# Patient Record
Sex: FEMALE | Race: WHITE | Employment: OTHER | URBAN - METROPOLITAN AREA
[De-identification: names, ages, dates, MRNs, and addresses within clinical notes are randomized per-mention and may not be internally consistent; named-entity substitution may affect disease eponyms.]

---

## 2017-01-03 ENCOUNTER — OFFICE VISIT (OUTPATIENT)
Dept: CARDIOLOGY | Age: 82
End: 2017-01-03

## 2017-01-03 VITALS
SYSTOLIC BLOOD PRESSURE: 136 MMHG | RESPIRATION RATE: 18 BRPM | HEART RATE: 68 BPM | HEIGHT: 65 IN | BODY MASS INDEX: 27.82 KG/M2 | DIASTOLIC BLOOD PRESSURE: 68 MMHG | OXYGEN SATURATION: 97 % | TEMPERATURE: 97 F | WEIGHT: 167 LBS

## 2017-01-03 DIAGNOSIS — Z79.899 ENCOUNTER FOR MONITORING SOTALOL THERAPY: ICD-10-CM

## 2017-01-03 DIAGNOSIS — Z51.81 ENCOUNTER FOR MONITORING SOTALOL THERAPY: ICD-10-CM

## 2017-01-03 DIAGNOSIS — I49.5 SICK SINUS SYNDROME (HCC): ICD-10-CM

## 2017-01-03 DIAGNOSIS — Z02.89 PAIN MEDICATION AGREEMENT: ICD-10-CM

## 2017-01-03 DIAGNOSIS — R09.81 CHRONIC NASAL CONGESTION: ICD-10-CM

## 2017-01-03 DIAGNOSIS — J32.9 SINUSITIS, UNSPECIFIED CHRONICITY, UNSPECIFIED LOCATION: Primary | ICD-10-CM

## 2017-01-03 DIAGNOSIS — I47.1 SVT (SUPRAVENTRICULAR TACHYCARDIA) (HCC): ICD-10-CM

## 2017-01-03 PROCEDURE — 99214 OFFICE O/P EST MOD 30 MIN: CPT | Performed by: INTERNAL MEDICINE

## 2017-01-03 RX ORDER — TRAMADOL HYDROCHLORIDE 50 MG/1
50 TABLET ORAL DAILY PRN
Qty: 30 TABLET | Refills: 5 | Status: SHIPPED | OUTPATIENT
Start: 2017-01-03 | End: 2017-11-30

## 2017-01-05 DIAGNOSIS — I47.1 SVT (SUPRAVENTRICULAR TACHYCARDIA) (HCC): ICD-10-CM

## 2017-01-05 DIAGNOSIS — Z02.89 PAIN MEDICATION AGREEMENT: ICD-10-CM

## 2017-01-05 DIAGNOSIS — J32.9 SINUSITIS, UNSPECIFIED CHRONICITY, UNSPECIFIED LOCATION: ICD-10-CM

## 2017-01-05 LAB
ALBUMIN SERPL-MCNC: 3.9 G/DL (ref 3.9–4.9)
ALP BLD-CCNC: 76 U/L (ref 40–130)
ALT SERPL-CCNC: 8 U/L (ref 0–33)
ANION GAP SERPL CALCULATED.3IONS-SCNC: 10 MEQ/L (ref 7–13)
AST SERPL-CCNC: 16 U/L (ref 0–35)
BILIRUB SERPL-MCNC: 0.5 MG/DL (ref 0–1.2)
BUN BLDV-MCNC: 23 MG/DL (ref 8–23)
C-REACTIVE PROTEIN: 1.2 MG/L (ref 0–5)
CALCIUM SERPL-MCNC: 8.8 MG/DL (ref 8.6–10.2)
CHLORIDE BLD-SCNC: 103 MEQ/L (ref 98–107)
CO2: 26 MEQ/L (ref 22–29)
CREAT SERPL-MCNC: 0.75 MG/DL (ref 0.5–0.9)
GFR AFRICAN AMERICAN: >60
GFR NON-AFRICAN AMERICAN: >60
GLOBULIN: 2.1 G/DL (ref 2.3–3.5)
GLUCOSE BLD-MCNC: 90 MG/DL (ref 74–109)
HCT VFR BLD CALC: 41 % (ref 37–47)
HEMOGLOBIN: 13.6 G/DL (ref 12–16)
MCH RBC QN AUTO: 31.4 PG (ref 27–31.3)
MCHC RBC AUTO-ENTMCNC: 33.1 % (ref 33–37)
MCV RBC AUTO: 94.9 FL (ref 82–100)
PDW BLD-RTO: 13.8 % (ref 11.5–14.5)
PLATELET # BLD: 150 K/UL (ref 130–400)
POTASSIUM SERPL-SCNC: 4.4 MEQ/L (ref 3.5–5.1)
RBC # BLD: 4.32 M/UL (ref 4.2–5.4)
SODIUM BLD-SCNC: 139 MEQ/L (ref 132–144)
TOTAL PROTEIN: 6 G/DL (ref 6.4–8.1)
WBC # BLD: 6.7 K/UL (ref 4.8–10.8)

## 2017-01-08 LAB
6-ACETYLMORPHINE: NOT DETECTED
7-AMINOCLONAZEPAM: NOT DETECTED
ALPHA-OH-ALPRAZOLAM: NOT DETECTED
ALPRAZOLAM: NOT DETECTED
AMPHETAMINE: NOT DETECTED
BARBITURATES: NOT DETECTED
BENZOYLECGONINE: NOT DETECTED
BUPRENORPHINE: NOT DETECTED
CARISOPRODOL: NOT DETECTED
CLONAZEPAM: NOT DETECTED
CODEINE: NOT DETECTED
CREATININE URINE: 82.8 MG/DL (ref 20–400)
DIAZEPAM: NOT DETECTED
EER PAIN MGT DRUG PANEL, HIGH RES/EMIT U: NORMAL
ETHYL GLUCURONIDE: NOT DETECTED
FENTANYL: NOT DETECTED
HYDROCODONE: NOT DETECTED
HYDROMORPHONE: NOT DETECTED
LORAZEPAM: NOT DETECTED
MARIJUANA METABOLITE: NOT DETECTED
MDA: NOT DETECTED
MDEA: NOT DETECTED
MDMA URINE: NOT DETECTED
MEPERIDINE: NOT DETECTED
METHADONE: NOT DETECTED
METHAMPHETAMINE: NOT DETECTED
METHYLPHENIDATE: NOT DETECTED
MIDAZOLAM: NOT DETECTED
MORPHINE: NOT DETECTED
NORBUPRENORPHINE, FREE: NOT DETECTED
NORDIAZEPAM: NOT DETECTED
NORFENTANYL: NOT DETECTED
NORHYDROCODONE, URINE: NOT DETECTED
NOROXYCODONE: NOT DETECTED
NOROXYMORPHONE, URINE: NOT DETECTED
OXAZEPAM: NOT DETECTED
OXYCODONE: NOT DETECTED
OXYMORPHONE: NOT DETECTED
PAIN MANAGEMENT DRUG PANEL: NORMAL
PCP: NOT DETECTED
PHENTERMINE: NOT DETECTED
PROPOXYPHENE: NOT DETECTED
TAPENTADOL, URINE: NOT DETECTED
TAPENTADOL-O-SULFATE, URINE: NOT DETECTED
TEMAZEPAM: NOT DETECTED
TRAMADOL: NOT DETECTED
ZOLPIDEM: NOT DETECTED

## 2017-01-11 ASSESSMENT — ENCOUNTER SYMPTOMS
CHEST TIGHTNESS: 0
ALLERGIC/IMMUNOLOGIC NEGATIVE: 1
SHORTNESS OF BREATH: 0
SINUS PRESSURE: 1
GASTROINTESTINAL NEGATIVE: 1
EYES NEGATIVE: 1
BACK PAIN: 1

## 2017-02-20 ENCOUNTER — HOSPITAL ENCOUNTER (OUTPATIENT)
Dept: CARDIOLOGY | Age: 82
Discharge: HOME | End: 2017-02-20
Payer: MEDICARE

## 2017-02-20 PROCEDURE — 93293 PM PHONE R-STRIP DEVICE EVAL: CPT

## 2017-04-07 RX ORDER — SOTALOL HYDROCHLORIDE 80 MG/1
80 TABLET ORAL 2 TIMES DAILY
Qty: 180 TABLET | Refills: 3 | Status: SHIPPED | OUTPATIENT
Start: 2017-04-07 | End: 2018-04-02 | Stop reason: SDUPTHER

## 2017-05-09 ENCOUNTER — OFFICE VISIT (OUTPATIENT)
Dept: CARDIOLOGY | Age: 82
End: 2017-05-09

## 2017-05-09 VITALS
OXYGEN SATURATION: 97 % | BODY MASS INDEX: 25.49 KG/M2 | HEIGHT: 65 IN | DIASTOLIC BLOOD PRESSURE: 76 MMHG | RESPIRATION RATE: 16 BRPM | WEIGHT: 153 LBS | SYSTOLIC BLOOD PRESSURE: 147 MMHG | TEMPERATURE: 98 F | HEART RATE: 69 BPM

## 2017-05-09 DIAGNOSIS — Z95.0 PACEMAKER: Primary | ICD-10-CM

## 2017-05-09 DIAGNOSIS — Z51.81 ENCOUNTER FOR MONITORING SOTALOL THERAPY: ICD-10-CM

## 2017-05-09 DIAGNOSIS — I47.1 PAROXYSMAL SVT (SUPRAVENTRICULAR TACHYCARDIA) (HCC): ICD-10-CM

## 2017-05-09 DIAGNOSIS — Z79.899 ENCOUNTER FOR MONITORING SOTALOL THERAPY: ICD-10-CM

## 2017-05-09 PROCEDURE — 1090F PRES/ABSN URINE INCON ASSESS: CPT | Performed by: INTERNAL MEDICINE

## 2017-05-09 PROCEDURE — 4040F PNEUMOC VAC/ADMIN/RCVD: CPT | Performed by: INTERNAL MEDICINE

## 2017-05-09 PROCEDURE — 1123F ACP DISCUSS/DSCN MKR DOCD: CPT | Performed by: INTERNAL MEDICINE

## 2017-05-09 PROCEDURE — G8428 CUR MEDS NOT DOCUMENT: HCPCS | Performed by: INTERNAL MEDICINE

## 2017-05-09 PROCEDURE — G8420 CALC BMI NORM PARAMETERS: HCPCS | Performed by: INTERNAL MEDICINE

## 2017-05-09 PROCEDURE — G8400 PT W/DXA NO RESULTS DOC: HCPCS | Performed by: INTERNAL MEDICINE

## 2017-05-09 PROCEDURE — 1036F TOBACCO NON-USER: CPT | Performed by: INTERNAL MEDICINE

## 2017-05-09 PROCEDURE — 99213 OFFICE O/P EST LOW 20 MIN: CPT | Performed by: INTERNAL MEDICINE

## 2017-05-09 PROCEDURE — 93000 ELECTROCARDIOGRAM COMPLETE: CPT | Performed by: INTERNAL MEDICINE

## 2017-05-22 ENCOUNTER — HOSPITAL ENCOUNTER (OUTPATIENT)
Dept: CARDIOLOGY | Age: 82
Discharge: HOME | End: 2017-05-22
Payer: MEDICARE

## 2017-05-22 PROCEDURE — 93280 PM DEVICE PROGR EVAL DUAL: CPT

## 2017-05-26 ASSESSMENT — ENCOUNTER SYMPTOMS
GASTROINTESTINAL NEGATIVE: 1
ALLERGIC/IMMUNOLOGIC NEGATIVE: 1
EYES NEGATIVE: 1
CHEST TIGHTNESS: 0
SHORTNESS OF BREATH: 0

## 2017-06-18 ENCOUNTER — HOSPITAL ENCOUNTER (EMERGENCY)
Age: 82
Discharge: HOME | End: 2017-06-18
Attending: EMERGENCY MEDICINE
Payer: MEDICARE

## 2017-06-18 ENCOUNTER — APPOINTMENT (OUTPATIENT)
Dept: GENERAL RADIOLOGY | Age: 82
End: 2017-06-18
Payer: MEDICARE

## 2017-06-18 VITALS
HEART RATE: 72 BPM | DIASTOLIC BLOOD PRESSURE: 67 MMHG | OXYGEN SATURATION: 96 % | HEIGHT: 65 IN | TEMPERATURE: 97.9 F | SYSTOLIC BLOOD PRESSURE: 118 MMHG | RESPIRATION RATE: 18 BRPM | BODY MASS INDEX: 26.66 KG/M2 | WEIGHT: 160 LBS

## 2017-06-18 DIAGNOSIS — M10.00 ACUTE IDIOPATHIC GOUT, UNSPECIFIED SITE: Primary | ICD-10-CM

## 2017-06-18 PROCEDURE — 99283 EMERGENCY DEPT VISIT LOW MDM: CPT

## 2017-06-18 PROCEDURE — 73110 X-RAY EXAM OF WRIST: CPT

## 2017-06-18 PROCEDURE — 6370000000 HC RX 637 (ALT 250 FOR IP): Performed by: EMERGENCY MEDICINE

## 2017-06-18 PROCEDURE — 6360000002 HC RX W HCPCS: Performed by: EMERGENCY MEDICINE

## 2017-06-18 PROCEDURE — 96372 THER/PROPH/DIAG INJ SC/IM: CPT

## 2017-06-18 RX ORDER — DEXAMETHASONE SODIUM PHOSPHATE 10 MG/ML
8 INJECTION, SOLUTION INTRAMUSCULAR; INTRAVENOUS ONCE
Status: COMPLETED | OUTPATIENT
Start: 2017-06-18 | End: 2017-06-18

## 2017-06-18 RX ORDER — HYDROCODONE BITARTRATE AND ACETAMINOPHEN 5; 325 MG/1; MG/1
1 TABLET ORAL ONCE
Status: COMPLETED | OUTPATIENT
Start: 2017-06-18 | End: 2017-06-18

## 2017-06-18 RX ORDER — HYDROCODONE BITARTRATE AND ACETAMINOPHEN 5; 325 MG/1; MG/1
1 TABLET ORAL EVERY 6 HOURS PRN
Qty: 20 TABLET | Refills: 0 | Status: SHIPPED | OUTPATIENT
Start: 2017-06-18 | End: 2017-06-25

## 2017-06-18 RX ORDER — COLCHICINE 0.6 MG/1
1.2 TABLET ORAL ONCE
Status: COMPLETED | OUTPATIENT
Start: 2017-06-18 | End: 2017-06-18

## 2017-06-18 RX ORDER — COLCHICINE 0.6 MG/1
0.6 TABLET ORAL ONCE
Status: COMPLETED | OUTPATIENT
Start: 2017-06-18 | End: 2017-06-18

## 2017-06-18 RX ORDER — PREDNISONE 20 MG/1
40 TABLET ORAL DAILY
Qty: 10 TABLET | Refills: 0 | Status: SHIPPED | OUTPATIENT
Start: 2017-06-18 | End: 2017-06-23

## 2017-06-18 RX ADMIN — COLCHICINE 1.2 MG: 0.6 TABLET, FILM COATED ORAL at 11:12

## 2017-06-18 RX ADMIN — HYDROCODONE BITARTRATE AND ACETAMINOPHEN 1 TABLET: 5; 325 TABLET ORAL at 11:12

## 2017-06-18 RX ADMIN — COLCHICINE 0.6 MG: 0.6 TABLET, FILM COATED ORAL at 12:22

## 2017-06-18 RX ADMIN — DEXAMETHASONE SODIUM PHOSPHATE 8 MG: 10 INJECTION INTRAMUSCULAR; INTRAVENOUS at 11:12

## 2017-06-18 ASSESSMENT — PAIN DESCRIPTION - ORIENTATION: ORIENTATION: RIGHT

## 2017-06-18 ASSESSMENT — ENCOUNTER SYMPTOMS
EYE PAIN: 0
SORE THROAT: 0
VOMITING: 0
CHEST TIGHTNESS: 0
NAUSEA: 0
ABDOMINAL PAIN: 0
SHORTNESS OF BREATH: 0

## 2017-06-18 ASSESSMENT — PAIN SCALES - GENERAL
PAINLEVEL_OUTOF10: 8
PAINLEVEL_OUTOF10: 9
PAINLEVEL_OUTOF10: 8

## 2017-06-18 ASSESSMENT — PAIN DESCRIPTION - ONSET: ONSET: SUDDEN

## 2017-06-18 ASSESSMENT — PAIN DESCRIPTION - PAIN TYPE: TYPE: ACUTE PAIN

## 2017-06-18 ASSESSMENT — PAIN DESCRIPTION - LOCATION: LOCATION: WRIST;HAND

## 2017-06-18 ASSESSMENT — PAIN DESCRIPTION - DESCRIPTORS: DESCRIPTORS: THROBBING;SHARP

## 2017-06-18 ASSESSMENT — PAIN DESCRIPTION - DIRECTION: RADIATING_TOWARDS: TO ELBOW

## 2017-06-18 ASSESSMENT — PAIN DESCRIPTION - FREQUENCY: FREQUENCY: CONTINUOUS

## 2017-08-02 DIAGNOSIS — Z95.0 PACEMAKER: Chronic | ICD-10-CM

## 2017-08-02 DIAGNOSIS — I10 ESSENTIAL HYPERTENSION: ICD-10-CM

## 2017-08-02 DIAGNOSIS — I47.1 PAROXYSMAL SVT (SUPRAVENTRICULAR TACHYCARDIA) (HCC): Primary | Chronic | ICD-10-CM

## 2017-08-24 ENCOUNTER — HOSPITAL ENCOUNTER (OUTPATIENT)
Dept: CARDIOLOGY | Age: 82
Discharge: HOME | End: 2017-08-24
Payer: MEDICARE

## 2017-08-24 PROCEDURE — 93293 PM PHONE R-STRIP DEVICE EVAL: CPT

## 2017-09-26 ENCOUNTER — TELEPHONE (OUTPATIENT)
Dept: ADMINISTRATIVE | Age: 82
End: 2017-09-26

## 2017-11-28 ENCOUNTER — HOSPITAL ENCOUNTER (OUTPATIENT)
Dept: CARDIOLOGY | Age: 82
Discharge: HOME | End: 2017-11-28
Payer: MEDICARE

## 2017-11-28 PROCEDURE — 93296 REM INTERROG EVL PM/IDS: CPT

## 2017-11-30 ENCOUNTER — OFFICE VISIT (OUTPATIENT)
Dept: CARDIOLOGY | Age: 82
End: 2017-11-30

## 2017-11-30 VITALS
TEMPERATURE: 98.1 F | RESPIRATION RATE: 16 BRPM | HEIGHT: 65 IN | SYSTOLIC BLOOD PRESSURE: 130 MMHG | HEART RATE: 70 BPM | DIASTOLIC BLOOD PRESSURE: 62 MMHG | BODY MASS INDEX: 26.33 KG/M2 | WEIGHT: 158 LBS | OXYGEN SATURATION: 93 %

## 2017-11-30 DIAGNOSIS — I10 ESSENTIAL HYPERTENSION: ICD-10-CM

## 2017-11-30 DIAGNOSIS — Z95.0 PACEMAKER: Chronic | ICD-10-CM

## 2017-11-30 DIAGNOSIS — I47.1 PAROXYSMAL SVT (SUPRAVENTRICULAR TACHYCARDIA) (HCC): Chronic | ICD-10-CM

## 2017-11-30 DIAGNOSIS — I49.5 SICK SINUS SYNDROME (HCC): ICD-10-CM

## 2017-11-30 DIAGNOSIS — R06.09 DOE (DYSPNEA ON EXERTION): Primary | ICD-10-CM

## 2017-11-30 PROCEDURE — G8417 CALC BMI ABV UP PARAM F/U: HCPCS | Performed by: INTERNAL MEDICINE

## 2017-11-30 PROCEDURE — G8400 PT W/DXA NO RESULTS DOC: HCPCS | Performed by: INTERNAL MEDICINE

## 2017-11-30 PROCEDURE — 4040F PNEUMOC VAC/ADMIN/RCVD: CPT | Performed by: INTERNAL MEDICINE

## 2017-11-30 PROCEDURE — 1090F PRES/ABSN URINE INCON ASSESS: CPT | Performed by: INTERNAL MEDICINE

## 2017-11-30 PROCEDURE — 99214 OFFICE O/P EST MOD 30 MIN: CPT | Performed by: INTERNAL MEDICINE

## 2017-11-30 PROCEDURE — 1036F TOBACCO NON-USER: CPT | Performed by: INTERNAL MEDICINE

## 2017-11-30 PROCEDURE — G8484 FLU IMMUNIZE NO ADMIN: HCPCS | Performed by: INTERNAL MEDICINE

## 2017-11-30 PROCEDURE — G8427 DOCREV CUR MEDS BY ELIG CLIN: HCPCS | Performed by: INTERNAL MEDICINE

## 2017-11-30 PROCEDURE — 93000 ELECTROCARDIOGRAM COMPLETE: CPT | Performed by: INTERNAL MEDICINE

## 2017-11-30 PROCEDURE — 1123F ACP DISCUSS/DSCN MKR DOCD: CPT | Performed by: INTERNAL MEDICINE

## 2017-11-30 ASSESSMENT — ENCOUNTER SYMPTOMS
BLOOD IN STOOL: 0
STRIDOR: 0
CHEST TIGHTNESS: 0
EYES NEGATIVE: 1
SHORTNESS OF BREATH: 1
GASTROINTESTINAL NEGATIVE: 1
NAUSEA: 0
COUGH: 0
WHEEZING: 0

## 2017-11-30 NOTE — PROGRESS NOTES
Subsequent Progress Note  Patient: Eduardo Mosquera  YOB: 1933  MRN: [id number]    Chief Complaint: bynum tachycardia  Chief Complaint   Patient presents with    Tachycardia     6 m f/u [place] pt, pacemaker       Subjective/HPI  Having worse bynum taking meds no cp no falls no bleed     EKG:SR 70   Past Medical History:   Diagnosis Date    Arthritis     BILAT    Lower leg edema     BILAT     Osteoarthritis     Pacemaker 2009    FOR SYNCOPE    Paroxysmal SVT (supraventricular tachycardia) (HCC)     Paroxysmal SVT (supraventricular tachycardia) (HCC)     Sinusitis        Past Surgical History:   Procedure Laterality Date    PACEMAKER INSERTION N/A 2009       No family history on file. Social History     Social History    Marital status:      Spouse name: N/A    Number of children: N/A    Years of education: N/A     Social History Main Topics    Smoking status: Passive Smoke Exposure - Never Smoker    Smokeless tobacco: Never Used    Alcohol use None    Drug use: No    Sexual activity: Not Asked     Other Topics Concern    None     Social History Narrative    None       Allergies   Allergen Reactions    Codeine      Other reaction(s): GI Upset    Penicillins      Other reaction(s): Intolerance       Current Outpatient Prescriptions   Medication Sig Dispense Refill    sotalol (BETAPACE) 80 MG tablet Take 1 tablet by mouth 2 times daily 180 tablet 3    verapamil (COVERA HS) 180 MG CR tablet Take 1 tablet by mouth nightly 90 tablet 3     No current facility-administered medications for this visit. Review of Systems:   Review of Systems   Constitutional: Negative. Negative for diaphoresis and fatigue. HENT: Negative. Eyes: Negative. Respiratory: Positive for shortness of breath. Negative for cough, chest tightness, wheezing and stridor. Cardiovascular: Positive for palpitations. Negative for chest pain and leg swelling. Gastrointestinal: Negative. Negative for blood in stool and nausea. Genitourinary: Negative. Musculoskeletal: Negative. Skin: Negative. Neurological: Negative. Negative for dizziness, syncope, weakness and light-headedness. Hematological: Negative. Psychiatric/Behavioral: Negative. Physical Examination:    /62 (Site: Left Arm, Position: Sitting, Cuff Size: Large Adult)   Pulse 70   Temp 98.1 °F (36.7 °C) (Temporal)   Resp 16   Ht 5' 5\" (1.651 m)   Wt 158 lb (71.7 kg)   SpO2 93%   BMI 26.29 kg/m²    Physical Exam   Constitutional: She appears healthy. No distress. HENT:   Normal cephalic and Atraumatic   Eyes: Pupils are equal, round, and reactive to light. Neck: Normal range of motion and thyroid normal. Neck supple. No JVD present. No neck adenopathy. No thyromegaly present. Cardiovascular: Normal rate, regular rhythm, normal heart sounds, intact distal pulses and normal pulses. Pulmonary/Chest: Effort normal and breath sounds normal. She has no wheezes. She has no rales. She exhibits no tenderness. Abdominal: Soft. Bowel sounds are normal. There is no tenderness. Musculoskeletal: Normal range of motion. She exhibits no edema or tenderness. Neurological: She is alert and oriented to person, place, and time. Skin: Skin is warm. No cyanosis. Nails show no clubbing.        LABS:  CBC:   Lab Results   Component Value Date    WBC 6.7 01/05/2017    RBC 4.32 01/05/2017    RBC 4.22 01/03/2012    HGB 13.6 01/05/2017    HCT 41.0 01/05/2017    MCV 94.9 01/05/2017    MCH 31.4 01/05/2017    MCHC 33.1 01/05/2017    RDW 13.8 01/05/2017     01/05/2017    MPV 8.2 06/27/2015     Lipids:  Lab Results   Component Value Date    CHOL 175 10/22/2014    CHOL 175 11/10/2013    CHOL 164 01/03/2012     Lab Results   Component Value Date    TRIG 65 10/22/2014    TRIG 88 11/10/2013    TRIG 75 01/03/2012     Lab Results   Component Value Date    HDL 61 (H) 10/22/2014    HDL 57 11/10/2013    HDL 52 01/03/2012 Lab Results   Component Value Date    LDLCALC 101 10/22/2014    LDLCALC 100 11/10/2013    LDLCALC 100 01/03/2012     No results found for: LABVLDL, VLDL  Lab Results   Component Value Date    CHOLHDLRATIO 3.2 01/03/2012     CMP:    Lab Results   Component Value Date     01/05/2017    K 4.4 01/05/2017     01/05/2017    CO2 26 01/05/2017    BUN 23 01/05/2017    CREATININE 0.75 01/05/2017    GFRAA >60.0 01/05/2017    LABGLOM >60.0 01/05/2017    GLUCOSE 90 01/05/2017    GLUCOSE 93 01/03/2012    PROT 6.0 01/05/2017    LABALBU 3.9 01/05/2017    LABALBU 4.0 01/03/2012    CALCIUM 8.8 01/05/2017    BILITOT 0.5 01/05/2017    ALKPHOS 76 01/05/2017    AST 16 01/05/2017    ALT 8 01/05/2017     BMP:    Lab Results   Component Value Date     01/05/2017    K 4.4 01/05/2017     01/05/2017    CO2 26 01/05/2017    BUN 23 01/05/2017    LABALBU 3.9 01/05/2017    LABALBU 4.0 01/03/2012    CREATININE 0.75 01/05/2017    CALCIUM 8.8 01/05/2017    GFRAA >60.0 01/05/2017    LABGLOM >60.0 01/05/2017    GLUCOSE 90 01/05/2017    GLUCOSE 93 01/03/2012     Magnesium:    Lab Results   Component Value Date    MG 2.0 10/22/2014    MG 2.0 01/03/2012     TSH:  Lab Results   Component Value Date    TSH 2.360 10/22/2014       Patient Active Problem List   Diagnosis    Disorder of lower leg    Pain in extremity    History of repair of hip joint    Paroxysmal SVT (supraventricular tachycardia) (HCC)    Pacemaker    Sick sinus syndrome (HCC)    Essential hypertension    DUMONT (dyspnea on exertion)       Assessment/Plan:    1. Paroxysmal SVT (supraventricular tachycardia) (HCC)  BB  - EKG 12 Lead    2. Pacemaker  Interrogation report noted. normla DDD fxn and good batterylife    3. DUMONT (dyspnea on exertion)     - ECHO Complete 2D W Doppler W Color; Future  - NM Myocardial Spect Rest Exercise or Rx; Future  - Comprehensive Metabolic Panel; Future  - Lipid Panel; Future  - CBC; Future  - TSH without Reflex;  Future  -

## 2017-12-05 DIAGNOSIS — R06.09 DOE (DYSPNEA ON EXERTION): ICD-10-CM

## 2017-12-05 LAB
ALBUMIN SERPL-MCNC: 3.8 G/DL (ref 3.9–4.9)
ALP BLD-CCNC: 62 U/L (ref 40–130)
ALT SERPL-CCNC: 8 U/L (ref 0–33)
ANION GAP SERPL CALCULATED.3IONS-SCNC: 18 MEQ/L (ref 7–13)
AST SERPL-CCNC: 15 U/L (ref 0–35)
BILIRUB SERPL-MCNC: 0.4 MG/DL (ref 0–1.2)
BUN BLDV-MCNC: 28 MG/DL (ref 8–23)
CALCIUM SERPL-MCNC: 8.9 MG/DL (ref 8.6–10.2)
CHLORIDE BLD-SCNC: 103 MEQ/L (ref 98–107)
CHOLESTEROL, TOTAL: 171 MG/DL (ref 0–199)
CO2: 24 MEQ/L (ref 22–29)
CREAT SERPL-MCNC: 0.65 MG/DL (ref 0.5–0.9)
GFR AFRICAN AMERICAN: >60
GFR NON-AFRICAN AMERICAN: >60
GLOBULIN: 2.4 G/DL (ref 2.3–3.5)
GLUCOSE BLD-MCNC: 86 MG/DL (ref 74–109)
HCT VFR BLD CALC: 39.5 % (ref 37–47)
HDLC SERPL-MCNC: 58 MG/DL (ref 40–59)
HEMOGLOBIN: 12.9 G/DL (ref 12–16)
LDL CHOLESTEROL CALCULATED: 99 MG/DL (ref 0–129)
MAGNESIUM: 2.1 MG/DL (ref 1.7–2.3)
MCH RBC QN AUTO: 31.3 PG (ref 27–31.3)
MCHC RBC AUTO-ENTMCNC: 32.7 % (ref 33–37)
MCV RBC AUTO: 95.6 FL (ref 82–100)
PDW BLD-RTO: 15.6 % (ref 11.5–14.5)
PLATELET # BLD: 227 K/UL (ref 130–400)
POTASSIUM SERPL-SCNC: 4.2 MEQ/L (ref 3.5–5.1)
RBC # BLD: 4.13 M/UL (ref 4.2–5.4)
SODIUM BLD-SCNC: 145 MEQ/L (ref 132–144)
TOTAL PROTEIN: 6.2 G/DL (ref 6.4–8.1)
TRIGL SERPL-MCNC: 71 MG/DL (ref 0–200)
TSH SERPL DL<=0.05 MIU/L-ACNC: 2.1 UIU/ML (ref 0.27–4.2)
WBC # BLD: 7 K/UL (ref 4.8–10.8)

## 2017-12-07 ENCOUNTER — HOSPITAL ENCOUNTER (OUTPATIENT)
Dept: NON INVASIVE DIAGNOSTICS | Age: 82
Discharge: HOME | End: 2017-12-07
Payer: MEDICARE

## 2017-12-07 ENCOUNTER — HOSPITAL ENCOUNTER (OUTPATIENT)
Dept: NUCLEAR MEDICINE | Age: 82
Discharge: HOME | End: 2017-12-07
Payer: MEDICARE

## 2017-12-07 DIAGNOSIS — R06.09 DOE (DYSPNEA ON EXERTION): ICD-10-CM

## 2017-12-07 LAB
LV EF: 60 %
LVEF MODALITY: NORMAL

## 2017-12-07 PROCEDURE — 3430000000 HC RX DIAGNOSTIC RADIOPHARMACEUTICAL: Performed by: INTERNAL MEDICINE

## 2017-12-07 PROCEDURE — 6360000002 HC RX W HCPCS: Performed by: INTERNAL MEDICINE

## 2017-12-07 PROCEDURE — 93018 CV STRESS TEST I&R ONLY: CPT | Performed by: INTERNAL MEDICINE

## 2017-12-07 PROCEDURE — 93306 TTE W/DOPPLER COMPLETE: CPT

## 2017-12-07 PROCEDURE — 2580000003 HC RX 258: Performed by: INTERNAL MEDICINE

## 2017-12-07 PROCEDURE — A9502 TC99M TETROFOSMIN: HCPCS | Performed by: INTERNAL MEDICINE

## 2017-12-07 PROCEDURE — 93017 CV STRESS TEST TRACING ONLY: CPT

## 2017-12-07 PROCEDURE — 78452 HT MUSCLE IMAGE SPECT MULT: CPT

## 2017-12-07 RX ORDER — SODIUM CHLORIDE 0.9 % (FLUSH) 0.9 %
10 SYRINGE (ML) INJECTION PRN
Status: DISCONTINUED | OUTPATIENT
Start: 2017-12-07 | End: 2017-12-10 | Stop reason: HOSPADM

## 2017-12-07 RX ADMIN — Medication 10 ML: at 08:25

## 2017-12-07 RX ADMIN — REGADENOSON 0.4 MG: 0.08 INJECTION, SOLUTION INTRAVENOUS at 10:02

## 2017-12-07 RX ADMIN — TETROFOSMIN 31.2 MILLICURIE: 0.23 INJECTION, POWDER, LYOPHILIZED, FOR SOLUTION INTRAVENOUS at 10:02

## 2017-12-07 RX ADMIN — TETROFOSMIN 11.9 MILLICURIE: 0.23 INJECTION, POWDER, LYOPHILIZED, FOR SOLUTION INTRAVENOUS at 08:25

## 2017-12-07 RX ADMIN — Medication 20 ML: at 10:02

## 2017-12-07 NOTE — PROCEDURES
[address]                       [address], [address] [facility]                                CARDIAC STRESS TEST    PATIENT NAME: Belle Ricci                    :        1933  MED REC NO:   [id number]                            ROOM:  ACCOUNT NO:   [de-identified]                           ADMIT DATE: 2017  PROVIDER:     Yaneli Frankel MD    CARDIOVASCULAR DIAGNOSTIC DEPARTMENT    DATE OF STUDY:  2017    PROCEDURE:  [facility] nuclear stress test.    REFERRING PHYSICIAN:  Ernie Strauss MD    INDICATIONS:  Dyspnea on exertion and tachycardia. PROCEDURE DETAILS:  The patient stressed according to Lexiscan protocol  with no significant event and no chest pain or EKG changes. The patient  has baseline intraventricular conduction block. The patient received 11.9 mCi Myoview at 8:25 a.m. and 31.2 mCi Myoview at  10:02 a.m. during stress. Review of raw images demonstrates some breast  attenuation. FINDINGS:  Uptake of the tracer was generally homogenous. There was a  small mid anterior fixed defect which I suspect is breast attenuation  artifact. The ejection fraction was 58% with end-diastolic volume of 75  mL. CONCLUSIONS:  Negative Lexiscan nuclear stress test.  Small mid anterior  perfusion defect that is fixed, probably breast attenuation.       Deny Gar MD    D: 2017 13:22:59       T: 2017 13:40:02     [email or web address]  Job#: [id number]     Doc#: [id number]    CC:

## 2017-12-07 NOTE — PROGRESS NOTES
Stress part of test complete. Mild SOB, face flushed, denies chest pain and or cardiac related symptoms. VSS, uneventful recovery. To cafeteria in stable condition, pain free.

## 2017-12-29 ENCOUNTER — OFFICE VISIT (OUTPATIENT)
Dept: CARDIOLOGY | Age: 82
End: 2017-12-29

## 2017-12-29 VITALS
WEIGHT: 158 LBS | DIASTOLIC BLOOD PRESSURE: 70 MMHG | HEART RATE: 70 BPM | TEMPERATURE: 98.7 F | RESPIRATION RATE: 16 BRPM | OXYGEN SATURATION: 97 % | BODY MASS INDEX: 26.33 KG/M2 | HEIGHT: 65 IN | SYSTOLIC BLOOD PRESSURE: 132 MMHG

## 2017-12-29 DIAGNOSIS — Z95.0 PACEMAKER: Chronic | ICD-10-CM

## 2017-12-29 DIAGNOSIS — I49.5 SICK SINUS SYNDROME (HCC): ICD-10-CM

## 2017-12-29 DIAGNOSIS — I10 ESSENTIAL HYPERTENSION: ICD-10-CM

## 2017-12-29 DIAGNOSIS — R06.09 DOE (DYSPNEA ON EXERTION): ICD-10-CM

## 2017-12-29 DIAGNOSIS — I47.1 PAROXYSMAL SVT (SUPRAVENTRICULAR TACHYCARDIA) (HCC): Chronic | ICD-10-CM

## 2017-12-29 PROCEDURE — 93000 ELECTROCARDIOGRAM COMPLETE: CPT | Performed by: INTERNAL MEDICINE

## 2017-12-29 PROCEDURE — G8417 CALC BMI ABV UP PARAM F/U: HCPCS | Performed by: INTERNAL MEDICINE

## 2017-12-29 PROCEDURE — G8428 CUR MEDS NOT DOCUMENT: HCPCS | Performed by: INTERNAL MEDICINE

## 2017-12-29 PROCEDURE — 4040F PNEUMOC VAC/ADMIN/RCVD: CPT | Performed by: INTERNAL MEDICINE

## 2017-12-29 PROCEDURE — 99214 OFFICE O/P EST MOD 30 MIN: CPT | Performed by: INTERNAL MEDICINE

## 2017-12-29 PROCEDURE — G8400 PT W/DXA NO RESULTS DOC: HCPCS | Performed by: INTERNAL MEDICINE

## 2017-12-29 PROCEDURE — 1123F ACP DISCUSS/DSCN MKR DOCD: CPT | Performed by: INTERNAL MEDICINE

## 2017-12-29 PROCEDURE — 1090F PRES/ABSN URINE INCON ASSESS: CPT | Performed by: INTERNAL MEDICINE

## 2017-12-29 PROCEDURE — G8484 FLU IMMUNIZE NO ADMIN: HCPCS | Performed by: INTERNAL MEDICINE

## 2017-12-29 PROCEDURE — 1036F TOBACCO NON-USER: CPT | Performed by: INTERNAL MEDICINE

## 2017-12-29 ASSESSMENT — ENCOUNTER SYMPTOMS
STRIDOR: 0
RESPIRATORY NEGATIVE: 1
BLOOD IN STOOL: 0
GASTROINTESTINAL NEGATIVE: 1
WHEEZING: 0
COUGH: 0
CHEST TIGHTNESS: 0
EYES NEGATIVE: 1
SHORTNESS OF BREATH: 0
NAUSEA: 0

## 2017-12-29 NOTE — PROGRESS NOTES
hypertension     - EKG 12 Lead    5. DUMONT (dyspnea on exertion)  stable       Counseling:  Heart Healthy Lifestyle, Low Salt Diet, Take Precautions to Prevent Falls and Walk Daily    Return in about 4 months (around 4/29/2018) for Cardiovascular care. .      Electronically signed by Kati Owens MD on 12/29/2017 at 1:50 PM

## 2018-02-27 ENCOUNTER — HOSPITAL ENCOUNTER (OUTPATIENT)
Dept: CARDIOLOGY | Age: 83
Discharge: HOME | End: 2018-02-27
Payer: MEDICARE

## 2018-02-27 PROCEDURE — 93293 PM PHONE R-STRIP DEVICE EVAL: CPT

## 2018-04-02 RX ORDER — SOTALOL HYDROCHLORIDE 80 MG/1
80 TABLET ORAL 2 TIMES DAILY
Qty: 60 TABLET | Refills: 0 | Status: SHIPPED | OUTPATIENT
Start: 2018-04-02 | End: 2018-04-30 | Stop reason: SDUPTHER

## 2018-04-30 ENCOUNTER — OFFICE VISIT (OUTPATIENT)
Dept: CARDIOLOGY CLINIC | Age: 83
End: 2018-04-30
Payer: MEDICARE

## 2018-04-30 VITALS
DIASTOLIC BLOOD PRESSURE: 68 MMHG | HEIGHT: 65 IN | BODY MASS INDEX: 27.16 KG/M2 | SYSTOLIC BLOOD PRESSURE: 136 MMHG | HEART RATE: 69 BPM | OXYGEN SATURATION: 97 % | WEIGHT: 163 LBS | RESPIRATION RATE: 16 BRPM

## 2018-04-30 DIAGNOSIS — I47.1 PAROXYSMAL SVT (SUPRAVENTRICULAR TACHYCARDIA) (HCC): Chronic | ICD-10-CM

## 2018-04-30 DIAGNOSIS — R06.09 DOE (DYSPNEA ON EXERTION): ICD-10-CM

## 2018-04-30 DIAGNOSIS — Z95.0 PACEMAKER: Chronic | ICD-10-CM

## 2018-04-30 DIAGNOSIS — I10 ESSENTIAL HYPERTENSION: ICD-10-CM

## 2018-04-30 DIAGNOSIS — I49.5 SICK SINUS SYNDROME (HCC): ICD-10-CM

## 2018-04-30 PROCEDURE — 1036F TOBACCO NON-USER: CPT | Performed by: INTERNAL MEDICINE

## 2018-04-30 PROCEDURE — 93000 ELECTROCARDIOGRAM COMPLETE: CPT | Performed by: INTERNAL MEDICINE

## 2018-04-30 PROCEDURE — 1123F ACP DISCUSS/DSCN MKR DOCD: CPT | Performed by: INTERNAL MEDICINE

## 2018-04-30 PROCEDURE — 4040F PNEUMOC VAC/ADMIN/RCVD: CPT | Performed by: INTERNAL MEDICINE

## 2018-04-30 PROCEDURE — 1090F PRES/ABSN URINE INCON ASSESS: CPT | Performed by: INTERNAL MEDICINE

## 2018-04-30 PROCEDURE — G8417 CALC BMI ABV UP PARAM F/U: HCPCS | Performed by: INTERNAL MEDICINE

## 2018-04-30 PROCEDURE — G8427 DOCREV CUR MEDS BY ELIG CLIN: HCPCS | Performed by: INTERNAL MEDICINE

## 2018-04-30 PROCEDURE — 99214 OFFICE O/P EST MOD 30 MIN: CPT | Performed by: INTERNAL MEDICINE

## 2018-04-30 PROCEDURE — G8400 PT W/DXA NO RESULTS DOC: HCPCS | Performed by: INTERNAL MEDICINE

## 2018-04-30 RX ORDER — SOTALOL HYDROCHLORIDE 80 MG/1
80 TABLET ORAL 2 TIMES DAILY
Qty: 180 TABLET | Refills: 2 | Status: SHIPPED | OUTPATIENT
Start: 2018-04-30 | End: 2019-01-17 | Stop reason: SDUPTHER

## 2018-04-30 ASSESSMENT — ENCOUNTER SYMPTOMS
CHEST TIGHTNESS: 0
NAUSEA: 0
RESPIRATORY NEGATIVE: 1
EYES NEGATIVE: 1
GASTROINTESTINAL NEGATIVE: 1
BLOOD IN STOOL: 0
STRIDOR: 0
COUGH: 0
WHEEZING: 0
SHORTNESS OF BREATH: 0

## 2018-05-29 ENCOUNTER — HOSPITAL ENCOUNTER (OUTPATIENT)
Dept: CARDIOLOGY | Age: 83
Discharge: HOME | End: 2018-05-29
Payer: MEDICARE

## 2018-05-29 PROCEDURE — 93280 PM DEVICE PROGR EVAL DUAL: CPT

## 2018-07-25 DIAGNOSIS — I10 ESSENTIAL HYPERTENSION: ICD-10-CM

## 2018-08-28 ENCOUNTER — HOSPITAL ENCOUNTER (OUTPATIENT)
Dept: CARDIOLOGY | Age: 83
Discharge: HOME | End: 2018-08-28
Payer: MEDICARE

## 2018-08-28 PROCEDURE — 93293 PM PHONE R-STRIP DEVICE EVAL: CPT

## 2018-08-31 ENCOUNTER — OFFICE VISIT (OUTPATIENT)
Dept: CARDIOLOGY CLINIC | Age: 83
End: 2018-08-31
Payer: MEDICARE

## 2018-08-31 VITALS
WEIGHT: 158 LBS | SYSTOLIC BLOOD PRESSURE: 136 MMHG | BODY MASS INDEX: 26.33 KG/M2 | DIASTOLIC BLOOD PRESSURE: 64 MMHG | HEIGHT: 65 IN | RESPIRATION RATE: 16 BRPM | HEART RATE: 69 BPM | OXYGEN SATURATION: 99 %

## 2018-08-31 DIAGNOSIS — I10 ESSENTIAL HYPERTENSION: ICD-10-CM

## 2018-08-31 DIAGNOSIS — Z95.0 PACEMAKER: Chronic | ICD-10-CM

## 2018-08-31 DIAGNOSIS — I47.1 PAROXYSMAL SVT (SUPRAVENTRICULAR TACHYCARDIA) (HCC): Chronic | ICD-10-CM

## 2018-08-31 DIAGNOSIS — I49.5 SICK SINUS SYNDROME (HCC): ICD-10-CM

## 2018-08-31 DIAGNOSIS — R06.09 DOE (DYSPNEA ON EXERTION): ICD-10-CM

## 2018-08-31 LAB
ANION GAP SERPL CALCULATED.3IONS-SCNC: 16 MEQ/L (ref 7–13)
BUN BLDV-MCNC: 23 MG/DL (ref 8–23)
CALCIUM SERPL-MCNC: 9.2 MG/DL (ref 8.6–10.2)
CHLORIDE BLD-SCNC: 101 MEQ/L (ref 98–107)
CO2: 25 MEQ/L (ref 22–29)
CREAT SERPL-MCNC: 0.76 MG/DL (ref 0.5–0.9)
GFR AFRICAN AMERICAN: >60
GFR NON-AFRICAN AMERICAN: >60
GLUCOSE BLD-MCNC: 79 MG/DL (ref 74–109)
HCT VFR BLD CALC: 43.4 % (ref 37–47)
HEMOGLOBIN: 14.7 G/DL (ref 12–16)
MCH RBC QN AUTO: 32.4 PG (ref 27–31.3)
MCHC RBC AUTO-ENTMCNC: 33.8 % (ref 33–37)
MCV RBC AUTO: 95.9 FL (ref 82–100)
PDW BLD-RTO: 13.7 % (ref 11.5–14.5)
PLATELET # BLD: 202 K/UL (ref 130–400)
POTASSIUM SERPL-SCNC: 4 MEQ/L (ref 3.5–5.1)
RBC # BLD: 4.52 M/UL (ref 4.2–5.4)
SODIUM BLD-SCNC: 142 MEQ/L (ref 132–144)
WBC # BLD: 7.3 K/UL (ref 4.8–10.8)

## 2018-08-31 PROCEDURE — 93000 ELECTROCARDIOGRAM COMPLETE: CPT | Performed by: INTERNAL MEDICINE

## 2018-08-31 PROCEDURE — G8427 DOCREV CUR MEDS BY ELIG CLIN: HCPCS | Performed by: INTERNAL MEDICINE

## 2018-08-31 PROCEDURE — 1090F PRES/ABSN URINE INCON ASSESS: CPT | Performed by: INTERNAL MEDICINE

## 2018-08-31 PROCEDURE — 1101F PT FALLS ASSESS-DOCD LE1/YR: CPT | Performed by: INTERNAL MEDICINE

## 2018-08-31 PROCEDURE — G8417 CALC BMI ABV UP PARAM F/U: HCPCS | Performed by: INTERNAL MEDICINE

## 2018-08-31 PROCEDURE — 4040F PNEUMOC VAC/ADMIN/RCVD: CPT | Performed by: INTERNAL MEDICINE

## 2018-08-31 PROCEDURE — 1123F ACP DISCUSS/DSCN MKR DOCD: CPT | Performed by: INTERNAL MEDICINE

## 2018-08-31 PROCEDURE — G8400 PT W/DXA NO RESULTS DOC: HCPCS | Performed by: INTERNAL MEDICINE

## 2018-08-31 PROCEDURE — 99215 OFFICE O/P EST HI 40 MIN: CPT | Performed by: INTERNAL MEDICINE

## 2018-08-31 PROCEDURE — 1036F TOBACCO NON-USER: CPT | Performed by: INTERNAL MEDICINE

## 2018-08-31 ASSESSMENT — ENCOUNTER SYMPTOMS
EYES NEGATIVE: 1
CHEST TIGHTNESS: 0
BLOOD IN STOOL: 0
GASTROINTESTINAL NEGATIVE: 1
SHORTNESS OF BREATH: 1
WHEEZING: 0
COUGH: 0
STRIDOR: 0
NAUSEA: 0

## 2018-08-31 NOTE — PROGRESS NOTES
Subsequent Progress Note  Patient: Mike Haro  YOB: 1933  MRN: [id number]    Chief Complaint:DUMONT pacer  Chief Complaint   Patient presents with    Hypertension     4 m    Tachycardia       CV Data:  12/2017 spect negative  12/2017 echo ef 60% Trace AR  Subjective/HPI: more dumont. No cp. No falls  No bleed mild edema     EKG:SR 70 QTc 453    Past Medical History:   Diagnosis Date    Arthritis     BILAT    Lower leg edema     BILAT     Osteoarthritis     Pacemaker 2009    FOR SYNCOPE    Paroxysmal SVT (supraventricular tachycardia) (HCC)     Paroxysmal SVT (supraventricular tachycardia) (HCC)     Sinusitis        Past Surgical History:   Procedure Laterality Date    PACEMAKER INSERTION N/A 2009       Family History   Problem Relation Age of Onset    Cancer Mother     No Known Problems Father        Social History     Social History    Marital status:      Spouse name: N/A    Number of children: N/A    Years of education: N/A     Social History Main Topics    Smoking status: Passive Smoke Exposure - Never Smoker    Smokeless tobacco: Never Used    Alcohol use None    Drug use: No    Sexual activity: Not Asked     Other Topics Concern    None     Social History Narrative    None       Allergies   Allergen Reactions    Codeine      Other reaction(s): GI Upset    Penicillins      Other reaction(s): Intolerance       Current Outpatient Prescriptions   Medication Sig Dispense Refill    verapamil (CALAN SR) 180 MG extended release tablet Take 1 tablet by mouth daily 90 tablet 3    sotalol (BETAPACE) 80 MG tablet Take 1 tablet by mouth 2 times daily 180 tablet 2    aspirin 81 MG tablet Take 1 tablet by mouth daily With Food 30 tablet 3     No current facility-administered medications for this visit. Review of Systems:   Review of Systems   Constitutional: Positive for fatigue. Negative for diaphoresis. HENT: Negative. Eyes: Negative.     Respiratory: Positive for 12/05/2017    TRIG 65 10/22/2014    TRIG 88 11/10/2013     Lab Results   Component Value Date    HDL 58 12/05/2017    HDL 61 (H) 10/22/2014    HDL 57 11/10/2013     Lab Results   Component Value Date    LDLCALC 99 12/05/2017    LDLCALC 101 10/22/2014    LDLCALC 100 11/10/2013     No results found for: LABVLDL, VLDL  Lab Results   Component Value Date    CHOLHDLRATIO 3.2 01/03/2012     CMP:    Lab Results   Component Value Date     12/05/2017    K 4.2 12/05/2017     12/05/2017    CO2 24 12/05/2017    BUN 28 12/05/2017    CREATININE 0.65 12/05/2017    GFRAA >60.0 12/05/2017    LABGLOM >60.0 12/05/2017    GLUCOSE 86 12/05/2017    GLUCOSE 93 01/03/2012    PROT 6.2 12/05/2017    LABALBU 3.8 12/05/2017    LABALBU 4.0 01/03/2012    CALCIUM 8.9 12/05/2017    BILITOT 0.4 12/05/2017    ALKPHOS 62 12/05/2017    AST 15 12/05/2017    ALT 8 12/05/2017     BMP:    Lab Results   Component Value Date     12/05/2017    K 4.2 12/05/2017     12/05/2017    CO2 24 12/05/2017    BUN 28 12/05/2017    LABALBU 3.8 12/05/2017    LABALBU 4.0 01/03/2012    CREATININE 0.65 12/05/2017    CALCIUM 8.9 12/05/2017    GFRAA >60.0 12/05/2017    LABGLOM >60.0 12/05/2017    GLUCOSE 86 12/05/2017    GLUCOSE 93 01/03/2012     Magnesium:    Lab Results   Component Value Date    MG 2.1 12/05/2017    MG 2.0 01/03/2012     TSH:  Lab Results   Component Value Date    TSH 2.100 12/05/2017       Patient Active Problem List   Diagnosis    Disorder of lower leg    Pain in extremity    History of repair of hip joint    Paroxysmal SVT (supraventricular tachycardia) (HCC)    Pacemaker    Sick sinus syndrome (HCC)    Essential hypertension    DUMONT (dyspnea on exertion)       There are no discontinued medications. Modified Medications    No medications on file       No orders of the defined types were placed in this encounter. Assessment/Plan:    1. Sick sinus syndrome (HCC)     - EKG 12 lead    2.  Essential hypertension  Stable

## 2018-09-04 ENCOUNTER — HOSPITAL ENCOUNTER (OUTPATIENT)
Dept: CARDIAC CATH/INVASIVE PROCEDURES | Age: 83
Discharge: HOME | End: 2018-09-04
Attending: INTERNAL MEDICINE | Admitting: INTERNAL MEDICINE
Payer: MEDICARE

## 2018-09-04 VITALS
TEMPERATURE: 98 F | SYSTOLIC BLOOD PRESSURE: 107 MMHG | HEIGHT: 65 IN | DIASTOLIC BLOOD PRESSURE: 67 MMHG | WEIGHT: 158.73 LBS | RESPIRATION RATE: 24 BRPM | BODY MASS INDEX: 26.45 KG/M2 | HEART RATE: 70 BPM

## 2018-09-04 PROCEDURE — 2580000003 HC RX 258: Performed by: INTERNAL MEDICINE

## 2018-09-04 PROCEDURE — 6360000002 HC RX W HCPCS

## 2018-09-04 PROCEDURE — C1887 CATHETER, GUIDING: HCPCS

## 2018-09-04 PROCEDURE — 2720000010 HC SURG SUPPLY STERILE

## 2018-09-04 PROCEDURE — 93458 L HRT ARTERY/VENTRICLE ANGIO: CPT | Performed by: INTERNAL MEDICINE

## 2018-09-04 PROCEDURE — 2709999900 HC NON-CHARGEABLE SUPPLY

## 2018-09-04 PROCEDURE — 2580000003 HC RX 258

## 2018-09-04 PROCEDURE — 6370000000 HC RX 637 (ALT 250 FOR IP): Performed by: INTERNAL MEDICINE

## 2018-09-04 PROCEDURE — 2500000003 HC RX 250 WO HCPCS

## 2018-09-04 PROCEDURE — C1894 INTRO/SHEATH, NON-LASER: HCPCS

## 2018-09-04 PROCEDURE — C1769 GUIDE WIRE: HCPCS

## 2018-09-04 RX ORDER — ASPIRIN 81 MG/1
81 TABLET, CHEWABLE ORAL ONCE
Status: COMPLETED | OUTPATIENT
Start: 2018-09-04 | End: 2018-09-04

## 2018-09-04 RX ORDER — SODIUM CHLORIDE 450 MG/100ML
75 INJECTION, SOLUTION INTRAVENOUS CONTINUOUS
Status: DISCONTINUED | OUTPATIENT
Start: 2018-09-04 | End: 2018-09-04 | Stop reason: HOSPADM

## 2018-09-04 RX ORDER — SODIUM CHLORIDE 450 MG/100ML
INJECTION, SOLUTION INTRAVENOUS CONTINUOUS
Status: CANCELLED | OUTPATIENT
Start: 2018-09-04

## 2018-09-04 RX ORDER — SODIUM CHLORIDE 0.9 % (FLUSH) 0.9 %
10 SYRINGE (ML) INJECTION PRN
Status: DISCONTINUED | OUTPATIENT
Start: 2018-09-04 | End: 2018-09-04 | Stop reason: HOSPADM

## 2018-09-04 RX ORDER — SODIUM CHLORIDE 0.9 % (FLUSH) 0.9 %
10 SYRINGE (ML) INJECTION EVERY 12 HOURS SCHEDULED
Status: DISCONTINUED | OUTPATIENT
Start: 2018-09-04 | End: 2018-09-04 | Stop reason: HOSPADM

## 2018-09-04 RX ORDER — NITROGLYCERIN 0.4 MG/1
0.4 TABLET SUBLINGUAL EVERY 5 MIN PRN
Status: DISCONTINUED | OUTPATIENT
Start: 2018-09-04 | End: 2018-09-04 | Stop reason: HOSPADM

## 2018-09-04 RX ORDER — ONDANSETRON 2 MG/ML
4 INJECTION INTRAMUSCULAR; INTRAVENOUS EVERY 6 HOURS PRN
Status: CANCELLED | OUTPATIENT
Start: 2018-09-04

## 2018-09-04 RX ORDER — SODIUM CHLORIDE 0.9 % (FLUSH) 0.9 %
10 SYRINGE (ML) INJECTION EVERY 12 HOURS SCHEDULED
Status: CANCELLED | OUTPATIENT
Start: 2018-09-04

## 2018-09-04 RX ORDER — SODIUM CHLORIDE 0.9 % (FLUSH) 0.9 %
10 SYRINGE (ML) INJECTION PRN
Status: CANCELLED | OUTPATIENT
Start: 2018-09-04

## 2018-09-04 RX ORDER — DIPHENHYDRAMINE HYDROCHLORIDE 50 MG/ML
50 INJECTION INTRAMUSCULAR; INTRAVENOUS ONCE
Status: DISCONTINUED | OUTPATIENT
Start: 2018-09-04 | End: 2018-09-04 | Stop reason: HOSPADM

## 2018-09-04 RX ORDER — ACETAMINOPHEN 325 MG/1
650 TABLET ORAL EVERY 4 HOURS PRN
Status: CANCELLED | OUTPATIENT
Start: 2018-09-04

## 2018-09-04 RX ORDER — ONDANSETRON 2 MG/ML
4 INJECTION INTRAMUSCULAR; INTRAVENOUS EVERY 6 HOURS PRN
Status: DISCONTINUED | OUTPATIENT
Start: 2018-09-04 | End: 2018-09-04 | Stop reason: HOSPADM

## 2018-09-04 RX ADMIN — SODIUM CHLORIDE 75 ML/HR: 4.5 INJECTION, SOLUTION INTRAVENOUS at 11:30

## 2018-09-04 RX ADMIN — ASPIRIN 81 MG 81 MG: 81 TABLET ORAL at 11:29

## 2018-09-04 NOTE — OP NOTE
Section of Cardiology  Adult Brief Cardiac Cath Procedure Note        Procedure(s):  LHC, b/l coronary angio    Pre-operative Diagnosis:   DUMONT    H&P Status: Completed and reviewed.      Post-operative Diagnosis:  50% Mid LAD near a small Diagonal  calcied CORS  LVEF 55  EDP 15    Findings:  See full report    Complications:  none    Primary Proceduralist:   Dwight Dillard MD

## 2018-09-24 ENCOUNTER — OFFICE VISIT (OUTPATIENT)
Dept: CARDIOLOGY CLINIC | Age: 83
End: 2018-09-24
Payer: MEDICARE

## 2018-09-24 VITALS
RESPIRATION RATE: 16 BRPM | DIASTOLIC BLOOD PRESSURE: 66 MMHG | SYSTOLIC BLOOD PRESSURE: 132 MMHG | BODY MASS INDEX: 26.66 KG/M2 | OXYGEN SATURATION: 98 % | HEIGHT: 65 IN | HEART RATE: 70 BPM | WEIGHT: 160 LBS

## 2018-09-24 DIAGNOSIS — I47.1 PAROXYSMAL SVT (SUPRAVENTRICULAR TACHYCARDIA) (HCC): Chronic | ICD-10-CM

## 2018-09-24 DIAGNOSIS — I49.5 SICK SINUS SYNDROME (HCC): ICD-10-CM

## 2018-09-24 DIAGNOSIS — I10 ESSENTIAL HYPERTENSION: ICD-10-CM

## 2018-09-24 DIAGNOSIS — Z95.0 PACEMAKER: Chronic | ICD-10-CM

## 2018-09-24 DIAGNOSIS — R06.09 DOE (DYSPNEA ON EXERTION): Primary | ICD-10-CM

## 2018-09-24 PROCEDURE — 1123F ACP DISCUSS/DSCN MKR DOCD: CPT | Performed by: INTERNAL MEDICINE

## 2018-09-24 PROCEDURE — G8400 PT W/DXA NO RESULTS DOC: HCPCS | Performed by: INTERNAL MEDICINE

## 2018-09-24 PROCEDURE — G8427 DOCREV CUR MEDS BY ELIG CLIN: HCPCS | Performed by: INTERNAL MEDICINE

## 2018-09-24 PROCEDURE — 1090F PRES/ABSN URINE INCON ASSESS: CPT | Performed by: INTERNAL MEDICINE

## 2018-09-24 PROCEDURE — G8417 CALC BMI ABV UP PARAM F/U: HCPCS | Performed by: INTERNAL MEDICINE

## 2018-09-24 PROCEDURE — 93000 ELECTROCARDIOGRAM COMPLETE: CPT | Performed by: INTERNAL MEDICINE

## 2018-09-24 PROCEDURE — 4040F PNEUMOC VAC/ADMIN/RCVD: CPT | Performed by: INTERNAL MEDICINE

## 2018-09-24 PROCEDURE — 99214 OFFICE O/P EST MOD 30 MIN: CPT | Performed by: INTERNAL MEDICINE

## 2018-09-24 PROCEDURE — 1036F TOBACCO NON-USER: CPT | Performed by: INTERNAL MEDICINE

## 2018-09-24 PROCEDURE — 1101F PT FALLS ASSESS-DOCD LE1/YR: CPT | Performed by: INTERNAL MEDICINE

## 2018-09-24 RX ORDER — ATORVASTATIN CALCIUM 20 MG/1
20 TABLET, FILM COATED ORAL DAILY
Qty: 30 TABLET | Refills: 3 | Status: SHIPPED | OUTPATIENT
Start: 2018-09-24 | End: 2020-01-02 | Stop reason: SDUPTHER

## 2018-09-24 NOTE — PROGRESS NOTES
Subsequent Progress Note  Patient: William Resendiz  YOB: 1933  MRN: [id number]    Chief Complaint: bynum pacer    Chief Complaint   Patient presents with    Hypertension     Hosp f/u Post Cath    Shortness of Breath    Tachycardia       CV Data:  12/2017 spect negative  12/2017 echo ef 60% Trace AR  9/2018 LAD mid 50% near a small diagonal. Cors all calcified EF 55 EDP 15  Subjective/HPI: leg swelling sandra, still sob no cp. Takes all meds     EKG:sr 79 qtc 26    Past Medical History:   Diagnosis Date    Arthritis     BILAT    Lower leg edema     BILAT     Osteoarthritis     Pacemaker 2009    FOR SYNCOPE    Paroxysmal SVT (supraventricular tachycardia) (HCC)     Paroxysmal SVT (supraventricular tachycardia) (HCC)     Sinusitis        Past Surgical History:   Procedure Laterality Date    PACEMAKER INSERTION N/A 2009       Family History   Problem Relation Age of Onset    Cancer Mother     No Known Problems Father        Social History     Social History    Marital status:      Spouse name: N/A    Number of children: N/A    Years of education: N/A     Social History Main Topics    Smoking status: Passive Smoke Exposure - Never Smoker    Smokeless tobacco: Never Used    Alcohol use None    Drug use: No    Sexual activity: Not Asked     Other Topics Concern    None     Social History Narrative    None       Allergies   Allergen Reactions    Codeine      Other reaction(s): GI Upset    Penicillins      Other reaction(s):  Intolerance       Current Outpatient Prescriptions   Medication Sig Dispense Refill    atorvastatin (LIPITOR) 20 MG tablet Take 1 tablet by mouth daily 30 tablet 3    verapamil (CALAN SR) 180 MG extended release tablet Take 1 tablet by mouth daily 90 tablet 3    sotalol (BETAPACE) 80 MG tablet Take 1 tablet by mouth 2 times daily 180 tablet 2    aspirin 81 MG tablet Take 1 tablet by mouth daily With Food 30 tablet 3     No current facility-administered

## 2018-11-01 ENCOUNTER — OFFICE VISIT (OUTPATIENT)
Dept: PULMONOLOGY | Age: 83
End: 2018-11-01
Payer: MEDICARE

## 2018-11-01 VITALS
DIASTOLIC BLOOD PRESSURE: 64 MMHG | BODY MASS INDEX: 26.66 KG/M2 | TEMPERATURE: 97.1 F | RESPIRATION RATE: 18 BRPM | SYSTOLIC BLOOD PRESSURE: 126 MMHG | HEART RATE: 66 BPM | OXYGEN SATURATION: 99 % | WEIGHT: 160 LBS | HEIGHT: 65 IN

## 2018-11-01 DIAGNOSIS — R06.00 DYSPNEA, UNSPECIFIED TYPE: Primary | ICD-10-CM

## 2018-11-01 DIAGNOSIS — I49.5 SICK SINUS SYNDROME (HCC): ICD-10-CM

## 2018-11-01 DIAGNOSIS — J40 BRONCHITIS: ICD-10-CM

## 2018-11-01 PROCEDURE — 4040F PNEUMOC VAC/ADMIN/RCVD: CPT | Performed by: INTERNAL MEDICINE

## 2018-11-01 PROCEDURE — 1090F PRES/ABSN URINE INCON ASSESS: CPT | Performed by: INTERNAL MEDICINE

## 2018-11-01 PROCEDURE — G8417 CALC BMI ABV UP PARAM F/U: HCPCS | Performed by: INTERNAL MEDICINE

## 2018-11-01 PROCEDURE — 1101F PT FALLS ASSESS-DOCD LE1/YR: CPT | Performed by: INTERNAL MEDICINE

## 2018-11-01 PROCEDURE — 1123F ACP DISCUSS/DSCN MKR DOCD: CPT | Performed by: INTERNAL MEDICINE

## 2018-11-01 PROCEDURE — G8427 DOCREV CUR MEDS BY ELIG CLIN: HCPCS | Performed by: INTERNAL MEDICINE

## 2018-11-01 PROCEDURE — 99204 OFFICE O/P NEW MOD 45 MIN: CPT | Performed by: INTERNAL MEDICINE

## 2018-11-01 PROCEDURE — 1036F TOBACCO NON-USER: CPT | Performed by: INTERNAL MEDICINE

## 2018-11-01 PROCEDURE — G8482 FLU IMMUNIZE ORDER/ADMIN: HCPCS | Performed by: INTERNAL MEDICINE

## 2018-11-01 PROCEDURE — G8400 PT W/DXA NO RESULTS DOC: HCPCS | Performed by: INTERNAL MEDICINE

## 2018-11-01 RX ORDER — DOXYCYCLINE HYCLATE 100 MG
100 TABLET ORAL 2 TIMES DAILY
Qty: 20 TABLET | Refills: 0 | Status: SHIPPED | OUTPATIENT
Start: 2018-11-01 | End: 2018-11-11

## 2018-11-01 RX ORDER — GUAIFENESIN 600 MG/1
600 TABLET, EXTENDED RELEASE ORAL 2 TIMES DAILY
Qty: 60 TABLET | Refills: 2 | Status: SHIPPED | OUTPATIENT
Start: 2018-11-01 | End: 2019-05-29

## 2018-11-01 ASSESSMENT — ENCOUNTER SYMPTOMS
RHINORRHEA: 0
NAUSEA: 0
VOICE CHANGE: 0
EYE DISCHARGE: 0
SHORTNESS OF BREATH: 1
TROUBLE SWALLOWING: 0
WHEEZING: 1
DIARRHEA: 0
SORE THROAT: 0
VOMITING: 0
SINUS PRESSURE: 0
COUGH: 1
EYE ITCHING: 0
CHEST TIGHTNESS: 0
ABDOMINAL PAIN: 0

## 2018-11-09 ENCOUNTER — HOSPITAL ENCOUNTER (OUTPATIENT)
Dept: GENERAL RADIOLOGY | Age: 83
Discharge: HOME | End: 2018-11-11
Payer: MEDICARE

## 2018-11-09 ENCOUNTER — HOSPITAL ENCOUNTER (OUTPATIENT)
Dept: PULMONOLOGY | Age: 83
Discharge: HOME | End: 2018-11-09
Payer: MEDICARE

## 2018-11-09 DIAGNOSIS — R06.00 DYSPNEA, UNSPECIFIED TYPE: ICD-10-CM

## 2018-11-09 PROCEDURE — 94060 EVALUATION OF WHEEZING: CPT | Performed by: INTERNAL MEDICINE

## 2018-11-09 PROCEDURE — 94729 DIFFUSING CAPACITY: CPT

## 2018-11-09 PROCEDURE — 6360000002 HC RX W HCPCS: Performed by: INTERNAL MEDICINE

## 2018-11-09 PROCEDURE — 94060 EVALUATION OF WHEEZING: CPT

## 2018-11-09 PROCEDURE — 94726 PLETHYSMOGRAPHY LUNG VOLUMES: CPT

## 2018-11-09 PROCEDURE — 94729 DIFFUSING CAPACITY: CPT | Performed by: INTERNAL MEDICINE

## 2018-11-09 PROCEDURE — 94726 PLETHYSMOGRAPHY LUNG VOLUMES: CPT | Performed by: INTERNAL MEDICINE

## 2018-11-09 PROCEDURE — 71046 X-RAY EXAM CHEST 2 VIEWS: CPT

## 2018-11-09 RX ORDER — ALBUTEROL SULFATE 2.5 MG/3ML
2.5 SOLUTION RESPIRATORY (INHALATION) ONCE
Status: COMPLETED | OUTPATIENT
Start: 2018-11-09 | End: 2018-11-09

## 2018-11-09 RX ADMIN — ALBUTEROL SULFATE 2.5 MG: 2.5 SOLUTION RESPIRATORY (INHALATION) at 12:24

## 2018-11-13 PROBLEM — R06.00 DYSPNEA: Status: ACTIVE | Noted: 2017-11-30

## 2018-11-13 NOTE — PROCEDURES
[address]                      [address], [address] [facility]                               PULMONARY FUNCTION    PATIENT NAME: Ashtyn Nelson                    :        1933  MED REC NO:   [id number]                            ROOM:  ACCOUNT NO:   [de-identified]                           ADMIT DATE: 2018  PROVIDER:     Nicole Clancy MD          DATE OF PROCEDURE:  2018    REASON FOR STUDY:  Shortness of breath, cough, and wheezing. INTERPRETATION:  FVC 1.12, 43% of predicted. FEV1 is 0.74, 38% of  predicted. FEV1/FVC is 66%. FEF 25-75% is 0.38, 30% of predicted. Postbronchodilator study showed study shows FVC is 1.25, 11%  improvement. Lung volume study shows residual volume  3.54, 136% of  predicted. TLC is 4.69, 87% of predicted. Diffusion capacity is 11.33,  57% of predicted. Airway resistance 3.28, 176% of predicted. SUMMARY:  Very severe obstructive pulmonary disease. There is some  response to bronchodilator. Static lung volume suggests air trapping  and hyperinflation. Diffusion capacity is severely impaired. Airway  resistance is increased. Clinical correlation is requested.         Papo Soria MD    D: 2018 17:22:47       T: 2018 23:35:06     [email or web address]  Job#: [id number]     Doc#: [id number]    CC:

## 2018-11-27 ENCOUNTER — HOSPITAL ENCOUNTER (OUTPATIENT)
Dept: CARDIOLOGY | Age: 83
Discharge: HOME | End: 2018-11-27
Payer: MEDICARE

## 2018-11-27 PROCEDURE — 93280 PM DEVICE PROGR EVAL DUAL: CPT

## 2018-12-03 ENCOUNTER — OFFICE VISIT (OUTPATIENT)
Dept: PULMONOLOGY | Age: 83
End: 2018-12-03
Payer: MEDICARE

## 2018-12-03 VITALS
TEMPERATURE: 97.4 F | HEIGHT: 65 IN | HEART RATE: 70 BPM | BODY MASS INDEX: 26.49 KG/M2 | OXYGEN SATURATION: 97 % | DIASTOLIC BLOOD PRESSURE: 62 MMHG | RESPIRATION RATE: 16 BRPM | SYSTOLIC BLOOD PRESSURE: 126 MMHG | WEIGHT: 159 LBS

## 2018-12-03 DIAGNOSIS — R06.00 DYSPNEA, UNSPECIFIED TYPE: ICD-10-CM

## 2018-12-03 DIAGNOSIS — J42 CHRONIC BRONCHITIS, UNSPECIFIED CHRONIC BRONCHITIS TYPE (HCC): ICD-10-CM

## 2018-12-03 DIAGNOSIS — J44.9 CHRONIC OBSTRUCTIVE PULMONARY DISEASE, UNSPECIFIED COPD TYPE (HCC): Primary | ICD-10-CM

## 2018-12-03 PROCEDURE — G8482 FLU IMMUNIZE ORDER/ADMIN: HCPCS | Performed by: INTERNAL MEDICINE

## 2018-12-03 PROCEDURE — 1101F PT FALLS ASSESS-DOCD LE1/YR: CPT | Performed by: INTERNAL MEDICINE

## 2018-12-03 PROCEDURE — G8400 PT W/DXA NO RESULTS DOC: HCPCS | Performed by: INTERNAL MEDICINE

## 2018-12-03 PROCEDURE — G8926 SPIRO NO PERF OR DOC: HCPCS | Performed by: INTERNAL MEDICINE

## 2018-12-03 PROCEDURE — 4040F PNEUMOC VAC/ADMIN/RCVD: CPT | Performed by: INTERNAL MEDICINE

## 2018-12-03 PROCEDURE — G8427 DOCREV CUR MEDS BY ELIG CLIN: HCPCS | Performed by: INTERNAL MEDICINE

## 2018-12-03 PROCEDURE — 99214 OFFICE O/P EST MOD 30 MIN: CPT | Performed by: INTERNAL MEDICINE

## 2018-12-03 PROCEDURE — 3023F SPIROM DOC REV: CPT | Performed by: INTERNAL MEDICINE

## 2018-12-03 PROCEDURE — 1090F PRES/ABSN URINE INCON ASSESS: CPT | Performed by: INTERNAL MEDICINE

## 2018-12-03 PROCEDURE — G8417 CALC BMI ABV UP PARAM F/U: HCPCS | Performed by: INTERNAL MEDICINE

## 2018-12-03 PROCEDURE — 1036F TOBACCO NON-USER: CPT | Performed by: INTERNAL MEDICINE

## 2018-12-03 PROCEDURE — 1123F ACP DISCUSS/DSCN MKR DOCD: CPT | Performed by: INTERNAL MEDICINE

## 2018-12-03 RX ORDER — DOXYCYCLINE HYCLATE 100 MG
100 TABLET ORAL 2 TIMES DAILY
Qty: 20 TABLET | Refills: 0 | Status: SHIPPED | OUTPATIENT
Start: 2018-12-03 | End: 2018-12-13

## 2018-12-03 ASSESSMENT — ENCOUNTER SYMPTOMS
DIARRHEA: 0
WHEEZING: 1
TROUBLE SWALLOWING: 0
COUGH: 1
SORE THROAT: 0
SHORTNESS OF BREATH: 1
SINUS PRESSURE: 0
NAUSEA: 0
ABDOMINAL PAIN: 0
VOICE CHANGE: 0
RHINORRHEA: 0
VOMITING: 0
EYE ITCHING: 0
EYE DISCHARGE: 0
CHEST TIGHTNESS: 0

## 2019-01-17 RX ORDER — SOTALOL HYDROCHLORIDE 80 MG/1
80 TABLET ORAL 2 TIMES DAILY
Qty: 180 TABLET | Refills: 2 | Status: SHIPPED | OUTPATIENT
Start: 2019-01-17 | End: 2019-10-23 | Stop reason: SDUPTHER

## 2019-02-26 ENCOUNTER — HOSPITAL ENCOUNTER (OUTPATIENT)
Dept: CARDIOLOGY | Age: 84
Discharge: HOME | End: 2019-02-26
Payer: MEDICARE

## 2019-02-26 PROCEDURE — 93293 PM PHONE R-STRIP DEVICE EVAL: CPT

## 2019-03-06 ENCOUNTER — OFFICE VISIT (OUTPATIENT)
Dept: PULMONOLOGY | Age: 84
End: 2019-03-06
Payer: MEDICARE

## 2019-03-06 VITALS
OXYGEN SATURATION: 98 % | SYSTOLIC BLOOD PRESSURE: 126 MMHG | DIASTOLIC BLOOD PRESSURE: 74 MMHG | RESPIRATION RATE: 16 BRPM | HEIGHT: 65 IN | TEMPERATURE: 96.7 F | BODY MASS INDEX: 26.33 KG/M2 | WEIGHT: 158 LBS | HEART RATE: 78 BPM

## 2019-03-06 DIAGNOSIS — R60.0 LOWER LEG EDEMA: ICD-10-CM

## 2019-03-06 DIAGNOSIS — R00.2 PALPITATIONS: ICD-10-CM

## 2019-03-06 DIAGNOSIS — J20.9 ACUTE BRONCHITIS, UNSPECIFIED ORGANISM: ICD-10-CM

## 2019-03-06 DIAGNOSIS — J44.1 COPD WITH ACUTE EXACERBATION (HCC): Primary | ICD-10-CM

## 2019-03-06 DIAGNOSIS — J44.9 CHRONIC OBSTRUCTIVE PULMONARY DISEASE, UNSPECIFIED COPD TYPE (HCC): ICD-10-CM

## 2019-03-06 DIAGNOSIS — R06.09 DOE (DYSPNEA ON EXERTION): Primary | ICD-10-CM

## 2019-03-06 DIAGNOSIS — R00.0 TACHYCARDIA: ICD-10-CM

## 2019-03-06 DIAGNOSIS — R06.02 SHORTNESS OF BREATH: ICD-10-CM

## 2019-03-06 PROCEDURE — 4040F PNEUMOC VAC/ADMIN/RCVD: CPT | Performed by: INTERNAL MEDICINE

## 2019-03-06 PROCEDURE — G8926 SPIRO NO PERF OR DOC: HCPCS | Performed by: INTERNAL MEDICINE

## 2019-03-06 PROCEDURE — G8417 CALC BMI ABV UP PARAM F/U: HCPCS | Performed by: INTERNAL MEDICINE

## 2019-03-06 PROCEDURE — 99214 OFFICE O/P EST MOD 30 MIN: CPT | Performed by: INTERNAL MEDICINE

## 2019-03-06 PROCEDURE — 1123F ACP DISCUSS/DSCN MKR DOCD: CPT | Performed by: INTERNAL MEDICINE

## 2019-03-06 PROCEDURE — 3023F SPIROM DOC REV: CPT | Performed by: INTERNAL MEDICINE

## 2019-03-06 PROCEDURE — G8427 DOCREV CUR MEDS BY ELIG CLIN: HCPCS | Performed by: INTERNAL MEDICINE

## 2019-03-06 PROCEDURE — 1036F TOBACCO NON-USER: CPT | Performed by: INTERNAL MEDICINE

## 2019-03-06 PROCEDURE — G8482 FLU IMMUNIZE ORDER/ADMIN: HCPCS | Performed by: INTERNAL MEDICINE

## 2019-03-06 PROCEDURE — 1101F PT FALLS ASSESS-DOCD LE1/YR: CPT | Performed by: INTERNAL MEDICINE

## 2019-03-06 PROCEDURE — 1090F PRES/ABSN URINE INCON ASSESS: CPT | Performed by: INTERNAL MEDICINE

## 2019-03-06 RX ORDER — PREDNISONE 10 MG/1
TABLET ORAL
Qty: 40 TABLET | Refills: 0 | Status: SHIPPED | OUTPATIENT
Start: 2019-03-06 | End: 2019-03-28 | Stop reason: ALTCHOICE

## 2019-03-06 RX ORDER — DOXYCYCLINE HYCLATE 100 MG
100 TABLET ORAL 2 TIMES DAILY
Qty: 20 TABLET | Refills: 0 | Status: SHIPPED | OUTPATIENT
Start: 2019-03-06 | End: 2019-03-16

## 2019-03-06 RX ORDER — ALBUTEROL SULFATE 2.5 MG/3ML
2.5 SOLUTION RESPIRATORY (INHALATION) EVERY 6 HOURS PRN
Qty: 120 EACH | Refills: 5 | Status: SHIPPED | OUTPATIENT
Start: 2019-03-06 | End: 2019-12-23 | Stop reason: SDUPTHER

## 2019-03-06 ASSESSMENT — ENCOUNTER SYMPTOMS
WHEEZING: 1
NAUSEA: 0
EYE ITCHING: 0
RHINORRHEA: 0
EYE DISCHARGE: 0
SHORTNESS OF BREATH: 1
VOICE CHANGE: 0
TROUBLE SWALLOWING: 0
DIARRHEA: 0
COUGH: 1
SORE THROAT: 0
ABDOMINAL PAIN: 0
VOMITING: 0
CHEST TIGHTNESS: 0
SINUS PRESSURE: 0

## 2019-03-28 ENCOUNTER — OFFICE VISIT (OUTPATIENT)
Dept: CARDIOLOGY CLINIC | Age: 84
End: 2019-03-28
Payer: MEDICARE

## 2019-03-28 VITALS
BODY MASS INDEX: 26.82 KG/M2 | DIASTOLIC BLOOD PRESSURE: 71 MMHG | HEART RATE: 74 BPM | HEIGHT: 65 IN | OXYGEN SATURATION: 98 % | SYSTOLIC BLOOD PRESSURE: 124 MMHG | RESPIRATION RATE: 16 BRPM | WEIGHT: 161 LBS

## 2019-03-28 DIAGNOSIS — I47.1 PAROXYSMAL SVT (SUPRAVENTRICULAR TACHYCARDIA) (HCC): Primary | Chronic | ICD-10-CM

## 2019-03-28 DIAGNOSIS — R06.02 SHORTNESS OF BREATH: ICD-10-CM

## 2019-03-28 DIAGNOSIS — I10 ESSENTIAL HYPERTENSION: ICD-10-CM

## 2019-03-28 DIAGNOSIS — Z95.0 PACEMAKER: Chronic | ICD-10-CM

## 2019-03-28 DIAGNOSIS — I49.5 SICK SINUS SYNDROME (HCC): ICD-10-CM

## 2019-03-28 PROCEDURE — G8417 CALC BMI ABV UP PARAM F/U: HCPCS | Performed by: INTERNAL MEDICINE

## 2019-03-28 PROCEDURE — 4040F PNEUMOC VAC/ADMIN/RCVD: CPT | Performed by: INTERNAL MEDICINE

## 2019-03-28 PROCEDURE — 1123F ACP DISCUSS/DSCN MKR DOCD: CPT | Performed by: INTERNAL MEDICINE

## 2019-03-28 PROCEDURE — 1090F PRES/ABSN URINE INCON ASSESS: CPT | Performed by: INTERNAL MEDICINE

## 2019-03-28 PROCEDURE — G8427 DOCREV CUR MEDS BY ELIG CLIN: HCPCS | Performed by: INTERNAL MEDICINE

## 2019-03-28 PROCEDURE — G8482 FLU IMMUNIZE ORDER/ADMIN: HCPCS | Performed by: INTERNAL MEDICINE

## 2019-03-28 PROCEDURE — 1036F TOBACCO NON-USER: CPT | Performed by: INTERNAL MEDICINE

## 2019-03-28 PROCEDURE — 93000 ELECTROCARDIOGRAM COMPLETE: CPT | Performed by: INTERNAL MEDICINE

## 2019-03-28 PROCEDURE — 99214 OFFICE O/P EST MOD 30 MIN: CPT | Performed by: INTERNAL MEDICINE

## 2019-03-28 ASSESSMENT — ENCOUNTER SYMPTOMS
BLOOD IN STOOL: 0
GASTROINTESTINAL NEGATIVE: 1
COUGH: 0
NAUSEA: 0
EYES NEGATIVE: 1
SHORTNESS OF BREATH: 1
WHEEZING: 0
STRIDOR: 0
CHEST TIGHTNESS: 0

## 2019-04-17 ENCOUNTER — OFFICE VISIT (OUTPATIENT)
Dept: PULMONOLOGY | Age: 84
End: 2019-04-17
Payer: MEDICARE

## 2019-04-17 VITALS
OXYGEN SATURATION: 98 % | DIASTOLIC BLOOD PRESSURE: 64 MMHG | RESPIRATION RATE: 16 BRPM | WEIGHT: 161 LBS | TEMPERATURE: 97.1 F | SYSTOLIC BLOOD PRESSURE: 132 MMHG | HEIGHT: 65 IN | HEART RATE: 91 BPM | BODY MASS INDEX: 26.82 KG/M2

## 2019-04-17 DIAGNOSIS — J44.9 CHRONIC OBSTRUCTIVE PULMONARY DISEASE, UNSPECIFIED COPD TYPE (HCC): Primary | ICD-10-CM

## 2019-04-17 DIAGNOSIS — R06.00 DYSPNEA, UNSPECIFIED TYPE: ICD-10-CM

## 2019-04-17 PROCEDURE — 1036F TOBACCO NON-USER: CPT | Performed by: INTERNAL MEDICINE

## 2019-04-17 PROCEDURE — 3023F SPIROM DOC REV: CPT | Performed by: INTERNAL MEDICINE

## 2019-04-17 PROCEDURE — G8427 DOCREV CUR MEDS BY ELIG CLIN: HCPCS | Performed by: INTERNAL MEDICINE

## 2019-04-17 PROCEDURE — 4040F PNEUMOC VAC/ADMIN/RCVD: CPT | Performed by: INTERNAL MEDICINE

## 2019-04-17 PROCEDURE — 1090F PRES/ABSN URINE INCON ASSESS: CPT | Performed by: INTERNAL MEDICINE

## 2019-04-17 PROCEDURE — 1123F ACP DISCUSS/DSCN MKR DOCD: CPT | Performed by: INTERNAL MEDICINE

## 2019-04-17 PROCEDURE — G8417 CALC BMI ABV UP PARAM F/U: HCPCS | Performed by: INTERNAL MEDICINE

## 2019-04-17 PROCEDURE — 99213 OFFICE O/P EST LOW 20 MIN: CPT | Performed by: INTERNAL MEDICINE

## 2019-04-17 PROCEDURE — G8926 SPIRO NO PERF OR DOC: HCPCS | Performed by: INTERNAL MEDICINE

## 2019-04-17 ASSESSMENT — ENCOUNTER SYMPTOMS
NAUSEA: 0
EYE ITCHING: 0
WHEEZING: 0
DIARRHEA: 0
VOICE CHANGE: 0
SHORTNESS OF BREATH: 1
ABDOMINAL PAIN: 0
TROUBLE SWALLOWING: 0
RHINORRHEA: 0
CHEST TIGHTNESS: 0
SINUS PRESSURE: 0
SORE THROAT: 0
EYE DISCHARGE: 0
VOMITING: 0
COUGH: 1

## 2019-04-17 NOTE — PROGRESS NOTES
ICD10    COMPARISONS: October 4, 2017     FINDINGS:    Two views of the chest are submitted. There is a left-sided ICD device. Leads overlying the cardiac silhouette. Unchanged The cardiac silhouette is unchanged configuration. .  The mediastinum is unremarkable. Pulmonary vascular is attenuated, lungs are hyperinflated and there is some widening of the AP diameter the chest and coarsening of the interstitium. Right sided trachea. No focal infiltrates. No effusions. There is blunting of left costophrenic angle again noted. May represent scarring. Unchanged  Pneumothoraces. Impression NO ACUTE ACTIVE CARDIOPULMONARY PROCESS. RADIOGRAPHIC FINDINGS OF COPD. Assessment/Plan:     1. Chronic obstructive pulmonary disease, unspecified COPD type ([address]  She is currently using nebulizer with albuterol twice a day. She said she is doing better since she was given prednisone and doxycycline in last visit. She is only having minimal cough. Sometimes he is using Mucinex to loosen up the mucus. She had a chest x-ray in past showing changes of COPD. She said she is not taking Anoro ellipta anymore. On room air 02 saturation 98%      2. Dyspnea, unspecified type  She is having chronic exertional shortness of breath which has not significantly changed. Her O2 saturation is 98% and recommends to continue bronchodilator as before. Return in about 4 months (around 8/17/2019) for COPD.       Jeimy Cleveland MD

## 2019-04-27 ENCOUNTER — APPOINTMENT (OUTPATIENT)
Dept: GENERAL RADIOLOGY | Age: 84
End: 2019-04-27
Payer: MEDICARE

## 2019-04-27 ENCOUNTER — HOSPITAL ENCOUNTER (EMERGENCY)
Age: 84
Discharge: ANOTHER HOSPITAL | End: 2019-04-27
Payer: MEDICARE

## 2019-04-27 VITALS
SYSTOLIC BLOOD PRESSURE: 149 MMHG | OXYGEN SATURATION: 98 % | HEIGHT: 66 IN | HEART RATE: 93 BPM | DIASTOLIC BLOOD PRESSURE: 61 MMHG | BODY MASS INDEX: 25.71 KG/M2 | TEMPERATURE: 98 F | RESPIRATION RATE: 18 BRPM | WEIGHT: 160 LBS
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
## 2019-04-27 DIAGNOSIS — S72.002A CLOSED FRACTURE OF NECK OF LEFT FEMUR, INITIAL ENCOUNTER (HCC): Primary | ICD-10-CM

## 2019-04-27 LAB
ABO/RH: NORMAL
ALBUMIN SERPL-MCNC: 3.6 G/DL (ref 3.5–4.6)
ALP BLD-CCNC: 76 U/L (ref 40–130)
ALT SERPL-CCNC: 11 U/L (ref 0–33)
ANION GAP SERPL CALCULATED.3IONS-SCNC: 14 MEQ/L (ref 9–15)
ANTIBODY SCREEN: NORMAL
APTT: 26.8 SEC (ref 21.6–35.4)
AST SERPL-CCNC: 14 U/L (ref 0–35)
BASOPHILS ABSOLUTE: 0.1 K/UL (ref 0–0.2)
BASOPHILS RELATIVE PERCENT: 0.9 %
BILIRUB SERPL-MCNC: 0.3 MG/DL (ref 0.2–0.7)
BILIRUBIN URINE: NEGATIVE
BLOOD, URINE: NEGATIVE
BUN BLDV-MCNC: 28 MG/DL (ref 8–23)
CALCIUM SERPL-MCNC: 8.9 MG/DL (ref 8.5–9.9)
CHLORIDE BLD-SCNC: 102 MEQ/L (ref 95–107)
CLARITY: CLEAR
CO2: 24 MEQ/L (ref 20–31)
COLOR: YELLOW
CREAT SERPL-MCNC: 0.68 MG/DL (ref 0.5–0.9)
EKG ATRIAL RATE: 76 BPM
EKG P AXIS: 75 DEGREES
EKG P-R INTERVAL: 204 MS
EKG Q-T INTERVAL: 438 MS
EKG QRS DURATION: 102 MS
EKG QTC CALCULATION (BAZETT): 492 MS
EKG R AXIS: 73 DEGREES
EKG T AXIS: 59 DEGREES
EKG VENTRICULAR RATE: 76 BPM
EOSINOPHILS ABSOLUTE: 0.1 K/UL (ref 0–0.7)
EOSINOPHILS RELATIVE PERCENT: 1.8 %
GFR AFRICAN AMERICAN: >60
GFR NON-AFRICAN AMERICAN: >60
GLOBULIN: 2.8 G/DL (ref 2.3–3.5)
GLUCOSE BLD-MCNC: 106 MG/DL (ref 70–99)
GLUCOSE URINE: NEGATIVE MG/DL
HCT VFR BLD CALC: 38.9 % (ref 37–47)
HEMOGLOBIN: 13.2 G/DL (ref 12–16)
INR BLD: 1.1
KETONES, URINE: ABNORMAL MG/DL
LEUKOCYTE ESTERASE, URINE: NEGATIVE
LYMPHOCYTES ABSOLUTE: 1.8 K/UL (ref 1–4.8)
LYMPHOCYTES RELATIVE PERCENT: 22.2 %
MCH RBC QN AUTO: 32.8 PG (ref 27–31.3)
MCHC RBC AUTO-ENTMCNC: 34 % (ref 33–37)
MCV RBC AUTO: 96.4 FL (ref 82–100)
MONOCYTES ABSOLUTE: 0.6 K/UL (ref 0.2–0.8)
MONOCYTES RELATIVE PERCENT: 8 %
NEUTROPHILS ABSOLUTE: 5.4 K/UL (ref 1.4–6.5)
NEUTROPHILS RELATIVE PERCENT: 67.1 %
NITRITE, URINE: NEGATIVE
PDW BLD-RTO: 14.3 % (ref 11.5–14.5)
PH UA: 5.5 (ref 5–9)
PLATELET # BLD: 183 K/UL (ref 130–400)
POTASSIUM SERPL-SCNC: 3.9 MEQ/L (ref 3.4–4.9)
PROTEIN UA: NEGATIVE MG/DL
PROTHROMBIN TIME: 11.3 SEC (ref 9–11.5)
RBC # BLD: 4.04 M/UL (ref 4.2–5.4)
SODIUM BLD-SCNC: 140 MEQ/L (ref 135–144)
SPECIFIC GRAVITY UA: 1.02 (ref 1–1.03)
TOTAL PROTEIN: 6.4 G/DL (ref 6.3–8)
URINE REFLEX TO CULTURE: ABNORMAL
UROBILINOGEN, URINE: 1 E.U./DL
WBC # BLD: 8.1 K/UL (ref 4.8–10.8)

## 2019-04-27 PROCEDURE — 6360000002 HC RX W HCPCS: Performed by: PERSONAL EMERGENCY RESPONSE ATTENDANT

## 2019-04-27 PROCEDURE — 96376 TX/PRO/DX INJ SAME DRUG ADON: CPT

## 2019-04-27 PROCEDURE — 86900 BLOOD TYPING SEROLOGIC ABO: CPT

## 2019-04-27 PROCEDURE — 73552 X-RAY EXAM OF FEMUR 2/>: CPT

## 2019-04-27 PROCEDURE — 85025 COMPLETE CBC W/AUTO DIFF WBC: CPT

## 2019-04-27 PROCEDURE — 93005 ELECTROCARDIOGRAM TRACING: CPT

## 2019-04-27 PROCEDURE — 86901 BLOOD TYPING SEROLOGIC RH(D): CPT

## 2019-04-27 PROCEDURE — 80053 COMPREHEN METABOLIC PANEL: CPT

## 2019-04-27 PROCEDURE — 86850 RBC ANTIBODY SCREEN: CPT

## 2019-04-27 PROCEDURE — 72170 X-RAY EXAM OF PELVIS: CPT

## 2019-04-27 PROCEDURE — 85610 PROTHROMBIN TIME: CPT

## 2019-04-27 PROCEDURE — 85730 THROMBOPLASTIN TIME PARTIAL: CPT

## 2019-04-27 PROCEDURE — 36415 COLL VENOUS BLD VENIPUNCTURE: CPT

## 2019-04-27 PROCEDURE — 96374 THER/PROPH/DIAG INJ IV PUSH: CPT

## 2019-04-27 PROCEDURE — 81003 URINALYSIS AUTO W/O SCOPE: CPT

## 2019-04-27 PROCEDURE — 71045 X-RAY EXAM CHEST 1 VIEW: CPT

## 2019-04-27 PROCEDURE — 99285 EMERGENCY DEPT VISIT HI MDM: CPT

## 2019-04-27 RX ORDER — FENTANYL CITRATE 50 UG/ML
50 INJECTION, SOLUTION INTRAMUSCULAR; INTRAVENOUS ONCE
Status: COMPLETED | OUTPATIENT
Start: 2019-04-27 | End: 2019-04-27

## 2019-04-27 RX ADMIN — FENTANYL CITRATE 50 MCG: 50 INJECTION, SOLUTION INTRAMUSCULAR; INTRAVENOUS at 02:44

## 2019-04-27 RX ADMIN — FENTANYL CITRATE 50 MCG: 50 INJECTION, SOLUTION INTRAMUSCULAR; INTRAVENOUS at 04:55

## 2019-04-27 ASSESSMENT — PAIN DESCRIPTION - PAIN TYPE
TYPE: ACUTE PAIN
TYPE: ACUTE PAIN

## 2019-04-27 ASSESSMENT — ENCOUNTER SYMPTOMS
BLOOD IN STOOL: 0
RHINORRHEA: 0
DIARRHEA: 0
COUGH: 0
NAUSEA: 0
VOMITING: 0
COLOR CHANGE: 0
SORE THROAT: 0
SHORTNESS OF BREATH: 0
ABDOMINAL PAIN: 0

## 2019-04-27 ASSESSMENT — PAIN DESCRIPTION - ORIENTATION
ORIENTATION: LEFT
ORIENTATION: LEFT

## 2019-04-27 ASSESSMENT — PAIN DESCRIPTION - LOCATION
LOCATION: HIP
LOCATION: LEG

## 2019-04-27 ASSESSMENT — PAIN SCALES - GENERAL
PAINLEVEL_OUTOF10: 9
PAINLEVEL_OUTOF10: 6
PAINLEVEL_OUTOF10: 8
PAINLEVEL_OUTOF10: 4

## 2019-04-27 ASSESSMENT — PAIN DESCRIPTION - FREQUENCY
FREQUENCY: CONTINUOUS
FREQUENCY: CONTINUOUS

## 2019-04-27 ASSESSMENT — PAIN DESCRIPTION - DESCRIPTORS: DESCRIPTORS: SHARP

## 2019-04-27 NOTE — ED TRIAGE NOTES
Pt states that she was watching the [employer] and got up to go to the bathroom. Pt states that she tripped on her \"own feet\" and fell to the floor. Pt denies hitting anything on the way down. Pt states that she landed on the carpet on the floor. Pt denies any loc, no cp, no SOB. Pt at this time is a+ox4. Per EMS pt was found on the floor. EMS placed traction splint on the pt and gave 50mcg fentanyl IV. Per pt she has a significant decrease in pain after traction splint was placed.

## 2019-04-27 NOTE — ED NOTES
Pt was straight cath, due to inability to urinate. Pt states that she has to go and feels her bladder is full, attempted to place pt on bedpan and pure wick, pt was unable to produce urine. Pt was given option for straight cath and agreed. pts sample was obtained and bladder was emptied.       Manjula Hernadez RN  04/27/19 [id number]

## 2019-04-27 NOTE — ED NOTES
Per Juve DANIELS traction splint placed by EMS is to be removed due to obstruction of imaging. Traction splint was placed on multiple positions via nurse in xray and Juve Gregory was called due to inability to get clear view of hip and pelvis. Per Juve DANIELS verbal orders were given to remove traction device.       Hola Santos RN  04/27/19 [id number]

## 2019-04-27 NOTE — ED NOTES
Tony DANIELS is at bedside, talking with pt and daughter about xray results and plan of care. Pt was medicated for pain due to increased pain after xray. Pt has dx of left hip fx. Pt after medication is resting comfortable at in bed at this time. Pt has no signs of distress. Pt was without O2 during transfer from xray table to bed. Noted pts SPO2 decreased to 87%, Lisbeth DANIELS was notified and pt was placed back on O2 at 3 lpm via NC.       Bertram Espinal RN  04/27/19 [id number]

## 2019-04-27 NOTE — ED PROVIDER NOTES
[facility] ED  eMERGENCY dEPARTMENT eNCOUnter      Pt Name: Lisa Pereira  MRN: [id number]  [place] 2/28/1933  Date of evaluation: 4/27/2019  Provider: FRANCES Velez      HISTORY OF PRESENT ILLNESS    Lisa Pereira is a 80 y.o. female with PMHx of right hip arthoplasty, pacemaker, sick sinus rhythm, hypertension, COPD presents to the emergency department with fall. Pt states PTA she got up from the couch and was walking to the bathroom and d/t knee arthritis, fell and landed on her left side. She tried to ambulate but couldn't get herself off the floor. She has been nonambulatory since. She complains of left groin pain and left thigh pain. She denies head injuries, loss of consciousness, chest pain, dizziness, any other injuries, abdominal pain. She denies being on blood thinners including aspirin. Patient was given fentanyl in ambulance with relief and denies needing additional pain medication. She was placed in traction splint in ambulance. HPI    Nursing Notes were reviewed. REVIEW OF SYSTEMS       Review of Systems   Constitutional: Negative for appetite change, chills and fever. HENT: Negative for congestion, rhinorrhea and sore throat. Respiratory: Negative for cough and shortness of breath. Cardiovascular: Negative for chest pain. Gastrointestinal: Negative for abdominal pain, blood in stool, diarrhea, nausea and vomiting. Genitourinary: Negative for difficulty urinating. Musculoskeletal: Positive for arthralgias and gait problem. Negative for neck stiffness. Skin: Negative for color change and rash. Neurological: Negative for dizziness, syncope, weakness, light-headedness, numbness and headaches. All other systems reviewed and are negative.             PAST MEDICAL HISTORY     Past Medical History:   Diagnosis Date    Arthritis     BILAT    Lower leg edema     BILAT     Lung disease     Osteoarthritis     Pacemaker 2009    FOR [employer]    Paroxysmal Attends Islam service: None     Active member of club or organization: None     Attends meetings of clubs or organizations: None     Relationship status: None    Intimate partner violence:     Fear of current or ex partner: None     Emotionally abused: None     Physically abused: None     Forced sexual activity: None   Other Topics Concern    None   Social History Narrative    None         PHYSICAL EXAM         ED Triage Vitals [04/27/19 0051]   BP Temp Temp Source Pulse Resp SpO2 Height Weight   (!) 130/109 98 °F (36.7 °C) Oral 78 18 95 % 5' 6\" (1.676 m) 160 lb (72.6 kg)       Physical Exam   Constitutional: She is oriented to person, place, and time. She appears well-developed and well-nourished. HENT:   Head: Normocephalic and atraumatic. Mouth/Throat: Oropharynx is clear and moist.   Eyes: Pupils are equal, round, and reactive to light. Conjunctivae and EOM are normal.   Neck: Normal range of motion. Neck supple. No tracheal deviation present. Cardiovascular: Normal heart sounds and intact distal pulses. Pulmonary/Chest: Effort normal and breath sounds normal. No stridor. No respiratory distress. Abdominal: Soft. Bowel sounds are normal. She exhibits no distension and no mass. There is no tenderness. There is no rebound and no guarding. Musculoskeletal: Normal range of motion. She exhibits deformity. Left leg shortened and external rotated. MSP intact distally. TTP in left groin area and left anterior thigh. No greater trochanter tenderness to palpation, no knee tenderness, no ankle tenderness, able to wiggle toes   Neurological: She is alert and oriented to person, place, and time. She has normal reflexes. Skin: Skin is warm and dry. Capillary refill takes less than 2 seconds. No rash noted. Psychiatric: She has a normal mood and affect.  Her behavior is normal. Judgment and thought content normal.       DIAGNOSTIC RESULTS     EKG:All EKG's are interpreted by the Emergency Department Physician who either signs or Co-signs this chart in the absence of a cardiologist.    EKG shows normal sinus rhythm with incomplete right bundle branch block, heart rate 76, normal intervals, normal axis, no ST segment changes    RADIOLOGY:   Non-plain film images such as CT, Ultrasound and MRI are read by theradiologist. Plain radiographic images are visualized and preliminarily interpreted by the emergency physician with the below findings:    Interpretation per theRadiologist below, if available at the time of this note:    XR CHEST PORTABLE    (Results Pending)   XR PELVIS (1-2 VIEWS)    (Results Pending)   XR FEMUR LEFT (MIN 2 VIEWS)    (Results Pending)           LABS:  Labs Reviewed   CBC WITH AUTO DIFFERENTIAL - Abnormal; Notable for the following components:       Result Value    RBC 4.04 (*)     MCH 32.8 (*)     All other components within normal limits   COMPREHENSIVE METABOLIC PANEL - Abnormal; Notable for the following components:    Glucose 106 (*)     BUN 28 (*)     All other components within normal limits   APTT   PROTIME-INR   URINE RT REFLEX TO CULTURE   TYPE AND SCREEN       All other labs were within normal range or not returned as of this dictation. EMERGENCY DEPARTMENT COURSE and DIFFERENTIAL DIAGNOSIS/MDM:   Vitals:    Vitals:    04/27/19 0051 04/27/19 0205 04/27/19 0250   BP: (!) 130/109 (!) 154/64 (!) 148/71   Pulse: 78 80 82   Resp: 18 18 18   Temp: 98 °F (36.7 °C)     TempSrc: Oral     SpO2: 95% 99% 97%   Weight: 160 lb (72.6 kg)     Height: 5' 6\" (1.676 m)           MDM    X-ray reveals left femoral neck fracture. Chest x-ray shows no acute process. Blood work is unremarkable. Patient's orthopedic doctor is [facility] Dr. Mari Doyle who did her previous right hip arthroplasty. She'll be transferred to [facility]. CRITICAL CARE TIME   Total Critical Caretime was 35 minutes, excluding separately reportable procedures.   There was a high probability of clinically significant/life threatening deterioration in the patient's condition which required my urgent intervention. Procedures    FINAL IMPRESSION      1. Closed fracture of neck of left femur, initial encounter ([facility] [address]    2. Fall, initial encounter          DISPOSITION/PLAN   DISPOSITION Decision To Transfer 04/27/2019 03:06:54 AM      PATIENT REFERRED TO:  No follow-up provider specified. DISCHARGE MEDICATIONS:  New Prescriptions    No medications on file          (Please notethat portions of this note were completed with a voice recognition program.  Efforts were made to edit the dictations but occasionally words are mis-transcribed. )    FRANCES Howard (electronically signed)  Emergency Physician Assistant         Endy Haynes, [address]  04/27/19 0225

## 2019-04-27 NOTE — ED NOTES
Pt report was called to [facility] to [facility], RN  04/27/19 1026

## 2019-04-29 PROCEDURE — 93010 ELECTROCARDIOGRAM REPORT: CPT | Performed by: INTERNAL MEDICINE

## 2019-05-01 ENCOUNTER — HOSPITAL ENCOUNTER (INPATIENT)
Age: 84
LOS: 8 days | Discharge: HOME HEALTH | DRG: 560 | End: 2019-05-09
Attending: PHYSICAL MEDICINE & REHABILITATION | Admitting: PHYSICAL MEDICINE & REHABILITATION
Payer: MEDICARE

## 2019-05-01 DIAGNOSIS — R26.9 ABNORMALITY OF GAIT AND MOBILITY: Primary | ICD-10-CM

## 2019-05-01 DIAGNOSIS — S72.052A CLOSED FRACTURE OF HEAD OF LEFT FEMUR, INITIAL ENCOUNTER (HCC): ICD-10-CM

## 2019-05-01 PROBLEM — S72.002A LEFT DISPLACED FEMORAL NECK FRACTURE (HCC): Status: ACTIVE | Noted: 2019-05-01

## 2019-05-01 LAB
BACTERIA: NEGATIVE /HPF
BILIRUBIN URINE: NEGATIVE
BLOOD, URINE: ABNORMAL
CLARITY: CLEAR
COLOR: YELLOW
EPITHELIAL CELLS, UA: ABNORMAL /HPF (ref 0–5)
GLUCOSE URINE: NEGATIVE MG/DL
HYALINE CASTS: ABNORMAL /HPF (ref 0–5)
KETONES, URINE: NEGATIVE MG/DL
LEUKOCYTE ESTERASE, URINE: ABNORMAL
NITRITE, URINE: NEGATIVE
PH UA: 6 (ref 5–9)
PROTEIN UA: NEGATIVE MG/DL
RBC UA: ABNORMAL /HPF (ref 0–5)
SPECIFIC GRAVITY UA: 1.01 (ref 1–1.03)
URINE REFLEX TO CULTURE: YES
UROBILINOGEN, URINE: 0.2 E.U./DL
WBC UA: ABNORMAL /HPF (ref 0–5)

## 2019-05-01 PROCEDURE — 81001 URINALYSIS AUTO W/SCOPE: CPT

## 2019-05-01 PROCEDURE — 94150 VITAL CAPACITY TEST: CPT

## 2019-05-01 PROCEDURE — 94664 DEMO&/EVAL PT USE INHALER: CPT

## 2019-05-01 PROCEDURE — 87086 URINE CULTURE/COLONY COUNT: CPT

## 2019-05-01 PROCEDURE — 1180000000 HC REHAB R&B

## 2019-05-01 PROCEDURE — 6370000000 HC RX 637 (ALT 250 FOR IP): Performed by: PHYSICAL MEDICINE & REHABILITATION

## 2019-05-01 RX ORDER — OXYCODONE HYDROCHLORIDE 5 MG/1
5 TABLET ORAL
Status: DISPENSED | OUTPATIENT
Start: 2019-05-01 | End: 2019-05-08

## 2019-05-01 RX ORDER — OXYCODONE HYDROCHLORIDE 5 MG/1
10 TABLET ORAL
Status: DISPENSED | OUTPATIENT
Start: 2019-05-01 | End: 2019-05-08

## 2019-05-01 RX ORDER — DOCUSATE SODIUM 100 MG/1
100 CAPSULE, LIQUID FILLED ORAL 2 TIMES DAILY
Status: DISCONTINUED | OUTPATIENT
Start: 2019-05-01 | End: 2019-05-09 | Stop reason: HOSPADM

## 2019-05-01 RX ORDER — ACETAMINOPHEN 500 MG
500 TABLET ORAL EVERY 8 HOURS SCHEDULED
Status: DISCONTINUED | OUTPATIENT
Start: 2019-05-01 | End: 2019-05-09 | Stop reason: HOSPADM

## 2019-05-01 RX ORDER — PREDNISONE 20 MG/1
20 TABLET ORAL DAILY
Status: COMPLETED | OUTPATIENT
Start: 2019-05-02 | End: 2019-05-03

## 2019-05-01 RX ORDER — SOTALOL HYDROCHLORIDE 80 MG/1
80 TABLET ORAL 2 TIMES DAILY
Status: DISCONTINUED | OUTPATIENT
Start: 2019-05-01 | End: 2019-05-09 | Stop reason: HOSPADM

## 2019-05-01 RX ORDER — ALBUTEROL SULFATE 2.5 MG/3ML
2.5 SOLUTION RESPIRATORY (INHALATION) EVERY 6 HOURS PRN
Status: DISCONTINUED | OUTPATIENT
Start: 2019-05-01 | End: 2019-05-01

## 2019-05-01 RX ORDER — ALBUTEROL SULFATE 2.5 MG/3ML
2.5 SOLUTION RESPIRATORY (INHALATION) 3 TIMES DAILY
Status: DISCONTINUED | OUTPATIENT
Start: 2019-05-01 | End: 2019-05-09 | Stop reason: HOSPADM

## 2019-05-01 RX ADMIN — SOTALOL HYDROCHLORIDE 80 MG: 80 TABLET ORAL at 19:59

## 2019-05-01 RX ADMIN — DOCUSATE SODIUM 100 MG: 100 CAPSULE, LIQUID FILLED ORAL at 19:58

## 2019-05-01 RX ADMIN — ACETAMINOPHEN 500 MG: 500 TABLET ORAL at 19:59

## 2019-05-01 RX ADMIN — OXYCODONE HYDROCHLORIDE 5 MG: 5 TABLET ORAL at 21:21

## 2019-05-01 RX ADMIN — ASPIRIN 325 MG: 325 TABLET, DELAYED RELEASE ORAL at 19:58

## 2019-05-01 RX ADMIN — VERAPAMIL HYDROCHLORIDE 180 MG: 180 TABLET, FILM COATED, EXTENDED RELEASE ORAL at 20:09

## 2019-05-01 ASSESSMENT — PAIN SCALES - GENERAL
PAINLEVEL_OUTOF10: 8
PAINLEVEL_OUTOF10: 6
PAINLEVEL_OUTOF10: 0

## 2019-05-01 NOTE — PROGRESS NOTES
pack years  []   Pulmonary Disorder  (acute or chronic)  [x]   Severe or Chronic w/ Exacerbation  []     Surgical Status No []   Surgeries     General [x]   Surgery Lower []   Abdominal Thoracic or []   Upper Abdominal Thoracic with  PulmonaryDisorder  []     Chest X-ray Clear/Not  Ordered     []  Chronic Changes  Results Pending  []  Infiltrates, atelectasis, pleural effusion, or edema  [x]  Infiltrates in more than one lobe []  Infiltrate + Atelectasis, &/or pleural effusion  []    Respiratory Pattern Regular,  RR = 12-20 [x]  Increased,  RR = 21-25 []  DUMONT, irregular,  or RR = 26-30 []  Decreased FEV1  or RR = 31-35 []  Severe SOB, use  of accessory muscles, or RR ? 35  []    Mental Status Alert, oriented,  Cooperative [x]  Confused but Follows commands []  Lethargic or unable to follow commands []  Obtunded  []  Comatose  []    Breath Sounds Clear to  auscultation  []  Decreased unilaterally or  in bases only []  Decreased  bilaterally  []  Crackles or intermittent wheezes  [x]  Wheezes []    Cough Strong, Spontan., & nonproductive [x]  Strong,  spontaneous, &  productive []  Weak,  Nonproductive []  Weak, productive or  with wheezes []  No spontaneous  cough or may require suctioning []    Level of Activity Ambulatory []  Ambulatory w/ Assist  [x]  Non-ambulatory []  Paraplegic []  Quadriplegic []    Total    Score:__10____     Triage Score:__4______      Tri       Triage:     1. (>20) Freq: Q3    2. (16-20) Freq: Q4   3. (11-15) Freq: QID & Albuterol Q2 PRN    4. (6-10) Freq: TID & Albuterol Q2 PRN    5. (0-5) Freq Q4prn

## 2019-05-02 LAB
ANION GAP SERPL CALCULATED.3IONS-SCNC: 10 MEQ/L (ref 9–15)
BASOPHILS ABSOLUTE: 0 K/UL (ref 0–0.2)
BASOPHILS RELATIVE PERCENT: 0.2 %
BUN BLDV-MCNC: 46 MG/DL (ref 8–23)
CALCIUM SERPL-MCNC: 8.1 MG/DL (ref 8.5–9.9)
CHLORIDE BLD-SCNC: 106 MEQ/L (ref 95–107)
CO2: 24 MEQ/L (ref 20–31)
CREAT SERPL-MCNC: 0.81 MG/DL (ref 0.5–0.9)
EOSINOPHILS ABSOLUTE: 0 K/UL (ref 0–0.7)
EOSINOPHILS RELATIVE PERCENT: 0 %
GFR AFRICAN AMERICAN: >60
GFR NON-AFRICAN AMERICAN: >60
GLUCOSE BLD-MCNC: 108 MG/DL (ref 70–99)
GLUCOSE BLD-MCNC: 163 MG/DL (ref 60–115)
HCT VFR BLD CALC: 28.5 % (ref 37–47)
HEMOGLOBIN: 9.5 G/DL (ref 12–16)
LYMPHOCYTES ABSOLUTE: 1.3 K/UL (ref 1–4.8)
LYMPHOCYTES RELATIVE PERCENT: 12 %
MCH RBC QN AUTO: 31.8 PG (ref 27–31.3)
MCHC RBC AUTO-ENTMCNC: 33.5 % (ref 33–37)
MCV RBC AUTO: 94.9 FL (ref 82–100)
MONOCYTES ABSOLUTE: 1.6 K/UL (ref 0.2–0.8)
MONOCYTES RELATIVE PERCENT: 14.7 %
NEUTROPHILS ABSOLUTE: 7.8 K/UL (ref 1.4–6.5)
NEUTROPHILS RELATIVE PERCENT: 74 %
PDW BLD-RTO: 14.3 % (ref 11.5–14.5)
PERFORMED ON: ABNORMAL
PLATELET # BLD: 151 K/UL (ref 130–400)
PLATELET SLIDE REVIEW: NORMAL
POTASSIUM REFLEX MAGNESIUM: 4.4 MEQ/L (ref 3.4–4.9)
RBC # BLD: 3 M/UL (ref 4.2–5.4)
RBC # BLD: NORMAL 10*6/UL
SODIUM BLD-SCNC: 140 MEQ/L (ref 135–144)
WBC # BLD: 10.6 K/UL (ref 4.8–10.8)

## 2019-05-02 PROCEDURE — 92610 EVALUATE SWALLOWING FUNCTION: CPT

## 2019-05-02 PROCEDURE — 6370000000 HC RX 637 (ALT 250 FOR IP): Performed by: PHYSICAL MEDICINE & REHABILITATION

## 2019-05-02 PROCEDURE — 96125 COGNITIVE TEST BY HC PRO: CPT

## 2019-05-02 PROCEDURE — 92523 SPEECH SOUND LANG COMPREHEN: CPT

## 2019-05-02 PROCEDURE — 94640 AIRWAY INHALATION TREATMENT: CPT

## 2019-05-02 PROCEDURE — 97535 SELF CARE MNGMENT TRAINING: CPT

## 2019-05-02 PROCEDURE — 97116 GAIT TRAINING THERAPY: CPT

## 2019-05-02 PROCEDURE — 80048 BASIC METABOLIC PNL TOTAL CA: CPT

## 2019-05-02 PROCEDURE — 6360000002 HC RX W HCPCS: Performed by: PHYSICAL MEDICINE & REHABILITATION

## 2019-05-02 PROCEDURE — 97162 PT EVAL MOD COMPLEX 30 MIN: CPT

## 2019-05-02 PROCEDURE — 36415 COLL VENOUS BLD VENIPUNCTURE: CPT

## 2019-05-02 PROCEDURE — 97110 THERAPEUTIC EXERCISES: CPT

## 2019-05-02 PROCEDURE — 97166 OT EVAL MOD COMPLEX 45 MIN: CPT

## 2019-05-02 PROCEDURE — 1180000000 HC REHAB R&B

## 2019-05-02 PROCEDURE — 85025 COMPLETE CBC W/AUTO DIFF WBC: CPT

## 2019-05-02 RX ADMIN — ALBUTEROL SULFATE 2.5 MG: 2.5 SOLUTION RESPIRATORY (INHALATION) at 17:54

## 2019-05-02 RX ADMIN — ACETAMINOPHEN 500 MG: 500 TABLET ORAL at 21:52

## 2019-05-02 RX ADMIN — DOCUSATE SODIUM 100 MG: 100 CAPSULE, LIQUID FILLED ORAL at 07:37

## 2019-05-02 RX ADMIN — ACETAMINOPHEN 500 MG: 500 TABLET ORAL at 14:11

## 2019-05-02 RX ADMIN — VERAPAMIL HYDROCHLORIDE 180 MG: 180 TABLET, FILM COATED, EXTENDED RELEASE ORAL at 21:52

## 2019-05-02 RX ADMIN — ASPIRIN 325 MG: 325 TABLET, DELAYED RELEASE ORAL at 07:37

## 2019-05-02 RX ADMIN — DOCUSATE SODIUM 100 MG: 100 CAPSULE, LIQUID FILLED ORAL at 21:52

## 2019-05-02 RX ADMIN — PREDNISONE 20 MG: 20 TABLET ORAL at 07:37

## 2019-05-02 RX ADMIN — SOTALOL HYDROCHLORIDE 80 MG: 80 TABLET ORAL at 21:52

## 2019-05-02 RX ADMIN — ALBUTEROL SULFATE 2.5 MG: 2.5 SOLUTION RESPIRATORY (INHALATION) at 12:33

## 2019-05-02 RX ADMIN — ACETAMINOPHEN 500 MG: 500 TABLET ORAL at 06:17

## 2019-05-02 RX ADMIN — ALBUTEROL SULFATE 2.5 MG: 2.5 SOLUTION RESPIRATORY (INHALATION) at 04:38

## 2019-05-02 RX ADMIN — SOTALOL HYDROCHLORIDE 80 MG: 80 TABLET ORAL at 07:37

## 2019-05-02 ASSESSMENT — PAIN SCALES - GENERAL
PAINLEVEL_OUTOF10: 1
PAINLEVEL_OUTOF10: 0
PAINLEVEL_OUTOF10: 0
PAINLEVEL_OUTOF10: 6
PAINLEVEL_OUTOF10: 0

## 2019-05-02 NOTE — PLAN OF CARE
Care plan updated, pt performing in therapy through out the day.  Electronically signed by Ruth Ann Flower RN on 5/2/2019 at 2:54 PM

## 2019-05-02 NOTE — PROGRESS NOTES
Physical Therapy Rehab Treatment Note  Facility/Department: [facility]  Room: [place] [address]       NAME: Adalgisa Morales  : 1933 (80 y.o.)  MRN: [id number]  CODE STATUS: Full Code    Date of Service: 2019  Chart Reviewed: Yes  Patient assessed for rehabilitation services?: Yes  Family / Caregiver Present: No    Restrictions:  Restrictions/Precautions: Weight Bearing  Lower Extremity Weight Bearing Restrictions  Left Lower Extremity Weight Bearing: Weight Bearing As Tolerated  Position Activity Restriction  Hip Precautions: Posterior hip precautions  Other position/activity restrictions: Posterior hip precautions       SUBJECTIVE: Response To Previous Treatment: Patient with no complaints from previous session. Pain Screening  Patient Currently in Pain: No  Pre Treatment Pain Screening  Pain at present: 0  Scale Used: Numeric Score  Intervention List: Patient able to continue with treatment    Post Treatment Pain Screening:  Pain Assessment  Pain Assessment: 0-10  Pain Level: 0    OBJECTIVE:   Overall Orientation Status: Within Functional Limits  Follows Commands: Within Functional Limits    Transfers  Sit to Stand: Contact guard assistance  Stand to sit: Contact guard assistance  Comment: Cues for approach to chair to maintain hip precautions    Ambulation  Ambulation?: Yes  More Ambulation?: No  Ambulation 1  Surface: carpet  Device: Rolling Walker  Assistance: Contact guard assistance  Quality of Gait: decreased LLE stance time, heavy UE use  Distance: 75'    Stairs/Curb  Stairs?: Yes   Stairs  # Steps : 4  Stairs Height: 6\"  Rails: Bilateral  Device: No Device  Assistance: Contact guard assistance  Comment: Cues for technique good carryover    Exercises  Hip Abduction: Adduction ball squeezes x 20  Knee Long Arc Quad: x 20  Ankle Pumps: x 20     ASSESSMENT/COMMENTS:  Body structures, Functions, Activity limitations: Decreased ROM; Decreased balance;Decreased endurance;Decreased safe awareness;Decreased functional mobility   Assessment: Patient shows good carryover with proper technique and maintaining hip precautions with gait and transfers    PLAN OF CARE/Safety:   Safety Devices  Type of devices: All fall risk precautions in place; Chair alarm in place; Left in chair      Therapy Time:   Individual   Time In 1030   Time Out 1100   Minutes 30     Minutes: 30      Transfer/Bed mobility training: 10      Gait training: 15     Therapeutic ex: [facility], 05/02/19 at 11:07 AM

## 2019-05-02 NOTE — PROGRESS NOTES
Pain Screening:  Pain Assessment  Pain Level: 0    Prior Level of Function:  Social/Functional History  Lives With: Daughter  Type of Home: House  Home Layout: One level  Home Access: Stairs to enter without rails  Entrance Stairs - Number of Steps: 3  Entrance Stairs - Rails: None  Home Equipment: Cane, Rolling walker, [employer], Long-handled shoehorn, Sock aid, Reacher  ADL Assistance: Independent  Homemaking Assistance: Independent  Ambulation Assistance: Independent(no device)  Transfer Assistance: Independent  Active : Yes    OBJECTIVE:   Vision/Hearing:  Vision: Within Functional Limits  Hearing: Within functional limits    Cognition:  Overall Orientation Status: Within Functional Limits  Follows Commands: Within Functional Limits       ROM:  RLE PROM: WFL  LLE PROM: WFL    Strength:  Strength RLE  Comment: 3-/5 hip,3+/5 knee ,4/5 ankle  Strength LLE  Comment: 4/5    Neuro:        Balance  Sitting - Static: Good  Sitting - Dynamic: (reaching NT)  Standing - Static: Fair;-(pt able to stand 60 sec with bilat UE support and min assist)  Standing - Dynamic: Fair;-(with bilat UE support and min assist)       Bed mobility  Rolling to Left: Minimal assistance  Rolling to Right: Minimal assistance  Supine to Sit: Minimal assistance  Sit to Supine: Minimal assistance    Transfers  Sit to Stand: Minimal Assistance  Stand to sit: Minimal Assistance  Bed to Chair: Minimal assistance(with ww)    Ambulation  Ambulation?: Yes  Ambulation 1  Surface: carpet  Device: Rolling Walker  Assistance: Minimal assistance  Quality of Gait: decreased LLE stance time, heavy UE use  Distance: 50 feet  Stairs/Curb  Stairs?: No(time limited)  Wheelchair Activities  Propulsion: No(pt to be ambulatory)    Activity Tolerance  Activity Tolerance: Patient Tolerated treatment well          Car transfers: Not tested  Walk 10 ft: Minimal Assist  Walk 50 ft with 2 turns: Minimal Assist  Walk 150 ft in corridor: Not tested  Walking 10 ft on unlevel surface: Not tested  Picking up objects: Not tested       ASSESSMENT:  Body structures, Functions, Activity limitations: Decreased ROM; Decreased balance;Decreased endurance;Decreased safe awareness;Decreased functional mobility ; Decreased strength  Decision Making: Medium Complexity  History: med  Exam: med  Clinical Presentation: med    Prognosis: Good  Patient Education: hip precautions  Barriers to Learning: none    CLINICAL IMPRESSION: Pt presents with deficits in strength, endurance, balance and motor control. She requires assistance with mobility due to new hip fx with hemiarthroplasty repair.  She is need of PT prior to safe return to home    PLAN OF CARE:  Frequency: 1-2 treatment sessions per day, 5-7 days per week     Current Treatment Recommendations: Strengthening, Functional Mobility Training, Neuromuscular Re-education, Home Exercise Program, Equipment Evaluation, Education, & procurement, Transfer Training, Gait Training, Safety Education & Training, Endurance Training, Balance Training, Stair training, Patient/Caregiver Education & Training    Patient's Goal:  to go home without needing help    GOALS:  Long term goals  Long term goal 1: indep bed mobility  Long term goal 2: indep bed transfers  Long term goal 3:  indep gait 150 feet with ww  Long term goal 4: indep 4 stairs with rails - SBA without rails  Long term goal 5: pt indep with HEP and hip precaution follow thru    ELOS:   Plan weeks: 1    Therapy Time:    Individual   Time In 1000   Time Out 1030   Minutes [email or web address] [place], [place], 05/02/19 at 12:20 PM

## 2019-05-02 NOTE — PROGRESS NOTES
Subjective: The patient complains of moderate  acute pain relieved by rest and exacerbated by activity. I am concerned about patients fatigue. ROS x10: The patient also complains of severely impaired mobility and activities of daily living. Otherwise no new problems with vision, hearing, nose, mouth, throat, dermal, cardiovascular, GI, , pulmonary, musculoskeletal, psychiatric or neurological. See Rehab H&P on Rehab chart dated . Vital signs:  /65   Pulse 76   Temp 97 °F (36.1 °C) (Oral)   Resp 18   Ht 5' 6\" (1.676 m)   Wt 172 lb 3.2 oz (78.1 kg)   SpO2 95%   BMI 27.79 kg/m²   I/O:   PO/Intake:  fair PO intake, no problems observed or reported. Bowel/Bladder:  continent, no problems noted. General:  Patient is well developed, adequately nourished, non-obese and     well kempt. HEENT:    PERRLA, hearing intact to loud voice, external inspection of ear     and nose benign. Inspection of lips, tongue and gums benign  Musculoskeletal: No significant change in strength or tone. All joints stable. Inspection and palpation of digits and nails show no clubbing,       cyanosis or inflammatory conditions. Neuro/Psychiatric: Affect: flat but pleasant. Alert and oriented to person, place and     situation. No significant change in deep tendon reflexes or     sensation  Lungs:  CTA-B. Respiration effort is normal at rest.     Heart:   S1 = S2, RRR. No loud murmurs. Abdomen:  Soft, non-tender, no enlargement of liver or spleen. Extremities:  No significant lower extremity edema or tenderness. Skin:   Intact to general survey, no visualized or palpated problems.     Rehabilitation:  Physical therapy: FIMS:  Bed Mobility:      Transfers: Sit to Stand: Contact guard assistance  Stand to sit: Contact guard assistance  Bed to Chair: Minimal assistance(with ww), Ambulation 1  Surface: carpet  Device: Rolling Walker  Assistance: Stand by assistance, Contact guard assistance(Cues ideas/needs only (hungry/hot/pain)  Social Interaction: 7 - Patient has appropriate behavior/relations 100% of the time  Problem Solvin - Patient independent with complex tasks  Memory: 5 - Patient requires prompting with stress/unfamiliar situations      Lab/X-ray studies reviewed, analyzed and discussed with patient and staff:   Recent Results (from the past 24 hour(s))   CBC Auto Differential    Collection Time: 19  6:22 AM   Result Value Ref Range    WBC 10.6 4.8 - 10.8 K/uL    RBC 3.00 (L) 4.20 - 5.40 M/uL    Hemoglobin 9.5 (L) 12.0 - 16.0 g/dL    Hematocrit 28.5 (L) 37.0 - 47.0 %    MCV 94.9 82.0 - 100.0 fL    MCH 31.8 (H) 27.0 - 31.3 pg    MCHC 33.5 33.0 - 37.0 %    RDW 14.3 11.5 - 14.5 %    Platelets 436 129 - 683 K/uL    PLATELET SLIDE REVIEW Normal     Neutrophils % 74.0 %    Lymphocytes % 12.0 %    Monocytes % 14.7 %    Eosinophils % 0.0 %    Basophils % 0.2 %    Neutrophils # 7.8 (H) 1.4 - 6.5 K/uL    Lymphocytes # 1.3 1.0 - 4.8 K/uL    Monocytes # 1.6 (H) 0.2 - 0.8 K/uL    Eosinophils # 0.0 0.0 - 0.7 K/uL    Basophils # 0.0 0.0 - 0.2 K/uL    RBC Morphology Normal    Basic Metabolic Panel w/ Reflex to MG    Collection Time: 19  6:22 AM   Result Value Ref Range    Sodium 140 135 - 144 mEq/L    Potassium reflex Magnesium 4.4 3.4 - 4.9 mEq/L    Chloride 106 95 - 107 mEq/L    CO2 24 20 - 31 mEq/L    Anion Gap 10 9 - 15 mEq/L    Glucose 108 (H) 70 - 99 mg/dL    BUN 46 (H) 8 - 23 mg/dL    CREATININE 0.81 0.50 - 0.90 mg/dL    GFR Non-African American >60.0 >60    GFR  >60.0 >60    Calcium 8.1 (L) 8.5 - 9.9 mg/dL       Xr Pelvis (1-2 Views)    Result Date: 2019  EXAMINATION: XR PELVIS (1 VIEWS) CLINICAL HISTORY: FRACTURE COMPARISONS: MAY 7, 2015 FINDINGS: Left subcapital fracture identified. Bipolar noncemented right hip replacement. Diffuse osteopenia. No abnormal lucency bone prosthetic interface. Bilateral diffuse calcification iliofemoral vasculature.      LEFT SUBCAPITAL residuals. Check for C.difficile x1 if >2 loose stools in 24 hours, continue bowel & bladder program.  Monitor bowel and bladder function. Lactinex 2 PO every AC. MOM prn, Brown Bomb prn, Glycerin suppository prn, enema prn. 3. Moderate generalized OA pain: reassess pain every shift and prior to and after each therapy session, give prn Tylenol and  See mar, modalities prn in therapy, Lidoderm, K-pad prn.   4. Skin healing and breakdown risk:  continue pressure relief program.  Daily skin exams and reports from nursing. 5. Fatigue due to nutritional and hydration deficiency:  continue to monitor I&Os, calorie counts prn, dietary consult prn.  6. Acute episodic insomnia with situational adjustment disorder:  prn Ambien, monitor for day time sedation. 7. Falls risk elevated:  patient to use call light to get nursing assistance to get up, bed and chair alarm. 8. Elevated DVT risk: progressive activities in PT, continue prophylaxis PATSY hose, elevation and see mar . 9. Complex discharge planning:  Weekly team meeting every Monday to assess progress towards goals, discuss and address social, psychological and medical comorbidities and to address difficulties they may be having progressing in therapy. Patient and family education is in progress. The patient is to follow-up with their family physician after discharge. 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             Tamiko Ruiz Cathy Ross D.O., PM&R     Attending    [address]

## 2019-05-02 NOTE — PROGRESS NOTES
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  DATE ONSET: 19    Date of Eval: 2019   Evaluating Therapist: DONN Lainez    Assessment:  Cognitive Diagnosis: Pt p/w mild cognitive deficits characterized by decreased immediate recall and STM; decreased ability to follow multi-step and abstract verbal directives; and decreased insight for safety and problem solving in novel/complex situations. Pt and son (present at eval) reported the pt was at her cognitive baseline and they did not seek further ST intervention at this time.          Recommendations:  Requires SLP Intervention: No(pt declined f/u therapy )  Duration/Frequency of Treatment: no f/u  cog   D/C Recommendations: Home with intermittent assistance          Goals:  Short-term Goals  Goal 1: NA no f/u cog  Long-term Goals  Goal 1: NA no f/u cog    Patient/family involved in developing goals and treatment plan: pt and her son   Subjective:   Previous level of function and limitations: her daughter lives with her and they spend most time together, she is rarely home alone; independent with most ADLs with assistance from daughter as needed   General  Chart Reviewed: Yes  Family / Caregiver Present: Yes  Subjective  Subjective: \"I think my medication is affecting my memory\"     Vision  Vision: Within Functional Limits  Hearing  Hearing: Within functional limits           Objective:     Oral/Motor  Oral Motor: Within functional limits    Auditory Comprehension  Comprehension: Exceptions  Multistep Basic Commands: Mild  Complex/Abstract Commands: Mild  Interfering Components: (pt verbalized feelings of anxiety as assessment progressed, which coincided with decline in ability to follow complex directives)  Effective Techniques: Repetition    Reading Comprehension  Reading Status: Within functional limits    Expression  Primary Mode of Expression: Verbal    Verbal Expression  Verbal Expression: Within functional limits    Written Expression  Written Expression: Within Functional Limits    Motor Speech  Motor Speech: Within Functional Limits    Pragmatics/Social Functioning  Pragmatics: Within functional limits    Cognition:      Orientation  Overall Orientation Status: Within Normal Limits  Attention  Attention: Within Functional Limits  Memory  Memory: Exceptions to [employer]  Short-term Memory: Mild  Working Memory: Mild  Problem Solving  Problem Solving: Exceptions to [employer]  Complex Functional Tasks: Mild  Numeric Reasoning  Numeric Reasoning: Exceptions to [employer]   Calculations: Moderate  Abstract Reasoning  Abstract Reasoning: Within Functional Limits  Safety/Judgement  Safety/Judgement: Exceptions to [employer]  Complex Functional Tasks: Mild  Novel Situations: Mild    Additional Assessments:    The [place] Cognitive Assessment [facility]) was complete this date. The [address] [place] [address], [place] is a useful screening tool for Mild Cognitive Impairment. Normal results are a score equal to or greater than 26/30. Please add 1 point for individuals who have 12 years or fewer of formal education.     Results:  Visuospatial/Executive Functioning 4/5   Naming 3/3   Attention 4/6   Language 3/3   Abstraction 2/2   Delayed Recall 2/5   Orientation 6/6   Total 24/30       Prognosis:  Speech Therapy Prognosis  Prognosis: Good(pt has strong famliy support - daughter lives with her)  Individuals consulted  Consulted and agree with results and recommendations: Patient; Family member  Family member consulted: son     Education:  Patient Education: rationale for SLE   Patient Education Response: Verbalizes understanding  Safety Devices in place: Yes  Type of devices: Call light within reach; Chair alarm in place    Pain:  Pain Assessment  Patient Currently in Pain: Denies  Pain Assessment: 0-10  Pain Level: 0    Comprehension          Expression   []7 - Independent   []7 - Indpendent   []6 - Modified Independent  [x]6 - Modified Independent   [x]5 - Supervision   []5 - Supervision   []4 - Min Assist   []4 - Min Assist   []3 - Mod Assist   []3 - Mod Assist   []2 - Max Assist   []2 - Max Assist   []1 - Dependent   []1 - Dependent    Problem Solving        Memory   []7 - Independent   []7 - Independent   []6 - Modified Independent  []6 - Modified Independent   [x]5 - Supervision   [x]5 - Supervision   []4 - Min Assist   []4 - Min Assist   []3 - Mod Assist   []3 - Mod Assist   []2 - Max Assist   []2 - Max Assist   []1 - Dependent   [] 1 -Dependent            Therapy Time:   Individual Concurrent Group Co-treatment   Time In 1110         Time Out 1150         Minutes Paola Davison, Date: 5/2/2019, Time: 12:51 PM

## 2019-05-02 NOTE — H&P
HISTORY & PHYSICAL       DATE OF ADMISSION:  5/1/2019    DATE OF SERVICE:  5/1/19    Subjective:    Miladys Bloom, 80 y.o. female presents today with:     CHIEF COMPLAINT:  pain     HPI    80 year with history of fall and sustained fall with left femeral neck fracture s/p left THR    The patient has stabilized medically andis able to participate at acute level rehab but is too medically complex for SNF due to need for therapy at the acute level with at least 15 hours a week of PT OT and cognitive and recreational therapy at an acute level with daily medical monitoring. Imaging:    Imaging and other studies reviewed and discussed with patient and staff    Xr Pelvis (1-2 Views)    Result Date: 4/27/2019  EXAMINATION: XR PELVIS (1 VIEWS) CLINICAL HISTORY: FRACTURE COMPARISONS: MAY 7, 2015 FINDINGS: Left subcapital fracture identified. Bipolar noncemented right hip replacement. Diffuse osteopenia. No abnormal lucency bone prosthetic interface. Bilateral diffuse calcification iliofemoral vasculature. LEFT SUBCAPITAL FRACTURE. RIGHT HIP REPLACEMENT. Xr Femur Left (min 2 Views)    Result Date: 4/27/2019  EXAMINATION: XR FEMUR LEFT (MIN 2 VIEWS) CLINICAL HISTORY: FRACTURE COMPARISONS: NONE FINDINGS: Nondisplaced left subcapital fracture. Osteopenia. Diffuse calcification profunda femoral, superficial femoral, and popliteal arteries. LEFT SUBCAPITAL FRACTURE. Xr Chest Portable    Result Date: 4/27/2019  EXAMINATION: XR CHEST PORTABLE CLINICAL HISTORY: STUMBLED AND FELL TO FLOOR. NO LOSS OF CONSCIOUSNESS COMPARISONS: None available. FINDINGS: Pacemaker generator overlying left axilla with pacemaker lead tips in region of right atrium and right ventricle. Cardiopericardial silhouette is normal in size. Aorta is calcified. Left diaphragm elevated. Mild blunting costophrenic angles. Calcified granuloma right upper lung. SMALL BILATERAL PLEURAL EFFUSIONS VERSUS THICKENING. 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 Labs:     labs reviewed and discussed with patient and staff    Lab Results   Component Value Date    POCGLU 105 11/10/2013     Lab Results   Component Value Date     04/27/2019    K 3.9 04/27/2019     04/27/2019    CO2 24 04/27/2019    BUN 28 04/27/2019    CREATININE 0.68 04/27/2019    CALCIUM 8.9 04/27/2019    LABALBU 3.6 04/27/2019    LABALBU 4.0 01/03/2012    BILITOT 0.3 04/27/2019    ALKPHOS 76 04/27/2019    AST 14 04/27/2019    ALT 11 04/27/2019     Lab Results   Component Value Date    WBC 8.1 04/27/2019    RBC 4.04 04/27/2019    RBC 4.22 01/03/2012    HGB 13.2 04/27/2019    HCT 38.9 04/27/2019    MCV 96.4 04/27/2019    MCH 32.8 04/27/2019    MCHC 34.0 04/27/2019    RDW 14.3 04/27/2019     04/27/2019    MPV 8.2 06/27/2015     No results found for: VITD25  Lab Results   Component Value Date    APPEARANCE CLOUDY 01/25/2013    COLORU Yellow 05/01/2019    NITRU Negative 05/01/2019    GLUCOSEU Negative 05/01/2019    GLUCOSEU NEG 01/03/2012    KETUA Negative 05/01/2019    UROBILINOGEN 0.2 05/01/2019    BILIRUBINUR Negative 05/01/2019    BILIRUBINUR NEG 01/03/2012     Lab Results   Component Value Date    PROTIME 11.3 04/27/2019    PROTIME 10.8 01/03/2012     Lab Results   Component Value Date    INR 1.1 04/27/2019         I discussed results with patient. The patient remains highly medically complex and continues to have severe problems with activities of daily living and mobility. The patient was assessed to be able to tolerate intensive rehabilitation and therefore was admitted to Rehabilitation to address these needs. Prior Function; everyday activities:     Social History     Socioeconomic History    Marital status:       Spouse name: Not on file    Number of children: Not on file    Years of education: Not on file    Highest education level: Not on file   Occupational History    Not on file   Social Needs    Financial resource strain: Not on file   [employer] insecurity:     Worry: Not on file     Inability: Not on file    Transportation needs:     Medical: Not on file     Non-medical: Not on file   Tobacco Use    Smoking status: Never Smoker    Smokeless tobacco: Never Used   Substance and Sexual Activity    Alcohol use: Not on file    Drug use: No    Sexual activity: Not on file   Lifestyle    Physical activity:     Days per week: Not on file     Minutes per session: Not on file    Stress: Not on file   Relationships    Social connections:     Talks on phone: Not on file     Gets together: Not on file     Attends Mandaeism service: Not on file     Active member of club or organization: Not on file     Attends meetings of clubs or organizations: Not on file     Relationship status: Not on file    Intimate partner violence:     Fear of current or ex partner: Not on file     Emotionally abused: Not on file     Physically abused: Not on file     Forced sexual activity: Not on file   Other Topics Concern    Not on file   Social History Narrative    Not on file     Social supports listed above. Prior Device(s) used:  As above    History of falls:  Rarely falls    In depth analysis of complex functional data; the patient has been:    Current Rehabilitation Assessments:    Physical therapy: FIMS:  Bed Mobility:      Transfers: ,  ,      FIMS:  , ,      Occupational therapy: FIMS:   ,  ,      Speech therapy: FIMS:       Prior to admission patient was independent with all ADLs and mobilityand did not require any outside services.        Past Medical History:   Diagnosis Date    Arthritis     BILAT    COPD [facility]chronic obstructive pulmonary disease) ([facility])     Lower leg edema     BILAT     Lung disease     Osteoarthritis     Pacemaker 2009    FOR [employer]    Paroxysmal SVT (supraventricular tachycardia) (HCC)     Paroxysmal SVT (supraventricular tachycardia) (HCC)     Pneumonia     Sinusitis        Past Surgical History:   Procedure Laterality Date    Smokeless tobacco: Never Used   Substance and Sexual Activity    Alcohol use: Not on file    Drug use: No    Sexual activity: Not on file   Lifestyle    Physical activity:     Days per week: Not on file     Minutes per session: Not on file    Stress: Not on file   Relationships    Social connections:     Talks on phone: Not on file     Gets together: Not on file     Attends Nondenominational service: Not on file     Active member of club or organization: Not on file     Attends meetings of clubs or organizations: Not on file     Relationship status: Not on file    Intimate partner violence:     Fear of current or ex partner: Not on file     Emotionally abused: Not on file     Physically abused: Not on file     Forced sexual activity: Not on file   Other Topics Concern    Not on file   Social History Narrative    Not on file                 FAMILY HISTORY:  Does not pertain tochief complaint. Review of Systems       Objective  /65   Pulse 79   Temp 97 °F (36.1 °C)   Resp 15   Ht 5' 6\" (1.676 m)   Wt 172 lb 3.2 oz (78.1 kg)   SpO2 93%   BMI 27.79 kg/m² *              ROS x10: The patient also complains of severely impaired mobility and activities of daily living. Otherwise no new problems with vision, hearing, nose, mouth, throat, dermal, cardiovascular, GI, , pulmonary, musculoskeletal, psychiatric or neurological. See Rehab H&P on Rehab chart dated . Vital signs:  /65   Pulse 79   Temp 97 °F (36.1 °C)   Resp 15   Ht 5' 6\" (1.676 m)   Wt 172 lb 3.2 oz (78.1 kg)   SpO2 93%   BMI 27.79 kg/m²   I/O:   PO/Intake:  fair PO intake, no problems observed or reported. Bowel/Bladder:  continent, no problems noted. General:  Patient is well developed, adequately nourished, non-obese and     well kempt. HEENT:    PERRLA, hearing intact to loud voice, external inspection of ear     and nose benign.   Inspection of lips, tongue and gums benign  Musculoskeletal: No significant change in strength or tone. All joints stable. Inspection and palpation of digits and nails show no clubbing,       cyanosis or inflammatory conditions. Neuro/Psychiatric: Affect: flat but pleasant. Alert and oriented to person, place and     situation. No significant change in deep tendon reflexes or     sensation  Lungs:  CTA-B. Respiration effort is normal at rest.     Heart:   S1 = S2, RRR. No loud murmurs. Abdomen:  Soft, non-tender, no enlargement of liver or spleen. Extremities:  No significant lower extremity edema or tenderness. Skin:   Intact to general survey, no visualized or palpated problems. After extensive review of the records and above physical exam, I have formulated the followingdiagnoses and plan:      DIAGNOSES:    1. The patient was admitted to the acute rehabilitation unit with the primary rehab diagnoses being severe abnormality of gait and mobility andimpaired self care and ADL's due to left femerel neck fx s/p THR    Compared to Pre-Admission Assessment, patients medical and functional status remain challengingly complex and patient continues to requireintensive therapeutic intervention from multiple therapies, therefore, initiate acute intensive comprehensive inpatient rehabilitation program including PT/OT to improve balance, ambulation, ADLs, and to improve the P/AROM. Functional and medical status reassessed regarding patients ability to participate in therapies and patient found to be able to participate in acute intensivecomprehensive inpatient rehabilitation program.  Therapeutic modifications regarding activities in therapies, place, amount of time per day and intensity of therapy made daily. Enroll in acute course of therapy program to include 1 1/2 hours per day of PT 5 to 7days per week and 1 1/2 hours per day of OT 5 to 7 days per week, and  SLP and Rec T 1/2 hour per day 3-5 days per week.   The patient is stable medically and physically on previous exam.       This patient present with significant new onset decreased mobility andinability to perform activities of daily living skills independently and is at significant risk for prolonged disability  For this reason they have been admitted to [facility]. Thepatient's current functional and medical status are highly complex but the patient is able to participate in intensive rehabilitation. A comprehensive inpatient rehabilitation program is appropriate. The patient Raymundo Congregation initial evaluation by the rehabilitation team and be discussed at regular treatment team meetings to assess progress, mobility, self care, mood and discharge issues. Physical therapy will be consulted for mobilityand endurance issues and should be performed 1 to 2 times per day, 7 days per week for the length of stay. Occupational therapy will be consulted for activities of daily living and should be performed 1 to 2 times per day,7 days per week for the length of stay. Their capacity to participate at an acute level, decision to be treated in the gym, room or on the unit, their activity goals for the day and the number of minutes of activeparticipation will be reassessed and re-prescribed daily. Because this patient is medically complex, I will check a CBC, BMP, UA and orthostatic blood pressures. They will be reassessed daily regarding their ability toparticipate in an acute level rehabilitation program.  Recreational Therapy will be consulted for community re-entry and adjustment to disability. Communication, cognitive and emotional issues will also be addressed duringthis rehabilitation stay by rehabilitation psychologist or speech therapist as appropriate. I reviewed the patient's old and current charts and discussed other rehabilitation options with the rehabilitation teamincluding the rehab RN and the admission team as well as the patient.   I feel that the patient's functional recovery would be best served at an acute inpatient rehabilitation program because the patient needs intense therapythree hours per day, direct RN supervision and daily monitoring by a physician for medical status. This cannot be sufficiently provided by home health care, a skilled nursing facility or in an outpatient setting. I furtherfeel that the patient has the potential to improve functional abilities in an acute intensive rehabilitation program.    Old records were reviewed and summarized. 2.  Other diagnoses which complicate rehabilitation stay include: Active Problems:    Sick sinus syndrome (HCC)    COPD with acute exacerbation (HCC)    Left displaced femoral neck fracture (HCC)  Resolved Problems:    * No resolved hospital problems. 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 The patient is high risk for urinary tract infection, an admission urinalysis has been ordered. I will have the nurses check post void residual bladder volumes and place acatheter if excessive urine is retained in the bladder after voiding. The patient is risk for deep venous thromosis,complex deep venous thrombosis protocol prophylaxis has been ordered see mar. The patient is high risk for orthostasis and a hydration program and orthostatic blood pressure screening have been ordered. I will attempt to get old records from the patient's previous hospital stay.   Care everywhere on EPIC wasutilized. 3.  Current and previous medications were reviewed and summarized and compared to old medication lists from home and from the acute floor. 4.  Complex labs and x-rays were reviewed. I will reviewpatient's old EKG and place it on the chart. 5.  Will provide emotional support for this patient regarding adjustment to their disability. Cognition and mood will be screened daily and addressed by rehabilitationpsychology and/or speech therapy as appropriate. I have encouraged the patient to attend the Rehab Adjustment to Disability Support Group and recreational therapy. 6.  Estimated length of stay is 2-   weeks. Discharge to home with help from family and home health PT, OT, RN, and aide. Patient should be independent at discharge. 7.  The patient's medical and rehab prognosis are good. [address] regarding the patient's back up to general medical needs. A welcome letter was presented with an explanation of my services, my specialty and what to expect during the rehabilitation process. Aswell as introducing myself, I also wrote my name on their bedside marker board with their name as well as the names of the other physicians with an explanation of our individual roles in their care, as well as the rehab process.       Padmini Galindo, RONNIE.O., F.A.A.P.M.&R.

## 2019-05-02 NOTE — PROGRESS NOTES
Physical Therapy Rehab Treatment Note  Fac[facility]/Department[facility]  [facility]       NAME: Vania Fitzpatrick  : 1933 (80 y.o.)  MRN: [id number]  CODE STATUS: Full Code    Date of Service: 2019  Chart Reviewed: Yes  Patient assessed for rehabilitation services?: Yes  Family / Caregiver Present: No    Restrictions:  Restrictions/Precautions: Weight Bearing  Lower Extremity Weight Bearing Restrictions  Left Lower Extremity Weight Bearing: Weight Bearing As Tolerated  Position Activity Restriction  Hip Precautions: Posterior hip precautions  Other position/activity restrictions: Posterior hip precautions       SUBJECTIVE: Response To Previous Treatment: Patient with no complaints from previous session.   Pain Screening  Patient Currently in Pain: No  Pre Treatment Pain Screening  Pain at present: 0  Scale Used: Numeric Score  Intervention List: Patient able to continue with treatment    Post Treatment Pain Screening:  Pain Assessment  Pain Assessment: 0-10  Pain Level: 0    OBJECTIVE:   Overall Orientation Status: Within Functional Limits  Follows Commands: Within Functional Limits      Bed mobility  Supine to Sit: Contact guard assistance  Sit to Supine: Minimal assistance  Comment: Verbal visual and tactile cues for proper technique while maintaining hip precautions    Transfers  Sit to Stand: Contact guard assistance  Stand to sit: Contact guard assistance  Comment: Cues for approach to chair to maintain hip precautions    Ambulation  Ambulation?: Yes  More Ambulation?: No  Ambulation 1  Surface: carpet  Device: Rolling Walker  Assistance: Stand by assistance;Contact guard assistance(Cues to keep hands on walker and how to navigate turns to maintain hip precautions)  Quality of Gait: Patient ambulates with mild antalgic gait with decreased LLE stance phase  Distance: 100' x 1, Multiple short distances ambulations to practice negotiation of turns and approach to sit  Stairs/Curb  Stairs?: No(Tested in a.m.)     Exercises  Heelslides: x20  Gluteal Sets: x 20 3\" hold  Hip Abduction: x 20 with slideboard  Knee Long Arc Quad: x 20  Ankle Pumps: x 20     ASSESSMENT/COMMENTS:  Body structures, Functions, Activity limitations: Decreased ROM; Decreased balance;Decreased endurance;Decreased safe awareness;Decreased functional mobility ; Decreased strength  Assessment: Patient able to state hip precautions but requires cues to maintain with gait and bed mobility. Patient ambulates and lifts hands from walker requiring cues for safety    PLAN OF CARE/Safety:   Safety Devices  Type of devices: Chair alarm in place; Left in chair      Therapy Time:   Individual   Time In 1300   Time Out 1400   Minutes 60     Minutes: 60      Transfer/Bed mobility trainin      Gait training: 15     Therapeutic ex: [address] PTA, 19 at 2:07 PM

## 2019-05-02 NOTE — PROGRESS NOTES
Occupational Therapy   Occupational Therapy Initial Assessment  Date: 2019   Patient Name: Lucy Snow  MRN: [id number]     : 1933    Date of Service: 2019    Discharge Recommendations:  Continue to assess pending progress       Assessment   Performance deficits / Impairments: Decreased functional mobility ; Decreased strength;Decreased endurance;Decreased high-level IADLs;Decreased ADL status; Decreased safe awareness;Decreased balance  Prognosis: Good  Decision Making: Medium Complexity  History: 5 complexities  Exam: 7 deficits  Assistance / Modification: Mod A  REQUIRES OT FOLLOW UP: Yes  Activity Tolerance  Activity Tolerance: Patient Tolerated treatment well  Safety Devices  Safety Devices in place: Yes  Type of devices: All fall risk precautions in place  Restraints  Initially in place: No           Patient Diagnosis(es): There were no encounter diagnoses. has a past medical history of Arthritis, COPD (chronic obstructive pulmonary disease) (HCC), Lower leg edema, Lung disease, Osteoarthritis, Pacemaker, Paroxysmal SVT (supraventricular tachycardia) ([address] Paroxysmal SVT (supraventricular tachycardia) ([address] Pneumonia, and Sinusitis. has a past surgical history that includes Pacemaker insertion (N/A, ).            Restrictions  Restrictions/Precautions  Restrictions/Precautions: Weight Bearing  Lower Extremity Weight Bearing Restrictions  Left Lower Extremity Weight Bearing: Weight Bearing As Tolerated  Position Activity Restriction  Hip Precautions: Posterior hip precautions  Other position/activity restrictions: Posterior hip precautions    Subjective   General  Chart Reviewed: Yes  Patient assessed for rehabilitation services?: Yes  Family / Caregiver Present: No  Referring Practitioner: Dr Tapan Pringle  Diagnosis: Left Femoral neck fracture S/P Left hip hemiarthroplasty  Oxygen Therapy  SpO2: 99 %  Pulse Oximeter Device Mode: Intermittent  Pulse Oximeter Device Location: reported. Visual Field Cut: No  Oculo Motor Control: WNL  Cognition  Cognition Comment: Comp 5, expression 4, social 7, problem 4, memory 5  Perception  Overall Perceptual Status: WFL     Sensation  Overall Sensation Status: WFL        LUE AROM (degrees)  LUE AROM : WFL  Left Hand AROM (degrees)  Left Hand AROM: WFL  RUE AROM (degrees)  RUE AROM : WFL  Right Hand AROM (degrees)  Right Hand AROM: WFL  LUE Strength  Gross LUE Strength: WFL  L Hand Grasp: 4/5  L Hand Release: 4/5  RUE Strength  Gross RUE Strength: WFL  R Hand Grasp: 4/5  R Hand Release: 4/5     Hand Dominance  Hand Dominance: Right             Plan   Plan  Times per week: 5-7x/wk  Plan weeks: 1 1/2 weeks  Current Treatment Recommendations: Strengthening, Functional Mobility Training, Cognitive/Perceptual Training, Endurance Training, Balance Training, Neuromuscular Re-education, Self-Care / ADL, Home Management Training    G-Code     OutComes Score                                                  AM-PAC Score             Goals  Patient Goals   Patient goals : To return to home with daughter. [x]  Patient will complete self care as followed using the recommended adaptive equipment and/or adaptive techniques as instructed:  Feeding:independent  Grooming: independent  Bathing: Mod I  UE Dressing: independent  LE Dressing: Mod I  Toileting: Mod I  Toilet Transfers: Mod I  Tub Transfers: mod I   [x]  Patient will sequence self-care routine without verbal/tactile cues. []  Patient will improve   UE sensation and/or utilize compensatory techniques for safe completion of self-care as projected. [x]  Patient will improve static and dynamic standing balance to complete pants management at standing level     []  Patient will improve   UE Function (AROM, strength, motor control, tone normalization) to complete ADLs as projected. [x]  Patient will improve functional endurance to tolerate/complete 35-50 minutes of ADLs.      [x]  Patient will improve

## 2019-05-02 NOTE — PROGRESS NOTES
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    ONSET DATE: 19    Date of Eval: 2019  Evaluating Therapist: Dina Lopez    Recommended Diet and Intervention  Diet Solids Recommendation: Regular  Liquid Consistency Recommendation: Thin  Recommended Form of Meds: PO     Therapeutic Interventions: (no f/u swallow)    Compensatory Swallowing Strategies  Compensatory Swallowing Strategies: Upright as possible for all oral intake    Reason for Referral  Raul Patel was referred for a bedside swallow evaluation to assess the efficiency of her swallow function, identify signs and symptoms of aspiration and make recommendations regarding safe dietary consistencies, effective compensatory strategies, and safe eating environment. General  Chart Reviewed: Yes  Behavior/Cognition: Alert; Cooperative;Pleasant mood  Respiratory Status: Room air  O2 Device: None (Room air)  Follows Directions: Complex  Dentition: Some missing teeth  Patient Positioning: Upright in chair  Baseline Vocal Quality: Normal  Volitional Cough: Strong  Prior Dysphagia History: none per pt and per chart review  Consistencies Administered: Thin;Thin - cup; Thin - straw;Reg solid;Puree    Current Diet level:  Current Diet : Regular  Current Liquid Diet : Thin    Oral Motor Deficits  Oral/Motor  Oral Motor:  Within functional limits    Oral Phase Dysfunction  Oral Phase  Oral Phase: WFL  Oral Phase  Oral Phase - Comment: WFL oral stage swallow with good bolus manipulation and minimal oral residue of regular solid clearing with thin liquid wash     Indicators of Pharyngeal Phase Dysfunction   Pharyngeal Phase  Pharyngeal Phase: WFL  Pharyngeal Phase   Pharyngeal: WFL pharyngeal swallow characterized by good swallow onset time and good laryngeal elevation judged via palpation     Impression  Dysphagia Diagnosis: Swallow function appears grossly intact  Dysphagia Outcome Severity Scale: Level 7: Normal in all situations     Treatment Plan  Requires SLP Intervention: No  Duration/Frequency of Treatment: no f/u swallow  D/C Recommendations: Home with intermittent assistance       Treatment/Goals  Short-term Goals  Goal 1: no f/u swallow  Long-term Goals  Goal 1: no f/u swallow    Prognosis  Prognosis  Prognosis for safe diet advancement: good  Barriers/Prognosis Comment: no f/u swallow   Individuals consulted  Consulted and agree with results and recommendations: Patient;RN;Family member(NICKOLAS Clancy )  Family member consulted: son     Education  Patient Education: rationale for BSE  Patient Education Response: Verbalizes understanding  Safety Devices in place: Yes  Type of devices: Call light within reach; Chair alarm in place    [x]  Roge Clancy RN notified   []  Dr. Yamel Bolaños notified via voicemail - NA no change   []  OT/PT Departments notified via [address] Patel [address] - NA no change     Pain:  Pain Assessment  Patient Currently in Pain: Denies  Pain Assessment: 0-10  Pain Level: 0       Therapy Time  SLP Individual Minutes  Time In: 1100  Time Out: [address]  Minutes: [address] [facility], [facility]-SLP, Date: 5/2/2019, Time: 12:13 PM

## 2019-05-02 NOTE — CONSULTS
Consult Note    Reason for Consult:  Medical mgmt    Requesting Physician:  Elizabeth Wu, *    HISTORY OF PRESENT ILLNESS:    The patient is a 80 y.o. female who presents s/p L LAKSHMI 2/2 mechanical fall. Pt reports pain is well controlled and denies CP, palp, SOB, abd pain, NVDFC. Hospitalist service consulted form mgmt of pt's underlying SSS s/p pacer, COPD, BLE edema. Pt will require intensive inpt PT/OT d/t gait ataxia 2/2 recent surgery. Past Medical History:   Diagnosis Date    Arthritis     BILAT    Lower leg edema     BILAT     Lung disease     Osteoarthritis     Pacemaker 2009    FOR SYNCOPE    Paroxysmal SVT (supraventricular tachycardia) (HCC)     Paroxysmal SVT (supraventricular tachycardia) (HCC)     Pneumonia     Sinusitis        Past Surgical History:   Procedure Laterality Date    PACEMAKER INSERTION N/A 2009       Prior to Admission medications    Medication Sig Start Date End Date Taking? Authorizing Provider   albuterol (PROVENTIL) (2.5 MG/3ML) 0.083% nebulizer solution Take 3 mLs by nebulization every 6 hours as needed for Wheezing 3/6/19 4/5/19  Samina Solis MD   Handicap Placard MISC by Does not apply route Patient requires a disability parking placard due to difficulties ambulating long distances.          Good 3/6/19-3/6/2024 3/6/19   Jaymie King MD   sotalol (BETAPACE) 80 MG tablet Take 1 tablet by mouth 2 times daily 1/17/19   Jaymie King MD   guaiFENesin [facility]) 600 MG extended release tablet Take 1 tablet by mouth 2 times daily 11/1/18   Samina Solis MD   atorvastatin (LIPITOR) 20 MG tablet Take 1 tablet by mouth daily 9/24/18   Jaymie King MD   verapamil (CALAN SR) 180 MG extended release tablet Take 1 tablet by mouth daily 7/25/18   Jaymie King MD   aspirin 81 MG tablet Take 1 tablet by mouth daily With Food 12/29/17   Jaymie King MD       Scheduled Meds:   aspirin  325 mg Oral Daily    docusate sodium  100 mg Oral BID    [START ON for  wheezing  CARDIOVASCULAR:  negative  GASTROINTESTINAL:  negative  MUSCULOSKELETAL:  positive for  pain and decreased range of motion to LLE  NEUROLOGICAL:  negative  BEHAVIOR/PSYCH:  negative  ROS as noted in HPI, 12 point ROS reviewed and otherwise negative. Physical Exam:  Vitals:    05/01/19 1659 05/01/19 2006   BP: (!) 143/69 136/65   Pulse: 94 79   Resp: 16 15   Temp: 97.3 °F (36.3 °C) 97 °F (36.1 °C)   TempSrc: Oral    SpO2: 94% 93%   Weight: 172 lb 3.2 oz (78.1 kg)    Height: 5' 6\" (1.676 m)        CONSTITUTIONAL:  alert and mild distress  EYES:  pupils equal, round and reactive to light, sclera clear and conjunctiva normal  ENT:  normocepalic, without obvious abnormality, atraumatic, upper and lower dentures, oral pharynx with moist mucus membranes  NECK:  supple, symmetrical, trachea midline, skin normal and no carotid bruits  LUNGS:  no increased work of breathing and clear to auscultation  CARDIOVASCULAR:  normal apical pulses and normal S1 and S2  ABDOMEN:  normal bowel sounds and non-tender  MUSCULOSKELETAL:  Bandage to L hip clean and dry, 1+ BLE edema + distal pulses  NEUROLOGIC:  Mental Status Exam:  Level of Alertness:   awake  Orientation:   person, place, time  Cranial Nerves:  cranial nerves II-XII are grossly intact  SKIN:  normal skin color, texture, turgor    Labs:  No results found for this or any previous visit (from the past 72 hour(s)). Data:    No results found. Assessment/Plan:  1. Gait ataxia - s/p L LAKSHMI 2/2 mechanical fall  2. SSS s/p pacer  3. COPD w/o exac  4.   Chronic sinusitis      PT/OT per rehab attending  Prn duoneb aerosols  Cbc, bmp, Mg in am  Fall precautions    Electronically signed by Daxa Sahu PA-C on 5/1/19 at 8:10 PM    Dr. Ronal Rawls DO - supervising physician

## 2019-05-03 LAB — URINE CULTURE, ROUTINE: NORMAL

## 2019-05-03 PROCEDURE — 1180000000 HC REHAB R&B

## 2019-05-03 PROCEDURE — 6360000002 HC RX W HCPCS: Performed by: PHYSICAL MEDICINE & REHABILITATION

## 2019-05-03 PROCEDURE — 6370000000 HC RX 637 (ALT 250 FOR IP): Performed by: PHYSICAL MEDICINE & REHABILITATION

## 2019-05-03 PROCEDURE — 97535 SELF CARE MNGMENT TRAINING: CPT

## 2019-05-03 PROCEDURE — 97530 THERAPEUTIC ACTIVITIES: CPT

## 2019-05-03 PROCEDURE — 97110 THERAPEUTIC EXERCISES: CPT

## 2019-05-03 PROCEDURE — 94640 AIRWAY INHALATION TREATMENT: CPT

## 2019-05-03 PROCEDURE — 97116 GAIT TRAINING THERAPY: CPT

## 2019-05-03 RX ADMIN — ACETAMINOPHEN 500 MG: 500 TABLET ORAL at 14:23

## 2019-05-03 RX ADMIN — ALBUTEROL SULFATE 2.5 MG: 2.5 SOLUTION RESPIRATORY (INHALATION) at 04:30

## 2019-05-03 RX ADMIN — ACETAMINOPHEN 500 MG: 500 TABLET ORAL at 06:04

## 2019-05-03 RX ADMIN — DOCUSATE SODIUM 100 MG: 100 CAPSULE, LIQUID FILLED ORAL at 21:11

## 2019-05-03 RX ADMIN — SOTALOL HYDROCHLORIDE 80 MG: 80 TABLET ORAL at 21:11

## 2019-05-03 RX ADMIN — OXYCODONE HYDROCHLORIDE 5 MG: 5 TABLET ORAL at 08:27

## 2019-05-03 RX ADMIN — SOTALOL HYDROCHLORIDE 80 MG: 80 TABLET ORAL at 08:27

## 2019-05-03 RX ADMIN — DOCUSATE SODIUM 100 MG: 100 CAPSULE, LIQUID FILLED ORAL at 08:27

## 2019-05-03 RX ADMIN — ALBUTEROL SULFATE 2.5 MG: 2.5 SOLUTION RESPIRATORY (INHALATION) at 11:04

## 2019-05-03 RX ADMIN — Medication 5 MG: at 21:13

## 2019-05-03 RX ADMIN — ALBUTEROL SULFATE 2.5 MG: 2.5 SOLUTION RESPIRATORY (INHALATION) at 17:37

## 2019-05-03 RX ADMIN — VERAPAMIL HYDROCHLORIDE 180 MG: 180 TABLET, FILM COATED, EXTENDED RELEASE ORAL at 21:11

## 2019-05-03 RX ADMIN — PREDNISONE 20 MG: 20 TABLET ORAL at 08:27

## 2019-05-03 RX ADMIN — OXYCODONE HYDROCHLORIDE 5 MG: 5 TABLET ORAL at 12:35

## 2019-05-03 RX ADMIN — OXYCODONE HYDROCHLORIDE 5 MG: 5 TABLET ORAL at 21:14

## 2019-05-03 RX ADMIN — ASPIRIN 325 MG: 325 TABLET, DELAYED RELEASE ORAL at 08:27

## 2019-05-03 RX ADMIN — MAGNESIUM HYDROXIDE 30 ML: 400 SUSPENSION ORAL at 14:23

## 2019-05-03 SDOH — SOCIAL STABILITY: SOCIAL NETWORK
DO YOU BELONG TO ANY CLUBS OR ORGANIZATIONS SUCH AS CHURCH GROUPS UNIONS, FRATERNAL OR ATHLETIC GROUPS, OR SCHOOL GROUPS?: NO

## 2019-05-03 SDOH — HEALTH STABILITY: PHYSICAL HEALTH: ON AVERAGE, HOW MANY MINUTES DO YOU ENGAGE IN EXERCISE AT THIS LEVEL?: 0 MIN

## 2019-05-03 SDOH — HEALTH STABILITY: PHYSICAL HEALTH: ON AVERAGE, HOW MANY DAYS PER WEEK DO YOU ENGAGE IN MODERATE TO STRENUOUS EXERCISE (LIKE A BRISK WALK)?: 0 DAYS

## 2019-05-03 SDOH — SOCIAL STABILITY: SOCIAL NETWORK: IN A TYPICAL WEEK, HOW MANY TIMES DO YOU TALK ON THE PHONE WITH FAMILY, FRIENDS, OR NEIGHBORS?: ONCE A WEEK

## 2019-05-03 SDOH — SOCIAL STABILITY: SOCIAL NETWORK: HOW OFTEN DO YOU GET TOGETHER WITH FRIENDS OR RELATIVES?: MORE THAN THREE TIMES A WEEK

## 2019-05-03 SDOH — HEALTH STABILITY: MENTAL HEALTH
STRESS IS WHEN SOMEONE FEELS TENSE, NERVOUS, ANXIOUS, OR CAN'T SLEEP AT NIGHT BECAUSE THEIR MIND IS TROUBLED. HOW STRESSED ARE YOU?: NOT AT ALL

## 2019-05-03 SDOH — SOCIAL STABILITY: SOCIAL NETWORK: HOW OFTEN DO YOU ATTENT MEETINGS OF THE CLUB OR ORGANIZATION YOU BELONG TO?: NEVER

## 2019-05-03 ASSESSMENT — PAIN SCALES - GENERAL
PAINLEVEL_OUTOF10: 0
PAINLEVEL_OUTOF10: 3
PAINLEVEL_OUTOF10: 6
PAINLEVEL_OUTOF10: 3
PAINLEVEL_OUTOF10: 0
PAINLEVEL_OUTOF10: 8

## 2019-05-03 NOTE — PLAN OF CARE
Plan of Care:  Coping  Educated and recommended to patient Rehab Support Group to help with coping. Patient reports she is not interested in attending the rehab support group while an inpatient on rehab.  Electronically signed by Vivian Bryant on 5/3/2019 at 9:59 AM

## 2019-05-03 NOTE — PROGRESS NOTES
Flexion: x 20 to hip precautions  Hip Abduction: x 20 slideboard  Knee Long Arc Quad: x 20  Ankle Pumps: x 20     ASSESSMENT/COMMENTS:  Body structures, Functions, Activity limitations: Decreased ROM; Decreased balance;Decreased endurance;Decreased safe awareness;Decreased functional mobility ; Decreased strength  Assessment: Patient shows improve technique with supine exercises and continues to require verbal cues for maintain hip precautions with bed mobility    PLAN OF CARE/Safety:   Safety Devices  Type of devices: Chair alarm in place; Left in chair      Therapy Time:   Individual   Time In 1300   Time Out 1400   Minutes 60     Minutes: 60      Transfer/Bed mobility training: 10      Gait trainin     Therapeutic ex: [address] [place] [address] [facility], 19 at 1:47 PM

## 2019-05-03 NOTE — PROGRESS NOTES
Long Arc Quad: x 20  Ankle Pumps: x 20     ASSESSMENT/COMMENTS:  Body structures, Functions, Activity limitations: Decreased ROM; Decreased balance;Decreased endurance;Decreased safe awareness;Decreased functional mobility ; Decreased strength  Assessment: Patient shows improved ability to maintain hip precautions with bed mobility and transfers this day. Patient progressing towards goals    PLAN OF CARE/Safety:   Safety Devices  Type of devices: Chair alarm in place; Left in chair      Therapy Time:   Individual   Time In 1000   Time Out 1100   Minutes 60     Minutes: 60      Transfer/Bed mobility training: 15      Gait trainin     Therapeutic ex: [address], 19 at 12:11 PM

## 2019-05-03 NOTE — PROGRESS NOTES
Occupational Therapy  Facility/Department: [facility]  Daily Treatment Note  NAME: Vania Fitzpatrick  : 1933  MRN: [id number]    Date of Service: 5/3/2019    Discharge Recommendations:  Continue to assess pending progress       Assessment      Activity Tolerance  Activity Tolerance: Patient Tolerated treatment well  Safety Devices  Safety Devices in place: Yes  Type of devices: All fall risk precautions in place  Restraints  Initially in place: No         Patient Diagnosis(es): There were no encounter diagnoses. has a past medical history of Arthritis, COPD (chronic obstructive pulmonary disease) (HCC), Lower leg edema, Lung disease, Osteoarthritis, Pacemaker, Paroxysmal SV[address] ([address]raventricular tachycardia) ([facility] Paroxysmal SVT (supraventricular tachycardia) ([facility] Pneumonia, and Sinusitis. has a past surgical history that includes Pacemaker insertion (N/A, ). Restrictions  Restrictions/Precautions  Restrictions/Precautions: Weight Bearing  Lower Extremity Weight Bearing Restrictions  Left Lower Extremity Weight Bearing: Weight Bearing As Tolerated  Position Activity Restriction  Hip Precautions: Posterior hip precautions  Other position/activity restrictions: Posterior hip precautions  Subjective \"I want to be home next week. \"  General  Chart Reviewed: Yes  Patient assessed for rehabilitation services?: Yes  Family / Caregiver Present: No  Referring Practitioner: Dr Atul Devine  Diagnosis: Left Femoral neck fracture S/P Left hip hemiarthroplasty      Orientation     Objective    ADL  Feeding: Setup  Grooming: Modified independent   UE Bathing: Setup  LE Bathing: Moderate assistance  UE Dressing: Setup  LE Dressing: Minimal assistance(using reacher and sock aid)  Toileting: Unable to assess(comment)        Toilet Transfers  Toilet Transfer: Unable to assess  Tub Transfers  Tub Transfers: Not tested  Shower Transfers  Shower - Transfer From: Silvia Eli - Transfer Type:  To and From  Shower - Transfer To: Shower seat with back  Shower - Technique: Ambulating  Shower Transfers: Contact Guard         Pt completed am adl routine with functional levels as stated above. Pt completed [place] puzzle in standing at table top without seated rest break. Pt reports minimal pain in hip, not quantified during treatment. \"               Plan   Plan  Times per week: 5-7x/wk  Plan weeks: 1 1/2 weeks  Current Treatment Recommendations: Strengthening, Functional Mobility Training, Cognitive/Perceptual Training, Endurance Training, Balance Training, Neuromuscular Re-education, Self-Care / ADL, Home Management Training  G-Code     OutComes Score                                                  AM-PAC Score             Goals  Patient Goals   Patient goals : To return to home with daughter.        Therapy Time   Individual Concurrent Group Co-treatment   Time In 0830         Time Out 0930         Minutes 61                 TANA Macias/L Electronically signed by TANA Swenson/L on 4/8/20 at 1:22 PM

## 2019-05-03 NOTE — PROGRESS NOTES
[facility]) -     Acute  Rehabilitation  RECREATIONAL THERAPY  Initial Evaluation    Date:  5/3/2019        Patient Name: Briana Balderas       MRN: [id number]        YOB: 1933 (80 y.o.)       Gender: female  Diagnosis: Left Femoral neck fracture S/P Left hip hemiarthroplasty  Referring Practitioner: Dr Danelle Gamez    RESTRICTIONS/PRECAUTIONS:  Restrictions/Precautions: Weight Bearing  Vision: Impaired  Hearing: Within functional limits    Patient seen at: [id number]    Recreation Therapist received referral, chart reviewed and visitor present    SUBJECTIVE:   Patient reports she is always on the go and how \"different\" she is. Prior to admission, patient engaged in leisure/recreation on a regular basis: always   Patient reports he/she is comfortable in: comfortable with both scenarios    Patient reports pain is a other  She reports soreness    OBJECTIVE:  Pt is up in wheelchair. Oxygen No    Ability to provide information regarding leisure and recreation interests:  No issues     Leisure Interest Survey: Prefers doing chores, cleaning, yardwork  [employer]   Gardening/yard work, Reading, Word searches/Crosswords, Likes music, Likes animals, Indoor/outdoor plants and Spending time outdoors                 Sports/Physical Activity     Walking to do yardwark and chores. Seasonal fishing    [employer], Shopping, Dining out, Gambling/Casino and Local zamarripa    Social Activities   Family/friend visits and Traveling    Details regarding  Leisure Interests: Pt prefers [employer] she also likes Polka. Pt likes estate sales and thrift stores. Likes to mow grass and prune in the yard.    Past leisure interests:   Future leisure interests:     Comments:     Emotional   Observed/noted:   Cooperative and Pleasant  Affect:   Appropriate  Comments:     Celina Cortez Session included:    Increased Socialization, Provided active listening and Benefits of the patient's leisure and recreation pursuits being utilized as stress relievers    Recommendations to utilize Recreation Therapy services, its supported services and groups include:            Art Therapy group, [facility] [facility], Pet Therapy, Individual Music Therapy session and Coping skills    Comment(s):     ASSESSMENT  Patient tolerated todays session:  good       Prior Activity Level: Very Active   Leisure Awareness:   good      Lack of interest in the following:    Patient declines ART THERAPY SERVICES services. and Independent leisure activities for in room use. Barriers to Leisure Participation:  General Weakness  Comment(s):    Plan  Prior to discharge from rehab program:   COMPLETE  Patient will express interest in participating in the Pet Therapy program during their hospitalization. COMPLETE Patient will indicate current and prior leisure/recreational interests and state those they will follow through with post discharge. COMPLETE Patient will be provided with a large print cognitive/orientation/puzzle handout 5x/week. COMPLETE Patient will express interest in participating in Music Therapy during his hospitalization.     Patient provided with the following accessible and independently used recreation/leisure resources when in hospital room:   5 Days week large print orientation/puzzle activity handout    EDUCATION   Was new education provided: Yes  Learner:   Patient and Family  Education provided:    [place]  Method of Education: Discussion  Evaluation of Patients Response to Education:  Verbalized Understanding                         FIMS  Comprehension: 5 - Patient understands basic needs (hungry/hot/pain)  Expression: 5 - Expresses basic ideas/needs only (hungry/hot/pain)  Social Interaction: 7 - Patient has appropriate behavior/relations 100% of the time  Problem Solvin - Patient independent with complex tasks  Memory: 5 - Patient requires prompting with stress/unfamiliar situations    Electronically signed by: Norbert Sanabria  Date: 5/3/2019   Electronically signed by Norbert Sanabria on 5/3/2019 at 10:05 AM

## 2019-05-03 NOTE — PROGRESS NOTES
mild antalgic gait with decreased LLE stance phase  Distance: 250' x 1 150' x 1  Comments: Increased tightness in hip with ambulation , Stairs  # Steps : 4  Stairs Height: 6\"  Rails: Bilateral  Device: No Device  Assistance: Stand by assistance  Comment: Good technique and safety awareness    FIMS: Bed, Chair, Wheel Chair: 4 - Requires steadying assistance only <25% assist  and/or requires assist with one leg only  Walk: 5 - Supervision Requires standby supervision or cuing to walk at least 150 feet  Distance Walked: 200  Wheel Chair: 0 - Activity Not Assessed/Does Not Occur  Stairs: 2- Maximal Assistance Performs 25-49% of the effort to go up and down 4 to 6 stairs and requires the assistance of one person only,  , Assessment: Patient shows improved ability to maintain hip precautions with bed mobility and transfers this day.  Patient progressing towards goals    Occupational therapy: FIMS:  Eatin - Feeds self with setup/supervision/cues and/or requires only setup/supervision/cues to perform tube feedings  Groomin - Requires setup/cues to do all tasks  Bathin - Did not occur  Dressing-Upper: 4 - Requires assist with buttons/zippers only and/or requires assist with one arm only  Dressing-Lower: 2 - Requires assist with 4-5 parts of dressing  Toiletin - Requires steadying assistance only  Toilet Transfer: 4 - Requires steadying assistance only < 25% assist  Tub Transfer: 0 - Activity does not occur  Shower Transfer: 4 - Minimal contact assistance, pt. expends 75% or more effort,  ,      Speech therapy: FIMS: Comprehension: 5 - Patient understands basic needs (hungry/hot/pain)  Expression: 5 - Expresses basic ideas/needs only (hungry/hot/pain)  Social Interaction: 5 - Patient is appropriate with supervision/cues  Problem Solvin - Patient independent with complex tasks  Memory: 5 - Patient requires prompting with stress/unfamiliar situations      Lab/X-ray studies reviewed, analyzed and discussed with patient and staff:   Recent Results (from the past 24 hour(s))   POCT Glucose    Collection Time: 05/02/19  8:54 PM   Result Value Ref Range    POC Glucose 163 (H) 60 - 115 mg/dl    Performed on ACCU-CHEK        Xr Pelvis (1-2 Views)    Result Date: 4/27/2019  EXAMINATION: XR PELVIS (1 VIEWS) CLINICAL HISTORY: FRACTURE COMPARISONS: MAY 7, 2015 FINDINGS: Left subcapital fracture identified. Bipolar noncemented right hip replacement. Diffuse osteopenia. No abnormal lucency bone prosthetic interface. Bilateral diffuse calcification iliofemoral vasculature. LEFT SUBCAPITAL FRACTURE. RIGHT HIP REPLACEMENT. Xr Femur Left (min 2 Views)    Result Date: 4/27/2019  EXAMINATION: XR FEMUR LEFT (MIN 2 VIEWS) CLINICAL HISTORY: FRACTURE COMPARISONS: NONE FINDINGS: Nondisplaced left subcapital fracture. Osteopenia. Diffuse calcification profunda femoral, superficial femoral, and popliteal arteries. LEFT SUBCAPITAL FRACTURE. Xr Chest Portable    Result Date: 4/27/2019  EXAMINATION: XR CHEST PORTABLE CLINICAL HISTORY: STUMBLED AND FELL TO FLOOR. NO LOSS OF CONSCIOUSNESS COMPARISONS: None available. FINDINGS: Pacemaker generator overlying left axilla with pacemaker lead tips in region of right atrium and right ventricle. Cardiopericardial silhouette is normal in size. Aorta is calcified. Left diaphragm elevated. Mild blunting costophrenic angles. Calcified granuloma right upper lung. SMALL BILATERAL PLEURAL EFFUSIONS VERSUS THICKENING. 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 Previous extensive, complex labs, notes and diagnostics reviewed and analyzed. ALLERGIES:    Allergies as of 05/01/2019 - Review Complete 05/01/2019   Allergen Reaction Noted    Codeine  08/18/2015    Penicillins  08/18/2015      (please also verify by checking [facility])     Complex Physical Medicine & Rehab Issues Assess & Plan:   1.  Severe abnormality of gait and mobility and impaired self-care and ADL's secondary to progressive left Hip fracture s/p THR . Functional and medical status reassessed regarding patients ability to participate in therapies and patient found to be able to participate in acute intensive comprehensive inpatient rehabilitation program including PT/OT to improve balance, ambulation, ADLs, and to improve the P/AROM. Therapeutic modifications regarding activities in therapies, place, amount of time per day and intensity of therapy made daily. In bed therapies or bedside therapies prn.   2. Bowel and Bladder dysfunction:  frequent toileting, ambulate to bathroom with assistance, check post void residuals. Check for C.difficile x1 if >2 loose stools in 24 hours, continue bowel & bladder program.  Monitor bowel and bladder function. Lactinex 2 PO every AC. MOM prn, Brown Bomb prn, Glycerin suppository prn, enema prn. 3. Moderate generalized OA pain: reassess pain every shift and prior to and after each therapy session, give prn Tylenol and  See mar, modalities prn in therapy, Lidoderm, K-pad prn.   4. Skin healing and breakdown risk:  continue pressure relief program.  Daily skin exams and reports from nursing. 5. Fatigue due to nutritional and hydration deficiency:  continue to monitor I&Os, calorie counts prn, dietary consult prn.  6. Acute episodic insomnia with situational adjustment disorder:  prn Ambien, monitor for day time sedation. 7. Falls risk elevated:  patient to use call light to get nursing assistance to get up, bed and chair alarm. 8. Elevated DVT risk: progressive activities in PT, continue prophylaxis PATSY hose, elevation and see mar . 9. Complex discharge planning:  Weekly team meeting every Monday to assess progress towards goals, discuss and address social, psychological and medical comorbidities and to address difficulties they may be having progressing in therapy. Patient and family education is in progress. The patient is to follow-up with their family physician after discharge.         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

## 2019-05-03 NOTE — CARE COORDINATION
Spoke with patient and explained role in the team. Patient questions answered appropriately. Explained discharge process. Patient stated understanding. Patient lives with daughter who is retired. Patient has a cane, SW, shower chair, and stated she has a w/c. Patient was independent prior to admission. Electronically signed by Barney Klinefelter, RN on 5/3/2019 at 8:24 AM  Will verify with daughter when she comes in. Electronically signed by Barney Klinefelter, RN on 5/3/2019 at 8:25 AM    Spoke with daughter. Patient does have SW, [employer], rollator and w/c at home. Updated PT.  Electronically signed by Barney Klinefelter, RN on 5/3/2019 at 10:40 AM

## 2019-05-04 PROCEDURE — 6370000000 HC RX 637 (ALT 250 FOR IP): Performed by: PHYSICAL MEDICINE & REHABILITATION

## 2019-05-04 PROCEDURE — 6360000002 HC RX W HCPCS: Performed by: PHYSICAL MEDICINE & REHABILITATION

## 2019-05-04 PROCEDURE — 94640 AIRWAY INHALATION TREATMENT: CPT

## 2019-05-04 PROCEDURE — 1180000000 HC REHAB R&B

## 2019-05-04 PROCEDURE — 97116 GAIT TRAINING THERAPY: CPT

## 2019-05-04 PROCEDURE — 97110 THERAPEUTIC EXERCISES: CPT

## 2019-05-04 PROCEDURE — 97112 NEUROMUSCULAR REEDUCATION: CPT

## 2019-05-04 PROCEDURE — 97535 SELF CARE MNGMENT TRAINING: CPT

## 2019-05-04 PROCEDURE — 97530 THERAPEUTIC ACTIVITIES: CPT

## 2019-05-04 PROCEDURE — 94760 N-INVAS EAR/PLS OXIMETRY 1: CPT

## 2019-05-04 RX ORDER — SODIUM PHOSPHATE, DIBASIC AND SODIUM PHOSPHATE, MONOBASIC 7; 19 G/133ML; G/133ML
1 ENEMA RECTAL ONCE
Status: COMPLETED | OUTPATIENT
Start: 2019-05-04 | End: 2019-05-05

## 2019-05-04 RX ORDER — LACTULOSE 10 G/15ML
20 SOLUTION ORAL ONCE
Status: COMPLETED | OUTPATIENT
Start: 2019-05-04 | End: 2019-05-04

## 2019-05-04 RX ORDER — POTASSIUM CHLORIDE 600 MG/1
8 TABLET, FILM COATED, EXTENDED RELEASE ORAL DAILY
Status: DISCONTINUED | OUTPATIENT
Start: 2019-05-05 | End: 2019-05-09 | Stop reason: HOSPADM

## 2019-05-04 RX ORDER — LACTULOSE 10 G/15ML
20 SOLUTION ORAL DAILY PRN
Status: DISCONTINUED | OUTPATIENT
Start: 2019-05-04 | End: 2019-05-09 | Stop reason: HOSPADM

## 2019-05-04 RX ORDER — FUROSEMIDE 20 MG/1
20 TABLET ORAL DAILY
Status: DISCONTINUED | OUTPATIENT
Start: 2019-05-05 | End: 2019-05-09 | Stop reason: HOSPADM

## 2019-05-04 RX ADMIN — SOTALOL HYDROCHLORIDE 80 MG: 80 TABLET ORAL at 22:04

## 2019-05-04 RX ADMIN — DOCUSATE SODIUM 100 MG: 100 CAPSULE, LIQUID FILLED ORAL at 11:17

## 2019-05-04 RX ADMIN — ACETAMINOPHEN 500 MG: 500 TABLET ORAL at 12:40

## 2019-05-04 RX ADMIN — ALBUTEROL SULFATE 2.5 MG: 2.5 SOLUTION RESPIRATORY (INHALATION) at 17:47

## 2019-05-04 RX ADMIN — OXYCODONE HYDROCHLORIDE 5 MG: 5 TABLET ORAL at 11:17

## 2019-05-04 RX ADMIN — ALBUTEROL SULFATE 2.5 MG: 2.5 SOLUTION RESPIRATORY (INHALATION) at 12:27

## 2019-05-04 RX ADMIN — ACETAMINOPHEN 500 MG: 500 TABLET ORAL at 22:04

## 2019-05-04 RX ADMIN — ALBUTEROL SULFATE 2.5 MG: 2.5 SOLUTION RESPIRATORY (INHALATION) at 04:57

## 2019-05-04 RX ADMIN — DOCUSATE SODIUM 100 MG: 100 CAPSULE, LIQUID FILLED ORAL at 22:04

## 2019-05-04 RX ADMIN — OXYCODONE HYDROCHLORIDE 5 MG: 5 TABLET ORAL at 22:04

## 2019-05-04 RX ADMIN — VERAPAMIL HYDROCHLORIDE 180 MG: 180 TABLET, FILM COATED, EXTENDED RELEASE ORAL at 22:04

## 2019-05-04 RX ADMIN — ASPIRIN 325 MG: 325 TABLET, DELAYED RELEASE ORAL at 11:17

## 2019-05-04 RX ADMIN — Medication 5 MG: at 22:03

## 2019-05-04 RX ADMIN — SOTALOL HYDROCHLORIDE 80 MG: 80 TABLET ORAL at 11:16

## 2019-05-04 RX ADMIN — LACTULOSE 20 G: 20 SOLUTION ORAL at 19:11

## 2019-05-04 ASSESSMENT — PAIN DESCRIPTION - ONSET: ONSET: ON-GOING

## 2019-05-04 ASSESSMENT — PAIN DESCRIPTION - PAIN TYPE
TYPE: SURGICAL PAIN

## 2019-05-04 ASSESSMENT — PAIN SCALES - GENERAL
PAINLEVEL_OUTOF10: 6
PAINLEVEL_OUTOF10: 4
PAINLEVEL_OUTOF10: 0
PAINLEVEL_OUTOF10: 5
PAINLEVEL_OUTOF10: 0
PAINLEVEL_OUTOF10: 5
PAINLEVEL_OUTOF10: 6

## 2019-05-04 ASSESSMENT — PAIN DESCRIPTION - DESCRIPTORS
DESCRIPTORS: ACHING
DESCRIPTORS: ACHING

## 2019-05-04 ASSESSMENT — PAIN DESCRIPTION - LOCATION
LOCATION: HIP

## 2019-05-04 ASSESSMENT — PAIN DESCRIPTION - ORIENTATION
ORIENTATION: LEFT

## 2019-05-04 ASSESSMENT — PAIN DESCRIPTION - FREQUENCY
FREQUENCY: CONTINUOUS
FREQUENCY: CONTINUOUS

## 2019-05-04 ASSESSMENT — PAIN DESCRIPTION - PROGRESSION: CLINICAL_PROGRESSION: NOT CHANGED

## 2019-05-04 NOTE — PROGRESS NOTES
INPATIENT PROGRESS NOTE    SERVICE DATE:  5/4/2019   SERVICE TIME:  3:26 PM      SUBJECTIVE    INTERVAL HPI: Pt c/o constipation and no stool passed for 9 days. She denies any NV, abd pain, CP, SOB. MEDICATIONS:    Current Facility-Administered Medications   Medication Dose Route Frequency Provider Last Rate Last Dose    aspirin EC tablet 325 mg  325 mg Oral Daily David Morales MD   325 mg at 05/04/19 1117    docusate sodium (COLACE) capsule 100 mg  100 mg Oral BID David Morales MD   100 mg at 05/04/19 1117    oxyCODONE (ROXICODONE) immediate release tablet 5 mg  5 mg Oral Q3H PRN David Morales MD   5 mg at 05/04/19 1117    Or    oxyCODONE (ROXICODONE) immediate release tablet 10 mg  10 mg Oral Q3H PRN David Morales MD        sotalol (BETAPACE) tablet 80 mg  80 mg Oral BID Davdi Morales MD   80 mg at 05/04/19 1116    verapamil (CALAN SR) extended release tablet 180 mg  180 mg Oral Nightly David Morales MD   180 mg at 05/03/19 2111    acetaminophen (TYLENOL) tablet 500 mg  500 mg Oral 3 times per day David Morales MD   500 mg at 05/04/19 1240    albuterol (PROVENTIL) nebulizer solution 2.5 mg  2.5 mg Nebulization TID David Morales MD   2.5 mg at 05/04/19 1227    magnesium hydroxide (MILK OF MAGNESIA) 400 MG/5ML suspension 30 mL  30 mL Oral Daily PRN David Morales MD   30 mL at 05/03/19 1423    melatonin tablet 5 mg  5 mg Oral Nightly PRN David Morales MD   5 mg at 05/03/19 2113       OBJECTIVE  PHYSICAL EXAM:   BP (!) 128/46   Pulse 68   Temp 97 °F (36.1 °C) (Oral)   Resp 18   Ht 5' 6\" (1.676 m)   Wt 172 lb 3.2 oz (78.1 kg)   SpO2 95%   BMI 27.79 kg/m²   Body mass index is 27.79 kg/m².   CONSTITUTIONAL:  alert and mild distress  EYES:  pupils equal, round and reactive to light, sclera clear and conjunctiva normal  ENT:  normocepalic, without obvious abnormality, atraumatic, fair dentition, oral pharynx with moist mucus membranes  NECK:  supple, symmetrical, trachea midline, skin normal and no carotid bruits  LUNGS:  no increased work of breathing and clear to auscultation  CARDIOVASCULAR:  normal apical pulses and normal S1 and S2  ABDOMEN:  normal bowel sounds and non-tender  MUSCULOSKELETAL:  2+ BLE edema, + distal pulses, limited ROM to LLE 2/2 pain  NEUROLOGIC:  Mental Status Exam:  Level of Alertness:   awake  Orientation:   person, place, time  Cranial Nerves:  cranial nerves II-XII are grossly intact  SKIN:  normal skin color, texture, turgor    DATA:     No results found for this or any previous visit (from the past 24 hour(s)). ASSESSMENT AND PLAN   1. S/p L LAKSHMI  2. Constipation  3. COPD w/o exac  4. SSS s/p pacer  5.   Peripheral edema      Fleets enema  20mg lasix qd x 3, PATSY hose qd  Cbc, bmp, Mg in am        SIGNATURE: Per Gifford PA-C PATIENT NAME: Adalgisa Morales   DATE: May 4, 2019 MRN: [id number]   TIME: 3:26 PM PAGER: [phone]     Dr. Lucinda Renteria DO, Supervising Physician

## 2019-05-04 NOTE — PROGRESS NOTES
Occupational Therapy  Facility/Department: [facility]  Daily Treatment Note  NAME: Lucy Snow  : 1933  MRN: [id number]    Date of Service: 2019    Discharge Recommendations:  Continue to assess pending progress    Assessment  Activity Tolerance  Activity Tolerance: Patient Tolerated treatment well  Safety Devices  Safety Devices in place: Yes  Type of devices: All fall risk precautions in place       Patient Diagnosis(es): There were no encounter diagnoses. has a past medical history of Arthritis, COPD (chronic obstructive pulmonary disease) (HCC), Lower leg edema, Lung disease, Osteoarthritis, Pacemaker, Paroxysmal SVT (supraventricular tachycardia) ([address] Paroxysmal SVT (supraventricular tachycardia) ([facility] [address] Pneumonia, and Sinusitis. has a past surgical history that includes Pacemaker insertion (N/A, ).     Restrictions  Restrictions/Precautions  Restrictions/Precautions: Weight Bearing  Lower Extremity Weight Bearing Restrictions  Left Lower Extremity Weight Bearing: Weight Bearing As Tolerated  Position Activity Restriction  Hip Precautions: Posterior hip precautions  Other position/activity restrictions: Posterior hip precautions     Subjective  General  Chart Reviewed: Yes  Patient assessed for rehabilitation services?: Yes  Response to previous treatment: Patient with no complaints from previous session  Family / Caregiver Present: No  Referring Practitioner: Dr Tapan Pringle  Diagnosis: Left Femoral neck fracture S/P Left hip hemiarthroplasty  Pain Assessment  Pain Assessment: 0-10  Pain Type: Surgical pain  Pain Location: Hip  Pain Orientation: Left  Pain Descriptors: Aching  Pain Frequency: Continuous  Pain Onset: On-going  Clinical Progression: Not changed  Non-Pharmaceutical Pain Intervention(s): Declines  Response to Pain Intervention: Patient Satisfied  Vital Signs  Patient Currently in Pain: Yes     Objective  ADL  Equipment Provided: Sock aid;Reacher;Dressing stick  LE Dressing: Stand by assistance(to don pants past feet and don/doff socks. )Assistance to pull socks all the way up. Pt had Min difficulty and required instruction with verbal cues for problem solving. Pt is able to state her hip precautions, however, required verbal cues throughout to maintain. Reacher Activity: Pt used her right hand  to manipulate a reacher in order to  various sized rings that were spread out across the floor and don/doff them onto horizontal rods at graded heights and distances. Pt had Min difficulty with activity and worked at a slow and steady pace without rest breaks. To improve reacher skills for use during LB dressing. Pt able to maintain hip precautions throughout with no cues. Cognition  Overall Cognitive Status: WFL  Problem Solving: Assistance required to identify errors made  Cognition Comment: Card Sorting: Pt sorted a standard deck of cards by suit (diamonds, hearts, spades, and clubs). Pt had no difficulty with activity. Pt then sequenced each deck of cards from lowest to highest (Lethia Sinks). Pt had Min difficulty with activity and made 5 errors. Pt made simple errors (switching 2 and 3, etc.) possibly d/t pt rushing through activity. Cued pt to slow down and focus on her work. Pt was able to correct errors once therapist identified. To improve cognition for safe ADL/IADL completion. Upper Extremity Function  UE Strengthing: Pt wore 1# wrist weights  to don/doff rubber rings onto their corresponding vertical graded malena. Pt had Min difficulty with activity and required rest breaks prn. To increase BUE strength and endurance for improved transfers. Problem solving incorporated into activity.      Plan  Plan  Times per week: 5-7x/wk  Plan weeks: 1 1/2 weeks  Current Treatment Recommendations: Strengthening, Functional Mobility Training, Cognitive/Perceptual Training, Endurance Training, Balance Training, Neuromuscular Re-education, Self-Care / ADL, Home Management Training Goals  Patient Goals   Patient goals : To return to home with daughter.     Therapy Time   Individual Concurrent Group Co-treatment   Time In 1100         Time Out 1200         Minutes 60           Electronically signed by ORVILLE Eduardo on 5/4/2019 at 12:47 PM    ORVILLE Eduardo

## 2019-05-04 NOTE — PROGRESS NOTES
[facility] EMERGENCY [facility] AT [place] Respiratory Therapy Evaluation   Current Order: albuterol tid and albuterol q2prn  Home Regimen: prn    Ordering Physician: shawn  Re-evaluation Date:  5/7   Diagnosis: hip fx   Patient Status: Stable / Unstable + Physician notified    The following MDI Criteria must be met in order to convert aerosol to MDI with spacer. If unable to meet, MDI will be converted to aerosol:  []  Patient able to demonstrate the ability to use MDI effectively  []  Patient alert and cooperative  []  Patient able to take deep breath with 5-10 second hold  []  Medication(s) available in this delivery method   []  Peak flow greater than or equal to 200 ml/min            Current Order Substituted To  (same drug, same frequency)   Aerosol to MDI [] Albuterol Sulfate 0.083% unit dose by aerosol Albuterol Sulfate MDI 2 puffs by inhalation with spacer    [] Levalbuterol 1.25 mg unit dose by aerosol Levalbuterol MDI 2 puffs by inhalation with spacer    [] Levalbuterol 0.63 mg unit dose by aerosol Levalbuterol MDI 2 puffs by inhalation with spacer    [] Ipratropium Bromide 0.02% unit dose by aerosol Ipratropium Bromide MDI 2 puffs by inhalation with spacer    [] Duoneb (Ipratropium + Albuterol) unit dose by aerosol Ipratropium MDI + Albuterol MDI 2 puffs by inhalation w/spacer   MDI to Aerosol [] Albuterol Sulfate MDI Albuterol Sulfate 0.083% unit dose by aerosol    [] Levalbuterol MDI 2 puffs by inhalation Levalbuterol 1.25 mg unit dose by aerosol    [] Ipratropium Bromide MDI by inhalation Ipratropium Bromide 0.02% unit dose by aerosol    [] Combivent (Ipratropium + Albuterol) MDI by inhalation Duoneb (Ipratropium + Albuterol) unit dose by aerosol   Treatment Assessment [Frequency/Schedule]:  Change frequency to: ____no changes______________________________________________per Protocol, P&T, MEC      Points 0 1 2 3 4   Pulmonary Status  Non-Smoker  []   Smoking history   < 20 pack years  []   Smoking history  ?  20 pack years  [] Pulmonary Disorder  (acute or chronic)  [x]   Severe or Chronic w/ Exacerbation  []     Surgical Status No []   Surgeries     General [x]   Surgery Lower []   Abdominal Thoracic or []   Upper Abdominal Thoracic with  PulmonaryDisorder  []     Chest X-ray Clear/Not  Ordered     [x]  Chronic Changes  Results Pending  []  Infiltrates, atelectasis, pleural effusion, or edema  []  Infiltrates in more than one lobe []  Infiltrate + Atelectasis, &/or pleural effusion  []    Respiratory Pattern Regular,  RR = 12-20 [x]  Increased,  RR = 21-25 []  DUMONT, irregular,  or RR = 26-30 []  Decreased FEV1  or RR = 31-35 []  Severe SOB, use  of accessory muscles, or RR ? 35  []    Mental Status Alert, oriented,  Cooperative [x]  Confused but Follows commands []  Lethargic or unable to follow commands []  Obtunded  []  Comatose  []    Breath Sounds Clear to  auscultation  []  Decreased unilaterally or  in bases only [x]  Decreased  bilaterally  []  Crackles or intermittent wheezes []  Wheezes []    Cough Strong, Spontan., & nonproductive [x]  Strong,  spontaneous, &  productive []  Weak,  Nonproductive []  Weak, productive or  with wheezes []  No spontaneous  cough or may require suctioning []    Level of Activity Ambulatory []  Ambulatory w/ Assist  [x]  Non-ambulatory []  Paraplegic []  Quadriplegic []    Total    Score:___6___     Triage Score:___4_____      Tri       Triage:     1. (>20) Freq: Q3    2. (16-20) Freq: Q4   3. (11-15) Freq: QID & Albuterol Q2 PRN    4. (6-10) Freq: TID & Albuterol Q2 PRN    5. (0-5) Freq Q4prn

## 2019-05-04 NOTE — PROGRESS NOTES
Physical Therapy Rehab Treatment Note  [facility]/Department: [facility]  Room: R240/R240-01       NAME: Becca Castellanos  : 1933 (80 y.o.)  MRN: [id number]  CODE STATUS: Full Code    Date of Service: 2019  Chart Reviewed: Yes  Family / Caregiver Present: No  General Comment  Comments: agreeable to session. Restrictions:  Restrictions/Precautions: Weight Bearing  Lower Extremity Weight Bearing Restrictions  Left Lower Extremity Weight Bearing: Weight Bearing As Tolerated  Position Activity Restriction  Hip Precautions: Posterior hip precautions  Other position/activity restrictions: Posterior hip precautions       SUBJECTIVE: Subjective: \"i haven't had a bm in 9 days\"  Pain Screening  Patient Currently in Pain: Yes  Pre Treatment Pain Screening  Pain at present: 5  Scale Used: Numeric Score  Comments / Details: \"It's not really pain, it's soreness\"    Post Treatment Pain Screenin  Pain Assessment  Pain Level: 5  Pain Type: Surgical pain  Pain Location: Hip  Pain Orientation: Left  Pain Descriptors: Aching  Pain Frequency: Continuous    OBJECTIVE:   Overall Orientation Status: Within Functional Limits    Transfers  Sit to Stand: Stand by assistance  Stand to sit: Stand by assistance    Ambulation  Ambulation?: Yes  Ambulation 1  Surface: carpet;ramp;level tile  Device: Rolling Walker  Assistance: Stand by assistance  Quality of Gait: increased wb through upper extremities, decreased wb through left hip  Distance: 175 feet  Comments: tolerated up and down low grade ramp well and managed hip precautions without prompting      Other exercises  Other exercises 3: gastroc stretches to pts tolerance. longseated     ASSESSMENT/COMMENTS:tolerated gt well and without complaint. Pt just concerned about not having bm in 9 days. PLAN OF CARE/Safety:   Safety Devices  Type of devices: Chair alarm in place; Left in chair; All fall risk precautions in place      Therapy Time:   Individual   Time In 1300   Time Out 1330   Minutes 30     Minutes:30      Transfer/Bed mobility training:10      Gait training:10      Neuro re education:     Therapeutic ex:10      Lina Dc PTA, 05/04/19 at 1:29 PM

## 2019-05-04 NOTE — PROGRESS NOTES
Physical Therapy Rehab Treatment Note  [facility]       NAME: Gena Magallon  : 1933 (80 y.o.)  MRN: [id number]  CODE STATUS: Full Code    Date of Service: 2019  Chart Reviewed: Yes  Family / Caregiver Present: No  General Comment  Comments: agreeable to session. Restrictions:  Restrictions/Precautions: Weight Bearing  Lower Extremity Weight Bearing Restrictions  Left Lower Extremity Weight Bearing: Weight Bearing As Tolerated  Position Activity Restriction  Hip Precautions: Posterior hip precautions       SUBJECTIVE: Subjective: No new c/o     Pre Treatment Pain Screening  Pain at present: 5  Intervention List: Patient able to continue with treatment    Post Treatment Pain Screening:  Pain Assessment  Pain Assessment: 0-10  Pain Level: 4  Pain Type: Surgical pain  Pain Location: Hip  Pain Orientation: Left    OBJECTIVE:   Overall Orientation Status: Within Functional Limits      Bed mobility  Supine to Sit: Contact guard assistance  Sit to Supine: Contact guard assistance    Transfers  Sit to Stand: Contact guard assistance  Stand to sit: Stand by assistance  Car Transfer: Contact guard assistance  Comment: verbal cues for hand placement    Ambulation 1  Surface: carpet  Device: Rolling Walker  Assistance: Stand by assistance  Quality of Gait: Decrease WB LLE  Distance: 150   Stairs  # Steps : 4  Stairs Height: 6\"  Rails: Bilateral  Assistance: Stand by assistance  Comment: Good technique and safety awareness         Exercises  Quad Sets: x20  Heelslides: x20  Gluteal Sets: x20  Hip Abduction: x 20 slideboard  Knee Short Arc Quad: x20  Ankle Pumps: x20     ASSESSMENT/COMMENTS:  Assessment: Pt demonstrates good safety awareness with gait and transfers. PLAN OF CARE/Safety:   Safety Devices  Type of devices: Chair alarm in place; Left in chair; All fall risk precautions in place      Therapy Time:   Individual   Time In 1330   Time Out 1400   Minutes 30 Minutes      Transfer/Bed mobility trainin      Gait training:10      Neuro re education:     Therapeutic ex:15      Handy Morfin PTA, 19 at 3:35 PM

## 2019-05-04 NOTE — PROGRESS NOTES
adduction LLE  Other exercises  Other exercises?: Yes  Other exercises 1: HS curls with green tband 2x20     ASSESSMENT/COMMENTS:  Body structures, Functions, Activity limitations: Decreased ROM; Decreased balance;Decreased endurance;Decreased safe awareness;Decreased functional mobility ; Decreased strength  Assessment: Reports mm soreness with therex but no c/o pain. continues to demo antalgic gait pattern LLE with decreased hip extension. PLAN OF CARE/Safety:   Safety Devices  Type of devices: Chair alarm in place; Left in chair; All fall risk precautions in place      Therapy Time:   Individual   Time In 930   Time Out 1030   Minutes 60     Minutes:      Transfer/Bed mobility trainin min      Gait trainin min     Therapeutic ex: 28 min      Michelle Roque PT, 19 at 12:39 PM

## 2019-05-05 LAB
ANION GAP SERPL CALCULATED.3IONS-SCNC: 11 MEQ/L (ref 9–15)
BASOPHILS ABSOLUTE: 0 K/UL (ref 0–0.2)
BASOPHILS RELATIVE PERCENT: 0.1 %
BUN BLDV-MCNC: 29 MG/DL (ref 8–23)
CALCIUM SERPL-MCNC: 7.9 MG/DL (ref 8.5–9.9)
CHLORIDE BLD-SCNC: 105 MEQ/L (ref 95–107)
CO2: 26 MEQ/L (ref 20–31)
CREAT SERPL-MCNC: 0.64 MG/DL (ref 0.5–0.9)
EOSINOPHILS ABSOLUTE: 0.6 K/UL (ref 0–0.7)
EOSINOPHILS RELATIVE PERCENT: 5.7 %
GFR AFRICAN AMERICAN: >60
GFR NON-AFRICAN AMERICAN: >60
GLUCOSE BLD-MCNC: 85 MG/DL (ref 70–99)
HBA1C MFR BLD: 5.2 % (ref 4.8–5.9)
HCT VFR BLD CALC: 29.7 % (ref 37–47)
HEMOGLOBIN: 9.7 G/DL (ref 12–16)
LYMPHOCYTES ABSOLUTE: 2.9 K/UL (ref 1–4.8)
LYMPHOCYTES RELATIVE PERCENT: 25.8 %
MAGNESIUM: 2.2 MG/DL (ref 1.7–2.4)
MCH RBC QN AUTO: 31.5 PG (ref 27–31.3)
MCHC RBC AUTO-ENTMCNC: 32.6 % (ref 33–37)
MCV RBC AUTO: 96.8 FL (ref 82–100)
MONOCYTES ABSOLUTE: 1.1 K/UL (ref 0.2–0.8)
MONOCYTES RELATIVE PERCENT: 9.5 %
NEUTROPHILS ABSOLUTE: 6.7 K/UL (ref 1.4–6.5)
NEUTROPHILS RELATIVE PERCENT: 58.9 %
PDW BLD-RTO: 14.7 % (ref 11.5–14.5)
PLATELET # BLD: 200 K/UL (ref 130–400)
POTASSIUM SERPL-SCNC: 4.6 MEQ/L (ref 3.4–4.9)
RBC # BLD: 3.06 M/UL (ref 4.2–5.4)
SODIUM BLD-SCNC: 142 MEQ/L (ref 135–144)
WBC # BLD: 11.4 K/UL (ref 4.8–10.8)

## 2019-05-05 PROCEDURE — 85025 COMPLETE CBC W/AUTO DIFF WBC: CPT

## 2019-05-05 PROCEDURE — 6360000002 HC RX W HCPCS: Performed by: PHYSICAL MEDICINE & REHABILITATION

## 2019-05-05 PROCEDURE — 6370000000 HC RX 637 (ALT 250 FOR IP): Performed by: PHYSICIAN ASSISTANT

## 2019-05-05 PROCEDURE — 6370000000 HC RX 637 (ALT 250 FOR IP): Performed by: PHYSICAL MEDICINE & REHABILITATION

## 2019-05-05 PROCEDURE — 83735 ASSAY OF MAGNESIUM: CPT

## 2019-05-05 PROCEDURE — 94761 N-INVAS EAR/PLS OXIMETRY MLT: CPT

## 2019-05-05 PROCEDURE — 94640 AIRWAY INHALATION TREATMENT: CPT

## 2019-05-05 PROCEDURE — 6370000000 HC RX 637 (ALT 250 FOR IP): Performed by: INTERNAL MEDICINE

## 2019-05-05 PROCEDURE — 97116 GAIT TRAINING THERAPY: CPT

## 2019-05-05 PROCEDURE — 97535 SELF CARE MNGMENT TRAINING: CPT

## 2019-05-05 PROCEDURE — 83036 HEMOGLOBIN GLYCOSYLATED A1C: CPT

## 2019-05-05 PROCEDURE — 36415 COLL VENOUS BLD VENIPUNCTURE: CPT

## 2019-05-05 PROCEDURE — 80048 BASIC METABOLIC PNL TOTAL CA: CPT

## 2019-05-05 PROCEDURE — 1180000000 HC REHAB R&B

## 2019-05-05 PROCEDURE — 94760 N-INVAS EAR/PLS OXIMETRY 1: CPT

## 2019-05-05 RX ORDER — BISACODYL 10 MG
10 SUPPOSITORY, RECTAL RECTAL DAILY PRN
Status: DISCONTINUED | OUTPATIENT
Start: 2019-05-05 | End: 2019-05-09 | Stop reason: HOSPADM

## 2019-05-05 RX ADMIN — Medication 1 ENEMA: at 03:10

## 2019-05-05 RX ADMIN — DOCUSATE SODIUM 100 MG: 100 CAPSULE, LIQUID FILLED ORAL at 09:37

## 2019-05-05 RX ADMIN — FUROSEMIDE 20 MG: 20 TABLET ORAL at 12:32

## 2019-05-05 RX ADMIN — SOTALOL HYDROCHLORIDE 80 MG: 80 TABLET ORAL at 09:38

## 2019-05-05 RX ADMIN — ACETAMINOPHEN 500 MG: 500 TABLET ORAL at 15:55

## 2019-05-05 RX ADMIN — DOCUSATE SODIUM 100 MG: 100 CAPSULE, LIQUID FILLED ORAL at 20:18

## 2019-05-05 RX ADMIN — POTASSIUM CHLORIDE 8 MEQ: 600 TABLET, EXTENDED RELEASE ORAL at 09:37

## 2019-05-05 RX ADMIN — ALBUTEROL SULFATE 2.5 MG: 2.5 SOLUTION RESPIRATORY (INHALATION) at 05:25

## 2019-05-05 RX ADMIN — ALBUTEROL SULFATE 2.5 MG: 2.5 SOLUTION RESPIRATORY (INHALATION) at 16:16

## 2019-05-05 RX ADMIN — SOTALOL HYDROCHLORIDE 80 MG: 80 TABLET ORAL at 20:18

## 2019-05-05 RX ADMIN — ACETAMINOPHEN 500 MG: 500 TABLET ORAL at 05:38

## 2019-05-05 RX ADMIN — ALBUTEROL SULFATE 2.5 MG: 2.5 SOLUTION RESPIRATORY (INHALATION) at 11:05

## 2019-05-05 RX ADMIN — BISACODYL 10 MG: 10 SUPPOSITORY RECTAL at 23:18

## 2019-05-05 RX ADMIN — OXYCODONE HYDROCHLORIDE 5 MG: 5 TABLET ORAL at 09:40

## 2019-05-05 RX ADMIN — ASPIRIN 325 MG: 325 TABLET, DELAYED RELEASE ORAL at 09:37

## 2019-05-05 RX ADMIN — LACTULOSE 20 G: 20 SOLUTION ORAL at 09:41

## 2019-05-05 RX ADMIN — ACETAMINOPHEN 500 MG: 500 TABLET ORAL at 20:17

## 2019-05-05 RX ADMIN — VERAPAMIL HYDROCHLORIDE 180 MG: 180 TABLET, FILM COATED, EXTENDED RELEASE ORAL at 20:19

## 2019-05-05 ASSESSMENT — PAIN DESCRIPTION - LOCATION: LOCATION: HIP

## 2019-05-05 ASSESSMENT — PAIN SCALES - GENERAL
PAINLEVEL_OUTOF10: 3
PAINLEVEL_OUTOF10: 6
PAINLEVEL_OUTOF10: 0
PAINLEVEL_OUTOF10: 5
PAINLEVEL_OUTOF10: 2
PAINLEVEL_OUTOF10: 5

## 2019-05-05 ASSESSMENT — PAIN DESCRIPTION - ORIENTATION: ORIENTATION: LEFT

## 2019-05-05 ASSESSMENT — PAIN DESCRIPTION - DESCRIPTORS: DESCRIPTORS: ACHING

## 2019-05-05 ASSESSMENT — PAIN DESCRIPTION - PAIN TYPE: TYPE: SURGICAL PAIN

## 2019-05-05 ASSESSMENT — PAIN DESCRIPTION - FREQUENCY: FREQUENCY: CONTINUOUS

## 2019-05-05 NOTE — PROGRESS NOTES
Physical Therapy Rehab Treatment Note  Facility/Department: [facility]  [facility]       NAME: Kimi Navas  : 1933 (80 y.o.)  MRN: [id number]  CODE STATUS: Full Code    Date of Service: 2019  Chart Reviewed: Yes  Family / Caregiver Present: No  General Comment  Comments: \"take it easy on me today please\"    Restrictions:  Restrictions/Precautions: Weight Bearing  Lower Extremity Weight Bearing Restrictions  Left Lower Extremity Weight Bearing: Weight Bearing As Tolerated  Position Activity Restriction  Hip Precautions: Posterior hip precautions  Other position/activity restrictions: Posterior hip precautions       SUBJECTIVE: Subjective: \"i still haven't had a bm\"  Pain Screening  Patient Currently in Pain: Yes  Pre Treatment Pain Screening  Pain at present: 5  Scale Used: Numeric Score    Post Treatment Pain Screenin  Pain Assessment  Pain Assessment: 0-10  Pain Level: 5  Pain Type: Surgical pain  Pain Location: Hip  Pain Orientation: Left  Pain Descriptors: Aching  Pain Frequency: Continuous  Ice applied to left hip after tx. OBJECTIVE:   Overall Orientation Status: Within Functional Limits         Bed mobility  Sit to Supine: Minimal assistance  Comment: pt unable to get left leg into bed at this time. Transfers  Sit to Stand: Stand by assistance  Stand to sit: Stand by assistance    Ambulation  Ambulation?: Yes  Ambulation 1  Surface: level tile  Device: Rolling Walker  Assistance: Stand by assistance  Quality of Gait: increased wb through upper extremities, mild antalgic gt  Distance: 250 feet x 2 with one seated rest break  Stairs/Curb  Stairs?: No      Activity Tolerance  Activity Tolerance: Patient Tolerated treatment well    ASSESSMENT/COMMENTS:  Assessment: tolerated session well and without complaint. pt requested to return to bed after tx.     PLAN OF CARE/Safety:      Therapy Time:   Individual   Time In 2799   Time Out 1020   Minutes 30     Minutes:30      Transfer/Bed mobility training:15      Gait training:15      Neuro re education:0     Therapeutic ex:0      Vivian Baca PTA, 05/05/19 at 10:29 AM

## 2019-05-05 NOTE — PROGRESS NOTES
Pt had some pain this morning and was given roxycodone. This afternoon, she took her scheduled tylenol and has had no further complaints of pain. She was given lactulose PRN since she needs to have a BM. She only passed one small hard stool today. Electronically signed by Kelley Perry LPN on 9/4/7616 at 6:43 PM

## 2019-05-05 NOTE — PROGRESS NOTES
Aquacel intact to left hip. Medicated with prn Roxicodone 5mg prn thus far. Focus assessment complete.  Electronically signed by Yogesh Rocha RN on 5/5/19 at 2:06 PM

## 2019-05-05 NOTE — PROGRESS NOTES
Subjective: The patient complains of moderate  acute pain relieved by rest and exacerbated by activity. I am concerned about patients fatigue. ROS x10: The patient also complains of severely impaired mobility and activities of daily living. Otherwise no new problems with vision, hearing, nose, mouth, throat, dermal, cardiovascular, GI, , pulmonary, musculoskeletal, psychiatric or neurological. See Rehab H&P on Rehab chart dated . Vital signs:  BP (!) 105/56   Pulse 70   Temp 97 °F (36.1 °C) (Oral)   Resp 13   Ht 5' 6\" (1.676 m)   Wt 172 lb 3.2 oz (78.1 kg)   SpO2 95%   BMI 27.79 kg/m²   I/O:   PO/Intake:  fair PO intake, no problems observed or reported. Bowel/Bladder:  continent, no problems noted. General:  Patient is well developed, adequately nourished, non-obese and     well kempt. HEENT:    PERRLA, hearing intact to loud voice, external inspection of ear     and nose benign. Inspection of lips, tongue and gums benign  Musculoskeletal: No significant change in strength or tone. All joints stable. Inspection and palpation of digits and nails show no clubbing,       cyanosis or inflammatory conditions. Neuro/Psychiatric: Affect: flat but pleasant. Alert and oriented to person, place and     situation. No significant change in deep tendon reflexes or     sensation  Lungs:  CTA-B. Respiration effort is normal at rest.     Heart:   S1 = S2, RRR. No loud murmurs. Abdomen:  Soft, non-tender, no enlargement of liver or spleen. Extremities:  No significant lower extremity edema or tenderness. Skin:   Intact to general survey, no visualized or palpated problems.     Rehabilitation:  Physical therapy: FIMS:  Bed Mobility:      Transfers: Sit to Stand: Stand by assistance  Stand to sit: Stand by assistance  Bed to Chair: Stand by assistance(<25% VCs for STS transfers for maintaining hip precautions), Ambulation 1  Surface: level tile  Device: Rolling Walker  Assistance: Stand by assistance  Quality of Gait: increased wb through upper extremities, mild antalgic gt  Distance: 250 feet x 2 with one seated rest break  Comments: tolerated up and down low grade ramp well and managed hip precautions without prompting, Stairs  # Steps : 4  Stairs Height: 6\"  Rails: Bilateral  Device: No Device  Assistance: Stand by assistance  Comment: Good technique and safety awareness    FIMS: Bed, Chair, Wheel Chair: 4 - Requires steadying assistance only <25% assist  and/or requires assist with one leg only  Walk: 5 - Supervision Requires standby supervision or cuing to walk at least 150 feet  Distance Walked: 200  Wheel Chair: 0 - Activity Not Assessed/Does Not Occur  Stairs: 2- Maximal Assistance Performs 25-49% of the effort to go up and down 4 to 6 stairs and requires the assistance of one person only,  , Assessment: tolerated session well and without complaint. pt requested to return to bed after tx.     Occupational therapy: FIMS:  Eatin - Feeds self with setup/supervision/cues and/or requires only setup/supervision/cues to perform tube feedings  Groomin - Requires setup/cues to do all tasks  Bathin - Did not occur  Dressing-Upper: 4 - Requires assist with buttons/zippers only and/or requires assist with one arm only  Dressing-Lower: 2 - Requires assist with 4-5 parts of dressing  Toiletin - Requires setup/supervision/cues  Toilet Transfer: 5 - Requires setup/supervision/cues  Tub Transfer: 0 - Activity does not occur  Shower Transfer: 4 - Minimal contact assistance, pt. expends 75% or more effort,  ,      Speech therapy: FIMS: Comprehension: 5 - Patient understands basic needs (hungry/hot/pain)  Expression: 5 - Expresses basic ideas/needs only (hungry/hot/pain)  Social Interaction: 6 - Patient requires medication for mood and/or effect  Problem Solvin - Patient able to solve simple/routine tasks  Memory: 5 - Patient requires prompting with stress/unfamiliar situations Lab/X-ray studies reviewed, analyzed and discussed with patient and staff:   Recent Results (from the past 24 hour(s))   Basic Metabolic Panel    Collection Time: 05/05/19  6:20 AM   Result Value Ref Range    Sodium 142 135 - 144 mEq/L    Potassium 4.6 3.4 - 4.9 mEq/L    Chloride 105 95 - 107 mEq/L    CO2 26 20 - 31 mEq/L    Anion Gap 11 9 - 15 mEq/L    Glucose 85 70 - 99 mg/dL    BUN 29 (H) 8 - 23 mg/dL    CREATININE 0.64 0.50 - 0.90 mg/dL    GFR Non-African American >60.0 >60    GFR  >60.0 >60    Calcium 7.9 (L) 8.5 - 9.9 mg/dL   CBC Auto Differential    Collection Time: 05/05/19  6:20 AM   Result Value Ref Range    WBC 11.4 (H) 4.8 - 10.8 K/uL    RBC 3.06 (L) 4.20 - 5.40 M/uL    Hemoglobin 9.7 (L) 12.0 - 16.0 g/dL    Hematocrit 29.7 (L) 37.0 - 47.0 %    MCV 96.8 82.0 - 100.0 fL    MCH 31.5 (H) 27.0 - 31.3 pg    MCHC 32.6 (L) 33.0 - 37.0 %    RDW 14.7 (H) 11.5 - 14.5 %    Platelets 121 875 - 280 K/uL    Neutrophils % 58.9 %    Lymphocytes % 25.8 %    Monocytes % 9.5 %    Eosinophils % 5.7 %    Basophils % 0.1 %    Neutrophils # 6.7 (H) 1.4 - 6.5 K/uL    Lymphocytes # 2.9 1.0 - 4.8 K/uL    Monocytes # 1.1 (H) 0.2 - 0.8 K/uL    Eosinophils # 0.6 0.0 - 0.7 K/uL    Basophils # 0.0 0.0 - 0.2 K/uL   Magnesium    Collection Time: 05/05/19  6:20 AM   Result Value Ref Range    Magnesium 2.2 1.7 - 2.4 mg/dL       Xr Pelvis (1-2 Views)    Result Date: 4/27/2019  EXAMINATION: XR PELVIS (1 VIEWS) CLINICAL HISTORY: FRACTURE COMPARISONS: MAY 7, 2015 FINDINGS: Left subcapital fracture identified. Bipolar noncemented right hip replacement. Diffuse osteopenia. No abnormal lucency bone prosthetic interface. Bilateral diffuse calcification iliofemoral vasculature. LEFT SUBCAPITAL FRACTURE. RIGHT HIP REPLACEMENT. Xr Femur Left (min 2 Views)    Result Date: 4/27/2019  EXAMINATION: XR FEMUR LEFT (MIN 2 VIEWS) CLINICAL HISTORY: FRACTURE COMPARISONS: NONE FINDINGS: Nondisplaced left subcapital fracture. Osteopenia. Diffuse calcification profunda femoral, superficial femoral, and popliteal arteries. LEFT SUBCAPITAL FRACTURE. Xr Chest Portable    Result Date: 4/27/2019  EXAMINATION: XR CHEST PORTABLE CLINICAL HISTORY: STUMBLED AND FELL TO FLOOR. NO LOSS OF CONSCIOUSNESS COMPARISONS: None available. FINDINGS: Pacemaker generator overlying left axilla with pacemaker lead tips in region of right atrium and right ventricle. Cardiopericardial silhouette is normal in size. Aorta is calcified. Left diaphragm elevated. Mild blunting costophrenic angles. Calcified granuloma right upper lung. SMALL BILATERAL PLEURAL EFFUSIONS VERSUS THICKENING. 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 Previous extensive, complex labs, notes and diagnostics reviewed and analyzed. ALLERGIES:    Allergies as of 05/01/2019 - Review Complete 05/01/2019   Allergen Reaction Noted    Codeine  08/18/2015    Penicillins  08/18/2015      (please also verify by checking [facility])     Complex Physical Medicine & Rehab Issues Assess & Plan:   1. Severe abnormality of gait and mobility and impaired self-care and ADL's secondary to progressive left Hip fracture s/p THR . Functional and medical status reassessed regarding patients ability to participate in therapies and patient found to be able to participate in acute intensive comprehensive inpatient rehabilitation program including PT/OT to improve balance, ambulation, ADLs, and to improve the P/AROM. Therapeutic modifications regarding activities in therapies, place, amount of time per day and intensity of therapy made daily. In bed therapies or bedside therapies prn.   2. Bowel and Bladder dysfunction:  frequent toileting, ambulate to bathroom with assistance, check post void residuals. Check for C.difficile x1 if >2 loose stools in 24 hours, continue bowel & bladder program.  Monitor bowel and bladder function. Lactinex 2 PO every AC.   MOM prn, Brown Bomb prn, Glycerin suppository prn, enema prn.  3. Moderate generalized OA pain: reassess pain every shift and prior to and after each therapy session, give prn Tylenol and  See mar, modalities prn in therapy, Lidoderm, K-pad prn.   4. Skin healing and breakdown risk:  continue pressure relief program.  Daily skin exams and reports from nursing. 5. Fatigue due to nutritional and hydration deficiency:  continue to monitor I&Os, calorie counts prn, dietary consult prn.  6. Acute episodic insomnia with situational adjustment disorder:  prn Ambien, monitor for day time sedation. 7. Falls risk elevated:  patient to use call light to get nursing assistance to get up, bed and chair alarm. 8. Elevated DVT risk: progressive activities in PT, continue prophylaxis PATSY hose, elevation and see mar . 9. Complex discharge planning:  Weekly team meeting every Monday to assess progress towards goals, discuss and address social, psychological and medical comorbidities and to address difficulties they may be having progressing in therapy. Patient and family education is in progress. The patient is to follow-up with their family physician after discharge.         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 [address]              Shea Rebolledo D.O., PM&R     Attending    [facility]

## 2019-05-05 NOTE — PROGRESS NOTES
Stand by assistance  Quality of Gait: increased wb through upper extremities, mild antalgic gt  Distance: 250 feet x 2 with one seated rest break  Comments: tolerated up and down low grade ramp well and managed hip precautions without prompting, Stairs  # Steps : 4  Stairs Height: 6\"  Rails: Bilateral  Device: No Device  Assistance: Stand by assistance  Comment: Good technique and safety awareness    FIMS: Bed, Chair, Wheel Chair: 4 - Requires steadying assistance only <25% assist  and/or requires assist with one leg only  Walk: 5 - Supervision Requires standby supervision or cuing to walk at least 150 feet  Distance Walked: 200  Wheel Chair: 0 - Activity Not Assessed/Does Not Occur  Stairs: 2- Maximal Assistance Performs 25-49% of the effort to go up and down 4 to 6 stairs and requires the assistance of one person only,  , Assessment: tolerated session well and without complaint. pt requested to return to bed after tx.     Occupational therapy: FIMS:  Eatin - Feeds self with setup/supervision/cues and/or requires only setup/supervision/cues to perform tube feedings  Groomin - Requires setup/cues to do all tasks  Bathin - Did not occur  Dressing-Upper: 4 - Requires assist with buttons/zippers only and/or requires assist with one arm only  Dressing-Lower: 2 - Requires assist with 4-5 parts of dressing  Toiletin - Requires setup/supervision/cues  Toilet Transfer: 5 - Requires setup/supervision/cues  Tub Transfer: 0 - Activity does not occur  Shower Transfer: 4 - Minimal contact assistance, pt. expends 75% or more effort,  ,      Speech therapy: FIMS: Comprehension: 5 - Patient understands basic needs (hungry/hot/pain)  Expression: 5 - Expresses basic ideas/needs only (hungry/hot/pain)  Social Interaction: 6 - Patient requires medication for mood and/or effect  Problem Solvin - Patient able to solve simple/routine tasks  Memory: 5 - Patient requires prompting with stress/unfamiliar situations Lab/X-ray studies reviewed, analyzed and discussed with patient and staff:   Recent Results (from the past 24 hour(s))   Basic Metabolic Panel    Collection Time: 05/05/19  6:20 AM   Result Value Ref Range    Sodium 142 135 - 144 mEq/L    Potassium 4.6 3.4 - 4.9 mEq/L    Chloride 105 95 - 107 mEq/L    CO2 26 20 - 31 mEq/L    Anion Gap 11 9 - 15 mEq/L    Glucose 85 70 - 99 mg/dL    BUN 29 (H) 8 - 23 mg/dL    CREATININE 0.64 0.50 - 0.90 mg/dL    GFR Non-African American >60.0 >60    GFR  >60.0 >60    Calcium 7.9 (L) 8.5 - 9.9 mg/dL   CBC Auto Differential    Collection Time: 05/05/19  6:20 AM   Result Value Ref Range    WBC 11.4 (H) 4.8 - 10.8 K/uL    RBC 3.06 (L) 4.20 - 5.40 M/uL    Hemoglobin 9.7 (L) 12.0 - 16.0 g/dL    Hematocrit 29.7 (L) 37.0 - 47.0 %    MCV 96.8 82.0 - 100.0 fL    MCH 31.5 (H) 27.0 - 31.3 pg    MCHC 32.6 (L) 33.0 - 37.0 %    RDW 14.7 (H) 11.5 - 14.5 %    Platelets 181 762 - 075 K/uL    Neutrophils % 58.9 %    Lymphocytes % 25.8 %    Monocytes % 9.5 %    Eosinophils % 5.7 %    Basophils % 0.1 %    Neutrophils # 6.7 (H) 1.4 - 6.5 K/uL    Lymphocytes # 2.9 1.0 - 4.8 K/uL    Monocytes # 1.1 (H) 0.2 - 0.8 K/uL    Eosinophils # 0.6 0.0 - 0.7 K/uL    Basophils # 0.0 0.0 - 0.2 K/uL   Magnesium    Collection Time: 05/05/19  6:20 AM   Result Value Ref Range    Magnesium 2.2 1.7 - 2.4 mg/dL       Xr Pelvis (1-2 Views)    Result Date: 4/27/2019  EXAMINATION: XR PELVIS (1 VIEWS) CLINICAL HISTORY: FRACTURE COMPARISONS: MAY 7, 2015 FINDINGS: Left subcapital fracture identified. Bipolar noncemented right hip replacement. Diffuse osteopenia. No abnormal lucency bone prosthetic interface. Bilateral diffuse calcification iliofemoral vasculature. LEFT SUBCAPITAL FRACTURE. RIGHT HIP REPLACEMENT. Xr Femur Left (min 2 Views)    Result Date: 4/27/2019  EXAMINATION: XR FEMUR LEFT (MIN 2 VIEWS) CLINICAL HISTORY: FRACTURE COMPARISONS: NONE FINDINGS: Nondisplaced left subcapital fracture. Osteopenia. Diffuse calcification profunda femoral, superficial femoral, and popliteal arteries. LEFT SUBCAPITAL FRACTURE. Xr Chest Portable    Result Date: 4/27/2019  EXAMINATION: XR CHEST PORTABLE CLINICAL HISTORY: STUMBLED AND FELL TO FLOOR. NO LOSS OF CONSCIOUSNESS COMPARISONS: None available. FINDINGS: Pacemaker generator overlying left axilla with pacemaker lead tips in region of right atrium and right ventricle. Cardiopericardial silhouette is normal in size. Aorta is calcified. Left diaphragm elevated. Mild blunting costophrenic angles. Calcified granuloma right upper lung. SMALL BILATERAL PLEURAL EFFUSIONS VERSUS THICKENING. 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 Previous extensive, complex labs, notes and diagnostics reviewed and analyzed. ALLERGIES:    Allergies as of 05/01/2019 - Review Complete 05/01/2019   Allergen Reaction Noted    Codeine  08/18/2015    Penicillins  08/18/2015      (please also verify by checking [facility])     Complex Physical Medicine & Rehab Issues Assess & Plan:   1. Severe abnormality of gait and mobility and impaired self-care and ADL's secondary to progressive left Hip fracture s/p THR . Functional and medical status reassessed regarding patients ability to participate in therapies and patient found to be able to participate in acute intensive comprehensive inpatient rehabilitation program including PT/OT to improve balance, ambulation, ADLs, and to improve the P/AROM. Therapeutic modifications regarding activities in therapies, place, amount of time per day and intensity of therapy made daily. In bed therapies or bedside therapies prn.   2. Bowel and Bladder dysfunction:  frequent toileting, ambulate to bathroom with assistance, check post void residuals. Check for C.difficile x1 if >2 loose stools in 24 hours, continue bowel & bladder program.  Monitor bowel and bladder function. Lactinex 2 PO every AC.   MOM prn, Brown Bomb prn, Glycerin suppository prn, enema

## 2019-05-06 PROBLEM — Z87.81 HX OF FRACTURE OF FEMUR: Status: ACTIVE | Noted: 2019-05-06

## 2019-05-06 PROBLEM — R26.9 ABNORMALITY OF GAIT AND MOBILITY: Status: ACTIVE | Noted: 2019-05-06

## 2019-05-06 PROCEDURE — 1180000000 HC REHAB R&B

## 2019-05-06 PROCEDURE — 94760 N-INVAS EAR/PLS OXIMETRY 1: CPT

## 2019-05-06 PROCEDURE — 97535 SELF CARE MNGMENT TRAINING: CPT

## 2019-05-06 PROCEDURE — 97110 THERAPEUTIC EXERCISES: CPT

## 2019-05-06 PROCEDURE — 6370000000 HC RX 637 (ALT 250 FOR IP): Performed by: PHYSICAL MEDICINE & REHABILITATION

## 2019-05-06 PROCEDURE — 94640 AIRWAY INHALATION TREATMENT: CPT

## 2019-05-06 PROCEDURE — 6360000002 HC RX W HCPCS: Performed by: PHYSICAL MEDICINE & REHABILITATION

## 2019-05-06 PROCEDURE — 97116 GAIT TRAINING THERAPY: CPT

## 2019-05-06 RX ADMIN — ALBUTEROL SULFATE 2.5 MG: 2.5 SOLUTION RESPIRATORY (INHALATION) at 14:10

## 2019-05-06 RX ADMIN — OXYCODONE HYDROCHLORIDE 10 MG: 5 TABLET ORAL at 03:53

## 2019-05-06 RX ADMIN — FUROSEMIDE 20 MG: 20 TABLET ORAL at 08:24

## 2019-05-06 RX ADMIN — DOCUSATE SODIUM 100 MG: 100 CAPSULE, LIQUID FILLED ORAL at 20:30

## 2019-05-06 RX ADMIN — SOTALOL HYDROCHLORIDE 80 MG: 80 TABLET ORAL at 08:24

## 2019-05-06 RX ADMIN — VERAPAMIL HYDROCHLORIDE 180 MG: 180 TABLET, FILM COATED, EXTENDED RELEASE ORAL at 20:29

## 2019-05-06 RX ADMIN — ACETAMINOPHEN 500 MG: 500 TABLET ORAL at 21:00

## 2019-05-06 RX ADMIN — ACETAMINOPHEN 500 MG: 500 TABLET ORAL at 05:30

## 2019-05-06 RX ADMIN — ASPIRIN 325 MG: 325 TABLET, DELAYED RELEASE ORAL at 08:24

## 2019-05-06 RX ADMIN — DOCUSATE SODIUM 100 MG: 100 CAPSULE, LIQUID FILLED ORAL at 08:24

## 2019-05-06 RX ADMIN — ALBUTEROL SULFATE 2.5 MG: 2.5 SOLUTION RESPIRATORY (INHALATION) at 04:51

## 2019-05-06 RX ADMIN — ACETAMINOPHEN 500 MG: 500 TABLET ORAL at 13:30

## 2019-05-06 RX ADMIN — POTASSIUM CHLORIDE 8 MEQ: 600 TABLET, EXTENDED RELEASE ORAL at 08:24

## 2019-05-06 RX ADMIN — SOTALOL HYDROCHLORIDE 80 MG: 80 TABLET ORAL at 20:29

## 2019-05-06 ASSESSMENT — PAIN SCALES - GENERAL
PAINLEVEL_OUTOF10: 8
PAINLEVEL_OUTOF10: 6
PAINLEVEL_OUTOF10: 2
PAINLEVEL_OUTOF10: 3
PAINLEVEL_OUTOF10: 5

## 2019-05-06 ASSESSMENT — PAIN DESCRIPTION - FREQUENCY: FREQUENCY: CONTINUOUS

## 2019-05-06 ASSESSMENT — PAIN DESCRIPTION - PAIN TYPE: TYPE: SURGICAL PAIN

## 2019-05-06 ASSESSMENT — PAIN DESCRIPTION - ORIENTATION: ORIENTATION: LEFT

## 2019-05-06 ASSESSMENT — PAIN DESCRIPTION - PROGRESSION: CLINICAL_PROGRESSION: GRADUALLY WORSENING

## 2019-05-06 ASSESSMENT — PAIN DESCRIPTION - DESCRIPTORS: DESCRIPTORS: ACHING

## 2019-05-06 ASSESSMENT — PAIN DESCRIPTION - LOCATION: LOCATION: HIP

## 2019-05-06 NOTE — PROGRESS NOTES
Physical Therapy Rehab Treatment Note  [facility]       NAME: Lisa Pereira  : 1933 (80 y.o.)  MRN: [id number]  CODE STATUS: Full Code    Date of Service: 2019  Chart Reviewed: Yes  Patient assessed for rehabilitation services?: Yes  Family / Caregiver Present: No    Restrictions:  Restrictions/Precautions: Weight Bearing  Lower Extremity Weight Bearing Restrictions  Left Lower Extremity Weight Bearing: Weight Bearing As Tolerated  Position Activity Restriction  Hip Precautions: Posterior hip precautions  Other position/activity restrictions: Posterior hip precautions       SUBJECTIVE: Subjective: I am doing good  Response To Previous Treatment: Patient with no complaints from previous session.   Pain Screening  Patient Currently in Pain: Yes  Pre Treatment Pain Screening  Pain at present: 0  Scale Used: Numeric Score  Intervention List: Patient able to continue with treatment    Post Treatment Pain Screening:  Pain Assessment  Pain Assessment: 0-10  Pain Level: 6  Pain Type: Surgical pain  Pain Location: Hip  Pain Orientation: Left  Pain Descriptors: Aching  Pain Frequency: Continuous  Clinical Progression: Gradually worsening  Non-Pharmaceutical Pain Intervention(s): Cold applied  Response to Pain Intervention: Patient Satisfied    OBJECTIVE:   Follows Commands: Within Functional Limits    Bed mobility  Rolling to Left: Stand by assistance  Rolling to Right: Stand by assistance  Supine to Sit: Stand by assistance  Sit to Supine: Stand by assistance  Scooting: Stand by assistance    Transfers  Sit to Stand: Supervision  Stand to sit: Supervision  Bed to Chair: Supervision  Car Transfer: Supervision  Comment: Improved hand placement    Ambulation  Ambulation?: Yes  Ambulation 1  Surface: level tile;carpet;ramp  Device: Rolling Walker  Quality of Gait: Improved weight acceptance on RLE   Distance: 500' x 1, 275' x 1  Stairs/Curb  Stairs?: Yes   Stairs  # Steps : 4  Stairs Height: 6\"  Rails: Bilateral  Assistance: Supervision  Comment: Non-reciprocal pattern     Exercises  Hamstring Sets: x 20 YTB  Gluteal Sets: x20  Hip Flexion: x 20 to hip precautions  Hip Abduction: x 20 YTB/ Ball squeezes  Knee Long Arc Quad: x 20  Ankle Pumps: x20     ASSESSMENT/COMMENTS:  Body structures, Functions, Activity limitations: Decreased ROM; Decreased balance;Decreased endurance;Decreased safe awareness;Decreased functional mobility ; Decreased strength  Assessment: Patient continues to progress towards goal patient satified with use of rhiannon for pain relief    PLAN OF CARE/Safety:   Safety Devices  Type of devices: Chair alarm in place; Left in chair; All fall risk precautions in place      Therapy Time:   Individual   Time In 1000   Time Out 1100   Minutes 60     Minutes: 60      Transfer/Bed mobility training: 15      Gait trainin     Therapeutic ex: [address], PTA, 19 at 11:50 AM

## 2019-05-06 NOTE — CARE COORDINATION
Spoke with patient and daughter and stated that upon discharge, they would like to go to [facility] OP therapy as needed.  Electronically signed by Patricia Hatfield RN on 5/6/2019 at 9:21 AM

## 2019-05-06 NOTE — PROGRESS NOTES
Subjective: The patient complains of moderate  acute pain relieved by rest and exacerbated by activity. I am concerned about patients fatigue. ROS x10: The patient also complains of severely impaired mobility and activities of daily living. Otherwise no new problems with vision, hearing, nose, mouth, throat, dermal, cardiovascular, GI, , pulmonary, musculoskeletal, psychiatric or neurological. See Rehab H&P on Rehab chart dated . Vital signs:  BP (!) 112/54   Pulse 76   Temp 98 °F (36.7 °C) (Oral)   Resp 16   Ht 5' 6\" (1.676 m)   Wt 172 lb 3.2 oz (78.1 kg)   SpO2 96%   BMI 27.79 kg/m²   I/O:   PO/Intake:  fair PO intake, no problems observed or reported. Bowel/Bladder:  continent, no problems noted. General:  Patient is well developed, adequately nourished, non-obese and     well kempt. HEENT:    PERRLA, hearing intact to loud voice, external inspection of ear     and nose benign. Inspection of lips, tongue and gums benign  Musculoskeletal: No significant change in strength or tone. All joints stable. Inspection and palpation of digits and nails show no clubbing,       cyanosis or inflammatory conditions. Neuro/Psychiatric: Affect: flat but pleasant. Alert and oriented to person, place and     situation. No significant change in deep tendon reflexes or     sensation  Lungs:  CTA-B. Respiration effort is normal at rest.     Heart:   S1 = S2, RRR. No loud murmurs. Abdomen:  Soft, non-tender, no enlargement of liver or spleen. Extremities:  No significant lower extremity edema or tenderness. Skin:   Intact to general survey, no visualized or palpated problems.     Rehabilitation:  Physical therapy: FIMS:  Bed Mobility:      Transfers: Sit to Stand: Stand by assistance  Stand to sit: Stand by assistance  Bed to Chair: Stand by assistance(<25% VCs for STS transfers for maintaining hip precautions), Ambulation 1  Surface: level tile  Device: Rolling Walker  Assistance: Stand by assistance  Quality of Gait: increased wb through upper extremities, mild antalgic gt  Distance: 250 feet x 2 with one seated rest break  Comments: tolerated up and down low grade ramp well and managed hip precautions without prompting, Stairs  # Steps : 4  Stairs Height: 6\"  Rails: Bilateral  Device: No Device  Assistance: Stand by assistance  Comment: Good technique and safety awareness    FIMS: Bed, Chair, Wheel Chair: 5 - Requires setup/supervision/cues  Walk: 5 - Supervision Requires standby supervision or cuing to walk at least 150 feet  Distance Walked: 200  Wheel Chair: 0 - Activity Not Assessed/Does Not Occur  Stairs: 2- Maximal Assistance Performs 25-49% of the effort to go up and down 4 to 6 stairs and requires the assistance of one person only,  , Assessment: tolerated session well and without complaint. pt requested to return to bed after tx.     Occupational therapy: FIMS:  Eatin - Patient feeds self  Groomin - Requires setup/cues to do all tasks  Bathin - Able to bathe 8-9 areas  Dressing-Upper: 4 - Requires assist with buttons/zippers only and/or requires assist with one arm only  Dressing-Lower: 3 - Requires assist with 2-3 parts of dressing  Toiletin - Requires setup/supervision/cues  Toilet Transfer: 5 - Requires setup/supervision/cues  Tub Transfer: 0 - Activity does not occur  Shower Transfer: 4 - Minimal contact assistance, pt. expends 75% or more effort,  ,      Speech therapy: FIMS: Comprehension: 5 - Patient understands basic needs (hungry/hot/pain)  Expression: 7 - Patient expresses complex ideas/needs  Social Interaction: 7 - Patient has appropriate behavior/relations 100% of the time  Problem Solvin - Patient able to solve simple/routine tasks  Memory: 6 - Patient requires device to recall (e.g. memory book)      Lab/X-ray studies reviewed, analyzed and discussed with patient and staff:   No results found for this or any previous visit (from the past 24 rehabilitation program including PT/OT to improve balance, ambulation, ADLs, and to improve the P/AROM. Therapeutic modifications regarding activities in therapies, place, amount of time per day and intensity of therapy made daily. In bed therapies or bedside therapies prn.   2. Bowel and Bladder dysfunction:  frequent toileting, ambulate to bathroom with assistance, check post void residuals. Check for C.difficile x1 if >2 loose stools in 24 hours, continue bowel & bladder program.  Monitor bowel and bladder function. Lactinex 2 PO every AC. MOM prn, Brown Bomb prn, Glycerin suppository prn, enema prn. 3. Moderate generalized OA pain: reassess pain every shift and prior to and after each therapy session, give prn Tylenol and  See mar, modalities prn in therapy, Lidoderm, K-pad prn.   4. Skin healing and breakdown risk:  continue pressure relief program.  Daily skin exams and reports from nursing. 5. Fatigue due to nutritional and hydration deficiency:  continue to monitor I&Os, calorie counts prn, dietary consult prn.  6. Acute episodic insomnia with situational adjustment disorder:  prn Ambien, monitor for day time sedation. 7. Falls risk elevated:  patient to use call light to get nursing assistance to get up, bed and chair alarm. 8. Elevated DVT risk: progressive activities in PT, continue prophylaxis PATSY hose, elevation and see mar . 9. Complex discharge planning:  Weekly team meeting every Monday to assess progress towards goals, discuss and address social, psychological and medical comorbidities and to address difficulties they may be having progressing in therapy. Patient and family education is in progress. The patient is to follow-up with their family physician after discharge.         Complex Active General Medical Issues that complicate care Assess & Plan:    Patient Active Problem List   Diagnosis    Disorder of lower leg    Pain in limb    Hip joint replacement by other means   

## 2019-05-06 NOTE — PROGRESS NOTES
Physical Therapy Rehab Treatment Note  Facility/Department: [facility]  Room: [place] [address]       NAME: Gena Magallon  : 1933 (80 y.o.)  MRN: [id number]  CODE STATUS: Full Code    Date of Service: 2019  Chart Reviewed: Yes  Patient assessed for rehabilitation services?: Yes  Family / Caregiver Present: No    Restrictions:  Restrictions/Precautions: Weight Bearing  Lower Extremity Weight Bearing Restrictions  Left Lower Extremity Weight Bearing: Weight Bearing As Tolerated  Position Activity Restriction  Hip Precautions: Posterior hip precautions  Other position/activity restrictions: Posterior hip precautions       SUBJECTIVE: Subjective: I am doing good  Response To Previous Treatment: Patient with no complaints from previous session.   Pain Screening  Patient Currently in Pain: Yes  Pre Treatment Pain Screening  Pain at present: 0  Scale Used: Numeric Score  Intervention List: Patient able to continue with treatment    Post Treatment Pain Screening:  Pain Assessment  Pain Assessment: 0-10  Pain Level: 6  Pain Type: Surgical pain  Pain Location: Hip  Pain Orientation: Left  Pain Descriptors: Aching  Pain Frequency: Continuous  Clinical Progression: Gradually worsening  Non-Pharmaceutical Pain Intervention(s): Cold applied  Response to Pain Intervention: Patient Satisfied    OBJECTIVE:   Follows Commands: Within Functional Limits    Bed mobility  Supine to Sit: Stand by assistance  Sit to Supine: Stand by assistance    Transfers  Sit to Stand: Supervision  Stand to sit: Supervision  Bed to Chair: Supervision  Comment: Improved hand placement    Ambulation  Ambulation?: Yes  More Ambulation?: No  Ambulation 1  Surface: level tile;carpet;ramp  Device: Rolling Walker  Assistance: Supervision  Quality of Gait: Reciprocal pattern cues to lift gaze from floor  Distance: 500' x 2  Stairs/Curb  Stairs?: No    Curbs: 6\"  Device: Rolling walker  Assistance: Supervision  Comment: Non-reciprocal pattern cues for

## 2019-05-06 NOTE — PROGRESS NOTES
Occupational Therapy  Facility/Department: [facility]  Daily Treatment Note  NAME: Becca Castellanos  : 1933  MRN: [id number]    Date of Service: 2019    Discharge Recommendations:  Continue to assess pending progress       Assessment   Performance deficits / Impairments: Decreased functional mobility ; Decreased strength;Decreased endurance;Decreased high-level IADLs;Decreased ADL status; Decreased safe awareness;Decreased balance  Prognosis: Good  History: 5 complexities  Exam: 7 deficits  Assistance / Modification: SBA  REQUIRES OT FOLLOW UP: Yes  Activity Tolerance  Activity Tolerance: Patient Tolerated treatment well  Safety Devices  Safety Devices in place: Yes  Type of devices: All fall risk precautions in place(HTP)         Patient Diagnosis(es): There were no encounter diagnoses. has a past medical history of Arthritis, COPD (chronic obstructive pulmonary disease) (HCC), Lower leg edema, Lung disease, Osteoarthritis, Pacemaker, Paroxysmal SVT (supraventricular tachycardia) ([address] Paroxysmal SVT (supraventricular tachycardia) ([address] Pneumonia, and Sinusitis. has a past surgical history that includes Pacemaker insertion (N/A, ).     Restrictions  Restrictions/Precautions  Restrictions/Precautions: Weight Bearing  Lower Extremity Weight Bearing Restrictions  Left Lower Extremity Weight Bearing: Weight Bearing As Tolerated  Position Activity Restriction  Hip Precautions: Posterior hip precautions  Other position/activity restrictions: Posterior hip precautions  Subjective   General  Chart Reviewed: Yes  Patient assessed for rehabilitation services?: Yes  Response to previous treatment: Patient with no complaints from previous session  Family / Caregiver Present: No  Referring Practitioner: Dr Dyer  Diagnosis: Left Femoral neck fracture S/P Left hip hemiarthroplasty      Orientation     Objective    ADL  Equipment Provided: Reacher;Sock aid;Long-handled sponge  Feeding: Independent  Grooming: Independent  UE Bathing: Supervision  LE Bathing: Verbal cueing  UE Dressing: Supervision  LE Dressing: Verbal cueing  Toileting: Supervision        Toilet Transfers  Toilet - Technique: Ambulating  Equipment Used: Grab bars  Toilet Transfer: Modified independent  Shower Transfers  Shower - Transfer From: Serene Cull - Transfer Type: To and From  Shower - Transfer To: Shower seat with back  Shower - Technique: Ambulating  Shower Transfers: Stand by assistance                  Plan   Plan  Times per week: 5-7x/wk  Plan weeks: 1 1/2 weeks  Current Treatment Recommendations: Strengthening, Functional Mobility Training, Cognitive/Perceptual Training, Endurance Training, Balance Training, Neuromuscular Re-education, Self-Care / ADL, Home Management Training               Goals  Patient Goals   Patient goals : To return to home with daughter.        Therapy Time   Individual Concurrent Group Co-treatment   Time In 0825         Time Out 9548         Minutes 60              Electronically signed by TANA Liang/L on 5/6/19 at 9:24 AM    TANA Liang/RIGOBERTO

## 2019-05-07 PROBLEM — S72.052A CLOSED FRACTURE OF HEAD OF LEFT FEMUR (HCC): Status: ACTIVE | Noted: 2019-04-27

## 2019-05-07 PROCEDURE — 97116 GAIT TRAINING THERAPY: CPT

## 2019-05-07 PROCEDURE — 97110 THERAPEUTIC EXERCISES: CPT

## 2019-05-07 PROCEDURE — 97530 THERAPEUTIC ACTIVITIES: CPT

## 2019-05-07 PROCEDURE — 94640 AIRWAY INHALATION TREATMENT: CPT

## 2019-05-07 PROCEDURE — 97535 SELF CARE MNGMENT TRAINING: CPT

## 2019-05-07 PROCEDURE — 94664 DEMO&/EVAL PT USE INHALER: CPT

## 2019-05-07 PROCEDURE — 6360000002 HC RX W HCPCS: Performed by: PHYSICAL MEDICINE & REHABILITATION

## 2019-05-07 PROCEDURE — 6370000000 HC RX 637 (ALT 250 FOR IP): Performed by: PHYSICAL MEDICINE & REHABILITATION

## 2019-05-07 PROCEDURE — 1180000000 HC REHAB R&B

## 2019-05-07 PROCEDURE — 99232 SBSQ HOSP IP/OBS MODERATE 35: CPT | Performed by: PHYSICAL MEDICINE & REHABILITATION

## 2019-05-07 RX ADMIN — ACETAMINOPHEN 500 MG: 500 TABLET ORAL at 06:14

## 2019-05-07 RX ADMIN — FUROSEMIDE 20 MG: 20 TABLET ORAL at 09:27

## 2019-05-07 RX ADMIN — LACTULOSE 20 G: 20 SOLUTION ORAL at 15:18

## 2019-05-07 RX ADMIN — DOCUSATE SODIUM 100 MG: 100 CAPSULE, LIQUID FILLED ORAL at 21:17

## 2019-05-07 RX ADMIN — ALBUTEROL SULFATE 2.5 MG: 2.5 SOLUTION RESPIRATORY (INHALATION) at 18:02

## 2019-05-07 RX ADMIN — VERAPAMIL HYDROCHLORIDE 180 MG: 180 TABLET, FILM COATED, EXTENDED RELEASE ORAL at 21:17

## 2019-05-07 RX ADMIN — SOTALOL HYDROCHLORIDE 80 MG: 80 TABLET ORAL at 21:17

## 2019-05-07 RX ADMIN — SOTALOL HYDROCHLORIDE 80 MG: 80 TABLET ORAL at 09:26

## 2019-05-07 RX ADMIN — ACETAMINOPHEN 500 MG: 500 TABLET ORAL at 21:17

## 2019-05-07 RX ADMIN — DOCUSATE SODIUM 100 MG: 100 CAPSULE, LIQUID FILLED ORAL at 09:27

## 2019-05-07 RX ADMIN — ASPIRIN 325 MG: 325 TABLET, DELAYED RELEASE ORAL at 09:27

## 2019-05-07 RX ADMIN — POTASSIUM CHLORIDE 8 MEQ: 600 TABLET, EXTENDED RELEASE ORAL at 09:27

## 2019-05-07 RX ADMIN — ACETAMINOPHEN 500 MG: 500 TABLET ORAL at 13:56

## 2019-05-07 RX ADMIN — ALBUTEROL SULFATE 2.5 MG: 2.5 SOLUTION RESPIRATORY (INHALATION) at 04:55

## 2019-05-07 ASSESSMENT — PAIN DESCRIPTION - ORIENTATION: ORIENTATION: LEFT

## 2019-05-07 ASSESSMENT — PAIN DESCRIPTION - FREQUENCY: FREQUENCY: CONTINUOUS

## 2019-05-07 ASSESSMENT — PAIN SCALES - GENERAL
PAINLEVEL_OUTOF10: 6
PAINLEVEL_OUTOF10: 6
PAINLEVEL_OUTOF10: 3
PAINLEVEL_OUTOF10: 1

## 2019-05-07 ASSESSMENT — PAIN DESCRIPTION - LOCATION: LOCATION: HIP

## 2019-05-07 ASSESSMENT — PAIN DESCRIPTION - PAIN TYPE: TYPE: SURGICAL PAIN;ACUTE PAIN

## 2019-05-07 ASSESSMENT — PAIN DESCRIPTION - DESCRIPTORS: DESCRIPTORS: ACHING

## 2019-05-07 ASSESSMENT — PAIN DESCRIPTION - PROGRESSION: CLINICAL_PROGRESSION: GRADUALLY WORSENING

## 2019-05-07 NOTE — PROGRESS NOTES
Physical Therapy Rehab Treatment Note  [facility]  [facility]       NAME: Briana Balderas  : 1933 (80 y.o.)  MRN: [id number]  CODE STATUS: Full Code    Date of Service: 2019  Chart Reviewed: Yes  Patient assessed for rehabilitation services?: Yes  Family / Caregiver Present: No  General Comment  Comments: Able to state hip precautions x 3    Restrictions:  Restrictions/Precautions: Weight Bearing  Lower Extremity Weight Bearing Restrictions  Left Lower Extremity Weight Bearing: Weight Bearing As Tolerated  Position Activity Restriction  Hip Precautions: Posterior hip precautions  Other position/activity restrictions: Posterior hip precautions       SUBJECTIVE: Subjective: I am going to take a nap after therapy  Response To Previous Treatment: Patient with no complaints from previous session.   Pain Screening  Patient Currently in Pain: Yes  Pre Treatment Pain Screening  Pain at present: 5  Scale Used: Numeric Score  Intervention List: Patient able to continue with treatment    Post Treatment Pain Screening:  Pain Assessment  Pain Assessment: 0-10  Pain Level: 6  Pain Type: Surgical pain;Acute pain  Pain Location: Hip  Pain Orientation: Left  Pain Descriptors: Aching  Pain Frequency: Continuous  Clinical Progression: Gradually worsening  Non-Pharmaceutical Pain Intervention(s): Cold applied  Response to Pain Intervention: Patient Satisfied    OBJECTIVE:   Follows Commands: Within Functional Limits    Bed mobility  Rolling to Left: Supervision  Rolling to Right: Supervision  Supine to Sit: Supervision  Sit to Supine: Supervision  Scooting: Supervision    Transfers  Sit to Stand: Modified independent  Stand to sit: Modified independent  Car Transfer: Modified independent    Ambulation  Ambulation?: Yes  More Ambulation?: No  Ambulation 1  Surface: level tile;carpet  Device: Rolling Walker  Assistance: Modified Independent  Quality of Gait: Reciprocal pattern patient lifts gaze from floor on own  Distance: 500' x 3  Stairs/Curb  Stairs?: No(Tested on a.m.)     Exercises  Straight Leg Raise: x 20   Hamstring Sets: x 20 YTB  Quad Sets: x20  Heelslides: x20  Gluteal Sets: x20  Hip Abduction: x 20  Ankle Pumps: x20    ASSESSMENT/COMMENTS:  Body structures, Functions, Activity limitations: Decreased ROM; Decreased balance;Decreased endurance;Decreased safe awareness;Decreased functional mobility ; Decreased strength  Assessment: Patient is meeting goals continue to progress hip strength    PLAN OF CARE/Safety:   Safety Devices  Type of devices: Chair alarm in place; Left in chair; All fall risk precautions in place      Therapy Time:   Individual   Time In 1300   Time Out 1400   Minutes 60     Minutes: 60      Transfer/Bed mobility training: 15      Gait trainin     Therapeutic ex: Marixa Morrison PTA, 19 at 1:28 PM

## 2019-05-07 NOTE — PROGRESS NOTES
Physical Therapy Rehab Treatment Note  Facility/Department: [facility]  Room: [place] [address]       NAME: Julian Kang  : 1933 (80 y.o.)  MRN: [id number]  CODE STATUS: Full Code    Date of Service: 2019  Chart Reviewed: Yes  Patient assessed for rehabilitation services?: Yes  Family / Caregiver Present: No  General Comment  Comments: Able to state hip precautions x 3    Restrictions:  Restrictions/Precautions: Weight Bearing  Lower Extremity Weight Bearing Restrictions  Left Lower Extremity Weight Bearing: Weight Bearing As Tolerated  Position Activity Restriction  Hip Precautions: Posterior hip precautions  Other position/activity restrictions: Posterior hip precautions       SUBJECTIVE: Subjective: I hope I will do good at home  Response To Previous Treatment: Patient with no complaints from previous session.   Pain Screening  Patient Currently in Pain: Yes  Pre Treatment Pain Screening  Pain at present: 0  Scale Used: Numeric Score  Intervention List: Patient able to continue with treatment    Post Treatment Pain Screening:  Pain Assessment  Pain Assessment: 0-10  Pain Level: 6  Pain Type: Surgical pain;Acute pain  Pain Location: Hip  Pain Orientation: Left  Pain Descriptors: Aching  Pain Frequency: Continuous  Clinical Progression: Gradually worsening  Non-Pharmaceutical Pain Intervention(s): Cold applied  Response to Pain Intervention: Patient Satisfied    OBJECTIVE:   Follows Commands: Within Functional Limits    Transfers  Sit to Stand: Modified independent  Stand to sit: Modified independent  Car Transfer: Modified independent    Ambulation  Ambulation?: Yes  More Ambulation?: No  Ambulation 1  Surface: level tile;carpet;ramp;uneven  Device: Rolling Walker  Assistance: Modified Independent  Quality of Gait: Reciprocal pattern cues to lift gaze from floor  Distance: 1000' x 1, 500' x 1  Stairs/Curb  Stairs?: Yes   Stairs  # Steps : 12  Stairs Height: 6\"  Rails: Bilateral  Curbs: 6\"  Device: Rolling walker  Assistance: Modified independent   Comment: Non-reciprocal pattern cues for walker negotiation  for curb step    Exercises  Hamstring Sets: x 20 YTB  Hip Flexion: x 20 to hip precautions  Hip Abduction:  x 20 YTB/ Ball squeezes  Knee Long Arc Quad: x 20  Ankle Pumps: x20  Comments: Ice pack applied to L hip while exercises performed     ASSESSMENT/COMMENTS:  Body structures, Functions, Activity limitations: Decreased ROM; Decreased balance;Decreased endurance;Decreased safe awareness;Decreased functional mobility ; Decreased strength  Assessment: Patient is meeting goals continue to progress hip strength    PLAN OF CARE/Safety:   Safety Devices  Type of devices: Chair alarm in place; Left in chair; All fall risk precautions in place      Therapy Time:   Individual   Time In 1000   Time Out 1100   Minutes 60     Minutes: 60      Transfer/Bed mobility training: 15      Gait trainin     Therapeutic ex: [id number] Watt PTA, 19 at 11:49 AM

## 2019-05-07 NOTE — PROGRESS NOTES
Subjective: The patient complains of severe  acute  On chronic left hip pain partially relieved by rest, ice, OxyIR and relieved with Tylenol and exacerbated by the patient range of motion and weightbearing. I am concerned about patients  postop hypoxia now improving. ROS x10: The patient also complains of severely impaired mobility and activities of daily living. Otherwise no new problems with vision, hearing, nose, mouth, throat, dermal, cardiovascular, GI, , pulmonary, musculoskeletal, psychiatric or neurological. See Rehab H&P on Rehab chart dated . Vital signs:  BP (!) 124/53   Pulse 70   Temp 97 °F (36.1 °C) (Oral)   Resp 18   Ht 5' 6\" (1.676 m)   Wt 172 lb 3.2 oz (78.1 kg)   SpO2 98%   BMI 27.79 kg/m²   I/O:   PO/Intake:  fair PO intake, no problems observed or reported. Bowel/Bladder:  continent, no problems noted. General:  Patient is well developed, adequately nourished, non-obese and     well kempt. HEENT:    PERRLA, hearing intact to loud voice, external inspection of ear     and nose benign. Inspection of lips, tongue and gums benign  Musculoskeletal: No significant change in strength or tone. All joints stable. Inspection and palpation of digits and nails show no clubbing,       cyanosis or inflammatory conditions. Neuro/Psychiatric: Affect: flat but pleasant. Alert and oriented to person, place and     situation. No significant change in deep tendon reflexes or     sensation  Lungs:  Diminished, CTA-B. Respiration effort is normal at rest.     Heart:   S1 = S2, RRR. No loud murmurs. Abdomen:  Soft, non-tender, no enlargement of liver or spleen. Extremities:  significant lower extremity edema and left hip tenderness. Skin:   Intact left hip incision to general survey, no visualized or palpated problems.     Rehabilitation:  Physical therapy: FIMS:  Bed Mobility: Scooting: Supervision    Transfers: Sit to Stand: Modified independent  Stand to sit: Modified independent  Bed to Chair: Supervision, Ambulation 1  Surface: level tile, carpet  Device: Rolling Walker  Assistance: Modified Independent  Quality of Gait: Reciprocal pattern patient lifts gaze from floor on own  Distance: 500' x 1  Comments: tolerated up and down low grade ramp well and managed hip precautions without prompting, Stairs  # Steps : 12  Stairs Height: 6\"  Rails: Bilateral  Curbs: 6\"  Device: Rolling walker  Assistance: Modified independent   Comment: Non-reciprocal pattern cues for walker negotiation  for curb step    FIMS: Bed, Chair, Wheel Chair: 5 - Requires setup/supervision/cues  Walk: 5 - Supervision Requires standby supervision or cuing to walk at least 150 feet  Distance Walked: 250  Wheel Chair: 0 - Activity Not Assessed/Does Not Occur  Stairs: 2- Maximal Assistance Performs 25-49% of the effort to go up and down 4 to 6 stairs and requires the assistance of one person only,  , Assessment: Patient is meeting goals continue to progress hip strength    Occupational therapy: FIMS:  Eatin - Patient feeds self  Groomin - Patient independent with all grooming tasks  Bathin - Able to bathe all 10 areas with device  Dressing-Upper: 5 - Requires setup/supervision/cues and/or requires assist with presthesis/brace only  Dressing-Lower: 5 - Requires setup/supervision/cues and/or staff applies TEDS/prosthesis/brace only  Toiletin - Requires setup/supervision/cues  Toilet Transfer: 5 - Requires setup/supervision/cues  Tub Transfer: 0 - Activity does not occur  Shower Transfer: 5 - Supervision, set-up, cues,  ,      Speech therapy: FIMS: Comprehension: 6 - Complex ideas 90% or device (hearing aid/glasses)  Expression: 6 - Device used to express complex ideas/needs  Social Interaction: 6 - Patient requires medication for mood and/or effect  Problem Solvin - Patient able to solve simple/routine tasks  Memory: 5 - Patient requires prompting with stress/unfamiliar situations Lab/X-ray studies reviewed, analyzed and discussed with patient and staff:   No results found for this or any previous visit (from the past 24 hour(s)). Previous extensive, complex labs, notes and diagnostics reviewed and analyzed. ALLERGIES:    Allergies as of 05/01/2019 - Review Complete 05/01/2019   Allergen Reaction Noted    Codeine  08/18/2015    Penicillins  08/18/2015      (please also verify by checking MAR)     Yesterday I evaluated this patient for periodic reassessment of medical and functional status. The patient was discussed in detail at the treatment team meeting focusing on current medical issues, progress in therapies, social issues, psychological issues, barriers to progress and strategies to address these barriers, and discharge planning. See the hand written addendum to rehab progress note. The patient continues to be high risk for future disability and their medical and rehabilitation prognosis continue to be good and therefore, we will continue the patient's rehabilitation course as planned. The patient's tentative discharge date was set. Patient and family education was discussed. The patient was made aware of the team discussion regarding their progress. Discharge plans were discussed along with barriers to progress and strategies to address these barriers, patient encouraged to continue to discuss discharge plans with . Complex Physical Medicine & Rehab Issues Assess & Plan:   1. Severe abnormality of gait and mobility and impaired self-care and ADL's secondary to left hip fracture . Functional and medical status reassessed regarding patients ability to participate in therapies and patient found to be able to participate in acute intensive comprehensive inpatient rehabilitation program including PT/OT to improve balance, ambulation, ADLs, and to improve the P/AROM.   Therapeutic modifications regarding activities in therapies, place, amount of time per Pacemaker, Sick sinus syndrome, Essential hypertension-signs every shift, dose and titrate cardiac medications, consult hospitalist for backup medical.  Consult Dr. Elham Delaney cardiology as needed  2. COPD with acute exacerbation, Chronic bronchitis -pox checks every shift, titrate oxygen and aerosol treatments  3.    Left displaced femoral neck fracture -bearing as tolerated-posterior total hip replacement precautions        Shana Zee D.O., PM&R     Attending    [address]

## 2019-05-07 NOTE — PROGRESS NOTES
Occupational Therapy  Facility/Department: [facility]  Daily Treatment Note  NAME: Tito Souza  : 1933  MRN: [id number]    Date of Service: 2019    Discharge Recommendations:  Continue to assess pending progress       Assessment   Performance deficits / Impairments: Decreased functional mobility ; Decreased strength;Decreased endurance;Decreased high-level IADLs;Decreased ADL status; Decreased safe awareness;Decreased balance  Prognosis: Good  Assistance / Modification: Mod I  REQUIRES OT FOLLOW UP: Yes  Activity Tolerance  Activity Tolerance: Patient Tolerated treatment well  Safety Devices  Safety Devices in place: Yes  Type of devices: All fall risk precautions in place  Restraints  Initially in place: No         Patient Diagnosis(es): The encounter diagnosis was Abnormality of gait and mobility due to Left femoral neck fracture S/P Left Hip Hem[facility]arthroplasty. [facility] .      has a past medical history of Arthritis, COPD (chronic obstructive pulmonary disease) (HCC), Lower leg edema, Lung disease, Osteoarthritis, Pacemaker, Paroxysmal SVT (supraventricular tachycardia) ([address] Paroxysmal SVT (supraventricular tachycardia) ([address] Pneumonia, and Sinusitis. has a past surgical history that includes Pacemaker insertion (N/A, ).     Restrictions  Restrictions/Precautions  Restrictions/Precautions: Weight Bearing  Lower Extremity Weight Bearing Restrictions  Left Lower Extremity Weight Bearing: Weight Bearing As Tolerated  Position Activity Restriction  Hip Precautions: Posterior hip precautions  Other position/activity restrictions: Posterior hip precautions  Subjective   General  Chart Reviewed: Yes  Patient assessed for rehabilitation services?: Yes  Response to previous treatment: Patient with no complaints from previous session  Family / Caregiver Present: No  Referring Practitioner: Dr Sherlyn Donald  Diagnosis: Left Femoral neck fracture S/P Left hip hemiarthroplasty

## 2019-05-07 NOTE — PROGRESS NOTES
[facility] EMERGENCY [facility] AT [place] Respiratory Therapy Evaluation   Current Order:  Albuterol TID      Home Regimen: PRN per patient      Ordering Physician: Ashish Greenberg  Re-evaluation Date:  5/10     Diagnosis: Hip fx      Patient Status: Stable / Unstable + Physician notified    The following MDI Criteria must be met in order to convert aerosol to MDI with spacer. If unable to meet, MDI will be converted to aerosol:  []  Patient able to demonstrate the ability to use MDI effectively  []  Patient alert and cooperative  []  Patient able to take deep breath with 5-10 second hold  []  Medication(s) available in this delivery method   []  Peak flow greater than or equal to 200 ml/min            Current Order Substituted To  (same drug, same frequency)   Aerosol to MDI [] Albuterol Sulfate 0.083% unit dose by aerosol Albuterol Sulfate MDI 2 puffs by inhalation with spacer    [] Levalbuterol 1.25 mg unit dose by aerosol Levalbuterol MDI 2 puffs by inhalation with spacer    [] Levalbuterol 0.63 mg unit dose by aerosol Levalbuterol MDI 2 puffs by inhalation with spacer    [] Ipratropium Bromide 0.02% unit dose by aerosol Ipratropium Bromide MDI 2 puffs by inhalation with spacer    [] Duoneb (Ipratropium + Albuterol) unit dose by aerosol Ipratropium MDI + Albuterol MDI 2 puffs by inhalation w/spacer   MDI to Aerosol [] Albuterol Sulfate MDI Albuterol Sulfate 0.083% unit dose by aerosol    [] Levalbuterol MDI 2 puffs by inhalation Levalbuterol 1.25 mg unit dose by aerosol    [] Ipratropium Bromide MDI by inhalation Ipratropium Bromide 0.02% unit dose by aerosol    [] Combivent (Ipratropium + Albuterol) MDI by inhalation Duoneb (Ipratropium + Albuterol) unit dose by aerosol   Treatment Assessment [Frequency/Schedule]:  Change frequency to: ______No change____________________________________________per Protocol, P&T, MEC      Points 0 1 2 3 4   Pulmonary Status  Non-Smoker  []   Smoking history   < 20 pack years  []   Smoking history  ?  20 pack years  []   Pulmonary Disorder  (acute or chronic)  [x]   Severe or Chronic w/ Exacerbation  []     Surgical Status No [x]   Surgeries     General []   Surgery Lower []   Abdominal Thoracic or []   Upper Abdominal Thoracic with  PulmonaryDisorder  []     Chest X-ray Clear/Not  Ordered     [x]  Chronic Changes  Results Pending  []  Infiltrates, atelectasis, pleural effusion, or edema  []  Infiltrates in more than one lobe []  Infiltrate + Atelectasis, &/or pleural effusion  []    Respiratory Pattern Regular,  RR = 12-20 [x]  Increased,  RR = 21-25 []  DUMONT, irregular,  or RR = 26-30 []  Decreased FEV1  or RR = 31-35 []  Severe SOB, use  of accessory muscles, or RR ? 35  []    Mental Status Alert, oriented,  Cooperative [x]  Confused but Follows commands []  Lethargic or unable to follow commands []  Obtunded  []  Comatose  []    Breath Sounds Clear to  auscultation  []  Decreased unilaterally or  in bases only []  Decreased  bilaterally  []  Crackles or intermittent wheezes [x]  Wheezes []    Cough Strong, Spontan., & nonproductive [x]  Strong,  spontaneous, &  productive []  Weak,  Nonproductive []  Weak, productive or  with wheezes []  No spontaneous  cough or may require suctioning []    Level of Activity Ambulatory []  Ambulatory w/ Assist  [x]  Non-ambulatory []  Paraplegic []  Quadriplegic []    Total    Score:____7___     Triage Score:____4____      Tri       Triage:     1. (>20) Freq: Q3    2. (16-20) Freq: Q4   3. (11-15) Freq: QID & Albuterol Q2 PRN    4. (6-10) Freq: TID & Albuterol Q2 PRN    5. (0-5) Freq Q4prn

## 2019-05-08 PROCEDURE — 97127 HC OT THER IVNTJ W/FOCUS COG FUNCJ: CPT

## 2019-05-08 PROCEDURE — 1180000000 HC REHAB R&B

## 2019-05-08 PROCEDURE — 97535 SELF CARE MNGMENT TRAINING: CPT

## 2019-05-08 PROCEDURE — 6360000002 HC RX W HCPCS: Performed by: PHYSICAL MEDICINE & REHABILITATION

## 2019-05-08 PROCEDURE — 97116 GAIT TRAINING THERAPY: CPT

## 2019-05-08 PROCEDURE — 6370000000 HC RX 637 (ALT 250 FOR IP): Performed by: PHYSICAL MEDICINE & REHABILITATION

## 2019-05-08 PROCEDURE — 97530 THERAPEUTIC ACTIVITIES: CPT

## 2019-05-08 PROCEDURE — 97110 THERAPEUTIC EXERCISES: CPT

## 2019-05-08 PROCEDURE — 94640 AIRWAY INHALATION TREATMENT: CPT

## 2019-05-08 PROCEDURE — 99232 SBSQ HOSP IP/OBS MODERATE 35: CPT | Performed by: PHYSICAL MEDICINE & REHABILITATION

## 2019-05-08 RX ORDER — FUROSEMIDE 20 MG/1
20 TABLET ORAL DAILY
Qty: 60 TABLET | Refills: 3 | Status: SHIPPED | OUTPATIENT
Start: 2019-05-08 | End: 2019-05-29

## 2019-05-08 RX ORDER — UREA 10 %
5 LOTION (ML) TOPICAL NIGHTLY PRN
COMMUNITY
Start: 2019-05-08 | End: 2019-05-29

## 2019-05-08 RX ORDER — POTASSIUM CHLORIDE 600 MG/1
8 TABLET, FILM COATED, EXTENDED RELEASE ORAL DAILY
Qty: 60 TABLET | Refills: 3 | Status: SHIPPED | OUTPATIENT
Start: 2019-05-08 | End: 2019-05-29 | Stop reason: ALTCHOICE

## 2019-05-08 RX ORDER — PSEUDOEPHEDRINE HCL 30 MG
100 TABLET ORAL 2 TIMES DAILY
COMMUNITY
Start: 2019-05-08 | End: 2019-05-29 | Stop reason: ALTCHOICE

## 2019-05-08 RX ORDER — LACTULOSE 10 G/15ML
20 SOLUTION ORAL DAILY
Qty: 1 BOTTLE | Refills: 2 | Status: SHIPPED | OUTPATIENT
Start: 2019-05-08 | End: 2019-05-29 | Stop reason: ALTCHOICE

## 2019-05-08 RX ORDER — LACTULOSE 10 G/15ML
20 SOLUTION ORAL DAILY
Status: DISCONTINUED | OUTPATIENT
Start: 2019-05-08 | End: 2019-05-09 | Stop reason: HOSPADM

## 2019-05-08 RX ORDER — BISACODYL 10 MG
10 SUPPOSITORY, RECTAL RECTAL DAILY PRN
COMMUNITY
Start: 2019-05-08 | End: 2019-05-29

## 2019-05-08 RX ADMIN — SOTALOL HYDROCHLORIDE 80 MG: 80 TABLET ORAL at 08:59

## 2019-05-08 RX ADMIN — ACETAMINOPHEN 500 MG: 500 TABLET ORAL at 22:48

## 2019-05-08 RX ADMIN — OXYCODONE HYDROCHLORIDE 5 MG: 5 TABLET ORAL at 09:02

## 2019-05-08 RX ADMIN — POTASSIUM CHLORIDE 8 MEQ: 600 TABLET, EXTENDED RELEASE ORAL at 08:59

## 2019-05-08 RX ADMIN — SOTALOL HYDROCHLORIDE 80 MG: 80 TABLET ORAL at 22:47

## 2019-05-08 RX ADMIN — DOCUSATE SODIUM 100 MG: 100 CAPSULE, LIQUID FILLED ORAL at 22:48

## 2019-05-08 RX ADMIN — ALBUTEROL SULFATE 2.5 MG: 2.5 SOLUTION RESPIRATORY (INHALATION) at 16:30

## 2019-05-08 RX ADMIN — DOCUSATE SODIUM 100 MG: 100 CAPSULE, LIQUID FILLED ORAL at 08:59

## 2019-05-08 RX ADMIN — VERAPAMIL HYDROCHLORIDE 180 MG: 180 TABLET, FILM COATED, EXTENDED RELEASE ORAL at 22:47

## 2019-05-08 RX ADMIN — ACETAMINOPHEN 500 MG: 500 TABLET ORAL at 06:12

## 2019-05-08 RX ADMIN — ALBUTEROL SULFATE 2.5 MG: 2.5 SOLUTION RESPIRATORY (INHALATION) at 05:05

## 2019-05-08 RX ADMIN — ASPIRIN 325 MG: 325 TABLET, DELAYED RELEASE ORAL at 08:59

## 2019-05-08 RX ADMIN — HYDROCORTISONE 2.5%: 25 CREAM TOPICAL at 22:50

## 2019-05-08 RX ADMIN — FUROSEMIDE 20 MG: 20 TABLET ORAL at 08:59

## 2019-05-08 ASSESSMENT — PAIN DESCRIPTION - FREQUENCY
FREQUENCY: CONTINUOUS
FREQUENCY: CONTINUOUS

## 2019-05-08 ASSESSMENT — PAIN SCALES - GENERAL
PAINLEVEL_OUTOF10: 6
PAINLEVEL_OUTOF10: 3
PAINLEVEL_OUTOF10: 5
PAINLEVEL_OUTOF10: 4
PAINLEVEL_OUTOF10: 5

## 2019-05-08 ASSESSMENT — PAIN DESCRIPTION - LOCATION
LOCATION: HIP
LOCATION: HIP

## 2019-05-08 ASSESSMENT — PAIN DESCRIPTION - ORIENTATION
ORIENTATION: LEFT
ORIENTATION: LEFT

## 2019-05-08 ASSESSMENT — PAIN DESCRIPTION - PAIN TYPE
TYPE: SURGICAL PAIN
TYPE: SURGICAL PAIN

## 2019-05-08 ASSESSMENT — PAIN DESCRIPTION - DESCRIPTORS
DESCRIPTORS: ACHING
DESCRIPTORS: ACHING

## 2019-05-08 NOTE — PROGRESS NOTES
Occupational Therapy  Facility/Department: [facility]  Daily Treatment Note  NAME: Nelia Garcia  : 1933  MRN: [id number]    Date of Service: 2019    Discharge Recommendations:  Continue to assess pending progress       Assessment      Activity Tolerance  Activity Tolerance: Patient Tolerated treatment well         Patient Diagnosis(es): The encounter diagnosis was Abnormality of gait and mobility due to Left femoral neck fracture S/P Left Hip Hemiarthroplasty. [facility] admit 19. .      has a past medical history of Arthritis, COPD (chronic obstructive pulmonary disease) (HCC), Lower leg edema, Lung disease, Osteoarthritis, Pacemaker, Paroxysmal SVT (supraventricular tachycardia) ([address] Paroxysmal SVT (supraventricular tachycardia) ([address] Pneumonia, and Sinusitis. has a past surgical history that includes Pacemaker insertion (N/A, ). Restrictions  Restrictions/Precautions  Restrictions/Precautions: Weight Bearing  Lower Extremity Weight Bearing Restrictions  Left Lower Extremity Weight Bearing: Weight Bearing As Tolerated  Position Activity Restriction  Hip Precautions: Posterior hip precautions  Other position/activity restrictions: Posterior hip precautions  Subjective   General  Chart Reviewed: Yes  Patient assessed for rehabilitation services?: Yes  Response to previous treatment: Patient with no complaints from previous session  Family / Caregiver Present: No  Referring Practitioner: Dr Partha Morris  Diagnosis: Left Femoral neck fracture S/P Left hip hemiarthroplasty  Pain Assessment  Pain Assessment: 0-10  Pain Level: 4  Pain Type: Surgical pain  Pain Location: Hip  Pain Orientation: Left  Pain Descriptors: Aching  Pain Frequency: Continuous  Non-Pharmaceutical Pain Intervention(s): Heat applied  Vital Signs  Patient Currently in Pain: Yes   Orientation     Objective     Pt. was scheduled for a discharge ADL, however completed previously with PCA.   Hot pack was applied to the L hip to reduce pain while she completed seated tasks. Pt. was issued a strengthening HEP with a 2 lb free weight to promote strengthening for her ADls and IADLs at home. Pt. completed 1 set x 10 reps with occasional verbal cues for technique. Pt. engaged in problem solving task with creating specific amounts of money. Pt. used bills and coins to come up with the exact amount. Pt. had 1 error out of the 6 amounts and required min assist to correct the error. Pt. then placed the coins and bills back into the correct spots with min errors with the coins. Pt. practiced a tub transfer with a tub chair. Pt. reports she does not have grab bars at home currently but does have a tub chair. Pt. attempted to step into the tub while standing with min assist and safety concerns. Pt. then attempted the transfer with sitting on the tub chair first and then swivel and lift her legs into the tub while seated. Pt. was able to complete the transfer at supervision level with better safety. Plan   Plan  Times per week: 5-7x/wk  Plan weeks: 1 1/2 weeks  Current Treatment Recommendations: Strengthening, Functional Mobility Training, Cognitive/Perceptual Training, Endurance Training, Balance Training, Neuromuscular Re-education, Self-Care / ADL, Home Management Training    Goals  Patient Goals   Patient goals : To return to home with daughter.        Therapy Time   Individual Concurrent Group Co-treatment   Time In 0900         Time Out 1000         Minutes 92 ORVILLE Dominguez Electronically signed by ORVILLE Wilks on 5/8/2019 at 11:16 AM

## 2019-05-08 NOTE — FLOWSHEET NOTE
Pt awake in room this am c/o some pain this am and wants to do great in pt so requested pain medication and give. .Electronically signed by Darius Kebede LPN on 4/5/4962 at 08:82 AM    Pt was due tylenol at 1400 but refused since she had other pain medication she stated she didn't think she needed tylenol. Pt also had lg BM so lactulose held and states she did not need the Anusol cream.Electronically signed by Darius Kebede LPN on 3/3/5415 at 5:10 PM  Pt was in bed when supper arrived daughter at bedside. Pt states that the pain medication worked really good and still did not want tylenol. No distress noted. Electronically signed by Darius Kebede LPN on 0/0/7595 at 2:56 PM

## 2019-05-08 NOTE — PROGRESS NOTES
Physical Therapy Rehab Treatment Note  Facility/Department: [facility]  Room: [place] [address]       NAME: Danette Quintanilla  : 1933 (80 y.o.)  MRN: [id number]  CODE STATUS: Full Code    Date of Service: 2019  Chart Reviewed: Yes  Patient assessed for rehabilitation services?: Yes  Family / Caregiver Present: No  General Comment  Comments: Able to state hip precautions x 3    Restrictions:  Restrictions/Precautions: Weight Bearing  Lower Extremity Weight Bearing Restrictions  Left Lower Extremity Weight Bearing: Weight Bearing As Tolerated  Position Activity Restriction  Hip Precautions: Posterior hip precautions  Other position/activity restrictions: Posterior hip precautions       SUBJECTIVE: Subjective: I like being here but I want to go home  Response To Previous Treatment: Patient with no complaints from previous session. Pain Screening  Patient Currently in Pain: Yes  Pre Treatment Pain Screening  Pain at present: 5  Scale Used: Numeric Score  Intervention List: Patient able to continue with treatment    Post Treatment Pain Screening:  Pain Assessment  Pain Assessment: 0-10  Pain Level: 5  Pain Type: Surgical pain  Pain Location: Hip  Pain Orientation: Left  Pain Descriptors: Aching  Pain Frequency: Continuous    OBJECTIVE:   Follows Commands: Within Functional Limits    Bed mobility  Rolling to Left: Independent  Rolling to Right: Independent  Supine to Sit: Independent  Sit to Supine: Independent  Scooting: Independent  Comment: Maintains hip precaution with all bed mobility    Transfers  Sit to Stand: Independent  Stand to sit:  Independent  Bed to Chair: Independent  Car Transfer: Independent    Ambulation  Ambulation?: Yes  More Ambulation?: No  Ambulation 1  Surface: level tile;carpet  Device: Rolling Walker  Assistance: Independent  Quality of Gait: Reciprocal pattern patient lifts gaze from floor on own  Distance: 1000' x 2  Stairs/Curb  Stairs?: Yes   Stairs  # Steps : 12  Stairs Height: 6\"  Rails: Bilateral  Curbs: 6\"  Device: Rolling walker  Assistance: Independent  Comment: Non-reciprocal pattern     Exercises  Straight Leg Raise: x 20   Quad Sets: x20  Heelslides: x20  Gluteal Sets: x20  Hip Abduction: x 20  Ankle Pumps: x20  Comments: Patient performed HEP with handout. Completed with Casey     ASSESSMENT/COMMENTS:  Body structures, Functions, Activity limitations: Decreased ROM; Decreased balance;Decreased endurance;Decreased safe awareness;Decreased functional mobility ; Decreased strength  Assessment: Patient has met all goals and is able to maintain hip precautions with all functional activities    PLAN OF CARE/Safety:   Safety Devices  Type of devices: Chair alarm in place; Left in chair; All fall risk precautions in place      Therapy Time:   Individual   Time In 1000   Time Out 1100   Minutes 60     Minutes: 60      Transfer/Bed mobility training: 15      Gait trainin     Therapeutic ex: [id number] Watt PTA, 19 at 10:50 AM

## 2019-05-08 NOTE — PROGRESS NOTES
[facility] OCCUPATIONAL THERAPY DISCHARGE SUMMARY- REHAB     Date: 2019  Patient Name: Abe Arnold        MRN: [id number]  Account: [de-identified]   : 1933  (80 y.o.)  Room: [place] [address]    Diagnosis:  Left Femoral neck fracture S/P Left hip hemiarthroplasty    Past Medical History:   Diagnosis Date    Arthritis     BILAT    [facility]chronic obstructive [facility] disease) [address]     Lower leg edema     BILAT     Lung disease     Osteoarthritis     Pacemaker     FOR SYNCOPE    Paroxysmal SVT (supraventricular tachycardia) (HCC)     Paroxysmal SVT (supraventricular tachycardia) (HCC)     Pneumonia     Sinusitis      Past Surgical History:   Procedure Laterality Date    PACEMAKER INSERTION N/A        Precautions:   Restrictions/Precautions: Weight Bearing  Hip Precautions: Posterior hip precautions  Other position/activity restrictions: Posterior hip precautions  Left Lower Extremity Weight Bearing: Weight Bearing As Tolerated     Social/Functional History:  Social/Functional History  Lives With: Daughter  Type of Home: House  Home Layout: One level  Home Access: Stairs to enter with rails  Entrance Stairs - Number of Steps: 3  Entrance Stairs - Rails: None  Bathroom Shower/Tub: Tub/Shower unit, Shower chair with back  Bathroom Equipment: Shower chair  Home Equipment: Cane, Standard walker, Reacher, Sock aid, Long-handled shoehorn  ADL Assistance: Independent  Homemaking Assistance: Independent  Ambulation Assistance: Independent  Transfer Assistance: Independent  Active : Yes  Mode of Transportation: Car  Occupation: Retired  Type of occupation: Domestic   Leisure & Hobbies: Reading, sports, outdoor acts and being active    Current Functional Status: Status per PCA in bold, pt. last ADL scores with OT as below  ADL  Equipment Provided: Reacher, Sock aid, Long-handled sponge  Feeding: Independent  Grooming: Independent  UE Bathing: Supervision  LE Bathing: Verbal cueing  UE Dressing: Supervision  LE Dressing: Verbal cueing  Toileting: Independent  Toilet Transfers  Toilet - Technique: Ambulating  Equipment Used: Grab bars  Toilet Transfer: Modified independent  Tub Transfers  Tub Transfers: Not tested  Shower Transfers  Shower - Transfer From: Hubert Santiago - Transfer Type: To and From  Shower - Transfer To: Shower seat with back  Shower - Technique: Ambulating  Shower Transfers: Stand by assistance    Equipment Provided: Reacher, Sock aid, Long-handled sponge  Feeding: Independent  Grooming: Independent  UE Bathing: Mod I  LE Bathing: Mod I  UE Dressing: Independent  LE Dressing: Supervision  Toileting: Mod I  Toilet Transfers  Toilet - Technique: Ambulating  Equipment Used: Grab bars  Toilet Transfer: Modified independent  Shower Transfers  Shower - Transfer From: Hubert Santiago - Transfer Type: To and From  Shower - Transfer To: Shower seat with back  Shower - Technique: Ambulating  Shower Transfers: Mod I    Orientation Status:  Orientation  Overall Orientation Status: Within Functional Limits    Cognition Status:  Cognition  Overall Cognitive Status: [facility]  Problem Solving: Assistance required to identify errors made  Cognition Comment: Comprehension: Mod I, Expression: Mod I, Social Interaction: Mod I, Problem Solving: Mod I, Memory:  Mod I    Perception Status:  Perception  Overall Perceptual Status: [facility]    Sensation Status:  Sensation  Overall Sensation Status: [facility]    Vision and Hearing Status:  Vision  Vision: Impaired  Vision Exceptions: Wears glasses for reading  Hearing  Hearing: Within functional limits     UE Function Status:    ROM:   LUE AROM (degrees)  LUE AROM : WFL  Left Hand AROM (degrees)  Left Hand AROM: WFL  RUE AROM (degrees)  RUE AROM : WFL  Right Hand AROM (degrees)  Right Hand AROM: WFL    Strength:  LUE Strength  Gross LUE Strength: WFL  L Hand Grasp: 4/5  L Hand Release: 4/5  RUE Strength  Gross RUE Strength: WFL  R Hand Grasp: 4/5  R Hand Release: 4/5    Coordination, Tone, Quality of Movement: Tone RUE  RUE Tone: Normotonic  Tone LUE  LUE Tone: Normotonic  Coordination  Movements Are Fluid And Coordinated: No  Coordination and Movement description: Decreased speed, Left UE, Right UE    D/C Recommendations:    Equipment Recommendations:  OT D/C Equipment  Equipment Needed: No    OT Follow Up:  OT D/C RECOMMENDATIONS  REQUIRES OT FOLLOW UP: No    Home Exercise Program Provided: [] Yes [x] No  If yes, type of HEP:      Electronically signed by:     AUSTIN Patrick OTR/L  5/8/2019, 3:48 PM

## 2019-05-08 NOTE — PROGRESS NOTES
Subjective: The patient complains of severe  acute  On chronic left hip pain partially relieved by rest, ice, OxyIR and relieved with Tylenol and exacerbated by the patient range of motion and weightbearing. I am concerned about patients  postop hypoxia now improving. Patient complains of severe constipation and hard stools most recent bowel movement was on 55 and it flared up some hemorrhoids. ROS x10: The patient also complains of severely impaired mobility and activities of daily living. Otherwise no new problems with vision, hearing, nose, mouth, throat, dermal, cardiovascular, GI, , pulmonary, musculoskeletal, psychiatric or neurological. See Rehab H&P on Rehab chart dated . Vital signs:  BP (!) 99/49   Pulse 70   Temp 97 °F (36.1 °C) (Oral)   Resp 17   Ht 5' 6\" (1.676 m)   Wt 172 lb 3.2 oz (78.1 kg)   SpO2 96%   BMI 27.79 kg/m²   I/O:   PO/Intake:  fair PO intake, no problems observed or reported. Bowel/Bladder:  continent,  severe constipation  General:  Patient is well developed, adequately nourished, non-obese and     well kempt. HEENT:    PERRLA, hearing intact to loud voice, external inspection of ear     and nose benign. Inspection of lips, tongue and gums benign  Musculoskeletal: No significant change in strength or tone. All joints stable. Inspection and palpation of digits and nails show no clubbing,       cyanosis or inflammatory conditions. Neuro/Psychiatric: Affect: flat but pleasant. Alert and oriented to person, place and     situation. No significant change in deep tendon reflexes or     sensation  Lungs:  Diminished, CTA-B. Respiration effort is normal at rest.     Heart:   S1 = S2, RRR. No loud murmurs. Abdomen:  Soft, non-tender, no enlargement of liver or spleen. Extremities:  significant lower extremity edema and left hip tenderness.   Skin:   Intact left hip incision to general survey, no visualized or palpated problems.     Rehabilitation:  Physical therapy: FIMS:  Bed Mobility: Scooting: Supervision    Transfers: Sit to Stand: Modified independent  Stand to sit: Modified independent  Bed to Chair: Supervision, Ambulation 1  Surface: level tile, carpet  Device: Rolling Walker  Assistance: Modified Independent  Quality of Gait: Reciprocal pattern patient lifts gaze from floor on own  Distance: 500' x 1  Comments: tolerated up and down low grade ramp well and managed hip precautions without prompting, Stairs  # Steps : 12  Stairs Height: 6\"  Rails: Bilateral  Curbs: 6\"  Device: Rolling walker  Assistance: Modified independent   Comment: Non-reciprocal pattern cues for walker negotiation  for curb step    FIMS: Bed, Chair, Wheel Chair: 5 - Requires setup/supervision/cues  Walk: 5 - Supervision Requires standby supervision or cuing to walk at least 150 feet  Distance Walked: 250  Wheel Chair: 0 - Activity Not Assessed/Does Not Occur  Stairs: 2- Maximal Assistance Performs 25-49% of the effort to go up and down 4 to 6 stairs and requires the assistance of one person only,  , Assessment: Patient is meeting goals continue to progress hip strength    Occupational therapy: FIMS:  Eatin - Patient feeds self  Groomin - Patient independent with all grooming tasks  Bathin - Able to bathe all 10 areas with device  Dressing-Upper: 5 - Requires setup/supervision/cues and/or requires assist with presthesis/brace only  Dressing-Lower: 5 - Requires setup/supervision/cues and/or staff applies TEDS/prosthesis/brace only  Toiletin - Requires setup/supervision/cues  Toilet Transfer: 5 - Requires setup/supervision/cues  Tub Transfer: 0 - Activity does not occur  Shower Transfer: 5 - Supervision, set-up, cues,  ,      Speech therapy: FIMS: Comprehension: 6 - Complex ideas 90% or device (hearing aid/glasses)  Expression: 6 - Device used to express complex ideas/needs  Social Interaction: 6 - Patient requires medication for mood and/or effect  Problem Solvin - Patient able to solve simple/routine tasks  Memory: 5 - Patient requires prompting with stress/unfamiliar situations      Lab/X-ray studies reviewed, analyzed and discussed with patient and staff:   No results found for this or any previous visit (from the past 24 hour(s)). Previous extensive, complex labs, notes and diagnostics reviewed and analyzed. ALLERGIES:    Allergies as of 2019 - Review Complete 2019   Allergen Reaction Noted    Codeine  2015    Penicillins  2015      (please also verify by checking MAR)       I reviewed her [facility] prescription monitoring service data sheets in hopes of eliminating polypharmacy and weaning to the lowest effective dose of pain medications and eliminating the concomitant use of benzodiazepines. I see no medications of concern. I see no habits of combining sedatives and narcotics. Complex Physical Medicine & Rehab Issues Assess & Plan:   1. Severe abnormality of gait and mobility and impaired self-care and ADL's secondary to left hip fracture . Functional and medical status reassessed regarding patients ability to participate in therapies and patient found to be able to participate in acute intensive comprehensive inpatient rehabilitation program including PT/OT to improve balance, ambulation, ADLs, and to improve the P/AROM. Therapeutic modifications regarding activities in therapies, place, amount of time per day and intensity of therapy made daily. In bed therapies or bedside therapies prn.   2. Bowel severe opiate-related constipation and Bladder dysfunction:  frequent toileting, ambulate to bathroom with assistance, check post void residuals. Check for C.difficile x1 if >2 loose stools in 24 hours, continue bowel & bladder program.  Monitor bowel and bladder function. Lactinex 2 PO every AC. MOM prn, Brown Bomb prn, Glycerin suppository prn, enema prn.   Add lactulose and treat hemorrhoids with Anusol  3. Severe post fracture pain left hip generalized OA pain: reassess pain every shift and prior to and after each therapy session, give prn medications, modalities prn in therapy, Lidoderm, K-pad prn.   4. Skin healing left hip incision and breakdown risk:  continue pressure relief program.  Daily skin exams and reports from nursing. 5. Nutritional and hydration deficiency:  continue to monitor I&Os, calorie counts prn, dietary consult prn.  6. Acute episodic insomnia with situational adjustment disorder:  prn Ambien, monitor for day time sedation. 7. Falls risk elevated:  patient to use call light to get nursing assistance to get up, bed and chair alarm. 8. Elevated DVT risk: progressive activities in PT, continue prophylaxis PATSY hose, elevation. 9. Complex discharge planning:  Discharge May 9, 2019 home with outpatient therapy. Status post  team meeting every Monday to assess progress towards goals, discuss and address social, psychological and medical comorbidities and to address difficulties they may be having progressing in therapy. Patient and family education is in progress. The patient is to follow-up with their family physician after discharge. Complex Active General Medical Issues that complicate care Assess & Plan:     1. Principal Problem:    Abnormality of gait and mobility due to Left femoral neck fracture S/P Left Hip Hemiarthroplasty. [facility] Rehab admit 05/01/19. Active Problems:  1. Paroxysmal SVT (supraventricular tachycardia), Pacemaker, Sick sinus syndrome, Essential hypertension-signs every shift, dose and titrate cardiac medications, consult hospitalist for backup Alice Laboy cardiology as needed  2. COPD with acute exacerbation, Chronic bronchitis -pox checks every shift, titrate oxygen and aerosol treatments  3.    Left displaced femoral neck fracture -bearing as tolerated-posterior total hip replacement precautions        Sonia Mckinnon, D.O., PM&R     Attending    [address]

## 2019-05-09 VITALS
SYSTOLIC BLOOD PRESSURE: 109 MMHG | HEART RATE: 74 BPM | HEIGHT: 66 IN | RESPIRATION RATE: 16 BRPM | TEMPERATURE: 98 F | DIASTOLIC BLOOD PRESSURE: 57 MMHG | OXYGEN SATURATION: 95 % | WEIGHT: 172.2 LBS | BODY MASS INDEX: 27.68 KG/M2

## 2019-05-09 PROCEDURE — 6360000002 HC RX W HCPCS: Performed by: PHYSICAL MEDICINE & REHABILITATION

## 2019-05-09 PROCEDURE — 94640 AIRWAY INHALATION TREATMENT: CPT

## 2019-05-09 PROCEDURE — 99238 HOSP IP/OBS DSCHRG MGMT 30/<: CPT | Performed by: PHYSICAL MEDICINE & REHABILITATION

## 2019-05-09 PROCEDURE — 6370000000 HC RX 637 (ALT 250 FOR IP): Performed by: PHYSICAL MEDICINE & REHABILITATION

## 2019-05-09 RX ORDER — TRAMADOL HYDROCHLORIDE 50 MG/1
25 TABLET ORAL EVERY 4 HOURS PRN
Status: DISCONTINUED | OUTPATIENT
Start: 2019-05-09 | End: 2019-05-09 | Stop reason: HOSPADM

## 2019-05-09 RX ORDER — TRAMADOL HYDROCHLORIDE 50 MG/1
50 TABLET ORAL EVERY 4 HOURS PRN
Status: DISCONTINUED | OUTPATIENT
Start: 2019-05-09 | End: 2019-05-09

## 2019-05-09 RX ORDER — TRAMADOL HYDROCHLORIDE 50 MG/1
25 TABLET ORAL EVERY 6 HOURS PRN
Qty: 20 TABLET | Refills: 0 | Status: SHIPPED | OUTPATIENT
Start: 2019-05-09 | End: 2019-05-23

## 2019-05-09 RX ADMIN — DOCUSATE SODIUM 100 MG: 100 CAPSULE, LIQUID FILLED ORAL at 08:55

## 2019-05-09 RX ADMIN — ALBUTEROL SULFATE 2.5 MG: 2.5 SOLUTION RESPIRATORY (INHALATION) at 10:06

## 2019-05-09 RX ADMIN — POTASSIUM CHLORIDE 8 MEQ: 600 TABLET, EXTENDED RELEASE ORAL at 08:55

## 2019-05-09 RX ADMIN — ASPIRIN 325 MG: 325 TABLET, DELAYED RELEASE ORAL at 08:55

## 2019-05-09 RX ADMIN — FUROSEMIDE 20 MG: 20 TABLET ORAL at 08:55

## 2019-05-09 RX ADMIN — ACETAMINOPHEN 500 MG: 500 TABLET ORAL at 06:03

## 2019-05-09 RX ADMIN — SOTALOL HYDROCHLORIDE 80 MG: 80 TABLET ORAL at 08:55

## 2019-05-09 RX ADMIN — ALBUTEROL SULFATE 2.5 MG: 2.5 SOLUTION RESPIRATORY (INHALATION) at 04:19

## 2019-05-09 ASSESSMENT — PAIN SCALES - GENERAL: PAINLEVEL_OUTOF10: 1

## 2019-05-09 NOTE — DISCHARGE SUMMARY
Subjective: The patient complains of severe  acute  On chronic left hip pain partially relieved by rest, ice, OxyIR and relieved with Tylenol and exacerbated by the patient range of motion and weightbearing. I am concerned about patients  postop hypoxia now improving. [address] Course: The patient was admitted to the Rehabilitation Unit to address ADL and mobility deficits. The patient was enrolled in acute PT, OT program.  Weekly team meetings were held to assess functional progress toward their goals. The patient's medical issues were addressed. The patient progressed in the rehab program and is now ready for discharge. Refer to FIM scores summary report for detailed functional status. Greater than 25 minutes was spent on coordinating patients discharge including follow-up care, medications and patient/family education. ROS x10: The patient also complains of severely impaired mobility and activities of daily living. Otherwise no new problems with vision, hearing, nose, mouth, throat, dermal, cardiovascular, GI, , pulmonary, musculoskeletal, psychiatric or neurological. See Rehab H&P on Rehab chart dated . Vital signs:  BP (!) 109/57   Pulse 74   Temp 98 °F (36.7 °C) (Oral)   Resp 16   Ht 5' 6\" (1.676 m)   Wt 172 lb 3.2 oz (78.1 kg)   SpO2 95%   BMI 27.79 kg/m²   I/O:   PO/Intake:  fair PO intake, no problems observed or reported. Bowel/Bladder:  continent,  severe constipation  General:  Patient is well developed, adequately nourished, non-obese and     well kempt. HEENT:    PERRLA, hearing intact to loud voice, external inspection of ear     and nose benign. Inspection of lips, tongue and gums benign  Musculoskeletal: No significant change in strength or tone. All joints stable. Inspection and palpation of digits and nails show no clubbing,       cyanosis or inflammatory conditions. Neuro/Psychiatric: Affect: flat but pleasant.   Alert and oriented to person, place and     situation. No significant change in deep tendon reflexes or     sensation  Lungs:  Diminished, CTA-B. Respiration effort is normal at rest.     Heart:   S1 = S2, RRR. No loud murmurs. Abdomen:  Soft, non-tender, no enlargement of liver or spleen. Extremities:  significant lower extremity edema and left hip tenderness. Skin:   Intact left hip incision to general survey, no visualized or palpated problems. Rehabilitation:  Physical therapy: FIMS:  Bed Mobility: Scooting: Independent    Transfers: Sit to Stand: Independent  Stand to sit:  Independent  Bed to Chair: Independent, Ambulation 1  Surface: level tile, carpet, ramp  Device: Rolling Walker  Assistance: Independent  Quality of Gait: Reciprocal pattern mild forward flexed posture  Distance: 250' x 3  Comments: Negotiated ramp well with no increase in pain  , Stairs  # Steps : 12  Stairs Height: 6\"  Rails: Bilateral  Curbs: 6\"  Device: Rolling walker  Assistance: Independent  Comment: Non-reciprocal pattern     FIMS: Bed, Chair, Wheel Chair: 7 - Patient independently transfers  Walk: 6 - Modified [place]  Walks at least 150 feet with an ambulatory device, orthosis or prosthesis OR requires extra amount of time OR there is concern for safety  Distance Walked: 200'  Wheel Chair: 0 - Activity Not Assessed/Does Not Occur  Stairs: 6 - Modified [place] Safely goes up and down at least one flight of stairs but requries a side support, handrail, cane, or portable supports, or the activity takes more than a reasonable amount of times, or there are safety considerations,  , Assessment: Patient has met all goals and is able to maintain hip precautions with all functional activities    Occupational therapy: FIMS:  Eatin - Patient feeds self  Groomin - Patient independent with all grooming tasks  Bathin - Able to bathe all 10 areas with device  Dressing-Upper: 7 - Patient independently dresses upper body  Dressing-Lower: 5 - Requires setup/supervision/cues and/or staff applies TEDS/prosthesis/brace only  Toiletin - Requires device (grab bar/walker/etc.)  Toilet Transfer: 6 - Independent with device (grab bar/walker/slide bar)  Tub Transfer: 0 - Activity does not occur  Shower Transfer: 6 - Modified independence,  ,      Speech therapy: FIMS: Comprehension: 6 - Complex ideas 90% or device (hearing aid/glasses)  Expression: 6 - Device used to express complex ideas/needs  Social Interaction: 7 - Patient has appropriate behavior/relations 100% of the time  Problem Solvin - Independent with device (e.g. notes, schedules)  Memory: 6 - Patient requires device to recall (e.g. memory book)      Lab/X-ray studies reviewed, analyzed and discussed with patient and staff:   No results found for this or any previous visit (from the past 24 hour(s)). Previous extensive, complex labs, notes and diagnostics reviewed and analyzed. ALLERGIES:    Allergies as of 2019 - Review Complete 2019   Allergen Reaction Noted    Codeine  2015    Penicillins  2015      (please also verify by checking MAR)       I reviewed her [facility] prescription monitoring service data sheets in hopes of eliminating polypharmacy and weaning to the lowest effective dose of pain medications and eliminating the concomitant use of benzodiazepines. I see no medications of concern. I see no habits of combining sedatives and narcotics. Complex Physical Medicine & Rehab Issues Assess & Plan:   1. Severe abnormality of gait and mobility and impaired self-care and ADL's secondary to left hip fracture . Functional and medical status improved greatly status post acute rehab at [facility] therefore patient is scheduled for  Discharge May 9, 2019 home with outpatient therapy.   Status post  team meeting every Monday to assess progress towards goals, discuss and address social, psychological and medical comorbidities and to address difficulties they may be having progressing in therapy. Patient and family education is in progress. The patient is to follow-up with their family physician after discharge. Complex Active General Medical Issues that complicated care Assess & Plan:     1. Principal Problem:    Abnormality of gait and mobility due to Left femoral neck fracture S/P Left Hip Hemiarthroplasty. [facility] Rehab admit 05/01/19. Active Problems:  1. Paroxysmal SVT (supraventricular tachycardia), Pacemaker, Sick sinus syndrome, Essential hypertension-signs every shift, dose and titrate cardiac medications, consult hospitalist for backup Martha Prophet Dr. Nadira Nur cardiology as needed  2. COPD with acute exacerbation, Chronic bronchitis -pox checks every shift, titrate oxygen and aerosol treatments  3.    Left displaced femoral neck fracture -bearing as tolerated-posterior total hip replacement precautions        Nati Perez D.O., PM&R     Attending    [address]

## 2019-05-10 ENCOUNTER — CARE COORDINATION (OUTPATIENT)
Dept: CASE MANAGEMENT | Age: 84
End: 2019-05-10

## 2019-05-10 NOTE — CARE COORDINATION
[facility] Transitions Initial Follow Up Call    Call within 2 business days of discharge: Yes    Patient: Boone Paredes Patient : 1933   MRN: [id number]  Reason for Admission: -2019 [facility] IP Rehab s/p HEMIARTHROPLASTY REPLACE JOINT PARTIAL HIP (Left) on 2019 at [facility]. Discharge Date: 19 RARS: Readmission Risk Score: 10  CM: 2  [facility]  PCP: Harris Zamarripa CNP    Last Discharge [address] [address]       Complaint Diagnosis Description Type Department Provider    19  Abnormality of gait and mobility due to Left femoral neck fracture S/P Left Hip Hemiarthroplasty. [facility] Rehab admit 19. ... Admission (Discharged) [facility] REHAB David Morales MD           Spoke with: Jalil Metz. Pt states post-op pain rated 6-7/10. She finds Tramadol effective for pain relief and denies need for anything stronger. Ambulating at home hourly w/ walker when her dtr is home. Pt observes left hip incision as clean and dry. Denies any new/increased redness, swelling, or drainage. [facility] and feels she will be able to tolerate [facility] outpatient therapy. Post-op Dr Leyla Calloway 5/15/19 11:30, [place] Dr Obadiah Castleman 19 1:30. Reports her dtr will take her to appts. Unable to perform med rec at this time. Dtr helps manage home medications. START taking:  bisacodyl (DULCOLAX)  docusate (COLACE, DULCOLAX)  furosemide (LASIX)  hydrocortisone (ANUSOL-HC)  lactulose (CHRONULAC)  melatonin  potassium chloride (KLOR-CON)  traMADol (ULTRAM)  STOP taking:  Handicap Placard Misc    Non-face-to-face services provided:  Obtained and reviewed discharge summary and/or continuity of care documents  Education of patient/family/caregiver/guardian to support self-management-Reviewed s/s post-op infection/complication to report to surgeon. Reviewed home restrictions. Reviewed proper hand hygiene before/after all wound care.     Care Transitions 24 Hour Call    Do you have any ongoing symptoms?: Yes  Patient-reported symptoms:  Pain  Do you have a copy of your discharge instructions?:  Yes  Do you have all of your prescriptions and are they filled?:  Yes  Have you been contacted by a [address]?:  No  Have you scheduled your follow up appointment?:  Yes  How are you going to get to your appointment?:  Car - family or friend to transport  Were you discharged with any Home Care or Post Acute Services:  Yes  Post Acute Services: Outpatient/Community Services (Comment: [facility])  Patient DME:  Margaret florez, Wheelchair, Other  Other Patient DME:  Saturnino Fournier.   Do you have support at home?:  Child  Do you feel like you have everything you need to keep you well at home?:  Yes  Are you an active caregiver in your home?:  No  Care Transitions Interventions         Follow Up  Future Appointments   Date Time Provider Randall Jauregui   5/28/2019  3:00 PM [address]   5/29/2019  1:30 PM Finesse Leslie  [facility]   8/21/2019  1:45 PM Jake Teixeira MD [address] [facility]

## 2019-05-13 NOTE — PROGRESS NOTES
Physical Therapy  Facility/Department: [facility]  Rehabilitation Discharge note    NAME: Gregg Rodriguez  : 1933  MRN: [id number]    Date of discharge: 19    Subjective: Prt reports she feels ready for discharge to home    Past Medical History:   Diagnosis Date    Arthritis     BILAT    [facility]     Lower leg edema     BILAT     Lung disease     Osteoarthritis     Pacemaker 2009    FOR SYNCOPE    [facility]     Paroxysmal SV[facility] tachy[facility]     Pneumonia     Sinusitis      Past Surgical History:   Procedure Laterality Date    PACEMAKER INSERTION N/A        Restrictions  Restrictions/Precautions  Restrictions/Precautions: Weight Bearing  Lower Extremity Weight Bearing Restrictions  Left Lower Extremity Weight Bearing: Weight Bearing As Tolerated  Position Activity Restriction  Hip Precautions: Posterior hip precautions  Other position/activity restrictions: Posterior hip precautions    Objective    Bed mobility  Rolling to Left: Independent  Rolling to Right: Independent  Supine to Sit: Independent  Sit to Supine: Independent  Scooting: Independent  Comment: Maintains hip precaution with all bed mobility    Transfers  Sit to Stand: Independent  Stand to sit:  Independent  Bed to Chair: Independent  Car Transfer: Independent  Comment: Improved hand placement    Ambulation  Ambulation?: Yes  More Ambulation?: No  Ambulation 1  Surface: level tile, carpet, ramp  Device: Rolling Walker  Assistance: Independent  Quality of Gait: Reciprocal pattern mild forward flexed posture  Distance: 250' x 3  Comments: Negotiated ramp well with no increase in pain        Stairs  # Steps : 12  Stairs Height: 6\"  Rails: Bilateral  Curbs: 6\"  Device: Rolling walker  Assistance: Independent  Comment: Non-reciprocal pattern   Wheelchair Activities  Propulsion: No(pt to be ambulatory)         Pt/ family education/training: Pt was educated in safety techniques throughout her Rehab stay .  She demonstrated good follow thru including with her hip precautions    Assessment: Pt made excellent progress and met her Rehab goals listed above      LTG established:  Long term goal 1: indep bed mobility  Long term goal 2: indep bed transfers  Long term goal 3:  indep gait 150 feet with ww  Long term goal 4: indep 4 stairs with rails - SBA without rails  Long term goal 5: pt indep with HEP and hip precaution follow thru    Discharge Plan:  D/c to home with PT follow up recommended      Electronically signed by Samantha Hallman PT on 5/13/2019 at 5:00 PM

## 2019-05-28 ENCOUNTER — HOSPITAL ENCOUNTER (OUTPATIENT)
Dept: CARDIOLOGY | Age: 84
Discharge: HOME | End: 2019-05-28
Payer: MEDICARE

## 2019-05-28 PROCEDURE — 93280 PM DEVICE PROGR EVAL DUAL: CPT

## 2019-05-29 ENCOUNTER — OFFICE VISIT (OUTPATIENT)
Dept: CARDIOLOGY CLINIC | Age: 84
End: 2019-05-29
Payer: MEDICARE

## 2019-05-29 VITALS
RESPIRATION RATE: 16 BRPM | BODY MASS INDEX: 25.55 KG/M2 | HEIGHT: 66 IN | HEART RATE: 65 BPM | SYSTOLIC BLOOD PRESSURE: 134 MMHG | WEIGHT: 159 LBS | OXYGEN SATURATION: 96 % | DIASTOLIC BLOOD PRESSURE: 60 MMHG

## 2019-05-29 DIAGNOSIS — Z95.0 PACEMAKER: Chronic | ICD-10-CM

## 2019-05-29 DIAGNOSIS — H53.9 VISUAL DISTURBANCE: ICD-10-CM

## 2019-05-29 DIAGNOSIS — I25.10 CORONARY ARTERY DISEASE INVOLVING NATIVE CORONARY ARTERY OF NATIVE HEART WITHOUT ANGINA PECTORIS: ICD-10-CM

## 2019-05-29 DIAGNOSIS — I47.1 PAROXYSMAL SVT (SUPRAVENTRICULAR TACHYCARDIA) (HCC): Primary | Chronic | ICD-10-CM

## 2019-05-29 DIAGNOSIS — I10 ESSENTIAL HYPERTENSION: ICD-10-CM

## 2019-05-29 DIAGNOSIS — R06.02 SHORTNESS OF BREATH: ICD-10-CM

## 2019-05-29 PROCEDURE — 1090F PRES/ABSN URINE INCON ASSESS: CPT | Performed by: INTERNAL MEDICINE

## 2019-05-29 PROCEDURE — 4040F PNEUMOC VAC/ADMIN/RCVD: CPT | Performed by: INTERNAL MEDICINE

## 2019-05-29 PROCEDURE — 99214 OFFICE O/P EST MOD 30 MIN: CPT | Performed by: INTERNAL MEDICINE

## 2019-05-29 PROCEDURE — 1111F DSCHRG MED/CURRENT MED MERGE: CPT | Performed by: INTERNAL MEDICINE

## 2019-05-29 PROCEDURE — G8427 DOCREV CUR MEDS BY ELIG CLIN: HCPCS | Performed by: INTERNAL MEDICINE

## 2019-05-29 PROCEDURE — G8598 ASA/ANTIPLAT THER USED: HCPCS | Performed by: INTERNAL MEDICINE

## 2019-05-29 PROCEDURE — G8417 CALC BMI ABV UP PARAM F/U: HCPCS | Performed by: INTERNAL MEDICINE

## 2019-05-29 PROCEDURE — 93000 ELECTROCARDIOGRAM COMPLETE: CPT | Performed by: INTERNAL MEDICINE

## 2019-05-29 PROCEDURE — 1036F TOBACCO NON-USER: CPT | Performed by: INTERNAL MEDICINE

## 2019-05-29 PROCEDURE — 1123F ACP DISCUSS/DSCN MKR DOCD: CPT | Performed by: INTERNAL MEDICINE

## 2019-05-29 ASSESSMENT — ENCOUNTER SYMPTOMS
SHORTNESS OF BREATH: 0
RESPIRATORY NEGATIVE: 1
WHEEZING: 0
GASTROINTESTINAL NEGATIVE: 1
NAUSEA: 0
BLOOD IN STOOL: 0
CHEST TIGHTNESS: 0
COUGH: 0
STRIDOR: 0

## 2019-05-29 NOTE — PROGRESS NOTES
Subsequent Progress Note  Patient: Ras Fregoso  YOB: 1933  MRN: [id number]    Chief Complaint: bynum AR cad htn ppm svt  Chief Complaint   Patient presents with    Coronary Artery Disease     Hosp f/u    Hypertension       CV Data:  12/2017 spect negative  12/2017 echo ef 60% Trace AR  9/2018 LAD mid 50% near a small diagonal. Cors all calcified EF 55 EDP 15  4/2019 LTHA     Subjective/HPI: no cp no sob no further falls no blled takes meds      EKG: SR 70  ms. Past Medical History:   Diagnosis Date    Arthritis     BILAT    COPD (chronic obstructive pulmonary disease) (HCC)     Lower leg edema     BILAT     Lung disease     Osteoarthritis     Pacemaker 2009    FOR SYNCOPE    Paroxysmal SVT (supraventricular tachycardia) (HCC)     Paroxysmal SVT (supraventricular tachycardia) (HCC)     Pneumonia     Sinusitis        Past Surgical History:   Procedure Laterality Date    PACEMAKER INSERTION N/A 2009       Family History   Problem Relation Age of Onset    Cancer Mother     No Known Problems Father        Social History     Socioeconomic History    Marital status:       Spouse name: None    Number of children: None    Years of education: None    Highest education level: None   Occupational History    None   Social Needs    Financial resource strain: None    Food insecurity:     Worry: None     Inability: None    Transportation needs:     Medical: None     Non-medical: None   Tobacco Use    Smoking status: Never Smoker    Smokeless tobacco: Never Used   Substance and Sexual Activity    Alcohol use: None    Drug use: No    Sexual activity: None   Lifestyle    Physical activity:     Days per week: 0 days     Minutes per session: 0 min    Stress: Not at all   Relationships    Social connections:     Talks on phone: Once a week     Gets together: More than three times a week     Attends Scientologist service: None     Active member of club or organization: No tightness, shortness of breath, wheezing and stridor. Cardiovascular: Negative. Negative for chest pain, palpitations and leg swelling. Gastrointestinal: Negative. Negative for blood in stool and nausea. Genitourinary: Negative. Musculoskeletal: Negative. Skin: Negative. Neurological: Negative. Negative for dizziness, syncope, weakness and light-headedness. Hematological: Negative. Psychiatric/Behavioral: Negative. Physical Examination:    /60 (Site: Right Upper Arm, Position: Sitting, Cuff Size: Large Adult)   Pulse 65   Resp 16   Ht 5' 6\" (1.676 m)   Wt 159 lb (72.1 kg)   SpO2 96%   BMI 25.66 kg/m²    Physical Exam   Constitutional: She appears healthy. No distress. HENT:   Normal cephalic and Atraumatic   Eyes: Pupils are equal, round, and reactive to light. Neck: Normal range of motion and thyroid normal. Neck supple. No JVD present. No neck adenopathy. No thyromegaly present. Cardiovascular: Normal rate, regular rhythm, intact distal pulses and normal pulses. Murmur heard. Pulmonary/Chest: Effort normal and breath sounds normal. She has no wheezes. She has no rales. She exhibits no tenderness. Abdominal: Soft. Bowel sounds are normal. There is no tenderness. Musculoskeletal: Normal range of motion. She exhibits no edema or tenderness. Neurological: She is alert and oriented to person, place, and time. Skin: Skin is warm. No cyanosis. Nails show no clubbing.        LABS:  CBC:   Lab Results   Component Value Date    WBC 11.4 05/05/2019    RBC 3.06 05/05/2019    RBC 4.22 01/03/2012    HGB 9.7 05/05/2019    HCT 29.7 05/05/2019    MCV 96.8 05/05/2019    MCH 31.5 05/05/2019    MCHC 32.6 05/05/2019    RDW 14.7 05/05/2019     05/05/2019    MPV 8.2 06/27/2015     Lipids:  Lab Results   Component Value Date    CHOL 171 12/05/2017    CHOL 175 10/22/2014    CHOL 175 11/10/2013     Lab Results   Component Value Date    TRIG 71 12/05/2017    TRIG 65 10/22/2014    TRIG 88 11/10/2013     Lab Results   Component Value Date    HDL 58 12/05/2017    HDL 61 (H) 10/22/2014    HDL 57 11/10/2013     Lab Results   Component Value Date    LDLCALC 99 12/05/2017    LDLCALC 101 10/22/2014    LDLCALC 100 11/10/2013     No results found for: LABVLDL, VLDL  Lab Results   Component Value Date    CHOLHDLRATIO 3.2 01/03/2012     CMP:    Lab Results   Component Value Date     05/05/2019    K 4.6 05/05/2019    K 4.4 05/02/2019     05/05/2019    CO2 26 05/05/2019    BUN 29 05/05/2019    CREATININE 0.64 05/05/2019    GFRAA >60.0 05/05/2019    LABGLOM >60.0 05/05/2019    GLUCOSE 85 05/05/2019    GLUCOSE 93 01/03/2012    PROT 6.4 04/27/2019    LABALBU 3.6 04/27/2019    LABALBU 4.0 01/03/2012    CALCIUM 7.9 05/05/2019    BILITOT 0.3 04/27/2019    ALKPHOS 76 04/27/2019    AST 14 04/27/2019    ALT 11 04/27/2019     BMP:    Lab Results   Component Value Date     05/05/2019    K 4.6 05/05/2019    K 4.4 05/02/2019     05/05/2019    CO2 26 05/05/2019    BUN 29 05/05/2019    LABALBU 3.6 04/27/2019    LABALBU 4.0 01/03/2012    CREATININE 0.64 05/05/2019    CALCIUM 7.9 05/05/2019    GFRAA >60.0 05/05/2019    LABGLOM >60.0 05/05/2019    GLUCOSE 85 05/05/2019    GLUCOSE 93 01/03/2012     Magnesium:    Lab Results   Component Value Date    MG 2.2 05/05/2019    MG 2.0 01/03/2012     TSH:  Lab Results   Component Value Date    TSH 2.100 12/05/2017       Patient Active Problem List   Diagnosis    Disorder of lower leg    Pain in limb    Hip joint replacement by other means    Paroxysmal SVT (supraventricular tachycardia) (HCC)    Pacemaker    Sick sinus syndrome (HCC)    Essential hypertension    Shortness of breath    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 [facility] admit 05/01/19.  Closed fracture of head of left femur (HCC)    Visual disturbance    Coronary artery disease involving native coronary artery of native heart without angina pectoris       Medications Discontinued During This Encounter   Medication Reason    bisacodyl (DULCOLAX) 10 MG suppository Patient Choice    guaiFENesin (MUCINEX) 600 MG extended release tablet Patient Choice    lactulose (CHRONULAC) 10 GM/15ML solution Therapy completed    potassium chloride (KLOR-CON) 8 MEQ extended release tablet Therapy completed    docusate sodium (COLACE, DULCOLAX) 100 MG CAPS Therapy completed    furosemide (LASIX) 20 MG tablet Patient Choice    hydrocortisone (ANUSOL-HC) 2.5 % rectal cream Patient Choice    melatonin 1 MG tablet Patient Choice       Modified Medications    No medications on file       No orders of the defined types were placed in this encounter. Assessment/Plan:    1. Paroxysmal SVT (supraventricular tachycardia) (HCC)   stable     2. Pacemaker   interrogate     3. Essential hypertension   stable     4. Shortness of breath       5. Visual disturbance     - US Carotid Artery Bilateral; Future    6. Coronary artery disease involving native coronary artery of native heart without angina pectoris          Counseling:  Heart Healthy Lifestyle, Low Salt Diet, Take Precautions to Prevent Falls and Walk Daily    Return in about 3 months (around 8/29/2019) for Cardiovascular care. .      Electronically signed by Cinthya Arango MD on 5/29/2019 at 1:47 PM

## 2019-05-31 DIAGNOSIS — I65.23 BILATERAL CAROTID ARTERY STENOSIS: Primary | ICD-10-CM

## 2019-06-28 ENCOUNTER — HOSPITAL ENCOUNTER (OUTPATIENT)
Dept: ULTRASOUND IMAGING | Age: 84
Discharge: HOME | End: 2019-06-30
Payer: MEDICARE

## 2019-06-28 DIAGNOSIS — I65.23 BILATERAL CAROTID ARTERY STENOSIS: ICD-10-CM

## 2019-06-28 PROCEDURE — 93880 EXTRACRANIAL BILAT STUDY: CPT

## 2019-06-28 PROCEDURE — 93880 EXTRACRANIAL BILAT STUDY: CPT | Performed by: INTERNAL MEDICINE

## 2019-07-19 ENCOUNTER — TELEPHONE (OUTPATIENT)
Dept: CARDIOLOGY CLINIC | Age: 84
End: 2019-07-19

## 2019-07-29 DIAGNOSIS — I10 ESSENTIAL HYPERTENSION: Primary | ICD-10-CM

## 2019-08-21 ENCOUNTER — OFFICE VISIT (OUTPATIENT)
Dept: PULMONOLOGY | Age: 84
End: 2019-08-21
Payer: MEDICARE

## 2019-08-21 VITALS
HEART RATE: 70 BPM | OXYGEN SATURATION: 95 % | TEMPERATURE: 97.2 F | RESPIRATION RATE: 16 BRPM | WEIGHT: 154 LBS | DIASTOLIC BLOOD PRESSURE: 66 MMHG | BODY MASS INDEX: 24.75 KG/M2 | SYSTOLIC BLOOD PRESSURE: 124 MMHG | HEIGHT: 66 IN

## 2019-08-21 DIAGNOSIS — R06.02 SHORTNESS OF BREATH: ICD-10-CM

## 2019-08-21 DIAGNOSIS — J44.9 CHRONIC OBSTRUCTIVE PULMONARY DISEASE, UNSPECIFIED COPD TYPE (HCC): Primary | ICD-10-CM

## 2019-08-21 PROCEDURE — G8420 CALC BMI NORM PARAMETERS: HCPCS | Performed by: INTERNAL MEDICINE

## 2019-08-21 PROCEDURE — 4040F PNEUMOC VAC/ADMIN/RCVD: CPT | Performed by: INTERNAL MEDICINE

## 2019-08-21 PROCEDURE — 1036F TOBACCO NON-USER: CPT | Performed by: INTERNAL MEDICINE

## 2019-08-21 PROCEDURE — 1123F ACP DISCUSS/DSCN MKR DOCD: CPT | Performed by: INTERNAL MEDICINE

## 2019-08-21 PROCEDURE — G8926 SPIRO NO PERF OR DOC: HCPCS | Performed by: INTERNAL MEDICINE

## 2019-08-21 PROCEDURE — G8598 ASA/ANTIPLAT THER USED: HCPCS | Performed by: INTERNAL MEDICINE

## 2019-08-21 PROCEDURE — 99214 OFFICE O/P EST MOD 30 MIN: CPT | Performed by: INTERNAL MEDICINE

## 2019-08-21 PROCEDURE — 3023F SPIROM DOC REV: CPT | Performed by: INTERNAL MEDICINE

## 2019-08-21 PROCEDURE — 1090F PRES/ABSN URINE INCON ASSESS: CPT | Performed by: INTERNAL MEDICINE

## 2019-08-21 PROCEDURE — G8427 DOCREV CUR MEDS BY ELIG CLIN: HCPCS | Performed by: INTERNAL MEDICINE

## 2019-08-21 RX ORDER — MELOXICAM 15 MG/1
15 TABLET ORAL
COMMUNITY
Start: 2019-06-14 | End: 2019-08-29 | Stop reason: ALTCHOICE

## 2019-08-21 RX ORDER — OXYCODONE HYDROCHLORIDE 5 MG/1
TABLET ORAL
Refills: 0 | COMMUNITY
Start: 2019-05-17 | End: 2019-11-22 | Stop reason: ALTCHOICE

## 2019-08-21 ASSESSMENT — ENCOUNTER SYMPTOMS
VOICE CHANGE: 0
EYE ITCHING: 0
COUGH: 1
ABDOMINAL PAIN: 0
SORE THROAT: 0
CHEST TIGHTNESS: 0
VOMITING: 0
SHORTNESS OF BREATH: 1
NAUSEA: 0
SINUS PRESSURE: 0
DIARRHEA: 0
WHEEZING: 0
TROUBLE SWALLOWING: 0
EYE DISCHARGE: 0
RHINORRHEA: 0

## 2019-08-21 NOTE — PROGRESS NOTES
rash.   Allergic/Immunologic: Negative for environmental allergies and food allergies. Neurological: Negative for dizziness, tremors, weakness and headaches. Psychiatric/Behavioral: Negative for behavioral problems and sleep disturbance. Objective:     Vitals:    08/21/19 1337   BP: 124/66   Pulse: 70   Resp: 16   Temp: 97.2 °F (36.2 °C)   TempSrc: Tympanic   SpO2: 95%   Weight: 154 lb (69.9 kg)   Height: 5' 6\" (1.676 m)         Physical Exam   Constitutional: She is oriented to person, place, and time. She appears well-developed and well-nourished. No distress. HENT:   Head: Normocephalic and atraumatic. Nose: Nose normal.   Mouth/Throat: Oropharynx is clear and moist.   Eyes: Pupils are equal, round, and reactive to light. EOM are normal.   Neck: No JVD present. No tracheal deviation present. No thyromegaly present. Cardiovascular: Normal rate and regular rhythm. Exam reveals no gallop and no friction rub. No murmur heard. Pulmonary/Chest: No respiratory distress. She has no wheezes. She has no rales. She exhibits no tenderness. diminished Breath sound bilaterally. Abdominal: She exhibits no distension. There is no tenderness. There is no rebound. Musculoskeletal: Normal range of motion. She exhibits edema (1+ pitting both leg ). Lymphadenopathy:     She has no cervical adenopathy. Neurological: She is alert and oriented to person, place, and time. Coordination normal.   Skin: Skin is warm and dry. She is not diaphoretic. Psychiatric: She has a normal mood and affect.        Current Outpatient Medications   Medication Sig Dispense Refill    meloxicam (MOBIC) 15 MG tablet Take 15 mg by mouth      oxyCODONE (ROXICODONE) 5 MG immediate release tablet take 1 to 2 tablets by mouth every 3 hours if needed for 7 days  0    verapamil (CALAN SR) 180 MG extended release tablet Take 1 tablet by mouth daily 90 tablet 2    albuterol (PROVENTIL) (2.5 MG/3ML) 0.083% nebulizer solution Take 3 GRANULOMATOUS DISEASE. Assessment/Plan:     1. Chronic obstructive pulmonary disease, unspecified COPD type ([facility]  Patient is using nebulizer with albuterol QID prn.  C/o shortness of breath , worse with exertion. No  Wheezing . C/o Cough with  White  Sputum. No significant change continue bronchodilator therapy as before    2. Shortness of breath  Patient is having  Chronic shortness of breath which is likely due to COPD, continue  Bronchodilator, as before. keep  Spo2 90% or above. Return in about 4 months (around 12/21/2019) for COPD, shortness of breath.       Kathleen Vergara MD

## 2019-08-27 ENCOUNTER — HOSPITAL ENCOUNTER (OUTPATIENT)
Dept: CARDIOLOGY | Age: 84
Discharge: HOME | End: 2019-08-27
Payer: MEDICARE

## 2019-08-27 PROCEDURE — 93293 PM PHONE R-STRIP DEVICE EVAL: CPT

## 2019-08-29 ENCOUNTER — OFFICE VISIT (OUTPATIENT)
Dept: CARDIOLOGY CLINIC | Age: 84
End: 2019-08-29
Payer: MEDICARE

## 2019-08-29 VITALS
BODY MASS INDEX: 24.79 KG/M2 | RESPIRATION RATE: 16 BRPM | HEART RATE: 69 BPM | OXYGEN SATURATION: 99 % | SYSTOLIC BLOOD PRESSURE: 146 MMHG | WEIGHT: 153.6 LBS | DIASTOLIC BLOOD PRESSURE: 54 MMHG

## 2019-08-29 DIAGNOSIS — Z95.0 PACEMAKER: Chronic | ICD-10-CM

## 2019-08-29 DIAGNOSIS — I25.10 CORONARY ARTERY DISEASE INVOLVING NATIVE CORONARY ARTERY OF NATIVE HEART WITHOUT ANGINA PECTORIS: ICD-10-CM

## 2019-08-29 DIAGNOSIS — I47.1 PAROXYSMAL SVT (SUPRAVENTRICULAR TACHYCARDIA) (HCC): Primary | Chronic | ICD-10-CM

## 2019-08-29 DIAGNOSIS — I10 ESSENTIAL HYPERTENSION: ICD-10-CM

## 2019-08-29 PROCEDURE — 1036F TOBACCO NON-USER: CPT | Performed by: INTERNAL MEDICINE

## 2019-08-29 PROCEDURE — G8420 CALC BMI NORM PARAMETERS: HCPCS | Performed by: INTERNAL MEDICINE

## 2019-08-29 PROCEDURE — 99214 OFFICE O/P EST MOD 30 MIN: CPT | Performed by: INTERNAL MEDICINE

## 2019-08-29 PROCEDURE — 4040F PNEUMOC VAC/ADMIN/RCVD: CPT | Performed by: INTERNAL MEDICINE

## 2019-08-29 PROCEDURE — 1123F ACP DISCUSS/DSCN MKR DOCD: CPT | Performed by: INTERNAL MEDICINE

## 2019-08-29 PROCEDURE — G8598 ASA/ANTIPLAT THER USED: HCPCS | Performed by: INTERNAL MEDICINE

## 2019-08-29 PROCEDURE — 1090F PRES/ABSN URINE INCON ASSESS: CPT | Performed by: INTERNAL MEDICINE

## 2019-08-29 PROCEDURE — 93000 ELECTROCARDIOGRAM COMPLETE: CPT | Performed by: INTERNAL MEDICINE

## 2019-08-29 PROCEDURE — G8427 DOCREV CUR MEDS BY ELIG CLIN: HCPCS | Performed by: INTERNAL MEDICINE

## 2019-08-29 ASSESSMENT — ENCOUNTER SYMPTOMS
RESPIRATORY NEGATIVE: 1
EYES NEGATIVE: 1
COUGH: 0
CHEST TIGHTNESS: 0
BACK PAIN: 1
STRIDOR: 0
SHORTNESS OF BREATH: 0
GASTROINTESTINAL NEGATIVE: 1
NAUSEA: 0
BLOOD IN STOOL: 0
WHEEZING: 0

## 2019-09-03 ENCOUNTER — CARE COORDINATION (OUTPATIENT)
Dept: CARE COORDINATION | Age: 84
End: 2019-09-03

## 2019-10-23 RX ORDER — SOTALOL HYDROCHLORIDE 80 MG/1
80 TABLET ORAL 2 TIMES DAILY
Qty: 180 TABLET | Refills: 2 | Status: SHIPPED | OUTPATIENT
Start: 2019-10-23 | End: 2020-07-13 | Stop reason: SDUPTHER

## 2019-11-22 ENCOUNTER — OFFICE VISIT (OUTPATIENT)
Dept: CARDIOLOGY CLINIC | Age: 84
End: 2019-11-22
Payer: MEDICARE

## 2019-11-22 VITALS
WEIGHT: 155.8 LBS | OXYGEN SATURATION: 96 % | DIASTOLIC BLOOD PRESSURE: 60 MMHG | HEART RATE: 70 BPM | RESPIRATION RATE: 16 BRPM | SYSTOLIC BLOOD PRESSURE: 126 MMHG | BODY MASS INDEX: 25.15 KG/M2

## 2019-11-22 DIAGNOSIS — M25.571 BILATERAL ANKLE PAIN, UNSPECIFIED CHRONICITY: ICD-10-CM

## 2019-11-22 DIAGNOSIS — M25.572 BILATERAL ANKLE PAIN, UNSPECIFIED CHRONICITY: ICD-10-CM

## 2019-11-22 DIAGNOSIS — M79.89 SWOLLEN FEET: ICD-10-CM

## 2019-11-22 DIAGNOSIS — M25.471 SWOLLEN ANKLES: ICD-10-CM

## 2019-11-22 DIAGNOSIS — M79.672 PAIN IN BOTH FEET: ICD-10-CM

## 2019-11-22 DIAGNOSIS — I47.1 PAROXYSMAL SVT (SUPRAVENTRICULAR TACHYCARDIA) (HCC): Primary | Chronic | ICD-10-CM

## 2019-11-22 DIAGNOSIS — M25.472 SWOLLEN ANKLES: ICD-10-CM

## 2019-11-22 DIAGNOSIS — M25.50 ARTHRALGIA, UNSPECIFIED JOINT: Primary | ICD-10-CM

## 2019-11-22 DIAGNOSIS — I25.10 CORONARY ARTERY DISEASE INVOLVING NATIVE CORONARY ARTERY OF NATIVE HEART WITHOUT ANGINA PECTORIS: ICD-10-CM

## 2019-11-22 DIAGNOSIS — Z95.0 PACEMAKER: Chronic | ICD-10-CM

## 2019-11-22 DIAGNOSIS — I49.5 SICK SINUS SYNDROME (HCC): ICD-10-CM

## 2019-11-22 DIAGNOSIS — I10 ESSENTIAL HYPERTENSION: ICD-10-CM

## 2019-11-22 DIAGNOSIS — Z76.89 ENCOUNTER TO ESTABLISH CARE: ICD-10-CM

## 2019-11-22 DIAGNOSIS — M79.671 PAIN IN BOTH FEET: ICD-10-CM

## 2019-11-22 PROCEDURE — G8427 DOCREV CUR MEDS BY ELIG CLIN: HCPCS | Performed by: INTERNAL MEDICINE

## 2019-11-22 PROCEDURE — G8482 FLU IMMUNIZE ORDER/ADMIN: HCPCS | Performed by: INTERNAL MEDICINE

## 2019-11-22 PROCEDURE — 99214 OFFICE O/P EST MOD 30 MIN: CPT | Performed by: INTERNAL MEDICINE

## 2019-11-22 PROCEDURE — 4040F PNEUMOC VAC/ADMIN/RCVD: CPT | Performed by: INTERNAL MEDICINE

## 2019-11-22 PROCEDURE — G8417 CALC BMI ABV UP PARAM F/U: HCPCS | Performed by: INTERNAL MEDICINE

## 2019-11-22 PROCEDURE — 1036F TOBACCO NON-USER: CPT | Performed by: INTERNAL MEDICINE

## 2019-11-22 PROCEDURE — 93000 ELECTROCARDIOGRAM COMPLETE: CPT | Performed by: INTERNAL MEDICINE

## 2019-11-22 PROCEDURE — 1090F PRES/ABSN URINE INCON ASSESS: CPT | Performed by: INTERNAL MEDICINE

## 2019-11-22 PROCEDURE — 1123F ACP DISCUSS/DSCN MKR DOCD: CPT | Performed by: INTERNAL MEDICINE

## 2019-11-22 PROCEDURE — G8598 ASA/ANTIPLAT THER USED: HCPCS | Performed by: INTERNAL MEDICINE

## 2019-11-22 ASSESSMENT — ENCOUNTER SYMPTOMS
SHORTNESS OF BREATH: 0
CHEST TIGHTNESS: 0
RESPIRATORY NEGATIVE: 1
BACK PAIN: 1
STRIDOR: 0
EYES NEGATIVE: 1
WHEEZING: 0
GASTROINTESTINAL NEGATIVE: 1
COUGH: 0
BLOOD IN STOOL: 0
NAUSEA: 0

## 2019-11-26 ENCOUNTER — HOSPITAL ENCOUNTER (OUTPATIENT)
Dept: CARDIOLOGY | Age: 84
Discharge: HOME | End: 2019-11-26
Payer: MEDICARE

## 2019-11-26 PROCEDURE — 93280 PM DEVICE PROGR EVAL DUAL: CPT

## 2019-12-23 ENCOUNTER — OFFICE VISIT (OUTPATIENT)
Dept: PULMONOLOGY | Age: 84
End: 2019-12-23
Payer: MEDICARE

## 2019-12-23 VITALS
HEIGHT: 69 IN | WEIGHT: 155 LBS | BODY MASS INDEX: 22.96 KG/M2 | SYSTOLIC BLOOD PRESSURE: 122 MMHG | HEART RATE: 69 BPM | OXYGEN SATURATION: 93 % | RESPIRATION RATE: 16 BRPM | TEMPERATURE: 97.4 F | DIASTOLIC BLOOD PRESSURE: 64 MMHG

## 2019-12-23 DIAGNOSIS — J44.9 CHRONIC OBSTRUCTIVE PULMONARY DISEASE, UNSPECIFIED COPD TYPE (HCC): Primary | ICD-10-CM

## 2019-12-23 DIAGNOSIS — R06.02 SHORTNESS OF BREATH: ICD-10-CM

## 2019-12-23 PROCEDURE — 1036F TOBACCO NON-USER: CPT | Performed by: INTERNAL MEDICINE

## 2019-12-23 PROCEDURE — 1090F PRES/ABSN URINE INCON ASSESS: CPT | Performed by: INTERNAL MEDICINE

## 2019-12-23 PROCEDURE — 3023F SPIROM DOC REV: CPT | Performed by: INTERNAL MEDICINE

## 2019-12-23 PROCEDURE — G8482 FLU IMMUNIZE ORDER/ADMIN: HCPCS | Performed by: INTERNAL MEDICINE

## 2019-12-23 PROCEDURE — 99213 OFFICE O/P EST LOW 20 MIN: CPT | Performed by: INTERNAL MEDICINE

## 2019-12-23 PROCEDURE — 4040F PNEUMOC VAC/ADMIN/RCVD: CPT | Performed by: INTERNAL MEDICINE

## 2019-12-23 PROCEDURE — G8420 CALC BMI NORM PARAMETERS: HCPCS | Performed by: INTERNAL MEDICINE

## 2019-12-23 PROCEDURE — G8427 DOCREV CUR MEDS BY ELIG CLIN: HCPCS | Performed by: INTERNAL MEDICINE

## 2019-12-23 PROCEDURE — 1123F ACP DISCUSS/DSCN MKR DOCD: CPT | Performed by: INTERNAL MEDICINE

## 2019-12-23 PROCEDURE — G8598 ASA/ANTIPLAT THER USED: HCPCS | Performed by: INTERNAL MEDICINE

## 2019-12-23 PROCEDURE — G8926 SPIRO NO PERF OR DOC: HCPCS | Performed by: INTERNAL MEDICINE

## 2019-12-23 RX ORDER — ALBUTEROL SULFATE 2.5 MG/3ML
2.5 SOLUTION RESPIRATORY (INHALATION) EVERY 6 HOURS PRN
Qty: 120 EACH | Refills: 5 | Status: SHIPPED | OUTPATIENT
Start: 2019-12-23 | End: 2020-09-10

## 2019-12-23 ASSESSMENT — ENCOUNTER SYMPTOMS
RHINORRHEA: 0
NAUSEA: 0
EYE ITCHING: 0
SHORTNESS OF BREATH: 1
SINUS PRESSURE: 0
VOMITING: 0
TROUBLE SWALLOWING: 0
DIARRHEA: 0
COUGH: 1
VOICE CHANGE: 0
EYE DISCHARGE: 0
WHEEZING: 0
SORE THROAT: 0
CHEST TIGHTNESS: 0
ABDOMINAL PAIN: 0

## 2020-01-02 ENCOUNTER — OFFICE VISIT (OUTPATIENT)
Dept: FAMILY MEDICINE CLINIC | Age: 85
End: 2020-01-02
Payer: MEDICARE

## 2020-01-02 VITALS
RESPIRATION RATE: 18 BRPM | SYSTOLIC BLOOD PRESSURE: 132 MMHG | BODY MASS INDEX: 25.23 KG/M2 | DIASTOLIC BLOOD PRESSURE: 60 MMHG | HEIGHT: 66 IN | TEMPERATURE: 98 F | WEIGHT: 157 LBS | HEART RATE: 98 BPM | OXYGEN SATURATION: 96 %

## 2020-01-02 DIAGNOSIS — I25.10 CORONARY ARTERY DISEASE INVOLVING NATIVE CORONARY ARTERY OF NATIVE HEART WITHOUT ANGINA PECTORIS: ICD-10-CM

## 2020-01-02 DIAGNOSIS — I10 ESSENTIAL HYPERTENSION: ICD-10-CM

## 2020-01-02 DIAGNOSIS — I27.20 PULMONARY HYPERTENSION (HCC): Chronic | ICD-10-CM

## 2020-01-02 PROBLEM — R60.0 EDEMA OF BOTH LEGS: Chronic | Status: ACTIVE | Noted: 2020-01-02

## 2020-01-02 PROBLEM — K40.90 UNILATERAL INGUINAL HERNIA WITHOUT OBSTRUCTION OR GANGRENE: Chronic | Status: ACTIVE | Noted: 2020-01-02

## 2020-01-02 PROBLEM — K41.90 UNILATERAL FEMORAL HERNIA WITHOUT OBSTRUCTION OR GANGRENE: Chronic | Status: ACTIVE | Noted: 2020-01-02

## 2020-01-02 PROBLEM — S72.002A LEFT DISPLACED FEMORAL NECK FRACTURE (HCC): Status: RESOLVED | Noted: 2019-05-01 | Resolved: 2020-01-02

## 2020-01-02 PROBLEM — S72.052A CLOSED FRACTURE OF HEAD OF LEFT FEMUR (HCC): Status: RESOLVED | Noted: 2019-04-27 | Resolved: 2020-01-02

## 2020-01-02 PROBLEM — J20.9 ACUTE BRONCHITIS: Status: RESOLVED | Noted: 2019-03-06 | Resolved: 2020-01-02

## 2020-01-02 PROBLEM — R26.9 ABNORMALITY OF GAIT AND MOBILITY: Status: RESOLVED | Noted: 2019-05-06 | Resolved: 2020-01-02

## 2020-01-02 LAB
ALBUMIN SERPL-MCNC: 4.2 G/DL (ref 3.5–4.6)
ALP BLD-CCNC: 74 U/L (ref 40–130)
ALT SERPL-CCNC: 10 U/L (ref 0–33)
ANION GAP SERPL CALCULATED.3IONS-SCNC: 13 MEQ/L (ref 9–15)
AST SERPL-CCNC: 18 U/L (ref 0–35)
BASOPHILS ABSOLUTE: 0 K/UL (ref 0–0.2)
BASOPHILS RELATIVE PERCENT: 0.4 %
BILIRUB SERPL-MCNC: 0.4 MG/DL (ref 0.2–0.7)
BUN BLDV-MCNC: 24 MG/DL (ref 8–23)
CALCIUM SERPL-MCNC: 9.3 MG/DL (ref 8.5–9.9)
CHLORIDE BLD-SCNC: 103 MEQ/L (ref 95–107)
CHOLESTEROL, TOTAL: 168 MG/DL (ref 0–199)
CO2: 26 MEQ/L (ref 20–31)
CREAT SERPL-MCNC: 0.69 MG/DL (ref 0.5–0.9)
EOSINOPHILS ABSOLUTE: 0.2 K/UL (ref 0–0.7)
EOSINOPHILS RELATIVE PERCENT: 2.1 %
GFR AFRICAN AMERICAN: >60
GFR NON-AFRICAN AMERICAN: >60
GLOBULIN: 3.4 G/DL (ref 2.3–3.5)
GLUCOSE BLD-MCNC: 83 MG/DL (ref 70–99)
HCT VFR BLD CALC: 42.1 % (ref 37–47)
HDLC SERPL-MCNC: 63 MG/DL (ref 40–59)
HEMOGLOBIN: 14 G/DL (ref 12–16)
LDL CHOLESTEROL CALCULATED: 93 MG/DL (ref 0–129)
LYMPHOCYTES ABSOLUTE: 2.1 K/UL (ref 1–4.8)
LYMPHOCYTES RELATIVE PERCENT: 26.5 %
MCH RBC QN AUTO: 32 PG (ref 27–31.3)
MCHC RBC AUTO-ENTMCNC: 33.3 % (ref 33–37)
MCV RBC AUTO: 96 FL (ref 82–100)
MONOCYTES ABSOLUTE: 0.6 K/UL (ref 0.2–0.8)
MONOCYTES RELATIVE PERCENT: 7.7 %
NEUTROPHILS ABSOLUTE: 5 K/UL (ref 1.4–6.5)
NEUTROPHILS RELATIVE PERCENT: 63.3 %
PDW BLD-RTO: 14.2 % (ref 11.5–14.5)
PLATELET # BLD: 227 K/UL (ref 130–400)
POTASSIUM SERPL-SCNC: 3.9 MEQ/L (ref 3.4–4.9)
RBC # BLD: 4.38 M/UL (ref 4.2–5.4)
SODIUM BLD-SCNC: 142 MEQ/L (ref 135–144)
TOTAL PROTEIN: 7.6 G/DL (ref 6.3–8)
TRIGL SERPL-MCNC: 61 MG/DL (ref 0–150)
TSH SERPL DL<=0.05 MIU/L-ACNC: 2.45 UIU/ML (ref 0.44–3.86)
WBC # BLD: 8 K/UL (ref 4.8–10.8)

## 2020-01-02 PROCEDURE — 99204 OFFICE O/P NEW MOD 45 MIN: CPT | Performed by: FAMILY MEDICINE

## 2020-01-02 PROCEDURE — 3023F SPIROM DOC REV: CPT | Performed by: FAMILY MEDICINE

## 2020-01-02 PROCEDURE — 1036F TOBACCO NON-USER: CPT | Performed by: FAMILY MEDICINE

## 2020-01-02 PROCEDURE — 1123F ACP DISCUSS/DSCN MKR DOCD: CPT | Performed by: FAMILY MEDICINE

## 2020-01-02 PROCEDURE — 4040F PNEUMOC VAC/ADMIN/RCVD: CPT | Performed by: FAMILY MEDICINE

## 2020-01-02 PROCEDURE — G8926 SPIRO NO PERF OR DOC: HCPCS | Performed by: FAMILY MEDICINE

## 2020-01-02 PROCEDURE — G8482 FLU IMMUNIZE ORDER/ADMIN: HCPCS | Performed by: FAMILY MEDICINE

## 2020-01-02 PROCEDURE — G8417 CALC BMI ABV UP PARAM F/U: HCPCS | Performed by: FAMILY MEDICINE

## 2020-01-02 PROCEDURE — G8427 DOCREV CUR MEDS BY ELIG CLIN: HCPCS | Performed by: FAMILY MEDICINE

## 2020-01-02 PROCEDURE — 1090F PRES/ABSN URINE INCON ASSESS: CPT | Performed by: FAMILY MEDICINE

## 2020-01-02 RX ORDER — ATORVASTATIN CALCIUM 20 MG/1
20 TABLET, FILM COATED ORAL DAILY
Qty: 90 TABLET | Refills: 4 | Status: SHIPPED | OUTPATIENT
Start: 2020-01-02 | End: 2021-04-19 | Stop reason: SDUPTHER

## 2020-01-02 NOTE — PROGRESS NOTES
of Systems  No fevers, chills, sweats. No unintended weight loss. No abdominal pain, nausea, vomiting, diarrhea,, bloody stools, black tarry stools. No rashes. No swollen glands. Occasional constipation      Past Medical History:   Diagnosis Date    Abnormality of gait and mobility due to Left femoral neck fracture S/P Left Hip Hemiarthroplasty. [facility] admit 05/01/19. 5/6/2019    This is an 80year old female with a medical history significant for COPD, SSS S/P PPM, paroxysmal SVT, HTN who was sent to [facility] from [facility] for further evaluation and management of left femoral neck fracture S/P fall. She states she was walking out of her bathroom when she tripped and fell. She denies any loss of consciousness and she was unable to get up. EMS transferred her to [facility] ER.  Arthritis     BILAT    CAD (coronary artery disease)     Closed fracture of head of left femur ([address] 4/27/2019    Last Assessment & Plan:  S/p left hip arthroplasty Assessment:  Pain well  controlled  PLAN:  Pain control with bowel regimen Ortho  following, going to SNF    COPD (chronic obstructive pulmonary disease) ([address]     Hip joint replacement by other means 5/6/2011    Hypertension     Left displaced femoral neck fracture ([address] 5/1/2019    Lower leg edema     BILAT     Lung disease     Osteoarthritis     Pacemaker 2009    FOR SYNCOPE    Paroxysmal SVT (supraventricular tachycardia) (HCC)     Paroxysmal SVT (supraventricular tachycardia) ([address]     Pneumonia     Sinusitis      Past Surgical History:   Procedure Laterality Date    CARDIAC PACEMAKER PLACEMENT      HIP FRACTURE SURGERY Bilateral     PACEMAKER INSERTION N/A 2009     Social History     Socioeconomic History    Marital status:       Spouse name: Not on file    Number of children: Not on file    Years of education: Not on file    Highest education level: Not on file   Occupational History    Not on file   Social Needs    Financial resource strain: Not on file    Food insecurity:     Worry: Not on file     Inability: Not on file    Transportation needs:     Medical: Not on file     Non-medical: Not on file   Tobacco Use    Smoking status: Never Smoker    Smokeless tobacco: Never Used   Substance and Sexual Activity    Alcohol use: Not on file    Drug use: No    Sexual activity: Not on file   Lifestyle    Physical activity:     Days per week: 0 days     Minutes per session: 0 min    Stress: Not at all   Relationships    Social connections:     Talks on phone: Once a week     Gets together: More than three times a week     Attends Advent service: Not on file     Active member of club or organization: No     Attends meetings of clubs or organizations: Never     Relationship status: Not on file    Intimate partner violence:     Fear of current or ex partner: Not on file     Emotionally abused: Not on file     Physically abused: Not on file     Forced sexual activity: Not on file   Other Topics Concern    Not on file   Social History Narrative         Lives With: Daughter    Type of Home: House    Home Layout: One level    Home Access: Stairs to enter with rails    Entrance Stairs - Number of Steps: 3    Entrance Stairs - Rails: None    Bathroom Shower/Tub: Tub/Shower unit, Shower chair with back    Home Equipment: Cane, Standard walker, Reacher, Sock aid, Long-handled shoehorn    ADL Assistance: Independent    Homemaking Assistance: Independent    Ambulation Assistance: Independent    Transfer Assistance: Independent    Active : Yes    Mode of Transportation: Car    Occupation: Retired    Type of occupation: Domestic     Leisure & Hobbies: Reading, sports, outdoor acts and being active     Family History   Problem Relation Age of Onset    Cancer Mother     No Known Problems Father      Allergies   Allergen Reactions    Codeine      Other reaction(s): GI Upset    Penicillins      Other reaction(s):  Intolerance     Current Monitoring:   No flowsheet data found.         Tamir Elam MD

## 2020-01-09 ENCOUNTER — OFFICE VISIT (OUTPATIENT)
Dept: SURGERY | Age: 85
End: 2020-01-09
Payer: MEDICARE

## 2020-01-09 VITALS
HEIGHT: 65 IN | BODY MASS INDEX: 25.79 KG/M2 | WEIGHT: 154.8 LBS | DIASTOLIC BLOOD PRESSURE: 72 MMHG | SYSTOLIC BLOOD PRESSURE: 114 MMHG | TEMPERATURE: 98 F

## 2020-01-09 PROCEDURE — 1123F ACP DISCUSS/DSCN MKR DOCD: CPT | Performed by: SURGERY

## 2020-01-09 PROCEDURE — G8417 CALC BMI ABV UP PARAM F/U: HCPCS | Performed by: SURGERY

## 2020-01-09 PROCEDURE — 1036F TOBACCO NON-USER: CPT | Performed by: SURGERY

## 2020-01-09 PROCEDURE — G8482 FLU IMMUNIZE ORDER/ADMIN: HCPCS | Performed by: SURGERY

## 2020-01-09 PROCEDURE — G8427 DOCREV CUR MEDS BY ELIG CLIN: HCPCS | Performed by: SURGERY

## 2020-01-09 PROCEDURE — 4040F PNEUMOC VAC/ADMIN/RCVD: CPT | Performed by: SURGERY

## 2020-01-09 PROCEDURE — 1090F PRES/ABSN URINE INCON ASSESS: CPT | Performed by: SURGERY

## 2020-01-09 PROCEDURE — 99203 OFFICE O/P NEW LOW 30 MIN: CPT | Performed by: SURGERY

## 2020-01-10 ENCOUNTER — PREP FOR PROCEDURE (OUTPATIENT)
Dept: SURGERY | Age: 85
End: 2020-01-10

## 2020-01-10 ASSESSMENT — ENCOUNTER SYMPTOMS
CHEST TIGHTNESS: 0
NAUSEA: 0
ABDOMINAL DISTENTION: 0
COLOR CHANGE: 0
RHINORRHEA: 0
BLOOD IN STOOL: 0
RECTAL PAIN: 0
SHORTNESS OF BREATH: 0
ABDOMINAL PAIN: 1
ALLERGIC/IMMUNOLOGIC NEGATIVE: 1

## 2020-01-13 ENCOUNTER — APPOINTMENT (OUTPATIENT)
Dept: ULTRASOUND IMAGING | Age: 85
End: 2020-01-13
Payer: MEDICARE

## 2020-01-13 ENCOUNTER — HOSPITAL ENCOUNTER (OUTPATIENT)
Dept: WOMENS IMAGING | Age: 85
Discharge: HOME | End: 2020-01-15
Payer: MEDICARE

## 2020-01-13 PROCEDURE — 77080 DXA BONE DENSITY AXIAL: CPT

## 2020-02-25 ENCOUNTER — HOSPITAL ENCOUNTER (OUTPATIENT)
Dept: CARDIOLOGY | Age: 85
Discharge: HOME | End: 2020-02-25
Payer: MEDICARE

## 2020-02-25 PROCEDURE — 93293 PM PHONE R-STRIP DEVICE EVAL: CPT

## 2020-03-11 ENCOUNTER — HOSPITAL ENCOUNTER (OUTPATIENT)
Dept: GENERAL RADIOLOGY | Age: 85
Discharge: HOME | End: 2020-03-13
Payer: MEDICARE

## 2020-03-11 PROCEDURE — 71046 X-RAY EXAM CHEST 2 VIEWS: CPT

## 2020-04-02 ENCOUNTER — VIRTUAL VISIT (OUTPATIENT)
Dept: FAMILY MEDICINE CLINIC | Age: 85
End: 2020-04-02
Payer: MEDICARE

## 2020-04-02 PROBLEM — M81.0 AGE-RELATED OSTEOPOROSIS WITHOUT CURRENT PATHOLOGICAL FRACTURE: Chronic | Status: ACTIVE | Noted: 2020-04-02

## 2020-04-02 PROCEDURE — 99443 PR PHYS/QHP TELEPHONE EVALUATION 21-30 MIN: CPT | Performed by: FAMILY MEDICINE

## 2020-04-02 RX ORDER — ALENDRONATE SODIUM 70 MG/1
70 TABLET ORAL
Qty: 12 TABLET | Refills: 4 | Status: SHIPPED | OUTPATIENT
Start: 2020-04-02 | End: 2020-09-10 | Stop reason: SDUPTHER

## 2020-04-02 NOTE — PROGRESS NOTES
as needed for Wheezing  Bruna Neville MD   sotalol (BETAPACE) 80 MG tablet Take 1 tablet by mouth 2 times daily  Shannon Mc MD   verapamil (CALAN SR) 180 MG extended release tablet Take 1 tablet by mouth daily  Shannon Mc MD   aspirin 81 MG tablet Take 1 tablet by mouth daily With Food  Shannon Mc MD       Social History     Tobacco Use    Smoking status: Never Smoker    Smokeless tobacco: Never Used   Substance Use Topics    Alcohol use: Not on file    Drug use: No        Allergies   Allergen Reactions    Codeine      Other reaction(s): GI Upset    Penicillins      Other reaction(s): Intolerance   ,   Past Medical History:   Diagnosis Date    Abnormality of gait and mobility due to Left femoral neck fracture S/P Left Hip Hemiarthroplasty. [facility] admit 05/01/19. 5/6/2019    This is an 80year old female with a medical history significant for COPD, SSS S/P PPM, paroxysmal SVT, HTN who was sent to [facility] from [facility] for further evaluation and management of left femoral neck fracture S/P fall. She states she was walking out of her bathroom when she tripped and fell. She denies any loss of consciousness and she was unable to get up. EMS transferred her to [facility] ER.       Arthritis     BILAT    CAD (coronary artery disease)     Closed fracture of head of left femur ([facility] 4/27/2019    Last Assessment & Plan:  S/p left hip arthroplasty Assessment:  Pain well  controlled  PLAN:  Pain control with bowel regimen Ortho  following, going to SNF    COPD (chronic obstructive pulmonary disease) ([facility]     Hip joint replacement by other means 5/6/2011    Hypertension     Left displaced femoral neck fracture ([facility] 5/1/2019    Lower leg edema     BILAT     Lung disease     Osteoarthritis     Pacemaker 2009    FOR SYNCOPE    Paroxysmal SVT (supraventricular tachycardia) (HCC)     Paroxysmal SVT (supraventricular tachycardia) (HCC)     Pneumonia     Sinusitis    ,   Past Surgical History: Procedure Laterality Date    CARDIAC PACEMAKER PLACEMENT      HIP FRACTURE SURGERY Bilateral     PACEMAKER INSERTION N/A 2009   ,   Social History     Tobacco Use    Smoking status: Never Smoker    Smokeless tobacco: Never Used   Substance Use Topics    Alcohol use: Not on file    Drug use: No   ,   Family History   Problem Relation Age of Onset    Cancer Mother     No Known Problems Father    ,   Immunization History   Administered Date(s) Administered    Influenza, High Dose (Fluzone 65 yrs and older) 11/21/2015, 09/17/2016, 09/20/2017, 09/15/2018    Influenza, Triv, inactivated, subunit, adjuvanted, IM (Fluad 65 yrs and older) 09/16/2017, 09/15/2018, 09/15/2019    Pneumococcal Polysaccharide ([id number]) 11/21/2015   ,   Health Maintenance   Topic Date Due    Annual Wellness Visit (AWV)  06/20/2019    DTaP/Tdap/Td vaccine (1 - Tdap) [birth year]/02/2021 (Originally 2/28/1952)    Shingles Vaccine (1 of 2) 01/02/2021 (Originally 2/28/1983)    Lipid screen  01/02/2021    Potassium monitoring  01/02/2021    Creatinine monitoring  01/02/2021    Flu vaccine  Completed    Pneumococcal 65+ years Vaccine  Completed    Hepatitis A vaccine  Aged Out    Hepatitis B vaccine  Aged Out    Hib vaccine  Aged Out    Meningococcal (ACWY) vaccine  Aged Out       PHYSICAL EXAMINATION:  [ INSTRUCTIONS:  \"[x]\" Indicates a positive item  \"[]\" Indicates a negative item  -- DELETE ALL ITEMS NOT EXAMINED]  Vital Signs:unavailable  Constitutional: [x] Appears well-developed and well-nourished [x] No apparent distress      [] Abnormal-   Mental status  [x] Alert and awake  [x] Oriented to person/place/time [x]Able to follow commands        Pulmonary/Chest: [x] Respiratory effort normal.  [x] No visualized signs of difficulty breathing or respiratory distress        [] Abnormal-            Psychiatric:       [x] Normal Affect [x] No Hallucinations        [] Abnormal-     Other pertinent observable physical exam findings- none    ASSESSMENT/PLAN:  Diagnoses and all orders for this visit:    Pulmonary hypertension ([facility]  Comments:  Stable, fair control with mild shortness of breath. Had x-ray ordered but has not been completed yet. Follow-up with pulmonology. Essential hypertension  Comments:  Stable and well-controlled on Betapace and verapamil    Chronic obstructive pulmonary disease, unspecified COPD type ([facility]  Comments:  Stable and fairly well-controlled with routine albuterol use. Coronary artery disease involving native coronary artery of native heart without angina pectoris  Comments:  Stable and well-controlled on Betapace, verapamil, statin    Age-related osteoporosis without current pathological fracture  Comments: Add Fosamax 70 mg weekly and calcium with vitamin D twice daily. Reassess January 2022    Unilateral inguinal hernia without obstruction or gangrene, recurrence not specified  Comments:  Currently stable. Elective surgery delayed. Reviewed signs and symptoms of incarceration. Call me or surgeon if develops          Caroline Sullivan is a 80 y.o. female being evaluated by a Virtual Visit (telephone visit) encounter to address concerns as mentioned above. A caregiver was present when appropriate. Due to this being a TeleHealth encounter (During AASEV- public health emergency), evaluation of the following organ systems was limited: Vitals/Constitutional/EENT/Resp/CV/GI//MS/Neuro/Skin/Heme-Lymph-Imm. Pursuant to the emergency declaration under the [facility], [facility] authority and the [employer] Act, this Virtual Visit was conducted with patient's (and/or legal guardian's) consent, to reduce the patient's risk of exposure to COVID-19 and provide necessary medical care.   The patient (and/or legal guardian) has also been advised to contact this office for worsening conditions or problems, and seek emergency medical treatment and/or call 911 if deemed necessary. Services were provided through a video synchronous discussion virtually to substitute for in-person clinic visit. Patient and provider were located at their individual homes. --Ines Hernandez MD on 4/2/2020 at 1:44 PM    An electronic signature was used to authenticate this note.     Note: not billable if this call serves to triage the patient into an appointment for the relevant concern      Gricelda NEUMANN

## 2020-05-27 ENCOUNTER — HOSPITAL ENCOUNTER (OUTPATIENT)
Dept: CARDIOLOGY | Age: 85
Discharge: HOME | End: 2020-05-27
Payer: MEDICARE

## 2020-05-27 PROCEDURE — 93280 PM DEVICE PROGR EVAL DUAL: CPT

## 2020-07-13 ENCOUNTER — OFFICE VISIT (OUTPATIENT)
Dept: CARDIOLOGY CLINIC | Age: 85
End: 2020-07-13
Payer: MEDICARE

## 2020-07-13 VITALS
HEART RATE: 69 BPM | OXYGEN SATURATION: 98 % | DIASTOLIC BLOOD PRESSURE: 64 MMHG | RESPIRATION RATE: 16 BRPM | BODY MASS INDEX: 25.29 KG/M2 | WEIGHT: 152 LBS | SYSTOLIC BLOOD PRESSURE: 132 MMHG

## 2020-07-13 PROCEDURE — G8427 DOCREV CUR MEDS BY ELIG CLIN: HCPCS | Performed by: INTERNAL MEDICINE

## 2020-07-13 PROCEDURE — G8417 CALC BMI ABV UP PARAM F/U: HCPCS | Performed by: INTERNAL MEDICINE

## 2020-07-13 PROCEDURE — 99214 OFFICE O/P EST MOD 30 MIN: CPT | Performed by: INTERNAL MEDICINE

## 2020-07-13 PROCEDURE — 1090F PRES/ABSN URINE INCON ASSESS: CPT | Performed by: INTERNAL MEDICINE

## 2020-07-13 PROCEDURE — 1123F ACP DISCUSS/DSCN MKR DOCD: CPT | Performed by: INTERNAL MEDICINE

## 2020-07-13 PROCEDURE — 1036F TOBACCO NON-USER: CPT | Performed by: INTERNAL MEDICINE

## 2020-07-13 PROCEDURE — 93000 ELECTROCARDIOGRAM COMPLETE: CPT | Performed by: INTERNAL MEDICINE

## 2020-07-13 PROCEDURE — 4040F PNEUMOC VAC/ADMIN/RCVD: CPT | Performed by: INTERNAL MEDICINE

## 2020-07-13 RX ORDER — SOTALOL HYDROCHLORIDE 80 MG/1
80 TABLET ORAL 2 TIMES DAILY
Qty: 180 TABLET | Refills: 2 | Status: SHIPPED | OUTPATIENT
Start: 2020-07-13 | End: 2021-04-19 | Stop reason: SDUPTHER

## 2020-07-13 ASSESSMENT — ENCOUNTER SYMPTOMS
CHEST TIGHTNESS: 0
WHEEZING: 0
RESPIRATORY NEGATIVE: 1
BACK PAIN: 1
NAUSEA: 0
EYES NEGATIVE: 1
COUGH: 0
GASTROINTESTINAL NEGATIVE: 1
STRIDOR: 0
SHORTNESS OF BREATH: 0
BLOOD IN STOOL: 0

## 2020-07-13 NOTE — PROGRESS NOTES
Subsequent Progress Note  Patient: Abby Daniels  YOB: 1933  MRN: [id number]    Chief Complaint:AR CAD HTN ppm svt recent L hip sx  Chief Complaint   Patient presents with    3 Month Follow-Up    Hypertension    Coronary Artery Disease    Tachycardia     PSVT       CV Data:  12/2017 spect negative  12/2017 echo ef 60% Trace AR  9/2018 LAD mid 50% near a small diagonal. Cors all calcified EF 55 EDP 15  4/2019 LTHA   PPM    Subjective/HPI: no cp no sob no further falls no bleed. Takes meds     11/22/2019 no cp no osb no falls no bleed. Can't walk well    7/13/2020 no cp no sob no falls   No bleed. No falls. No bleed. Eats well. EKG: SR70 QTc 441    Past Medical History:   Diagnosis Date    Abnormality of gait and mobility due to Left femoral neck fracture S/P Left Hip Hemiarthroplasty. [facility] admit 05/01/19. 5/6/2019    This is an 80year old female with a medical history significant for COPD, SSS S/P PPM, paroxysmal SVT, HTN who was sent to [facility] from [facility] for further evaluation and management of left femoral neck fracture S/P fall. She states she was walking out of her bathroom when she tripped and fell. She denies any loss of consciousness and she was unable to get up. EMS transferred her to [facility] ER.       Arthritis     BILAT    CAD (coronary artery disease)     Closed fracture of head of left femur ([address] 4/27/2019    Last Assessment & Plan:  S/p left hip arthroplasty Assessment:  Pain well  controlled  PLAN:  Pain control with bowel regimen Ortho  following, going to SNF    COPD (chronic obstructive pulmonary disease) ([address]     Hip joint replacement by other means 5/6/2011    Hypertension     Left displaced femoral neck fracture ([address] 5/1/2019    Lower leg edema     BILAT     Lung disease     Osteoarthritis     Pacemaker 2009    FOR SYNCOPE    Paroxysmal SVT (supraventricular tachycardia) (HCC)     Paroxysmal SVT (supraventricular tachycardia) (HCC)     Pneumonia  Sinusitis        Past Surgical History:   Procedure Laterality Date    CARDIAC PACEMAKER PLACEMENT      HIP FRACTURE SURGERY Bilateral     PACEMAKER INSERTION N/A 2009       Family History   Problem Relation Age of Onset    Cancer Mother     No Known Problems Father        Social History     Socioeconomic History    Marital status:       Spouse name: None    Number of children: None    Years of education: None    Highest education level: None   Occupational History    None   Social Needs    Financial resource strain: None    Food insecurity     Worry: None     Inability: None    Transportation needs     Medical: None     Non-medical: None   Tobacco Use    Smoking status: Never Smoker    Smokeless tobacco: Never Used   Substance and Sexual Activity    Alcohol use: None    Drug use: No    Sexual activity: None   Lifestyle    Physical activity     Days per week: 0 days     Minutes per session: 0 min    Stress: Not at all   Relationships    Social connections     Talks on phone: Once a week     Gets together: More than three times a week     Attends Sikhism service: None     Active member of club or organization: No     Attends meetings of clubs or organizations: Never     Relationship status: None    Intimate partner violence     Fear of current or ex partner: None     Emotionally abused: None     Physically abused: None     Forced sexual activity: None   Other Topics Concern    None   Social History Narrative         Lives With: Daughter    Type of Home: House    Home Layout: One level    Home Access: Stairs to enter with rails    Entrance Stairs - Number of Steps: 3    Entrance Stairs - Rails: None    Bathroom Shower/Tub: Tub/Shower unit, Shower chair with back    Home Equipment: Cane, Standard walker, Reacher, Sock aid, Long-handled shoehorn    ADL Assistance: Independent    Homemaking Assistance: Independent    Ambulation Assistance: Independent    Transfer Assistance: Independent Active : Yes    Mode of Transportation: Car    Occupation: Retired    Type of occupation: Domestic     Leisure & Hobbies: Reading, sports, outdoor acts and being active       Allergies   Allergen Reactions    Codeine      Other reaction(s): GI Upset    Penicillins      Other reaction(s): Intolerance       Current Outpatient Medications   Medication Sig Dispense Refill    verapamil (CALAN SR) 180 MG extended release tablet Take 1 tablet by mouth daily 90 tablet 3    atorvastatin (LIPITOR) 20 MG tablet Take 1 tablet by mouth daily 90 tablet 4    sotalol (BETAPACE) 80 MG tablet Take 1 tablet by mouth 2 times daily 180 tablet 2    aspirin 81 MG tablet Take 1 tablet by mouth daily With Food 30 tablet 3    Nebulizers (COMPRESSOR/NEBULIZER) MISC Nebulizer Supplies 1 each 1    Calcium 600-400 MG-UNIT CHEW Take 1 tablet by mouth 2 times daily (Patient not taking: Reported on 7/13/2020) 180 tablet 4    alendronate (FOSAMAX) 70 MG tablet Take 1 tablet by mouth every 7 days (Patient not taking: Reported on 7/13/2020) 12 tablet 4    albuterol (PROVENTIL) (2.5 MG/3ML) 0.083% nebulizer solution Take 3 mLs by nebulization every 6 hours as needed for Wheezing 120 each 5     No current facility-administered medications for this visit. Review of Systems:   Review of Systems   Constitutional: Negative. Negative for diaphoresis and fatigue. HENT: Negative. Eyes: Negative. Respiratory: Negative. Negative for cough, chest tightness, shortness of breath, wheezing and stridor. Cardiovascular: Negative. Negative for chest pain, palpitations and leg swelling. Gastrointestinal: Negative. Negative for blood in stool and nausea. Genitourinary: Negative. Musculoskeletal: Positive for arthralgias, back pain and gait problem. Skin: Negative. Neurological: Negative for dizziness, syncope, weakness and light-headedness. Hematological: Negative. Psychiatric/Behavioral: Negative. Physical Examination:    /64 (Site: Right Upper Arm, Position: Sitting, Cuff Size: Medium Adult)   Pulse 69   Resp 16   Wt 152 lb (68.9 kg)   SpO2 98%   BMI 25.29 kg/m²    Physical Exam   Constitutional: She appears healthy. No distress. HENT:   Normal cephalic and Atraumatic   Eyes: Pupils are equal, round, and reactive to light. Neck: Normal range of motion and thyroid normal. Neck supple. No JVD present. No neck adenopathy. No thyromegaly present. Cardiovascular: Normal rate, regular rhythm, intact distal pulses and normal pulses. Murmur heard. Pulmonary/Chest: Effort normal and breath sounds normal. She has no wheezes. She has no rales. She exhibits no tenderness. Abdominal: Soft. Bowel sounds are normal. There is no abdominal tenderness. Musculoskeletal: Normal range of motion. General: Edema (mild) present. No tenderness. Neurological: She is alert and oriented to person, place, and time. Skin: Skin is warm. No cyanosis. Nails show no clubbing.        LABS:  CBC:   Lab Results   Component Value Date    WBC 8.0 01/02/2020    RBC 4.38 01/02/2020    RBC 4.22 01/03/2012    HGB 14.0 01/02/2020    HCT 42.1 01/02/2020    MCV 96.0 01/02/2020    MCH 32.0 01/02/2020    MCHC 33.3 01/02/2020    RDW 14.2 01/02/2020     01/02/2020    MPV 8.2 06/27/2015     Lipids:  Lab Results   Component Value Date    CHOL 168 01/02/2020    CHOL 171 12/05/2017    CHOL 175 10/22/2014     Lab Results   Component Value Date    TRIG 61 01/02/2020    TRIG 71 12/05/2017    TRIG 65 10/22/2014     Lab Results   Component Value Date    HDL 63 (H) 01/02/2020    HDL 58 12/05/2017    HDL 61 (H) 10/22/2014     Lab Results   Component Value Date    LDLCALC 93 01/02/2020    LDLCALC 99 12/05/2017    LDLCALC 101 10/22/2014     No results found for: LABVLDL, VLDL  Lab Results   Component Value Date    CHOLHDLRATIO 3.2 01/03/2012     CMP:    Lab Results   Component Value Date     01/02/2020    K 3.9 01/02/2020    K 4.4 05/02/2019     01/02/2020    CO2 26 01/02/2020    BUN 24 01/02/2020    CREATININE 0.69 01/02/2020    GFRAA >60.0 01/02/2020    LABGLOM >60.0 01/02/2020    GLUCOSE 83 01/02/2020    GLUCOSE 93 01/03/2012    PROT 7.6 01/02/2020    LABALBU 4.2 01/02/2020    LABALBU 4.0 01/03/2012    CALCIUM 9.3 01/02/2020    BILITOT 0.4 01/02/2020    ALKPHOS 74 01/02/2020    AST 18 01/02/2020    ALT 10 01/02/2020     BMP:    Lab Results   Component Value Date     01/02/2020    K 3.9 01/02/2020    K 4.4 05/02/2019     01/02/2020    CO2 26 01/02/2020    BUN 24 01/02/2020    LABALBU 4.2 01/02/2020    LABALBU 4.0 01/03/2012    CREATININE 0.69 01/02/2020    CALCIUM 9.3 01/02/2020    GFRAA >60.0 01/02/2020    LABGLOM >60.0 01/02/2020    GLUCOSE 83 01/02/2020    GLUCOSE 93 01/03/2012     Magnesium:    Lab Results   Component Value Date    MG 2.2 05/05/2019    MG 2.0 01/03/2012     TSH:  Lab Results   Component Value Date    TSH 2.450 01/02/2020       Patient Active Problem List   Diagnosis    Paroxysmal SVT (supraventricular tachycardia) (HCC)    Pacemaker    Sick sinus syndrome (HCC)    Essential hypertension    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 Modified Medications    No medications on file       No orders of the defined types were placed in this encounter. Assessment/Plan:    1. Paroxysmal SVT (supraventricular tachycardia) (HCC)   stable QTc - on Sotalol.   - EKG 12 lead    2. Pacemaker   interrogate - reviewed. - EKG 12 lead    3. Essential hypertension   stable   - EKG 12 lead    4. Coronary artery disease involving native coronary artery of native heart without angina pectoris   no angina  - EKG 12 lead      Counseling:  Heart Healthy Lifestyle, Low Salt Diet, Take Precautions to Prevent Falls and Walk Daily    Return in about 4 months (around 11/13/2020) for Cardiovascular care. .      Electronically signed by Ilda Lowe MD on 7/13/2020 at 2:36 PM

## 2020-07-24 ENCOUNTER — OFFICE VISIT (OUTPATIENT)
Dept: PULMONOLOGY | Age: 85
End: 2020-07-24
Payer: MEDICARE

## 2020-07-24 VITALS
HEIGHT: 66 IN | BODY MASS INDEX: 24.59 KG/M2 | TEMPERATURE: 97.3 F | DIASTOLIC BLOOD PRESSURE: 60 MMHG | SYSTOLIC BLOOD PRESSURE: 130 MMHG | RESPIRATION RATE: 16 BRPM | HEART RATE: 71 BPM | WEIGHT: 153 LBS | OXYGEN SATURATION: 96 %

## 2020-07-24 PROBLEM — R06.02 SOB (SHORTNESS OF BREATH): Status: ACTIVE | Noted: 2020-07-24

## 2020-07-24 PROCEDURE — 1036F TOBACCO NON-USER: CPT | Performed by: INTERNAL MEDICINE

## 2020-07-24 PROCEDURE — 1090F PRES/ABSN URINE INCON ASSESS: CPT | Performed by: INTERNAL MEDICINE

## 2020-07-24 PROCEDURE — 99214 OFFICE O/P EST MOD 30 MIN: CPT | Performed by: INTERNAL MEDICINE

## 2020-07-24 PROCEDURE — 3023F SPIROM DOC REV: CPT | Performed by: INTERNAL MEDICINE

## 2020-07-24 PROCEDURE — G8420 CALC BMI NORM PARAMETERS: HCPCS | Performed by: INTERNAL MEDICINE

## 2020-07-24 PROCEDURE — 4040F PNEUMOC VAC/ADMIN/RCVD: CPT | Performed by: INTERNAL MEDICINE

## 2020-07-24 PROCEDURE — G8427 DOCREV CUR MEDS BY ELIG CLIN: HCPCS | Performed by: INTERNAL MEDICINE

## 2020-07-24 PROCEDURE — G8926 SPIRO NO PERF OR DOC: HCPCS | Performed by: INTERNAL MEDICINE

## 2020-07-24 PROCEDURE — 1123F ACP DISCUSS/DSCN MKR DOCD: CPT | Performed by: INTERNAL MEDICINE

## 2020-07-24 RX ORDER — ALBUTEROL SULFATE 2.5 MG/3ML
2.5 SOLUTION RESPIRATORY (INHALATION) EVERY 6 HOURS PRN
Qty: 120 EACH | Refills: 5 | Status: SHIPPED | OUTPATIENT
Start: 2020-07-24 | End: 2021-06-22 | Stop reason: SDUPTHER

## 2020-07-24 RX ORDER — NEBULIZER ACCESSORIES
EACH MISCELLANEOUS
Qty: 1 EACH | Refills: 0 | Status: SHIPPED | OUTPATIENT
Start: 2020-07-24 | End: 2020-09-10

## 2020-07-24 ASSESSMENT — ENCOUNTER SYMPTOMS
TROUBLE SWALLOWING: 0
SORE THROAT: 0
VOICE CHANGE: 0
WHEEZING: 0
NAUSEA: 0
ABDOMINAL PAIN: 0
SINUS PRESSURE: 0
RHINORRHEA: 0
SHORTNESS OF BREATH: 1
DIARRHEA: 0
EYE ITCHING: 0
CHEST TIGHTNESS: 0
VOMITING: 0
EYE DISCHARGE: 0
COUGH: 1

## 2020-07-24 NOTE — PROGRESS NOTES
Subjective:     Sheila Roe is a 80 y.o. female who complains today of:     Chief Complaint   Patient presents with    Follow-up     f/u for CXR results. HPI  Patient was using nebulizer with albuterol but need nebulizer supply since cup broke. C/o shortness of breath  with exertion. No Wheezing   C/o Cough with  Clear Sputum  No Hemoptysis  No Chest tightness   No Chest pain with radiation  or pleuritic pain  No Fever or chills. No Rhinorrhea and postnasal drip. Allergies:  Codeine and Penicillins  Past Medical History:   Diagnosis Date    Abnormality of gait and mobility due to Left femoral neck fracture S/P Left Hip Hemiarthroplasty. [facility] admit 05/01/19. 5/6/2019    This is an 80year old female with a medical history significant for COPD, SSS S/P PPM, paroxysmal SVT, HTN who was sent to [facility] from [facility] for further evaluation and management of left femoral neck fracture S/P fall. She states she was walking out of her bathroom when she tripped and fell. She denies any loss of consciousness and she was unable to get up. EMS transferred her to [facility] ER.       Arthritis     BILAT    CAD (coronary artery disease)     Closed fracture of head of left femur ([address] 4/27/2019    Last Assessment & Plan:  S/p left hip arthroplasty Assessment:  Pain well  controlled  PLAN:  Pain control with bowel regimen Ortho  following, going to SNF    COPD (chronic obstructive pulmonary disease) ([address]     Hip joint replacement by other means 5/6/2011    Hypertension     Left displaced femoral neck fracture ([address] 5/1/2019    Lower leg edema     BILAT     Lung disease     Osteoarthritis     Pacemaker 2009    FOR SYNCOPE    Paroxysmal SVT (supraventricular tachycardia) (HCC)     Paroxysmal SVT (supraventricular tachycardia) ([address]     Pneumonia     Sinusitis      Past Surgical History:   Procedure Laterality Date    CARDIAC PACEMAKER PLACEMENT      HIP FRACTURE SURGERY Bilateral     PACEMAKER INSERTION N/A 2009     Family History   Problem Relation Age of Onset   Tre[place] Driscoll Cancer Mother     No Known Problems Father      Social History     Socioeconomic History    Marital status:       Spouse name: Not on file    Number of children: Not on file    Years of education: Not on file    Highest education level: Not on file   Occupational History    Not on file   Social Needs    Financial resource strain: Not on file    Food insecurity     Worry: Not on file     Inability: Not on file    Transportation needs     Medical: Not on file     Non-medical: Not on file   Tobacco Use    Smoking status: Never Smoker    Smokeless tobacco: Never Used   Substance and Sexual Activity    Alcohol use: Not on file    Drug use: No    Sexual activity: Not on file   Lifestyle    Physical activity     Days per week: 0 days     Minutes per session: 0 min    Stress: Not at all   Relationships    Social connections     Talks on phone: Once a week     Gets together: More than three times a week     Attends Mormon service: Not on file     Active member of club or organization: No     Attends meetings of clubs or organizations: Never     Relationship status: Not on file    Intimate partner violence     Fear of current or ex partner: Not on file     Emotionally abused: Not on file     Physically abused: Not on file     Forced sexual activity: Not on file   Other Topics Concern    Not on file   Social History Narrative         Lives With: Daughter    Type of Home: House    Home Layout: One level    Home Access: Stairs to enter with rails    Entrance Stairs - Number of Steps: 3    Entrance Stairs - Rails: None    Bathroom Shower/Tub: Tub/Shower unit, Shower chair with back    Home Equipment: Cane, Standard walker, Reacher, Sock aid, Long-handled shoehorn    ADL Assistance: Independent    Homemaking Assistance: Independent    Ambulation Assistance: Independent    Transfer Assistance: Independent    Active : Yes    Mode of Transportation: Car    Occupation: Retired    Type of occupation: Domestic     Leisure & Hobbies: Reading, sports, outdoor acts and being active         Review of Systems   Constitutional: Negative for chills, diaphoresis, fatigue and fever. HENT: Negative for congestion, mouth sores, nosebleeds, postnasal drip, rhinorrhea, sinus pressure, sneezing, sore throat, trouble swallowing and voice change. Eyes: Negative for discharge, itching and visual disturbance. Respiratory: Positive for cough and shortness of breath. Negative for chest tightness and wheezing. Cardiovascular: Negative for chest pain, palpitations and leg swelling. Gastrointestinal: Negative for abdominal pain, diarrhea, nausea and vomiting. Genitourinary: Negative for difficulty urinating and hematuria. Musculoskeletal: Negative for arthralgias, joint swelling and myalgias. Skin: Negative for rash. Allergic/Immunologic: Negative for environmental allergies and food allergies. Neurological: Negative for dizziness, tremors, weakness and headaches. Psychiatric/Behavioral: Negative for behavioral problems and sleep disturbance.         :     Vitals:    07/24/20 1052   BP: 130/60   Pulse: 71   Resp: 16   Temp: 97.3 °F (36.3 °C)   TempSrc: Temporal   SpO2: 96%   Weight: 153 lb (69.4 kg)   Height: 5' 6\" (1.676 m)     Wt Readings from Last 3 Encounters:   07/24/20 153 lb (69.4 kg)   07/13/20 152 lb (68.9 kg)   01/09/20 154 lb 12.8 oz (70.2 kg)         Physical Exam  Constitutional:       General: She is not in acute distress. Appearance: She is well-developed. She is not diaphoretic. HENT:      Head: Normocephalic and atraumatic. Nose: Nose normal.   Eyes:      Pupils: Pupils are equal, round, and reactive to light. Neck:      Thyroid: No thyromegaly. Vascular: No JVD. Trachea: No tracheal deviation. Cardiovascular:      Rate and Rhythm: Normal rate and regular rhythm.       Heart sounds: No murmur. No friction rub. No gallop. Pulmonary:      Effort: No respiratory distress. Breath sounds: No wheezing or rales. Comments: diminished Breath sound bilaterally. Chest:      Chest wall: No tenderness. Abdominal:      General: There is no distension. Tenderness: There is no abdominal tenderness. There is no rebound. Musculoskeletal: Normal range of motion. Lymphadenopathy:      Cervical: No cervical adenopathy. Skin:     General: Skin is warm and dry. Neurological:      Mental Status: She is alert and oriented to person, place, and time. Coordination: Coordination normal.         Current Outpatient Medications   Medication Sig Dispense Refill    Respiratory Therapy Supplies (NEBULIZER AIR TUBE/PLUGS) MISC Nebulizer supply, cup and tubing 1 each 0    albuterol (PROVENTIL) (2.5 MG/3ML) 0.083% nebulizer solution Take 3 mLs by nebulization every 6 hours as needed for Wheezing 120 each 5    sotalol (BETAPACE) 80 MG tablet Take 1 tablet by mouth 2 times daily 180 tablet 2    Nebulizers (COMPRESSOR/NEBULIZER) MISC Nebulizer Supplies 1 each 1    verapamil (CALAN SR) 180 MG extended release tablet Take 1 tablet by mouth daily 90 tablet 3    Calcium 600-400 MG-UNIT CHEW Take 1 tablet by mouth 2 times daily 180 tablet 4    alendronate (FOSAMAX) 70 MG tablet Take 1 tablet by mouth every 7 days 12 tablet 4    atorvastatin (LIPITOR) 20 MG tablet Take 1 tablet by mouth daily 90 tablet 4    aspirin 81 MG tablet Take 1 tablet by mouth daily With Food 30 tablet 3    albuterol (PROVENTIL) (2.5 MG/3ML) 0.083% nebulizer solution Take 3 mLs by nebulization every 6 hours as needed for Wheezing 120 each 5     No current facility-administered medications for this visit.         Results for orders placed during the hospital encounter of 03/11/20   XR CHEST STANDARD (2 VW)    Narrative EXAMINATION: XR CHEST (2 VW)     CLINICAL HISTORY: R06.02 SOB (shortness of breath) ICD10    COMPARISONS: April 27, 2019     FINDINGS:    Two views of the chest are submitted. The cardiac silhouette is of normal size configuration. Pulmonary vascular attenuated. Lungs are hyperinflated. Right sided trachea. No focal infiltrates. Trace/small left pleural effusion. No Pneumothoraces. Impression TRACE/SMALL LEFT PLEURAL EFFUSION SUPERIMPOSED UPON COPD.    ]  Results for orders placed during the hospital encounter of 04/27/19   XR CHEST PORTABLE    Narrative EXAMINATION: XR CHEST PORTABLE    CLINICAL HISTORY: STUMBLED AND FELL TO FLOOR. NO LOSS OF CONSCIOUSNESS    COMPARISONS: None available. FINDINGS: Pacemaker generator overlying left axilla with pacemaker lead tips in region of right atrium and right ventricle. Cardiopericardial silhouette is normal in size. Aorta is calcified. Left diaphragm elevated. Mild blunting costophrenic angles. Calcified granuloma right upper lung. Impression SMALL BILATERAL PLEURAL EFFUSIONS VERSUS THICKENING. 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   ]    Assessment/Plan:     1. Chronic obstructive pulmonary disease, unspecified COPD type ([address]  She  was using nebulizer with albuterol but need nebulizer supply since cup  for nebulizer solution broke few days ago. C/o shortness of breath  with exertion. No Wheezing . C/o Cough with Clear Sputum. Continue present treatment plan    - Respiratory Therapy Supplies (NEBULIZER AIR TUBE/PLUGS) MISC; Nebulizer supply, cup and tubing  Dispense: 1 each; Refill: 0    2. SOB (shortness of breath)  Patient is having  Chronic shortness of breath which is likely due to COPD, continue  Bronchodilator,  as before. keep  Spo2 90% or above. Return in about 3 months (around 10/24/2020) for COPD.       Myron Mchugh MD

## 2020-08-27 ENCOUNTER — HOSPITAL ENCOUNTER (OUTPATIENT)
Dept: CARDIOLOGY | Age: 85
Discharge: HOME | End: 2020-08-27
Payer: MEDICARE

## 2020-08-27 PROCEDURE — 93293 PM PHONE R-STRIP DEVICE EVAL: CPT

## 2020-09-10 ENCOUNTER — OFFICE VISIT (OUTPATIENT)
Dept: FAMILY MEDICINE CLINIC | Age: 85
End: 2020-09-10
Payer: MEDICARE

## 2020-09-10 VITALS
BODY MASS INDEX: 25.07 KG/M2 | WEIGHT: 156 LBS | OXYGEN SATURATION: 99 % | HEIGHT: 66 IN | DIASTOLIC BLOOD PRESSURE: 64 MMHG | RESPIRATION RATE: 18 BRPM | SYSTOLIC BLOOD PRESSURE: 122 MMHG | HEART RATE: 69 BPM | TEMPERATURE: 97.9 F

## 2020-09-10 PROBLEM — J01.90 ACUTE BACTERIAL SINUSITIS: Chronic | Status: ACTIVE | Noted: 2020-09-10

## 2020-09-10 PROBLEM — B96.89 ACUTE BACTERIAL SINUSITIS: Chronic | Status: ACTIVE | Noted: 2020-09-10

## 2020-09-10 PROCEDURE — 1090F PRES/ABSN URINE INCON ASSESS: CPT | Performed by: FAMILY MEDICINE

## 2020-09-10 PROCEDURE — 99214 OFFICE O/P EST MOD 30 MIN: CPT | Performed by: FAMILY MEDICINE

## 2020-09-10 PROCEDURE — G8926 SPIRO NO PERF OR DOC: HCPCS | Performed by: FAMILY MEDICINE

## 2020-09-10 PROCEDURE — 1123F ACP DISCUSS/DSCN MKR DOCD: CPT | Performed by: FAMILY MEDICINE

## 2020-09-10 PROCEDURE — 3023F SPIROM DOC REV: CPT | Performed by: FAMILY MEDICINE

## 2020-09-10 PROCEDURE — G8417 CALC BMI ABV UP PARAM F/U: HCPCS | Performed by: FAMILY MEDICINE

## 2020-09-10 PROCEDURE — 4040F PNEUMOC VAC/ADMIN/RCVD: CPT | Performed by: FAMILY MEDICINE

## 2020-09-10 PROCEDURE — 1036F TOBACCO NON-USER: CPT | Performed by: FAMILY MEDICINE

## 2020-09-10 PROCEDURE — G8427 DOCREV CUR MEDS BY ELIG CLIN: HCPCS | Performed by: FAMILY MEDICINE

## 2020-09-10 RX ORDER — ALENDRONATE SODIUM 70 MG/1
70 TABLET ORAL
Qty: 12 TABLET | Refills: 4 | Status: SHIPPED | OUTPATIENT
Start: 2020-09-10 | End: 2020-11-13

## 2020-09-10 RX ORDER — DOXYCYCLINE HYCLATE 100 MG
100 TABLET ORAL 2 TIMES DAILY
Qty: 20 TABLET | Refills: 0 | Status: SHIPPED | OUTPATIENT
Start: 2020-09-10 | End: 2020-09-20

## 2020-09-10 RX ORDER — FLUTICASONE PROPIONATE 50 MCG
2 SPRAY, SUSPENSION (ML) NASAL DAILY
Qty: 1 BOTTLE | Refills: 1 | Status: SHIPPED | OUTPATIENT
Start: 2020-09-10 | End: 2020-11-13

## 2020-09-10 ASSESSMENT — PATIENT HEALTH QUESTIONNAIRE - PHQ9
1. LITTLE INTEREST OR PLEASURE IN DOING THINGS: 0
SUM OF ALL RESPONSES TO PHQ QUESTIONS 1-9: 0
SUM OF ALL RESPONSES TO PHQ QUESTIONS 1-9: 0
2. FEELING DOWN, DEPRESSED OR HOPELESS: 0
SUM OF ALL RESPONSES TO PHQ9 QUESTIONS 1 & 2: 0

## 2020-09-10 NOTE — PROGRESS NOTES
Subjective  Aminata Samson, 80 y.o. female presents today with:  Chief Complaint   Patient presents with    Osteoporosis     4 month follow up for osteoporosis and hernia     Otalgia     right ear pain feels like it is congested           HPI    Right ear feels like someone is breathing in her ear when she lies down; then feels like her heart is beating in ear. Now with slime coming out of her nose. When that happens her ear feels better. No drainage from ear. Has sharp shooting pains in ear occasionally. No hearing loss perceived. A problem for 2-3 months. Some right frontal pain occasionally. No significant cough. Some post nasal drip. Patient is here for follow-up of CAD, SSS HTN and pul HTN with pacer, which has been checked recently, and sotalol. No chest pain, shortness of breath, palpitations, edema, paroxysmal nocturnal dyspnea, orthopnea. Is compliant with meds which do not cause significant side effects. Avoids added salt; exercises occasionally and tries to eat a healthy diet. COPD. On  prn albuterol - less than 3 times a week. No significant cough, mild baseline shortness of breath, occasional wheezing, mild, occasional chest tightness. Review of Systems  No fevers, chills, sweats. No unintended weight loss. No abdominal pain, nausea, vomiting, diarrhea, constipation, bloody stools, black tarry stools. No rashes. No swollen glands. Past Medical History:   Diagnosis Date    Abnormality of gait and mobility due to Left femoral neck fracture S/P Left Hip Hemiarthroplasty. [facility] admit 05/01/19. 5/6/2019    This is an 80year old female with a medical history significant for COPD, SSS S/P PPM, paroxysmal SVT, HTN who was sent to [facility] from [facility] for further evaluation and management of left femoral neck fracture S/P fall. She states she was walking out of her bathroom when she tripped and fell. She denies any loss of consciousness and she was unable to get up.   EMS transferred her to [facility] ER.  Arthritis     BILAT    CAD (coronary artery disease)     Closed fracture of head of left femur ([facility] [address] 4/27/2019    Last Assessment & Plan:  S/p left hip arthroplasty Assessment:  Pain well  controlled  PLAN:  Pain control with bowel regimen Ortho  following, going to SNF    COPD (chronic obstructive pulmonary disease) ([address]     Hip joint replacement by other means 5/6/2011    Hypertension     Left displaced femoral neck fracture ([address] 5/1/2019    Lower leg edema     BILAT     Lung disease     Osteoarthritis     Pacemaker 2009    FOR SYNCOPE    [facility]     Paroxysmal SVT (supraventricular tachycardia) ([facility] [address]     Pneumonia     Sinusitis      Past Surgical History:   Procedure Laterality Date    CARDIAC PACEMAKER PLACEMENT      HIP FRACTURE SURGERY Bilateral     PACEMAKER INSERTION N/A 2009     Social History     Socioeconomic History    Marital status:       Spouse name: Not on file    Number of children: Not on file    Years of education: Not on file    Highest education level: Not on file   Occupational History    Not on file   Social Needs    Financial resource strain: Not on file    Food insecurity     Worry: Not on file     Inability: Not on file    Transportation needs     Medical: Not on file     Non-medical: Not on file   Tobacco Use    Smoking status: Never Smoker    Smokeless tobacco: Never Used   Substance and Sexual Activity    Alcohol use: Not on file    Drug use: No    Sexual activity: Not on file   Lifestyle    Physical activity     Days per week: 0 days     Minutes per session: 0 min    Stress: Not at all   Relationships    Social connections     Talks on phone: Once a week     Gets together: More than three times a week     Attends Hoahaoism service: Not on file     Active member of club or organization: No     Attends meetings of clubs or organizations: Never     Relationship status: Not on file    Intimate partner violence     Fear of current or ex partner: Not on file     Emotionally abused: Not on file     Physically abused: Not on file     Forced sexual activity: Not on file   Other Topics Concern    Not on file   Social History Narrative         Lives With: Daughter    Type of Home: House    Home Layout: One level    Home Access: Stairs to enter with rails    Entrance Stairs - Number of Steps: 3    Entrance Stairs - Rails: None    Bathroom Shower/Tub: Tub/Shower unit, Shower chair with back    Home Equipment: Rosalita Batter, Standard walker, Reacher, Sock aid, Long-handled shoehorn    ADL Assistance: Independent    Homemaking Assistance: Independent    Ambulation Assistance: Independent    Transfer Assistance: Independent    Active : Yes    Mode of Transportation: Car    Occupation: Retired    Type of occupation: Domestic     Leisure & Hobbies: Reading, sports, outdoor acts and being active     Family History   Problem Relation Age of Onset    Cancer Mother     No Known Problems Father      Allergies   Allergen Reactions    Codeine      Other reaction(s): GI Upset    Penicillins      Other reaction(s):  Intolerance     Current Outpatient Medications   Medication Sig Dispense Refill    alendronate (FOSAMAX) 70 MG tablet Take 1 tablet by mouth every 7 days 12 tablet 4    doxycycline hyclate (VIBRA-TABS) 100 MG tablet Take 1 tablet by mouth 2 times daily for 10 days 20 tablet 0    fluticasone (FLONASE) 50 MCG/ACT nasal spray 2 sprays by Nasal route daily 1 Bottle 1    albuterol (PROVENTIL) (2.5 MG/3ML) 0.083% nebulizer solution Take 3 mLs by nebulization every 6 hours as needed for Wheezing 120 each 5    sotalol (BETAPACE) 80 MG tablet Take 1 tablet by mouth 2 times daily 180 tablet 2    Nebulizers (COMPRESSOR/NEBULIZER) MISC Nebulizer Supplies 1 each 1    Calcium 600-400 MG-UNIT CHEW Take 1 tablet by mouth 2 times daily 180 tablet 4    atorvastatin (LIPITOR) 20 MG tablet Take 1 tablet by mouth daily 90 tablet 4    aspirin 81 MG tablet Take 1 tablet by mouth daily With Food 30 tablet 3     No current facility-administered medications for this visit. PMH, Surgical Hx, Family Hx, and Social Hxreviewed and updated. Health Maintenance reviewed. Objective    Vitals:    09/10/20 0949   BP: 122/64   Site: Right Upper Arm   Position: Sitting   Cuff Size: Medium Adult   Pulse: 69   Resp: 18   Temp: 97.9 °F (36.6 °C)   TempSrc: Tympanic   SpO2: 99%   Weight: 156 lb (70.8 kg)   Height: 5' 6\" (1.676 m)        Physical Exam  Constitutional:       Appearance: She is well-developed. HENT:      Head: Normocephalic and atraumatic. Right Ear: Ear canal and external ear normal.      Left Ear: Tympanic membrane, ear canal and external ear normal.      Ears:      Comments: Mild bulging, sharp light reflex     Nose:      Comments: Diffuse sinus TTP  Eyes:      Conjunctiva/sclera: Conjunctivae normal.   Neck:      Musculoskeletal: Neck supple. Thyroid: No thyromegaly. Vascular: No carotid bruit. Cardiovascular:      Rate and Rhythm: Normal rate and regular rhythm. Heart sounds: S1 normal and S2 normal.   Pulmonary:      Effort: Pulmonary effort is normal.      Breath sounds: No wheezing or rales. Musculoskeletal:      Right lower leg: No edema. Left lower leg: No edema. Lymphadenopathy:      Cervical: No cervical adenopathy. Skin:     General: Skin is warm and dry. Neurological:      Mental Status: She is alert and oriented to person, place, and time. Psychiatric:         Mood and Affect: Mood normal.         Behavior: Behavior normal.         Thought Content:  Thought content normal.         Judgment: Judgment normal.           Lab Results   Component Value Date    LABA1C 5.2 05/05/2019     Lab Results   Component Value Date    CREATININE 0.69 01/02/2020     Lab Results   Component Value Date    ALT 10 01/02/2020    AST 18 01/02/2020     Lab Results   Component Value Date    CHOL 168 01/02/2020    TRIG 61 01/02/2020    HDL 63 (H) 01/02/2020    LDLCALC 93 01/02/2020        Assessment & Plan   Visit Diagnoses and Associated Orders     Paroxysmal SVT (supraventricular tachycardia) (HCC)    -  Primary    Stable and well-controlled with sotalol         Pulmonary hypertension (HCC)        stable and well-controlled          Sick sinus syndrome (HCC)        stable and well-controlled with pacer         Essential hypertension        stable and well-controlled with sotalol    Comprehensive Metabolic Panel [[id number] Custom]   - Future Order    CBC With Auto Differential [[id number] Custom]   - Future Order         Coronary artery disease involving native coronary artery of native heart without angina pectoris        stable and well-controlled on atorvastatin and asa         Chronic obstructive pulmonary disease, unspecified COPD type ([facility] [address]        stable and well-controlled on albuterol PRN         Age-related osteoporosis without current pathological fracture        never received Fosamax. Re-prescribed    alendronate (FOSAMAX) 70 MG tablet [[id number]]           Age-related osteoporosis without current pathological fracture        Add Fosamax 70 mg weekly and calcium with vitamin D twice daily. Reassess January 2022    alendronate (FOSAMAX) 70 MG tablet [[id number]]           Acute bacterial sinusitis        Doxycycline and flonase    doxycycline hyclate (VIBRA-TABS) 100 MG tablet [[id number]]      fluticasone (FLONASE) 50 MCG/ACT nasal spray [[id number]]                   Reviewed with the patient: all disease processes, current clinical status, medications, activities and diet.      Side effects, adverse effects of the medication prescribed today, as well as treatment plan/ rationale and result expectations have been discussed with the patient who expresses understanding and desires to proceed.     Close follow up to evaluate treatment results and for coordination of care.   I have reviewed the patient's medical history in detail and updated the computerized patient record. Orders Placed This Encounter   Procedures    Comprehensive Metabolic Panel     Standing Status:   Future     Standing Expiration Date:   9/10/2021    CBC With Auto Differential     Standing Status:   Future     Standing Expiration Date:   9/10/2021     Orders Placed This Encounter   Medications    alendronate (FOSAMAX) 70 MG tablet     Sig: Take 1 tablet by mouth every 7 days     Dispense:  12 tablet     Refill:  4    doxycycline hyclate (VIBRA-TABS) 100 MG tablet     Sig: Take 1 tablet by mouth 2 times daily for 10 days     Dispense:  20 tablet     Refill:  0    fluticasone (FLONASE) 50 MCG/ACT nasal spray     Si sprays by Nasal route daily     Dispense:  1 Bottle     Refill:  1     Medications Discontinued During This Encounter   Medication Reason    albuterol (PROVENTIL) (2.5 MG/3ML) 0.083% nebulizer solution LIST CLEANUP    Respiratory Therapy Supplies (NEBULIZER AIR TUBE/PLUGS) MISC LIST CLEANUP    verapamil (CALAN SR) 180 MG extended release tablet LIST CLEANUP    alendronate (FOSAMAX) 70 MG tablet REORDER     Return in about 3 months (around 12/10/2020) for HTN, CAD, COPD. Controlled Substance Monitoring:    Acute and Chronic Pain Monitoring:   No flowsheet data found.         Suleman Reed MD

## 2020-10-29 ENCOUNTER — OFFICE VISIT (OUTPATIENT)
Dept: PULMONOLOGY | Age: 85
End: 2020-10-29
Payer: MEDICARE

## 2020-10-29 VITALS
TEMPERATURE: 96.4 F | DIASTOLIC BLOOD PRESSURE: 74 MMHG | HEART RATE: 70 BPM | BODY MASS INDEX: 25.55 KG/M2 | SYSTOLIC BLOOD PRESSURE: 120 MMHG | WEIGHT: 159 LBS | RESPIRATION RATE: 16 BRPM | HEIGHT: 66 IN | OXYGEN SATURATION: 98 %

## 2020-10-29 PROCEDURE — G8417 CALC BMI ABV UP PARAM F/U: HCPCS | Performed by: INTERNAL MEDICINE

## 2020-10-29 PROCEDURE — 99214 OFFICE O/P EST MOD 30 MIN: CPT | Performed by: INTERNAL MEDICINE

## 2020-10-29 PROCEDURE — G8484 FLU IMMUNIZE NO ADMIN: HCPCS | Performed by: INTERNAL MEDICINE

## 2020-10-29 PROCEDURE — 1090F PRES/ABSN URINE INCON ASSESS: CPT | Performed by: INTERNAL MEDICINE

## 2020-10-29 PROCEDURE — 4040F PNEUMOC VAC/ADMIN/RCVD: CPT | Performed by: INTERNAL MEDICINE

## 2020-10-29 PROCEDURE — G8427 DOCREV CUR MEDS BY ELIG CLIN: HCPCS | Performed by: INTERNAL MEDICINE

## 2020-10-29 PROCEDURE — G8926 SPIRO NO PERF OR DOC: HCPCS | Performed by: INTERNAL MEDICINE

## 2020-10-29 PROCEDURE — 1036F TOBACCO NON-USER: CPT | Performed by: INTERNAL MEDICINE

## 2020-10-29 PROCEDURE — 3023F SPIROM DOC REV: CPT | Performed by: INTERNAL MEDICINE

## 2020-10-29 PROCEDURE — 1123F ACP DISCUSS/DSCN MKR DOCD: CPT | Performed by: INTERNAL MEDICINE

## 2020-10-29 ASSESSMENT — ENCOUNTER SYMPTOMS
ABDOMINAL PAIN: 0
WHEEZING: 1
EYE DISCHARGE: 0
DIARRHEA: 0
CHEST TIGHTNESS: 0
NAUSEA: 0
TROUBLE SWALLOWING: 0
COUGH: 1
SINUS PRESSURE: 0
SORE THROAT: 0
SHORTNESS OF BREATH: 1
VOICE CHANGE: 0
RHINORRHEA: 0
VOMITING: 0
EYE ITCHING: 0

## 2020-10-29 NOTE — PROGRESS NOTES
Subjective:     Sami Barreto is a 80 y.o. female who complains today of:     Chief Complaint   Patient presents with    Follow-up     three month f/u for COPD. HPI  C/o shortness of breath , worse with exertion. Off and on Wheezing   C/o Cough with white to yellow  Sputum  No Hemoptysis  No Chest tightness   No Chest pain with radiation  or pleuritic pain  No Fever or chills. No Rhinorrhea and postnasal drip. Using bronchodilator with nebulizer with albuterol . CXR done in 3/20 TRACE/SMALL LEFT PLEURAL EFFUSION SUPERIMPOSED UPON COPD. She had PFT in past show severe COPD in 2018     Allergies:  Codeine and Penicillins  Past Medical History:   Diagnosis Date    Abnormality of gait and mobility due to Left femoral neck fracture S/P Left Hip Hemiarthroplasty. [facility] admit 05/01/19. 5/6/2019    This is an 80year old female with a medical history significant for COPD, SSS S/P PPM, paroxysmal SVT, HTN who was sent to [facility] from [facility] for further evaluation and management of left femoral neck fracture S/P fall. She states she was walking out of her bathroom when she tripped and fell. She denies any loss of consciousness and she was unable to get up. EMS transferred her to [facility] ER.       Arthritis     BILAT    CAD (coronary artery disease)     Closed fracture of head of left femur ([facility] [address] 4/27/2019    Last Assessment & Plan:  S/p left hip arthroplasty Assessment:  Pain well  controlled  PLAN:  Pain control with bowel regimen Ortho  following, going to SNF    COPD (chronic obstructive pulmonary disease) ([address]     Hip joint replacement by other means 5/6/2011    Hypertension     Left displaced femoral neck fracture ([address] 5/1/2019    Lower leg edema     BILAT     Lung disease     Osteoarthritis     Pacemaker 2009    FOR SYNCOPE    Paroxysmal SVT (supraventricular tachycardia) (HCC)     Paroxysmal SVT (supraventricular tachycardia) (HCC)     Pneumonia     Sinusitis      Past Surgical History:   Procedure Laterality Date    CARDIAC PACEMAKER PLACEMENT      HIP FRACTURE SURGERY Bilateral     PACEMAKER INSERTION N/A 2009     Family History   Problem Relation Age of Onset    Cancer Mother     No Known Problems Father      Social History     Socioeconomic History    Marital status:       Spouse name: Not on file    Number of children: Not on file    Years of education: Not on file    Highest education level: Not on file   Occupational History    Not on file   Social Needs    Financial resource strain: Not on file    Food insecurity     Worry: Not on file     Inability: Not on file    Transportation needs     Medical: Not on file     Non-medical: Not on file   Tobacco Use    Smoking status: Never Smoker    Smokeless tobacco: Never Used   Substance and Sexual Activity    Alcohol use: Not on file    Drug use: No    Sexual activity: Not on file   Lifestyle    Physical activity     Days per week: 0 days     Minutes per session: 0 min    Stress: Not at all   Relationships    Social connections     Talks on phone: Once a week     Gets together: More than three times a week     Attends Latter day service: Not on file     Active member of club or organization: No     Attends meetings of clubs or organizations: Never     Relationship status: Not on file    Intimate partner violence     Fear of current or ex partner: Not on file     Emotionally abused: Not on file     Physically abused: Not on file     Forced sexual activity: Not on file   Other Topics Concern    Not on file   Social History Narrative         Lives With: Daughter    Type of Home: House    Home Layout: One level    Home Access: Stairs to enter with rails    Entrance Stairs - Number of Steps: 3    Entrance Stairs - Rails: None    Bathroom Shower/Tub: Tub/Shower unit, Shower chair with back    Home Equipment: Cane, Standard walker, Reacher, Sock aid, Long-handled shoehorn    ADL Assistance: Independent No tracheal deviation. Cardiovascular:      Rate and Rhythm: Normal rate and regular rhythm. Heart sounds: No murmur. No friction rub. No gallop. Pulmonary:      Effort: No respiratory distress. Breath sounds: No wheezing or rales. Comments: diminished Breath sound bilaterally. Chest:      Chest wall: No tenderness. Abdominal:      General: There is no distension. Tenderness: There is no abdominal tenderness. There is no rebound. Musculoskeletal: Normal range of motion. Lymphadenopathy:      Cervical: No cervical adenopathy. Skin:     General: Skin is warm and dry. Neurological:      Mental Status: She is alert and oriented to person, place, and time. Coordination: Coordination normal.         Current Outpatient Medications   Medication Sig Dispense Refill    alendronate (FOSAMAX) 70 MG tablet Take 1 tablet by mouth every 7 days 12 tablet 4    fluticasone (FLONASE) 50 MCG/ACT nasal spray 2 sprays by Nasal route daily 1 Bottle 1    sotalol (BETAPACE) 80 MG tablet Take 1 tablet by mouth 2 times daily 180 tablet 2    Nebulizers (COMPRESSOR/NEBULIZER) MISC Nebulizer Supplies 1 each 1    Calcium 600-400 MG-UNIT CHEW Take 1 tablet by mouth 2 times daily 180 tablet 4    atorvastatin (LIPITOR) 20 MG tablet Take 1 tablet by mouth daily 90 tablet 4    aspirin 81 MG tablet Take 1 tablet by mouth daily With Food 30 tablet 3    albuterol (PROVENTIL) (2.5 MG/3ML) 0.083% nebulizer solution Take 3 mLs by nebulization every 6 hours as needed for Wheezing 120 each 5     No current facility-administered medications for this visit. Results for orders placed during the hospital encounter of 03/11/20   XR CHEST STANDARD (2 VW)    Narrative EXAMINATION: XR CHEST (2 VW)     CLINICAL HISTORY: R06.02 SOB (shortness of breath) ICD10    COMPARISONS: April 27, 2019     FINDINGS:    Two views of the chest are submitted. The cardiac silhouette is of normal size configuration.     Pulmonary

## 2020-11-13 ENCOUNTER — OFFICE VISIT (OUTPATIENT)
Dept: CARDIOLOGY CLINIC | Age: 85
End: 2020-11-13
Payer: MEDICARE

## 2020-11-13 VITALS
HEIGHT: 66 IN | DIASTOLIC BLOOD PRESSURE: 72 MMHG | WEIGHT: 158 LBS | HEART RATE: 69 BPM | BODY MASS INDEX: 25.39 KG/M2 | OXYGEN SATURATION: 98 % | SYSTOLIC BLOOD PRESSURE: 126 MMHG | RESPIRATION RATE: 18 BRPM

## 2020-11-13 PROCEDURE — 99214 OFFICE O/P EST MOD 30 MIN: CPT | Performed by: INTERNAL MEDICINE

## 2020-11-13 PROCEDURE — 1090F PRES/ABSN URINE INCON ASSESS: CPT | Performed by: INTERNAL MEDICINE

## 2020-11-13 PROCEDURE — 4040F PNEUMOC VAC/ADMIN/RCVD: CPT | Performed by: INTERNAL MEDICINE

## 2020-11-13 PROCEDURE — 1123F ACP DISCUSS/DSCN MKR DOCD: CPT | Performed by: INTERNAL MEDICINE

## 2020-11-13 PROCEDURE — 1036F TOBACCO NON-USER: CPT | Performed by: INTERNAL MEDICINE

## 2020-11-13 PROCEDURE — 93000 ELECTROCARDIOGRAM COMPLETE: CPT | Performed by: INTERNAL MEDICINE

## 2020-11-13 PROCEDURE — G8484 FLU IMMUNIZE NO ADMIN: HCPCS | Performed by: INTERNAL MEDICINE

## 2020-11-13 PROCEDURE — G8427 DOCREV CUR MEDS BY ELIG CLIN: HCPCS | Performed by: INTERNAL MEDICINE

## 2020-11-13 PROCEDURE — G8417 CALC BMI ABV UP PARAM F/U: HCPCS | Performed by: INTERNAL MEDICINE

## 2020-11-13 ASSESSMENT — ENCOUNTER SYMPTOMS
BACK PAIN: 1
GASTROINTESTINAL NEGATIVE: 1
CHEST TIGHTNESS: 0
RESPIRATORY NEGATIVE: 1
EYES NEGATIVE: 1
SHORTNESS OF BREATH: 0
STRIDOR: 0
COUGH: 0
BLOOD IN STOOL: 0
NAUSEA: 0
WHEEZING: 0

## 2020-11-13 NOTE — PROGRESS NOTES
Subsequent Progress Note  Patient: Parker Leslie  YOB: 1933  MRN: [id number]    Chief Complaint:AR CAD HTN ppm svt recent L hip sx  Chief Complaint   Patient presents with    Follow-up     4 month    Hypertension    Coronary Artery Disease    Tachycardia     PSVT    Shortness of Breath     chronic issue       CV Data:  12/2017 spect negative  12/2017 echo ef 60% Trace AR  9/2018 LAD mid 50% near a small diagonal. Cors all calcified EF 55 EDP 15  4/2019 LTHA   6/2019 CUS mild  PPM    Subjective/HPI: no cp no sob no further falls no bleed. Takes meds     11/22/2019 no cp no osb no falls no bleed. Can't walk well    7/13/2020 no cp no sob no falls   No bleed. No falls. No bleed. Eats well.     11/13/2020 no cp no sob no falls no bleed no palp    EKG: SR79 QTc 449    Past Medical History:   Diagnosis Date    Abnormality of gait and mobility due to Left femoral neck fracture S/P Left Hip Hemiarthroplasty. [facility] admit 05/01/19. 5/6/2019    This is an 80year old female with a medical history significant for COPD, SSS S/P PPM, paroxysmal SVT, HTN who was sent to [facility] from [facility] for further evaluation and management of left femoral neck fracture S/P fall. She states she was walking out of her bathroom when she tripped and fell. She denies any loss of consciousness and she was unable to get up. EMS transferred her to [facility] ER.       Arthritis     BILAT    CAD (coronary artery disease)     Closed fracture of head of left femur ([facility] 4/27/2019    Last Assessment & Plan:  S/p left hip arthroplasty Assessment:  Pain well  controlled  PLAN:  Pain control with bowel regimen Ortho  following, going to SNF    COPD (chronic obstructive pulmonary disease) ([facility]     Hip joint replacement by other means 5/6/2011    Hypertension     Left displaced femoral neck fracture ([facility] 5/1/2019    Lower leg edema     BILAT     Lung disease     Osteoarthritis     Pacemaker 2009    FOR SYNCOPE    Paroxysmal SVT (supraventricular tachycardia) (HCC)     Paroxysmal SVT (supraventricular tachycardia) (HCC)     Pneumonia     Sinusitis        Past Surgical History:   Procedure Laterality Date    CARDIAC PACEMAKER PLACEMENT      HIP FRACTURE SURGERY Bilateral     PACEMAKER INSERTION N/A 2009       Family History   Problem Relation Age of Onset    Cancer Mother     No Known Problems Father        Social History     Socioeconomic History    Marital status:       Spouse name: None    Number of children: None    Years of education: None    Highest education level: None   Occupational History    None   Social Needs    Financial resource strain: None    Food insecurity     Worry: None     Inability: None    Transportation needs     Medical: None     Non-medical: None   Tobacco Use    Smoking status: Never Smoker    Smokeless tobacco: Never Used   Substance and Sexual Activity    Alcohol use: None    Drug use: No    Sexual activity: None   Lifestyle    Physical activity     Days per week: 0 days     Minutes per session: 0 min    Stress: Not at all   Relationships    Social connections     Talks on phone: Once a week     Gets together: More than three times a week     Attends Hindu service: None     Active member of club or organization: No     Attends meetings of clubs or organizations: Never     Relationship status: None    Intimate partner violence     Fear of current or ex partner: None     Emotionally abused: None     Physically abused: None     Forced sexual activity: None   Other Topics Concern    None   Social History Narrative         Lives With: Daughter    Type of Home: House    Home Layout: One level    Home Access: Stairs to enter with rails    Entrance Stairs - Number of Steps: 3    Entrance Stairs - Rails: None    Bathroom Shower/Tub: Tub/Shower unit, Shower chair with back    Home Equipment: Cane, Standard walker, Reacher, Sock aid, Long-handled shoehorn    ADL Assistance: Independent    Homemaking Assistance: Independent    Ambulation Assistance: Independent    Transfer Assistance: Independent    Active : Yes    Mode of Transportation: Car    Occupation: Retired    Type of occupation: Domestic     Leisure & Hobbies: Reading, sports, outdoor acts and being active       Allergies   Allergen Reactions    Codeine      Other reaction(s): GI Upset    Penicillins      Other reaction(s): Intolerance       Current Outpatient Medications   Medication Sig Dispense Refill    verapamil (CALAN SR) 180 MG extended release tablet Take 1 tablet by mouth daily      sotalol (BETAPACE) 80 MG tablet Take 1 tablet by mouth 2 times daily 180 tablet 2    atorvastatin (LIPITOR) 20 MG tablet Take 1 tablet by mouth daily 90 tablet 4    aspirin 81 MG tablet Take 1 tablet by mouth daily With Food 30 tablet 3    albuterol (PROVENTIL) (2.5 MG/3ML) 0.083% nebulizer solution Take 3 mLs by nebulization every 6 hours as needed for Wheezing 120 each 5    Nebulizers (COMPRESSOR/NEBULIZER) MISC Nebulizer Supplies 1 each 1     No current facility-administered medications for this visit. Review of Systems:   Review of Systems   Constitutional: Negative. Negative for diaphoresis and fatigue. HENT: Negative. Eyes: Negative. Respiratory: Negative. Negative for cough, chest tightness, shortness of breath, wheezing and stridor. Cardiovascular: Negative. Negative for chest pain, palpitations and leg swelling. Gastrointestinal: Negative. Negative for blood in stool and nausea. Genitourinary: Negative. Musculoskeletal: Positive for arthralgias, back pain and gait problem. Skin: Negative. Neurological: Negative for dizziness, syncope, weakness and light-headedness. Hematological: Negative. Psychiatric/Behavioral: Negative.           Physical Examination:    /72 (Site: Left Upper Arm, Position: Sitting, Cuff Size: Medium Adult)   Pulse 69   Resp 18   Ht 01/02/2020    GFRAA >60.0 01/02/2020    LABGLOM >60.0 01/02/2020    GLUCOSE 83 01/02/2020    GLUCOSE 93 01/03/2012    PROT 7.6 01/02/2020    LABALBU 4.2 01/02/2020    LABALBU 4.0 01/03/2012    CALCIUM 9.3 01/02/2020    BILITOT 0.4 01/02/2020    ALKPHOS 74 01/02/2020    AST 18 01/02/2020    ALT 10 01/02/2020     BMP:    Lab Results   Component Value Date     01/02/2020    K 3.9 01/02/2020    K 4.4 05/02/2019     01/02/2020    CO2 26 01/02/2020    BUN 24 01/02/2020    LABALBU 4.2 01/02/2020    LABALBU 4.0 01/03/2012    CREATININE 0.69 01/02/2020    CALCIUM 9.3 01/02/2020    GFRAA >60.0 01/02/2020    LABGLOM >60.0 01/02/2020    GLUCOSE 83 01/02/2020    GLUCOSE 93 01/03/2012     Magnesium:    Lab Results   Component Value Date    MG 2.2 05/05/2019    MG 2.0 01/03/2012     TSH:  Lab Results   Component Value Date    TSH 2.450 01/02/2020       Patient Active Problem List   Diagnosis    Paroxysmal SVT (supraventricular tachycardia) (HCC)    Pacemaker    Sick sinus syndrome (HCC)    Essential hypertension    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)    Chronic obstructive pulmonary disease (HCC)    Hx of fracture of femur    BMI 26.0-26.9,adult    Visual disturbance    Coronary artery disease involving native coronary artery of native heart without angina pectoris    Pulmonary hypertension ([facility] [address]    Unilateral inguinal hernia without obstruction or gangrene    Edema of both legs    Age-related osteoporosis without current pathological fracture    Shortness of breath    Acute bacterial sinusitis       Medications Discontinued During This Encounter   Medication Reason    alendronate (FOSAMAX) 70 MG tablet LIST CLEANUP    Calcium 600-400 MG-UNIT CHEW LIST CLEANUP    fluticasone (FLONASE) 50 MCG/ACT nasal spray LIST CLEANUP       Modified Medications    No medications on file       No orders of the defined types were placed in this encounter. Assessment/Plan:    1. Paroxysmal SVT (supraventricular tachycardia) (HCC)   stable QTc - on Sotalol.   - EKG 12 lead    2. Pacemaker   interrogate - reviewed. -interrogate  - EKG 12 lead    3. Essential hypertension   stable   - EKG 12 lead    4. Coronary artery disease involving native coronary artery of native heart without angina pectoris   no angina  - EKG 12 lead    5. AR- need echo   Counseling:  Heart Healthy Lifestyle, Low Salt Diet, Take Precautions to Prevent Falls and Walk Daily    Return in about 4 months (around 3/13/2021).       Electronically signed by Momo Rebolledo MD on 11/13/2020 at 2:18 PM

## 2020-12-02 NOTE — DISCHARGE INSTR - ACTIVITY
Denny Johnson, RN  Care Coordinator   [facility]          You may shower in AM, remove dressing from R wrist after showering. No driving for 24 hours.

## 2020-12-03 ENCOUNTER — HOSPITAL ENCOUNTER (OUTPATIENT)
Dept: CARDIOLOGY | Age: 85
Discharge: HOME | End: 2020-12-03
Payer: MEDICARE

## 2020-12-03 PROCEDURE — 93293 PM PHONE R-STRIP DEVICE EVAL: CPT

## 2020-12-07 ENCOUNTER — HOSPITAL ENCOUNTER (OUTPATIENT)
Dept: NON INVASIVE DIAGNOSTICS | Age: 85
Discharge: HOME | End: 2020-12-07
Payer: MEDICARE

## 2020-12-07 LAB
LV EF: 60 %
LVEF MODALITY: NORMAL

## 2020-12-07 PROCEDURE — 93306 TTE W/DOPPLER COMPLETE: CPT

## 2021-02-02 ENCOUNTER — HOSPITAL ENCOUNTER (OUTPATIENT)
Dept: CARDIOLOGY | Age: 86
Discharge: HOME | End: 2021-02-02
Payer: MEDICARE

## 2021-02-02 PROCEDURE — 93293 PM PHONE R-STRIP DEVICE EVAL: CPT

## 2021-02-22 ENCOUNTER — OFFICE VISIT (OUTPATIENT)
Dept: PULMONOLOGY | Age: 86
End: 2021-02-22
Payer: MEDICARE

## 2021-02-22 VITALS
SYSTOLIC BLOOD PRESSURE: 126 MMHG | WEIGHT: 164 LBS | RESPIRATION RATE: 16 BRPM | DIASTOLIC BLOOD PRESSURE: 72 MMHG | BODY MASS INDEX: 26.36 KG/M2 | HEART RATE: 64 BPM | OXYGEN SATURATION: 97 % | TEMPERATURE: 97 F | HEIGHT: 66 IN

## 2021-02-22 DIAGNOSIS — J90 PLEURAL EFFUSION ON LEFT: ICD-10-CM

## 2021-02-22 DIAGNOSIS — R06.02 SHORTNESS OF BREATH: ICD-10-CM

## 2021-02-22 DIAGNOSIS — J44.9 CHRONIC OBSTRUCTIVE PULMONARY DISEASE, UNSPECIFIED COPD TYPE (HCC): Primary | ICD-10-CM

## 2021-02-22 PROCEDURE — 1036F TOBACCO NON-USER: CPT | Performed by: INTERNAL MEDICINE

## 2021-02-22 PROCEDURE — 4040F PNEUMOC VAC/ADMIN/RCVD: CPT | Performed by: INTERNAL MEDICINE

## 2021-02-22 PROCEDURE — 1090F PRES/ABSN URINE INCON ASSESS: CPT | Performed by: INTERNAL MEDICINE

## 2021-02-22 PROCEDURE — 99214 OFFICE O/P EST MOD 30 MIN: CPT | Performed by: INTERNAL MEDICINE

## 2021-02-22 PROCEDURE — G8417 CALC BMI ABV UP PARAM F/U: HCPCS | Performed by: INTERNAL MEDICINE

## 2021-02-22 PROCEDURE — G8926 SPIRO NO PERF OR DOC: HCPCS | Performed by: INTERNAL MEDICINE

## 2021-02-22 PROCEDURE — G8427 DOCREV CUR MEDS BY ELIG CLIN: HCPCS | Performed by: INTERNAL MEDICINE

## 2021-02-22 PROCEDURE — 1123F ACP DISCUSS/DSCN MKR DOCD: CPT | Performed by: INTERNAL MEDICINE

## 2021-02-22 PROCEDURE — G8484 FLU IMMUNIZE NO ADMIN: HCPCS | Performed by: INTERNAL MEDICINE

## 2021-02-22 PROCEDURE — 3023F SPIROM DOC REV: CPT | Performed by: INTERNAL MEDICINE

## 2021-02-22 ASSESSMENT — ENCOUNTER SYMPTOMS
VOICE CHANGE: 0
SINUS PRESSURE: 0
DIARRHEA: 0
WHEEZING: 1
EYE ITCHING: 0
EYE DISCHARGE: 0
SHORTNESS OF BREATH: 1
COUGH: 0
TROUBLE SWALLOWING: 0
CHEST TIGHTNESS: 0
ABDOMINAL PAIN: 0
RHINORRHEA: 1
NAUSEA: 0
VOMITING: 0
SORE THROAT: 0

## 2021-02-22 NOTE — PROGRESS NOTES
Subjective:     Shonna Alonso is a 80 y.o. female who complains today of:     Chief Complaint   Patient presents with    Follow-up     three month f/u for COPD. HPI  She is on bronchodilator with nebulizer with albuterol  prn   She had PFT in past show severe COPD in 2018   C/o shortness of breath with exertion. C/o Wheezing , on and off   C/o Cough with white to yellow  Sputum  No Hemoptysis  No Chest tightness   No Chest pain with radiation  or pleuritic pain  No Fever or chills. C/o Rhinorrhea and postnasal drip. Allergies:  Codeine and Penicillins  Past Medical History:   Diagnosis Date    Abnormality of gait and mobility due to Left femoral neck fracture S/P Left Hip Hemiarthroplasty. [facility] admit 05/01/19. 5/6/2019    This is an 80year old female with a medical history significant for COPD, SSS S/P PPM, paroxysmal SVT, HTN who was sent to [facility] from [address] [place] [address] for further evaluation and management of left femoral neck fracture S/P fall. She states she was walking out of her bathroom when she tripped and fell. She denies any loss of consciousness and she was unable to get up. EMS transferred her to [address] [place] [address] ER.       Arthritis     BILAT    CAD (coronary artery disease)     Closed fracture of head of left femur ([facility] 4/27/2019    Last Assessment & Plan:  S/p left hip arthroplasty Assessment:  Pain well  controlled  PLAN:  Pain control with bowel regimen Ortho  following, going to SNF    COPD (chronic obstructive pulmonary disease) ([facility]     Hip joint replacement by other means 5/6/2011    Hypertension     Left displaced femoral neck fracture ([address] 5/1/2019    Lower leg edema     BILAT     Lung disease     Osteoarthritis     Pacemaker 2009    FOR SYNCOPE    Paroxysmal SVT (supraventricular tachycardia) (HCC)     Paroxysmal SVT (supraventricular tachycardia) (HCC)     Pneumonia     Sinusitis      Past Surgical History:   Procedure Laterality Date  CARDIAC PACEMAKER PLACEMENT      HIP FRACTURE SURGERY Bilateral     PACEMAKER INSERTION N/A 2009     Family History   Problem Relation Age of Onset    Cancer Mother     No Known Problems Father      Social History     Socioeconomic History    Marital status:       Spouse name: Not on file    Number of children: Not on file    Years of education: Not on file    Highest education level: Not on file   Occupational History    Not on file   Social Needs    Financial resource strain: Not on file    Food insecurity     Worry: Not on file     Inability: Not on file    Transportation needs     Medical: Not on file     Non-medical: Not on file   Tobacco Use    Smoking status: Never Smoker    Smokeless tobacco: Never Used   Substance and Sexual Activity    Alcohol use: Not on file    Drug use: No    Sexual activity: Not on file   Lifestyle    Physical activity     Days per week: 0 days     Minutes per session: 0 min    Stress: Not at all   Relationships    Social connections     Talks on phone: Once a week     Gets together: More than three times a week     Attends Episcopal service: Not on file     Active member of club or organization: No     Attends meetings of clubs or organizations: Never     Relationship status: Not on file    Intimate partner violence     Fear of current or ex partner: Not on file     Emotionally abused: Not on file     Physically abused: Not on file     Forced sexual activity: Not on file   Other Topics Concern    Not on file   Social History Narrative         Lives With: Daughter    Type of Home: House    Home Layout: One level    Home Access: Stairs to enter with rails    Entrance Stairs - Number of Steps: 3    Entrance Stairs - Rails: None    Bathroom Shower/Tub: Tub/Shower unit, Shower chair with back    Home Equipment: Cane, Standard walker, Reacher, Sock aid, Long-handled shoehorn    ADL Assistance: [facility]: Independent Ambulation Assistance: Independent    Transfer Assistance: Independent    Active : Yes    Mode of Transportation: Car    Occupation: Retired    Type of occupation: Domestic     Leisure & Hobbies: Reading, sports, outdoor acts and being active         Review of Systems   Constitutional: Negative for chills, diaphoresis, fatigue and fever. HENT: Positive for postnasal drip and rhinorrhea. Negative for congestion, mouth sores, nosebleeds, sinus pressure, sneezing, sore throat, trouble swallowing and voice change. Eyes: Negative for discharge, itching and visual disturbance. Respiratory: Positive for shortness of breath and wheezing. Negative for cough and chest tightness. Cardiovascular: Negative for chest pain, palpitations and leg swelling. Gastrointestinal: Negative for abdominal pain, diarrhea, nausea and vomiting. Genitourinary: Negative for difficulty urinating and hematuria. Musculoskeletal: Negative for arthralgias, joint swelling and myalgias. Skin: Negative for rash. Allergic/Immunologic: Negative for environmental allergies and food allergies. Neurological: Negative for dizziness, tremors, weakness and headaches. Psychiatric/Behavioral: Negative for behavioral problems and sleep disturbance.         :     Vitals:    02/22/21 0951   BP: 126/72   Pulse: 64   Resp: 16   Temp: 97 °F (36.1 °C)   TempSrc: Tympanic   SpO2: 97%   Weight: 164 lb (74.4 kg)   Height: 5' 6\" (1.676 m)     Wt Readings from Last 3 Encounters:   02/22/21 164 lb (74.4 kg)   11/13/20 158 lb (71.7 kg)   10/29/20 159 lb (72.1 kg)         Physical Exam  Constitutional:       General: She is not in acute distress. Appearance: She is well-developed. She is not diaphoretic. HENT:      Head: Normocephalic and atraumatic. Nose: Nose normal.   Eyes:      Pupils: Pupils are equal, round, and reactive to light. Neck:      Thyroid: No thyromegaly. Vascular: No JVD. Impression TRACE/SMALL LEFT PLEURAL EFFUSION SUPERIMPOSED UPON COPD.    ]  Results for orders placed during the hospital encounter of 04/27/19   XR CHEST PORTABLE    Narrative EXAMINATION: XR CHEST PORTABLE    CLINICAL HISTORY: STUMBLED AND FELL TO FLOOR. NO LOSS OF CONSCIOUSNESS    COMPARISONS: None available. FINDINGS: Pacemaker generator overlying left axilla with pacemaker lead tips in region of right atrium and right ventricle. Cardiopericardial silhouette is normal in size. Aorta is calcified. Left diaphragm elevated. Mild blunting costophrenic angles. Calcified granuloma right upper lung. Impression SMALL BILATERAL PLEURAL EFFUSIONS VERSUS THICKENING. 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 Assessment/Plan:     1. Chronic obstructive pulmonary disease, unspecified COPD type ([address]  She is on bronchodilator with nebulizer with albuterol  prn . She had PFT done  in past show severe COPD in 2018 . C/o shortness of breath with exertion. C/o Wheezing , on and off . C/o Cough with white to yellow  Sputum. C/o Rhinorrhea and postnasal drip. - XR CHEST STANDARD (2 VW); Future    2. Shortness of breath  She having  Chronic shortness of breath which is chronic and unchanged, continue  Bronchodilator,  as before. keep  Spo2 90% or above. 3. Pleural effusion on left  Chest x-ray shows small left pleural effusion superimposed on COPD    Return in about 4 months (around 6/22/2021) for COPD.       Mauricio Hernandez MD

## 2021-02-26 ENCOUNTER — TELEPHONE (OUTPATIENT)
Dept: FAMILY MEDICINE CLINIC | Age: 86
End: 2021-02-26

## 2021-02-26 ENCOUNTER — HOSPITAL ENCOUNTER (OUTPATIENT)
Dept: GENERAL RADIOLOGY | Age: 86
Discharge: HOME | End: 2021-02-28
Payer: MEDICARE

## 2021-02-26 DIAGNOSIS — J44.9 CHRONIC OBSTRUCTIVE PULMONARY DISEASE, UNSPECIFIED COPD TYPE (HCC): ICD-10-CM

## 2021-02-26 PROCEDURE — 71046 X-RAY EXAM CHEST 2 VIEWS: CPT

## 2021-02-26 NOTE — TELEPHONE ENCOUNTER
Outreach call placed to assist with scheduling new PCP follow-up (family emergency had to cancel last appt). Patient is currently going to hospital for xray she did not get yet for (ride just arrived for her); patient will call me back to schedule, number was provided.

## 2021-03-12 ENCOUNTER — OFFICE VISIT (OUTPATIENT)
Dept: CARDIOLOGY CLINIC | Age: 86
End: 2021-03-12
Payer: MEDICARE

## 2021-03-12 VITALS
HEIGHT: 66 IN | HEART RATE: 70 BPM | SYSTOLIC BLOOD PRESSURE: 138 MMHG | BODY MASS INDEX: 25.71 KG/M2 | OXYGEN SATURATION: 97 % | RESPIRATION RATE: 16 BRPM | DIASTOLIC BLOOD PRESSURE: 66 MMHG | WEIGHT: 160 LBS

## 2021-03-12 DIAGNOSIS — I25.10 CORONARY ARTERY DISEASE INVOLVING NATIVE CORONARY ARTERY OF NATIVE HEART WITHOUT ANGINA PECTORIS: Primary | ICD-10-CM

## 2021-03-12 DIAGNOSIS — I47.1 PAROXYSMAL SVT (SUPRAVENTRICULAR TACHYCARDIA) (HCC): Chronic | ICD-10-CM

## 2021-03-12 DIAGNOSIS — Z95.0 PACEMAKER: Chronic | ICD-10-CM

## 2021-03-12 DIAGNOSIS — R60.0 LEG EDEMA: ICD-10-CM

## 2021-03-12 DIAGNOSIS — I49.5 SICK SINUS SYNDROME (HCC): ICD-10-CM

## 2021-03-12 DIAGNOSIS — E78.5 DYSLIPIDEMIA: ICD-10-CM

## 2021-03-12 DIAGNOSIS — I10 ESSENTIAL HYPERTENSION: ICD-10-CM

## 2021-03-12 PROCEDURE — 4040F PNEUMOC VAC/ADMIN/RCVD: CPT | Performed by: INTERNAL MEDICINE

## 2021-03-12 PROCEDURE — 1036F TOBACCO NON-USER: CPT | Performed by: INTERNAL MEDICINE

## 2021-03-12 PROCEDURE — 99214 OFFICE O/P EST MOD 30 MIN: CPT | Performed by: INTERNAL MEDICINE

## 2021-03-12 PROCEDURE — G8417 CALC BMI ABV UP PARAM F/U: HCPCS | Performed by: INTERNAL MEDICINE

## 2021-03-12 PROCEDURE — 1123F ACP DISCUSS/DSCN MKR DOCD: CPT | Performed by: INTERNAL MEDICINE

## 2021-03-12 PROCEDURE — 93000 ELECTROCARDIOGRAM COMPLETE: CPT | Performed by: INTERNAL MEDICINE

## 2021-03-12 PROCEDURE — 1090F PRES/ABSN URINE INCON ASSESS: CPT | Performed by: INTERNAL MEDICINE

## 2021-03-12 PROCEDURE — G8484 FLU IMMUNIZE NO ADMIN: HCPCS | Performed by: INTERNAL MEDICINE

## 2021-03-12 PROCEDURE — G8427 DOCREV CUR MEDS BY ELIG CLIN: HCPCS | Performed by: INTERNAL MEDICINE

## 2021-03-12 RX ORDER — TRIAMTERENE AND HYDROCHLOROTHIAZIDE 37.5; 25 MG/1; MG/1
1 TABLET ORAL DAILY
Qty: 90 TABLET | Refills: 3 | Status: SHIPPED | OUTPATIENT
Start: 2021-03-12 | End: 2022-04-08

## 2021-03-12 ASSESSMENT — ENCOUNTER SYMPTOMS
BLOOD IN STOOL: 0
CHEST TIGHTNESS: 0
BACK PAIN: 1
SHORTNESS OF BREATH: 0
WHEEZING: 0
RESPIRATORY NEGATIVE: 1
STRIDOR: 0
COUGH: 0
EYES NEGATIVE: 1
GASTROINTESTINAL NEGATIVE: 1
NAUSEA: 0

## 2021-03-12 NOTE — PROGRESS NOTES
BILAT     Lung disease     Osteoarthritis     Pacemaker 2009    FOR SYNCOPE    Paroxysmal SVT (supraventricular tachycardia) (HCC)     Paroxysmal SVT (supraventricular tachycardia) (HCC)     Pneumonia     Sinusitis        Past Surgical History:   Procedure Laterality Date    CARDIAC PACEMAKER PLACEMENT      HIP FRACTURE SURGERY Bilateral     PACEMAKER INSERTION N/A 2009       Family History   Problem Relation Age of Onset    Cancer Mother     No Known Problems Father        Social History     Socioeconomic History    Marital status:       Spouse name: None    Number of children: None    Years of education: None    Highest education level: None   Occupational History    None   Social Needs    Financial resource strain: None    Food insecurity     Worry: None     Inability: None    Transportation needs     Medical: None     Non-medical: None   Tobacco Use    Smoking status: Never Smoker    Smokeless tobacco: Never Used   Substance and Sexual Activity    Alcohol use: None    Drug use: No    Sexual activity: None   Lifestyle    Physical activity     Days per week: 0 days     Minutes per session: 0 min    Stress: Not at all   Relationships    Social connections     Talks on phone: Once a week     Gets together: More than three times a week     Attends Spiritism service: None     Active member of club or organization: No     Attends meetings of clubs or organizations: Never     Relationship status: None    Intimate partner violence     Fear of current or ex partner: None     Emotionally abused: None     Physically abused: None     Forced sexual activity: None   Other Topics Concern    None   Social History Narrative         Lives With: Daughter    Type of Home: House    Home Layout: One level    Home Access: Stairs to enter with rails    Entrance Stairs - Number of Steps: 3    Entrance Stairs - Rails: None    Bathroom Shower/Tub: Tub/Shower unit, Shower chair with back    Home Equipment: [employer], Standard walker, Reacher, Sock aid, Long-handled shoehorn    ADL Assistance: Independent    Homemaking Assistance: Independent    Ambulation Assistance: Independent    Transfer Assistance: Independent    Active : Yes    Mode of Transportation: Car    Occupation: Retired    Type of occupation: Domestic     Leisure & Hobbies: Reading, sports, outdoor acts and being active       Allergies   Allergen Reactions    Codeine      Other reaction(s): GI Upset    Penicillins      Other reaction(s): Intolerance       Current Outpatient Medications   Medication Sig Dispense Refill    triamterene-hydroCHLOROthiazide (MAXZIDE-25) 37.5-25 MG per tablet Take 1 tablet by mouth daily 90 tablet 3    verapamil (CALAN SR) 180 MG extended release tablet Take 1 tablet by mouth daily      albuterol (PROVENTIL) (2.5 MG/3ML) 0.083% nebulizer solution Take 3 mLs by nebulization every 6 hours as needed for Wheezing 120 each 5    sotalol (BETAPACE) 80 MG tablet Take 1 tablet by mouth 2 times daily 180 tablet 2    Nebulizers (COMPRESSOR/NEBULIZER) MISC Nebulizer Supplies 1 each 1    atorvastatin (LIPITOR) 20 MG tablet Take 1 tablet by mouth daily 90 tablet 4    aspirin 81 MG tablet Take 1 tablet by mouth daily With Food 30 tablet 3     No current facility-administered medications for this visit. Review of Systems:   Review of Systems   Constitutional: Negative. Negative for diaphoresis and fatigue. HENT: Negative. Eyes: Negative. Respiratory: Negative. Negative for cough, chest tightness, shortness of breath, wheezing and stridor. Cardiovascular: Negative. Negative for chest pain, palpitations and leg swelling. Gastrointestinal: Negative. Negative for blood in stool and nausea. Genitourinary: Negative. Musculoskeletal: Positive for arthralgias, back pain and gait problem. Skin: Negative. Neurological: Negative for dizziness, syncope, weakness and light-headedness. Hematological: Negative. Psychiatric/Behavioral: Negative. Physical Examination:    /66 (Site: Left Upper Arm, Position: Sitting, Cuff Size: Medium Adult)   Pulse 70   Resp 16   Ht 5' 6\" (1.676 m)   Wt 160 lb (72.6 kg)   SpO2 97%   BMI 25.82 kg/m²    Physical Exam   Constitutional: She appears healthy. No distress. HENT:   Normal cephalic and Atraumatic   Eyes: Pupils are equal, round, and reactive to light. Neck: Normal range of motion and thyroid normal. Neck supple. No JVD present. No neck adenopathy. No thyromegaly present. Cardiovascular: Normal rate, regular rhythm, intact distal pulses and normal pulses. Murmur heard. Pulmonary/Chest: Effort normal and breath sounds normal. She has no wheezes. She has no rales. She exhibits no tenderness. Abdominal: Soft. Bowel sounds are normal. There is no abdominal tenderness. Musculoskeletal: Normal range of motion. General: Edema (mild) present. No tenderness. Neurological: She is alert and oriented to person, place, and time. Skin: Skin is warm. No cyanosis. Nails show no clubbing.        LABS:  CBC:   Lab Results   Component Value Date    WBC 8.0 01/02/2020    RBC 4.38 01/02/2020    RBC 4.22 01/03/2012    HGB 14.0 01/02/2020    HCT 42.1 01/02/2020    MCV 96.0 01/02/2020    MCH 32.0 01/02/2020    MCHC 33.3 01/02/2020    RDW 14.2 01/02/2020     01/02/2020    MPV 8.2 06/27/2015     Lipids:  Lab Results   Component Value Date    CHOL 168 01/02/2020    CHOL 171 12/05/2017    CHOL 175 10/22/2014     Lab Results   Component Value Date    TRIG 61 01/02/2020    TRIG 71 12/05/2017    TRIG 65 10/22/2014     Lab Results   Component Value Date    HDL 63 (H) 01/02/2020    HDL 58 12/05/2017    HDL 61 (H) 10/22/2014     Lab Results   Component Value Date    LDLCALC 93 01/02/2020    LDLCALC 99 12/05/2017    LDLCALC 101 10/22/2014     No results found for: LABVLDL, VLDL  Lab Results   Component Value Date    CHOLHDLRATIO 3.2 01/03/2012     CMP:    Lab Results   Component Value Date     01/02/2020    K 3.9 01/02/2020    K 4.4 05/02/2019     01/02/2020    CO2 26 01/02/2020    BUN 24 01/02/2020    CREATININE 0.69 01/02/2020    GFRAA >60.0 01/02/2020    LABGLOM >60.0 01/02/2020    GLUCOSE 83 01/02/2020    GLUCOSE 93 01/03/2012    PROT 7.6 01/02/2020    LABALBU 4.2 01/02/2020    LABALBU 4.0 01/03/2012    CALCIUM 9.3 01/02/2020    BILITOT 0.4 01/02/2020    ALKPHOS 74 01/02/2020    AST 18 01/02/2020    ALT 10 01/02/2020     BMP:    Lab Results   Component Value Date     01/02/2020    K 3.9 01/02/2020    K 4.4 05/02/2019     01/02/2020    CO2 26 01/02/2020    BUN 24 01/02/2020    LABALBU 4.2 01/02/2020    LABALBU 4.0 01/03/2012    CREATININE 0.69 01/02/2020    CALCIUM 9.3 01/02/2020    GFRAA >60.0 01/02/2020    LABGLOM >60.0 01/02/2020    GLUCOSE 83 01/02/2020    GLUCOSE 93 01/03/2012     Magnesium:    Lab Results   Component Value Date    MG 2.2 05/05/2019    MG 2.0 01/03/2012     TSH:  Lab Results   Component Value Date    TSH 2.450 01/02/2020       Patient Active Problem List   Diagnosis    [facility]ox[facility]    Pacemaker    Sick sinus syndrome (HCC)    Essential hypertension    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     Modified Medications    No medications on file       Orders Placed This Encounter   Medications    triamterene-hydroCHLOROthiazide (MAXZIDE-25) 37.5-25 MG per tablet     Sig: Take 1 tablet by mouth daily     Dispense:  90 tablet     Refill:  3       Assessment/Plan:    1. Paroxysmal SVT (supraventricular tachycardia) (HCC)   stable QTc - on Sotalol.   - EKG 12 lead    2. Pacemaker   interrogate - reviewed. -interrogate  - EKG 12 lead    3. Essential hypertension   stable   - EKG 12 lead    4. Coronary artery disease involving native coronary artery of native heart without angina pectoris   no angina  - EKG 12 lead    5. AR- need echo - stable     6. LE Edeam- Maxzide. Fasting Labs ASAP  Counseling:  Heart Healthy Lifestyle, Low Salt Diet, Take Precautions to Prevent Falls and Walk Daily    Return in about 4 months (around 7/12/2021).       Electronically signed by Анна Moreno MD on 3/12/2021 at 12:36 PM

## 2021-03-15 DIAGNOSIS — E78.5 DYSLIPIDEMIA: ICD-10-CM

## 2021-03-15 DIAGNOSIS — I10 ESSENTIAL HYPERTENSION: ICD-10-CM

## 2021-03-15 LAB
ALBUMIN SERPL-MCNC: 3.8 G/DL (ref 3.5–4.6)
ALP BLD-CCNC: 72 U/L (ref 40–130)
ALT SERPL-CCNC: 7 U/L (ref 0–33)
ANION GAP SERPL CALCULATED.3IONS-SCNC: 7 MEQ/L (ref 9–15)
AST SERPL-CCNC: 14 U/L (ref 0–35)
BILIRUB SERPL-MCNC: 0.5 MG/DL (ref 0.2–0.7)
BUN BLDV-MCNC: 25 MG/DL (ref 8–23)
CALCIUM SERPL-MCNC: 9.3 MG/DL (ref 8.5–9.9)
CHLORIDE BLD-SCNC: 106 MEQ/L (ref 95–107)
CHOLESTEROL, FASTING: 157 MG/DL (ref 0–199)
CO2: 28 MEQ/L (ref 20–31)
CREAT SERPL-MCNC: 0.71 MG/DL (ref 0.5–0.9)
GFR AFRICAN AMERICAN: >60
GFR NON-AFRICAN AMERICAN: >60
GLOBULIN: 3.1 G/DL (ref 2.3–3.5)
GLUCOSE BLD-MCNC: 88 MG/DL (ref 70–99)
HCT VFR BLD CALC: 41.1 % (ref 37–47)
HDLC SERPL-MCNC: 58 MG/DL (ref 40–59)
HEMOGLOBIN: 13.4 G/DL (ref 12–16)
LDL CHOLESTEROL CALCULATED: 86 MG/DL (ref 0–129)
MAGNESIUM: 2 MG/DL (ref 1.7–2.4)
MCH RBC QN AUTO: 31 PG (ref 27–31.3)
MCHC RBC AUTO-ENTMCNC: 32.7 % (ref 33–37)
MCV RBC AUTO: 94.9 FL (ref 82–100)
PDW BLD-RTO: 13.6 % (ref 11.5–14.5)
PLATELET # BLD: 209 K/UL (ref 130–400)
POTASSIUM SERPL-SCNC: 4.1 MEQ/L (ref 3.4–4.9)
RBC # BLD: 4.33 M/UL (ref 4.2–5.4)
SODIUM BLD-SCNC: 141 MEQ/L (ref 135–144)
TOTAL PROTEIN: 6.9 G/DL (ref 6.3–8)
TRIGLYCERIDE, FASTING: 64 MG/DL (ref 0–150)
TSH SERPL DL<=0.05 MIU/L-ACNC: 2.63 UIU/ML (ref 0.44–3.86)
WBC # BLD: 6.2 K/UL (ref 4.8–10.8)

## 2021-04-01 ENCOUNTER — TELEPHONE (OUTPATIENT)
Dept: FAMILY MEDICINE CLINIC | Age: 86
End: 2021-04-01

## 2021-04-06 ENCOUNTER — HOSPITAL ENCOUNTER (OUTPATIENT)
Dept: CARDIOLOGY | Age: 86
Discharge: HOME | End: 2021-04-06
Payer: MEDICARE

## 2021-04-06 PROCEDURE — 93293 PM PHONE R-STRIP DEVICE EVAL: CPT

## 2021-04-19 DIAGNOSIS — I25.10 CORONARY ARTERY DISEASE INVOLVING NATIVE CORONARY ARTERY OF NATIVE HEART WITHOUT ANGINA PECTORIS: ICD-10-CM

## 2021-04-19 RX ORDER — SOTALOL HYDROCHLORIDE 80 MG/1
80 TABLET ORAL 2 TIMES DAILY
Qty: 180 TABLET | Refills: 2 | Status: SHIPPED | OUTPATIENT
Start: 2021-04-19 | End: 2022-01-10 | Stop reason: SDUPTHER

## 2021-04-19 RX ORDER — ATORVASTATIN CALCIUM 20 MG/1
20 TABLET, FILM COATED ORAL DAILY
Qty: 90 TABLET | Refills: 4 | Status: SHIPPED | OUTPATIENT
Start: 2021-04-19 | End: 2022-04-08 | Stop reason: SDUPTHER

## 2021-05-27 ENCOUNTER — HOSPITAL ENCOUNTER (OUTPATIENT)
Dept: CARDIOLOGY | Age: 86
Discharge: HOME | End: 2021-05-27
Payer: MEDICARE

## 2021-05-27 PROCEDURE — 93280 PM DEVICE PROGR EVAL DUAL: CPT

## 2021-06-22 ENCOUNTER — OFFICE VISIT (OUTPATIENT)
Dept: PULMONOLOGY | Age: 86
End: 2021-06-22
Payer: MEDICARE

## 2021-06-22 VITALS
HEART RATE: 75 BPM | TEMPERATURE: 97.5 F | OXYGEN SATURATION: 98 % | BODY MASS INDEX: 24.91 KG/M2 | SYSTOLIC BLOOD PRESSURE: 135 MMHG | HEIGHT: 66 IN | WEIGHT: 155 LBS | DIASTOLIC BLOOD PRESSURE: 72 MMHG

## 2021-06-22 DIAGNOSIS — J90 PLEURAL EFFUSION ON LEFT: ICD-10-CM

## 2021-06-22 DIAGNOSIS — J20.9 ACUTE BRONCHITIS, UNSPECIFIED ORGANISM: ICD-10-CM

## 2021-06-22 DIAGNOSIS — R06.02 SHORTNESS OF BREATH: ICD-10-CM

## 2021-06-22 DIAGNOSIS — J44.9 CHRONIC OBSTRUCTIVE PULMONARY DISEASE, UNSPECIFIED COPD TYPE (HCC): Primary | ICD-10-CM

## 2021-06-22 PROCEDURE — 4040F PNEUMOC VAC/ADMIN/RCVD: CPT | Performed by: INTERNAL MEDICINE

## 2021-06-22 PROCEDURE — 99214 OFFICE O/P EST MOD 30 MIN: CPT | Performed by: INTERNAL MEDICINE

## 2021-06-22 PROCEDURE — 1090F PRES/ABSN URINE INCON ASSESS: CPT | Performed by: INTERNAL MEDICINE

## 2021-06-22 PROCEDURE — G8417 CALC BMI ABV UP PARAM F/U: HCPCS | Performed by: INTERNAL MEDICINE

## 2021-06-22 PROCEDURE — G8427 DOCREV CUR MEDS BY ELIG CLIN: HCPCS | Performed by: INTERNAL MEDICINE

## 2021-06-22 PROCEDURE — 3023F SPIROM DOC REV: CPT | Performed by: INTERNAL MEDICINE

## 2021-06-22 PROCEDURE — G8926 SPIRO NO PERF OR DOC: HCPCS | Performed by: INTERNAL MEDICINE

## 2021-06-22 PROCEDURE — 1036F TOBACCO NON-USER: CPT | Performed by: INTERNAL MEDICINE

## 2021-06-22 PROCEDURE — 1123F ACP DISCUSS/DSCN MKR DOCD: CPT | Performed by: INTERNAL MEDICINE

## 2021-06-22 RX ORDER — ALBUTEROL SULFATE 2.5 MG/3ML
2.5 SOLUTION RESPIRATORY (INHALATION) EVERY 6 HOURS PRN
Qty: 120 EACH | Refills: 5 | Status: SHIPPED | OUTPATIENT
Start: 2021-06-22 | End: 2022-03-01 | Stop reason: SDUPTHER

## 2021-06-22 RX ORDER — DOXYCYCLINE HYCLATE 100 MG
100 TABLET ORAL 2 TIMES DAILY
Qty: 14 TABLET | Refills: 0 | Status: SHIPPED | OUTPATIENT
Start: 2021-06-22 | End: 2021-06-29

## 2021-06-22 ASSESSMENT — ENCOUNTER SYMPTOMS
TROUBLE SWALLOWING: 0
EYE ITCHING: 0
ABDOMINAL PAIN: 0
SHORTNESS OF BREATH: 1
EYE DISCHARGE: 0
COUGH: 1
SINUS PRESSURE: 0
RHINORRHEA: 0
DIARRHEA: 0
SORE THROAT: 0
NAUSEA: 0
VOICE CHANGE: 0
VOMITING: 0
WHEEZING: 1
CHEST TIGHTNESS: 0

## 2021-06-22 NOTE — PROGRESS NOTES
Subjective:     Gogo Mendez is a 80 y.o. female who complains today of:     Chief Complaint   Patient presents with    COPD     4 month f/u       HPI  She is using bronchodilator with nebulizer with albuterol  prn   C/o shortness of breath , worse with exertion. C/o  Wheezing. C/o Cough with  Yellow to green Sputum few weeks , want to have antibiotics. No Hemoptysis. No Chest tightness. No Chest pain with radiation  or pleuritic pain. No  leg edema. No orthopnea. No Fever or chills. No Rhinorrhea and postnasal drip. Allergies:  Codeine and Penicillins  Past Medical History:   Diagnosis Date    Abnormality of gait and mobility due to Left femoral neck fracture S/P Left Hip Hemiarthroplasty. [facility] admit 05/01/19. 5/6/2019    This is an 80year old female with a medical history significant for COPD, SSS S/P PPM, paroxysmal SVT, HTN who was sent to [facility] from [facility] for further evaluation and management of left femoral neck fracture S/P fall. She states she was walking out of her bathroom when she tripped and fell. She denies any loss of consciousness and she was unable to get up. EMS transferred her to [facility] ER.       Arthritis     BILAT    CAD (coronary artery disease)     Closed fracture of head of left femur ([address] 4/27/2019    Last Assessment & Plan:  S/p left hip arthroplasty Assessment:  Pain well  controlled  PLAN:  Pain control with bowel regimen Ortho  following, going to SNF    COPD (chronic obstructive pulmonary disease) ([address]     Hip joint replacement by other means 5/6/2011    Hypertension     Left displaced femoral neck fracture ([address] 5/1/2019    Lower leg edema     BILAT     Lung disease     Osteoarthritis     Pacemaker 2009    FOR SYNCOPE    Paroxysmal SVT (supraventricular tachycardia) (HCC)     Paroxysmal SVT (supraventricular tachycardia) ([address]     Pneumonia     Sinusitis      Past Surgical History:   Procedure Laterality Date    CARDIAC PACEMAKER PLACEMENT      HIP FRACTURE SURGERY Bilateral     PACEMAKER INSERTION N/A 2009     Family History   Problem Relation Age of Onset   Marco A Brock Cancer Mother     No Known Problems Father      Social History     Socioeconomic History    Marital status:      Spouse name: Not on file    Number of children: Not on file    Years of education: Not on file    Highest education level: Not on file   Occupational History    Not on file   Tobacco Use    Smoking status: Never Smoker    Smokeless tobacco: Never Used   Substance and Sexual Activity    Alcohol use: Not on file    Drug use: No    Sexual activity: Not on file   Other Topics Concern    Not on file   Social History Narrative         Lives With: Daughter    Type of Home: House    Home Layout: One level    Home Access: Stairs to enter with rails    Entrance Stairs - Number of Steps: 3    Entrance Stairs - Rails: None    Bathroom Shower/Tub: Tub/Shower unit, Shower chair with back    Home Equipment: [employer], Standard walker, Reacher, Sock aid, Long-handled shoehorn    ADL Assistance: Independent    Homemaking Assistance: Independent    Ambulation Assistance: Independent    Transfer Assistance: Independent    Active : Yes    Mode of Transportation: Car    Occupation: Retired    Type of occupation: Domestic     Leisure & Hobbies: Reading, sports, outdoor acts and being active     Social Determinants of Health     Financial Resource Strain:     Difficulty of Paying Living Expenses:    Food Insecurity:     Worried About [facility] in the Last Year:    [address] in the Last Year:    Transportation Needs:     Lack of Transportation (Medical):      Lack of Transportation (Non-Medical):    Physical Activity:     Days of Exercise per Week:     Minutes of Exercise per Session:    Stress:     Feeling of Stress :    Social Connections:     Frequency of Communication with Friends and Family:     Frequency of Social Gatherings with Friends and Family:     Attends Episcopal Services:     Active Member of Clubs or Organizations:     Attends Club or Organization Meetings:     Marital Status:    Intimate Partner Violence:     Fear of Current or Ex-Partner:     Emotionally Abused:     Physically Abused:     Sexually Abused:          Review of Systems   Constitutional: Negative for chills, diaphoresis, fatigue and fever. HENT: Positive for postnasal drip. Negative for congestion, mouth sores, nosebleeds, rhinorrhea, sinus pressure, sneezing, sore throat, trouble swallowing and voice change. Eyes: Negative for discharge, itching and visual disturbance. Respiratory: Positive for cough, shortness of breath and wheezing. Negative for chest tightness. Cardiovascular: Negative for chest pain, palpitations and leg swelling. Gastrointestinal: Negative for abdominal pain, diarrhea, nausea and vomiting. Genitourinary: Negative for difficulty urinating and hematuria. Musculoskeletal: Negative for arthralgias, joint swelling and myalgias. Skin: Negative for rash. Allergic/Immunologic: Negative for environmental allergies and food allergies. Neurological: Negative for dizziness, tremors, weakness and headaches. Psychiatric/Behavioral: Negative for behavioral problems and sleep disturbance.         :     Vitals:    06/22/21 0915   BP: 135/72   Pulse: 75   Temp: 97.5 °F (36.4 °C)   SpO2: 98%   Weight: 155 lb (70.3 kg)   Height: 5' 6\" (1.676 m)     Wt Readings from Last 3 Encounters:   06/22/21 155 lb (70.3 kg)   03/12/21 160 lb (72.6 kg)   02/22/21 164 lb (74.4 kg)         Physical Exam  Constitutional:       General: She is not in acute distress. Appearance: She is well-developed. She is not diaphoretic. HENT:      Head: Normocephalic and atraumatic. Nose: Nose normal.   Eyes:      Pupils: Pupils are equal, round, and reactive to light. Neck:      Thyroid: No thyromegaly. Vascular: No JVD.       Trachea: No tracheal deviation. Cardiovascular:      Rate and Rhythm: Normal rate and regular rhythm. Heart sounds: No murmur heard. No friction rub. No gallop. Pulmonary:      Effort: No respiratory distress. Breath sounds: Wheezing present. No rales. Comments: diminished Breath sound bilaterally. Chest:      Chest wall: No tenderness. Abdominal:      General: There is no distension. Tenderness: There is no abdominal tenderness. There is no rebound. Musculoskeletal:         General: Normal range of motion. Lymphadenopathy:      Cervical: No cervical adenopathy. Skin:     General: Skin is warm and dry. Neurological:      Mental Status: She is alert and oriented to person, place, and time. Coordination: Coordination normal.         Current Outpatient Medications   Medication Sig Dispense Refill    doxycycline hyclate (VIBRA-TABS) 100 MG tablet Take 1 tablet by mouth 2 times daily for 7 days 14 tablet 0    albuterol (PROVENTIL) (2.5 MG/3ML) 0.083% nebulizer solution Take 3 mLs by nebulization every 6 hours as needed for Wheezing 120 each 5    sotalol (BETAPACE) 80 MG tablet Take 1 tablet by mouth 2 times daily 180 tablet 2    verapamil (CALAN SR) 180 MG extended release tablet Take 1 tablet by mouth daily 90 tablet 3    atorvastatin (LIPITOR) 20 MG tablet Take 1 tablet by mouth daily 90 tablet 4    triamterene-hydroCHLOROthiazide (MAXZIDE-25) 37.5-25 MG per tablet Take 1 tablet by mouth daily 90 tablet 3    Nebulizers (COMPRESSOR/NEBULIZER) MISC Nebulizer Supplies 1 each 1    aspirin 81 MG tablet Take 1 tablet by mouth daily With Food 30 tablet 3     No current facility-administered medications for this visit.        Results for orders placed during the hospital encounter of 02/26/21    XR CHEST (2 VW)    Narrative  EXAM: CHEST, 2 VIEWS    CLINICAL HISTORY: J44.9 Chronic obstructive pulmonary disease, unspecified COPD type ([facility] ICD10    COMPARISONS:  Chest x-ray from March 11, 2020    Findings: There is  a dual lead cardiac pacemaker in place  The cardiomediastinal silhouette is within normal limits. The right lung is unremarkable. There is persistent blunting of the left costophrenic recess which may be secondary to scarring from prior infectious or inflammatory process. The visualized bones are demineralized. Impression  No radiographic evidence of acute intrathoracic process. No change since the previous study with chronic appearing scarring and blunting of the left costophrenic recess. Results for orders placed during the hospital encounter of 03/11/20    XR CHEST STANDARD (2 VW)    Narrative  EXAMINATION: XR CHEST (2 VW)    CLINICAL HISTORY: R06.02 SOB (shortness of breath) ICD10    COMPARISONS: April 27, 2019    FINDINGS:    Two views of the chest are submitted. The cardiac silhouette is of normal size configuration. Pulmonary vascular attenuated. Lungs are hyperinflated. Right sided trachea. No focal infiltrates. Trace/small left pleural effusion. No Pneumothoraces. Impression  TRACE/SMALL LEFT PLEURAL EFFUSION SUPERIMPOSED UPON COPD. Results for orders placed during the hospital encounter of 11/09/18    XR CHEST STANDARD (2 VW)    Narrative  EXAMINATION: CHEST TWO VIEWS    CLINICAL HISTORY: R06.00 Dyspnea, unspecified type ICD10    COMPARISONS: October 4, 2017    FINDINGS:    Two views of the chest are submitted. There is a left-sided ICD device. Leads overlying the cardiac silhouette. Unchanged The cardiac silhouette is unchanged configuration. .  The mediastinum is unremarkable. Pulmonary vascular is attenuated, lungs are hyperinflated and there is some widening of the AP diameter the chest and coarsening of the interstitium. Right sided trachea. No focal infiltrates. No effusions. There is blunting of left costophrenic angle again noted. May represent scarring. Unchanged  Pneumothoraces. Impression  NO ACUTE ACTIVE CARDIOPULMONARY PROCESS. RADIOGRAPHIC FINDINGS OF COPD.  ]  Results for orders placed during the hospital encounter of 04/27/19    XR CHEST PORTABLE    Narrative  EXAMINATION: XR CHEST PORTABLE    CLINICAL HISTORY: STUMBLED AND FELL TO FLOOR. NO LOSS OF CONSCIOUSNESS    COMPARISONS: None available. FINDINGS: Pacemaker generator overlying left axilla with pacemaker lead tips in region of right atrium and right ventricle. Cardiopericardial silhouette is normal in size. Aorta is calcified. Left diaphragm elevated. Mild blunting costophrenic angles. Calcified granuloma right upper lung. Impression  SMALL BILATERAL PLEURAL EFFUSIONS VERSUS THICKENING. 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 Assessment/Plan:     1. Chronic obstructive pulmonary disease, unspecified COPD type ([address]  She is using bronchodilator with nebulizer with albuterol  prn C/o shortness of breath , worse with exertion. C/o  Wheezing. C/o Cough with  Yellow to green Sputum few weeks , want to have antibiotics. No Chest tightness. Refill of neb solution done       2. Shortness of breath  She having  Chronic shortness of breath which is likely due to COPD, continue  Bronchodilator, as before. keep  Spo2 90% or above. 3. Pleural effusion on left  She had CXR 2/21  No radiographic evidence of acute intrathoracic process. No change since the previous study with chronic appearing scarring and blunting of the left costophrenic recess. 4. Acute bronchitis, unspecified organism  She is having cough with yellow to green mucus , possibly due to bronchitis. No fever  - doxycycline hyclate (VIBRA-TABS) 100 MG tablet; Take 1 tablet by mouth 2 times daily for 7 days  Dispense: 14 tablet; Refill: 0      Return in about 4 months (around 10/22/2021) for COPD.       Jose Workman MD

## 2021-07-27 ENCOUNTER — OFFICE VISIT (OUTPATIENT)
Dept: CARDIOLOGY CLINIC | Age: 86
End: 2021-07-27
Payer: MEDICARE

## 2021-07-27 VITALS
HEIGHT: 66 IN | DIASTOLIC BLOOD PRESSURE: 73 MMHG | BODY MASS INDEX: 24.75 KG/M2 | RESPIRATION RATE: 16 BRPM | WEIGHT: 154 LBS | HEART RATE: 70 BPM | OXYGEN SATURATION: 97 % | SYSTOLIC BLOOD PRESSURE: 136 MMHG

## 2021-07-27 DIAGNOSIS — I49.5 SICK SINUS SYNDROME (HCC): ICD-10-CM

## 2021-07-27 DIAGNOSIS — E78.5 DYSLIPIDEMIA: ICD-10-CM

## 2021-07-27 DIAGNOSIS — Z95.0 PACEMAKER: ICD-10-CM

## 2021-07-27 DIAGNOSIS — R09.89 BILATERAL CAROTID BRUITS: ICD-10-CM

## 2021-07-27 DIAGNOSIS — I27.20 PULMONARY HYPERTENSION (HCC): ICD-10-CM

## 2021-07-27 DIAGNOSIS — I10 ESSENTIAL HYPERTENSION: ICD-10-CM

## 2021-07-27 DIAGNOSIS — I25.10 CORONARY ARTERY DISEASE INVOLVING NATIVE CORONARY ARTERY OF NATIVE HEART WITHOUT ANGINA PECTORIS: Primary | ICD-10-CM

## 2021-07-27 PROCEDURE — 1123F ACP DISCUSS/DSCN MKR DOCD: CPT | Performed by: INTERNAL MEDICINE

## 2021-07-27 PROCEDURE — 93000 ELECTROCARDIOGRAM COMPLETE: CPT | Performed by: INTERNAL MEDICINE

## 2021-07-27 PROCEDURE — G8427 DOCREV CUR MEDS BY ELIG CLIN: HCPCS | Performed by: INTERNAL MEDICINE

## 2021-07-27 PROCEDURE — 1036F TOBACCO NON-USER: CPT | Performed by: INTERNAL MEDICINE

## 2021-07-27 PROCEDURE — 99214 OFFICE O/P EST MOD 30 MIN: CPT | Performed by: INTERNAL MEDICINE

## 2021-07-27 PROCEDURE — 4040F PNEUMOC VAC/ADMIN/RCVD: CPT | Performed by: INTERNAL MEDICINE

## 2021-07-27 PROCEDURE — G8420 CALC BMI NORM PARAMETERS: HCPCS | Performed by: INTERNAL MEDICINE

## 2021-07-27 PROCEDURE — 1090F PRES/ABSN URINE INCON ASSESS: CPT | Performed by: INTERNAL MEDICINE

## 2021-07-27 ASSESSMENT — ENCOUNTER SYMPTOMS
SHORTNESS OF BREATH: 0
EYES NEGATIVE: 1
CHEST TIGHTNESS: 0
STRIDOR: 0
NAUSEA: 0
RESPIRATORY NEGATIVE: 1
BLOOD IN STOOL: 0
BACK PAIN: 1
WHEEZING: 0
GASTROINTESTINAL NEGATIVE: 1
COUGH: 0

## 2021-07-27 NOTE — PROGRESS NOTES
Subsequent Progress Note  Patient: Van William  YOB: 1933  MRN: [id number]    Chief Complaint:AR CAD HTN ppm svt recent L hip sx  Chief Complaint   Patient presents with    Follow-up     4 month    Coronary Artery Disease    Hypertension    Irregular Heart Beat     SSS    Shortness of Breath     chronic    Fatigue       CV Data:  12/2017 spect negative  12/2017 echo ef 60% Trace AR  9/2018 LAD mid 50% near a small diagonal. Cors all calcified EF 55 EDP 15  4/2019 LTHA   6/2019 CUS mild  PPM  12/2020 Echo EF 60 2+ MR 1-2 AR  RVSP 55     Subjective/HPI: no cp no sob no further falls no bleed. Takes meds     11/22/2019 no cp no osb no falls no bleed. Can't walk well    7/13/2020 no cp no sob no falls   No bleed. No falls. No bleed. Eats well.     11/13/2020 no cp no sob no falls no bleed no palp    3/12/21 no cp no sob no falls no bleed LE edema no better    7/27/21 Tired all the time. No cp no sob no falls no bleed. Takes meds eats well. EKG: SR70 QTc 451    Past Medical History:   Diagnosis Date    Abnormality of gait and mobility due to Left femoral neck fracture S/P Left Hip Hemiarthroplasty. [facility] admit 05/01/19. 5/6/2019    This is an 80year old female with a medical history significant for COPD, SSS S/P PPM, paroxysmal SVT, HTN who was sent to [facility] from [facility] for further evaluation and management of left femoral neck fracture S/P fall. She states she was walking out of her bathroom when she tripped and fell. She denies any loss of consciousness and she was unable to get up. EMS transferred her to [facility] ER.       Arthritis     BILAT    CAD (coronary artery disease)     Closed fracture of head of left femur ([facility] [address] 4/27/2019    Last Assessment & Plan:  S/p left hip arthroplasty Assessment:  Pain well  controlled  PLAN:  Pain control with bowel regimen Ortho  following, going to SNF    COPD (chronic obstructive pulmonary disease) ([facility] [address]     Hip joint replacement by other means 5/6/2011    Hypertension     Left displaced femoral neck fracture ([address] 5/1/2019    Lower leg edema     BILAT     Lung disease     Osteoarthritis     Pacemaker 2009    FOR SYNCOPE    Paroxysmal SVT (supraventricular tachycardia) (HCC)     Paroxysmal SVT (supraventricular tachycardia) (HCC)     Pneumonia     Sinusitis        Past Surgical History:   Procedure Laterality Date    CARDIAC PACEMAKER PLACEMENT      HIP FRACTURE SURGERY Bilateral     PACEMAKER INSERTION N/A 2009       Family History   Problem Relation Age of Onset    Cancer Mother     No Known Problems Father        Social History     Socioeconomic History    Marital status:       Spouse name: None    Number of children: None    Years of education: None    Highest education level: None   Occupational History    None   Tobacco Use    Smoking status: Never Smoker    Smokeless tobacco: Never Used   Substance and Sexual Activity    Alcohol use: None    Drug use: No    Sexual activity: None   Other Topics Concern    None   Social History Narrative         Lives With: Daughter    Type of Home: House    Home Layout: One level    Home Access: Stairs to enter with rails    Entrance Stairs - Number of Steps: 3    Entrance Stairs - Rails: None    Bathroom Shower/Tub: Tub/Shower unit, Shower chair with back    Home Equipment: Cane, Standard walker, Reacher, Sock aid, Long-handled shoehorn    ADL Assistance: Independent    Homemaking Assistance: Independent    Ambulation Assistance: Independent    Transfer Assistance: Independent    Active : Yes    Mode of Transportation: Car    Occupation: Retired    Type of occupation: Domestic     Leisure & Hobbies: Reading, sports, outdoor acts and being active     Social Determinants of [address] Strain:     Difficulty of Paying Living Expenses:    Food Insecurity:     [place] [address] [facility] Last Year:    [address] in the Last Year: Transportation Needs:     Lack of Transportation (Medical):  Lack of Transportation (Non-Medical):    Physical Activity:     Days of Exercise per Week:     Minutes of Exercise per Session:    Stress:     Feeling of Stress :    Social Connections:     Frequency of Communication with Friends and Family:     Frequency of Social Gatherings with Friends and Family:     Attends Cheondoism Services:     Active Member of Clubs or Organizations:     Attends Club or Organization Meetings:     Marital Status:    Intimate Partner Violence:     Fear of Current or Ex-Partner:     Emotionally Abused:     Physically Abused:     Sexually Abused: Allergies   Allergen Reactions    Codeine      Other reaction(s): GI Upset    Penicillins      Other reaction(s): Intolerance       Current Outpatient Medications   Medication Sig Dispense Refill    albuterol (PROVENTIL) (2.5 MG/3ML) 0.083% nebulizer solution Take 3 mLs by nebulization every 6 hours as needed for Wheezing 120 each 5    sotalol (BETAPACE) 80 MG tablet Take 1 tablet by mouth 2 times daily 180 tablet 2    verapamil (CALAN SR) 180 MG extended release tablet Take 1 tablet by mouth daily 90 tablet 3    atorvastatin (LIPITOR) 20 MG tablet Take 1 tablet by mouth daily 90 tablet 4    triamterene-hydroCHLOROthiazide (MAXZIDE-25) 37.5-25 MG per tablet Take 1 tablet by mouth daily 90 tablet 3    Nebulizers (COMPRESSOR/NEBULIZER) MISC Nebulizer Supplies 1 each 1    aspirin 81 MG tablet Take 1 tablet by mouth daily With Food 30 tablet 3     No current facility-administered medications for this visit. Review of Systems:   Review of Systems   Constitutional: Negative. Negative for diaphoresis and fatigue. HENT: Negative. Eyes: Negative. Respiratory: Negative. Negative for cough, chest tightness, shortness of breath, wheezing and stridor. Cardiovascular: Negative. Negative for chest pain, palpitations and leg swelling. Gastrointestinal: Negative. Negative for blood in stool and nausea. Genitourinary: Negative. Musculoskeletal: Positive for arthralgias, back pain and gait problem. Skin: Negative. Neurological: Negative for dizziness, syncope, weakness and light-headedness. Hematological: Negative. Psychiatric/Behavioral: Negative. Physical Examination:    /73 (Site: Left Upper Arm, Position: Sitting, Cuff Size: Medium Adult)   Pulse 70   Resp 16   Ht 5' 6\" (1.676 m)   Wt 154 lb (69.9 kg)   SpO2 97%   BMI 24.86 kg/m²    Physical Exam   Constitutional: She appears healthy. No distress. HENT:   Normal cephalic and Atraumatic   Eyes: Pupils are equal, round, and reactive to light. Neck: Thyroid normal. No JVD present. No neck adenopathy. No thyromegaly present. Cardiovascular: Normal rate, regular rhythm, intact distal pulses and normal pulses. Murmur heard. Pulmonary/Chest: Effort normal and breath sounds normal. She has no wheezes. She has no rales. She exhibits no tenderness. Abdominal: Soft. Bowel sounds are normal. There is no abdominal tenderness. Musculoskeletal:         General: Edema (mild) present. No tenderness. Normal range of motion. Cervical back: Normal range of motion and neck supple. Neurological: She is alert and oriented to person, place, and time. Skin: Skin is warm. No cyanosis. Nails show no clubbing.        LABS:  CBC:   Lab Results   Component Value Date    WBC 6.2 03/15/2021    RBC 4.33 03/15/2021    RBC 4.22 01/03/2012    HGB 13.4 03/15/2021    HCT 41.1 03/15/2021    MCV 94.9 03/15/2021    MCH 31.0 03/15/2021    MCHC 32.7 03/15/2021    RDW 13.6 03/15/2021     03/15/2021    MPV 8.2 06/27/2015     Lipids:  Lab Results   Component Value Date    CHOL 168 01/02/2020    CHOL 171 12/05/2017    CHOL 175 10/22/2014     Lab Results   Component Value Date    TRIG 61 01/02/2020    TRIG 71 12/05/2017    TRIG 65 10/22/2014     Lab Results   Component Value Date    HDL 58 03/15/2021    HDL 63 (H) 01/02/2020    HDL 58 12/05/2017     Lab Results   Component Value Date    LDLCALC 86 03/15/2021    LDLCALC 93 01/02/2020    LDLCALC 99 12/05/2017     No results found for: LABVLDL, VLDL  Lab Results   Component Value Date    CHOLHDLRATIO 3.2 01/03/2012     CMP:    Lab Results   Component Value Date     03/15/2021    K 4.1 03/15/2021    K 4.4 05/02/2019     03/15/2021    CO2 28 03/15/2021    BUN 25 03/15/2021    CREATININE 0.71 03/15/2021    GFRAA >60.0 03/15/2021    LABGLOM >60.0 03/15/2021    GLUCOSE 88 03/15/2021    GLUCOSE 93 01/03/2012    PROT 6.9 03/15/2021    LABALBU 3.8 03/15/2021    LABALBU 4.0 01/03/2012    CALCIUM 9.3 03/15/2021    BILITOT 0.5 03/15/2021    ALKPHOS 72 03/15/2021    AST 14 03/15/2021    ALT 7 03/15/2021     BMP:    Lab Results   Component Value Date     03/15/2021    K 4.1 03/15/2021    K 4.4 05/02/2019     03/15/2021    CO2 28 03/15/2021    BUN 25 03/15/2021    LABALBU 3.8 03/15/2021    LABALBU 4.0 01/03/2012    CREATININE 0.71 03/15/2021    CALCIUM 9.3 03/15/2021    GFRAA >60.0 03/15/2021    LABGLOM >60.0 03/15/2021    GLUCOSE 88 03/15/2021    GLUCOSE 93 01/03/2012     Magnesium:    Lab Results   Component Value Date    MG 2.0 03/15/2021    MG 2.0 01/03/2012     TSH:  Lab Results   Component Value Date    TSH 2.630 03/15/2021       Patient Active Problem List   Diagnosis    Paroxysmal SVT (supraventricular tachycardia) (HCC)    Pacemaker    Sick sinus syndrome (HCC)    Essential hypertension    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

## 2021-08-04 DIAGNOSIS — E78.5 DYSLIPIDEMIA: ICD-10-CM

## 2021-08-04 DIAGNOSIS — I10 ESSENTIAL HYPERTENSION: ICD-10-CM

## 2021-08-04 LAB
ALBUMIN SERPL-MCNC: 4.1 G/DL (ref 3.5–4.6)
ALP BLD-CCNC: 77 U/L (ref 40–130)
ALT SERPL-CCNC: 7 U/L (ref 0–33)
ANION GAP SERPL CALCULATED.3IONS-SCNC: 11 MEQ/L (ref 9–15)
AST SERPL-CCNC: 15 U/L (ref 0–35)
BILIRUB SERPL-MCNC: 0.6 MG/DL (ref 0.2–0.7)
BUN BLDV-MCNC: 24 MG/DL (ref 8–23)
CALCIUM SERPL-MCNC: 9.3 MG/DL (ref 8.5–9.9)
CHLORIDE BLD-SCNC: 103 MEQ/L (ref 95–107)
CHOLESTEROL, FASTING: 164 MG/DL (ref 0–199)
CO2: 28 MEQ/L (ref 20–31)
CREAT SERPL-MCNC: 0.81 MG/DL (ref 0.5–0.9)
GFR AFRICAN AMERICAN: >60
GFR NON-AFRICAN AMERICAN: >60
GLOBULIN: 3 G/DL (ref 2.3–3.5)
GLUCOSE BLD-MCNC: 102 MG/DL (ref 70–99)
HCT VFR BLD CALC: 41 % (ref 37–47)
HDLC SERPL-MCNC: 63 MG/DL (ref 40–59)
HEMOGLOBIN: 13.5 G/DL (ref 12–16)
LDL CHOLESTEROL CALCULATED: 91 MG/DL (ref 0–129)
MAGNESIUM: 2 MG/DL (ref 1.7–2.4)
MCH RBC QN AUTO: 31.7 PG (ref 27–31.3)
MCHC RBC AUTO-ENTMCNC: 33 % (ref 33–37)
MCV RBC AUTO: 96 FL (ref 82–100)
PDW BLD-RTO: 14.2 % (ref 11.5–14.5)
PLATELET # BLD: 228 K/UL (ref 130–400)
POTASSIUM SERPL-SCNC: 4.3 MEQ/L (ref 3.4–4.9)
RBC # BLD: 4.27 M/UL (ref 4.2–5.4)
SODIUM BLD-SCNC: 142 MEQ/L (ref 135–144)
TOTAL PROTEIN: 7.1 G/DL (ref 6.3–8)
TRIGLYCERIDE, FASTING: 52 MG/DL (ref 0–150)
TSH SERPL DL<=0.05 MIU/L-ACNC: 2.2 UIU/ML (ref 0.44–3.86)
WBC # BLD: 10 K/UL (ref 4.8–10.8)

## 2021-08-26 ENCOUNTER — HOSPITAL ENCOUNTER (OUTPATIENT)
Dept: ULTRASOUND IMAGING | Age: 86
Discharge: HOME | End: 2021-08-28
Payer: MEDICARE

## 2021-08-26 DIAGNOSIS — R09.89 BILATERAL CAROTID BRUITS: ICD-10-CM

## 2021-08-26 PROCEDURE — 93880 EXTRACRANIAL BILAT STUDY: CPT

## 2021-08-30 PROCEDURE — 93880 EXTRACRANIAL BILAT STUDY: CPT | Performed by: INTERNAL MEDICINE

## 2021-09-09 ENCOUNTER — HOSPITAL ENCOUNTER (OUTPATIENT)
Dept: CARDIOLOGY | Age: 86
Discharge: HOME | End: 2021-09-09
Payer: MEDICARE

## 2021-09-09 PROCEDURE — 93293 PM PHONE R-STRIP DEVICE EVAL: CPT

## 2021-10-19 ENCOUNTER — OFFICE VISIT (OUTPATIENT)
Dept: PULMONOLOGY | Age: 86
End: 2021-10-19
Payer: MEDICARE

## 2021-10-19 VITALS
DIASTOLIC BLOOD PRESSURE: 58 MMHG | WEIGHT: 152 LBS | HEIGHT: 66 IN | TEMPERATURE: 98.2 F | HEART RATE: 70 BPM | BODY MASS INDEX: 24.43 KG/M2 | OXYGEN SATURATION: 98 % | SYSTOLIC BLOOD PRESSURE: 132 MMHG

## 2021-10-19 DIAGNOSIS — J20.9 ACUTE BRONCHITIS, UNSPECIFIED ORGANISM: Primary | ICD-10-CM

## 2021-10-19 DIAGNOSIS — J44.9 CHRONIC OBSTRUCTIVE PULMONARY DISEASE, UNSPECIFIED COPD TYPE (HCC): ICD-10-CM

## 2021-10-19 PROCEDURE — G8926 SPIRO NO PERF OR DOC: HCPCS | Performed by: INTERNAL MEDICINE

## 2021-10-19 PROCEDURE — 3023F SPIROM DOC REV: CPT | Performed by: INTERNAL MEDICINE

## 2021-10-19 PROCEDURE — 4040F PNEUMOC VAC/ADMIN/RCVD: CPT | Performed by: INTERNAL MEDICINE

## 2021-10-19 PROCEDURE — 1090F PRES/ABSN URINE INCON ASSESS: CPT | Performed by: INTERNAL MEDICINE

## 2021-10-19 PROCEDURE — 99214 OFFICE O/P EST MOD 30 MIN: CPT | Performed by: INTERNAL MEDICINE

## 2021-10-19 PROCEDURE — G8484 FLU IMMUNIZE NO ADMIN: HCPCS | Performed by: INTERNAL MEDICINE

## 2021-10-19 PROCEDURE — G8427 DOCREV CUR MEDS BY ELIG CLIN: HCPCS | Performed by: INTERNAL MEDICINE

## 2021-10-19 PROCEDURE — G8420 CALC BMI NORM PARAMETERS: HCPCS | Performed by: INTERNAL MEDICINE

## 2021-10-19 PROCEDURE — 1036F TOBACCO NON-USER: CPT | Performed by: INTERNAL MEDICINE

## 2021-10-19 PROCEDURE — 1123F ACP DISCUSS/DSCN MKR DOCD: CPT | Performed by: INTERNAL MEDICINE

## 2021-10-19 RX ORDER — DOXYCYCLINE HYCLATE 100 MG
100 TABLET ORAL 2 TIMES DAILY
Qty: 20 TABLET | Refills: 0 | Status: SHIPPED | OUTPATIENT
Start: 2021-10-19 | End: 2021-10-29

## 2021-10-19 ASSESSMENT — ENCOUNTER SYMPTOMS
VOMITING: 0
CHEST TIGHTNESS: 0
SINUS PRESSURE: 0
ABDOMINAL PAIN: 0
TROUBLE SWALLOWING: 0
SHORTNESS OF BREATH: 1
EYE ITCHING: 0
NAUSEA: 0
DIARRHEA: 0
VOICE CHANGE: 0
COUGH: 1
RHINORRHEA: 0
SORE THROAT: 0
WHEEZING: 1
EYE DISCHARGE: 0

## 2021-10-19 NOTE — PROGRESS NOTES
Subjective:     Gerardo Anglin is a 80 y.o. female who complains today of:     Chief Complaint   Patient presents with    COPD     4 month f/u       HPI   She is using  nebulizer with albuterol  prn   C/o shortness of breath , worse with exertion. C/o Wheezing. C/o Cough with yellow  Sputum. No Hemoptysis. No Chest tightness. No Chest pain with radiation  or pleuritic pain. No  leg edema. No orthopnea. No Fever or chills. No Rhinorrhea and postnasal drip. She want new nebulizer machine     Allergies:  Codeine and Penicillins  Past Medical History:   Diagnosis Date    Abnormality of gait and mobility due to Left femoral neck fracture S[facility]    This is an 80year old female with a medical history significant for COPD, SSS S/P PPM, paroxysmal SVT, HTN who was sent to [facility] for further evaluation and management of left femoral neck fracture S/P fall. She states she was walking out of her bathroom when she tripped and fell. She denies any loss of consciousness and she was unable to get up. EMS transferred her to [facility] ER.       Arthritis     BILAT    CAD (coronary artery disease)     Closed fracture of head of left femur ([facility] [address] 4/27/2019    Last Assessment & Plan:  S/p left hip arthroplasty Assessment:  Pain well  controlled  PLAN:  Pain control with bowel regimen Ortho  following, going to SNF    COPD (chronic obstructive pulmonary disease) ([facility] [address]     Hip joint replacement by other means 5/6/2011    Hypertension     Left displaced femoral neck fracture ([facility] [address] 5/1/2019    Lower leg edema     BILAT     Lung disease     Osteoarthritis     Pacemaker 2009    FOR SYNCOPE    Paroxysmal SVT (supraventricular tachycardia) (HCC)     Paroxysmal SVT (supraventricular tachycardia) ([facility] [address]     Pneumonia     Sinusitis      Past Surgical History:   Procedure Laterality Date    CARDIAC PACEMAKER PLACEMENT      HIP FRACTURE SURGERY Bilateral     PACEMAKER INSERTION N/A 2009     Family History   Problem Relation Age of Onset   Gibson Gibson Cancer Mother     No Known Problems Father      Social History     Socioeconomic History    Marital status:      Spouse name: Not on file    Number of children: Not on file    Years of education: Not on file    Highest education level: Not on file   Occupational History    Not on file   Tobacco Use    Smoking status: Never Smoker    Smokeless tobacco: Never Used   Substance and Sexual Activity    Alcohol use: Not on file    Drug use: No    Sexual activity: Not on file   Other Topics Concern    Not on file   Social History Narrative         Lives With: Daughter    Type of Home: House    Home Layout: One level    Home Access: Stairs to enter with rails    Entrance Stairs - Number of Steps: 3    Entrance Stairs - Rails: None    Bathroom Shower/Tub: Tub/Shower unit, Shower chair with back    Home Equipment: Brandon Areola, Standard walker, Reacher, Sock aid, Long-handled shoehorn    ADL Assistance: Independent    Homemaking Assistance: Independent    Ambulation Assistance: Independent    Transfer Assistance: Independent    Active : Yes    Mode of Transportation: Car    Occupation: Retired    Type of occupation: Domestic     Leisure & Hobbies: Reading, sports, outdoor acts and being active     Social Determinants of Health     Financial Resource Strain:     Difficulty of Paying Living Expenses:    Food Insecurity:     Worried About [address] in the Last Year:    [address] in the Last Year:    Transportation Needs:     Lack of Transportation (Medical):      Lack of Transportation (Non-Medical):    Physical Activity:     Days of Exercise per Week:     Minutes of Exercise per Session:    Stress:     Feeling of Stress :    Social Connections:     Frequency of Communication with Friends and Family:     Frequency of Social Gatherings with Friends and Family:     Attends Jew Services:     Active Member of Clubs or Organizations:     Attends Club or Organization Meetings:     Marital Status:    Intimate Partner Violence:     Fear of Current or Ex-Partner:     Emotionally Abused:     Physically Abused:     Sexually Abused:           Review of Systems   Constitutional: Negative for chills, diaphoresis, fatigue and fever. HENT: Negative for congestion, mouth sores, nosebleeds, postnasal drip, rhinorrhea, sinus pressure, sneezing, sore throat, trouble swallowing and voice change. Eyes: Negative for discharge, itching and visual disturbance. Respiratory: Positive for cough, shortness of breath and wheezing. Negative for chest tightness. Cardiovascular: Negative for chest pain, palpitations and leg swelling. Gastrointestinal: Negative for abdominal pain, diarrhea, nausea and vomiting. Genitourinary: Negative for difficulty urinating and hematuria. Musculoskeletal: Negative for arthralgias, joint swelling and myalgias. Skin: Negative for rash. Allergic/Immunologic: Negative for environmental allergies and food allergies. Neurological: Negative for dizziness, tremors, weakness and headaches. Psychiatric/Behavioral: Negative for behavioral problems and sleep disturbance.         :     Vitals:    10/19/21 1013   BP: (!) 132/58   Pulse: 70   Temp: 98.2 °F (36.8 °C)   SpO2: 98%   Weight: 152 lb (68.9 kg)   Height: 5' 6\" (1.676 m)     Wt Readings from Last 3 Encounters:   10/19/21 152 lb (68.9 kg)   07/27/21 154 lb (69.9 kg)   06/22/21 155 lb (70.3 kg)         Physical Exam  Constitutional:       General: She is not in acute distress. Appearance: She is well-developed. She is not diaphoretic. HENT:      Head: Normocephalic and atraumatic. Nose: Nose normal.   Eyes:      Pupils: Pupils are equal, round, and reactive to light. Neck:      Thyroid: No thyromegaly. Vascular: No JVD. Trachea: No tracheal deviation.    Cardiovascular:      Rate and Rhythm: Normal rate and regular rhythm. Heart sounds: No murmur heard. No friction rub. No gallop. Pulmonary:      Effort: No respiratory distress. Breath sounds: Wheezing present. No rales. Comments: diminished Breath sound bilaterally. Chest:      Chest wall: No tenderness. Abdominal:      General: There is no distension. Tenderness: There is no abdominal tenderness. There is no rebound. Musculoskeletal:         General: Normal range of motion. Lymphadenopathy:      Cervical: No cervical adenopathy. Skin:     General: Skin is warm and dry. Neurological:      Mental Status: She is alert and oriented to person, place, and time. Coordination: Coordination normal.         Current Outpatient Medications   Medication Sig Dispense Refill    sotalol (BETAPACE) 80 MG tablet Take 1 tablet by mouth 2 times daily 180 tablet 2    verapamil (CALAN SR) 180 MG extended release tablet Take 1 tablet by mouth daily 90 tablet 3    atorvastatin (LIPITOR) 20 MG tablet Take 1 tablet by mouth daily 90 tablet 4    triamterene-hydroCHLOROthiazide (MAXZIDE-25) 37.5-25 MG per tablet Take 1 tablet by mouth daily 90 tablet 3    Nebulizers (COMPRESSOR/NEBULIZER) MISC Nebulizer Supplies 1 each 1    aspirin 81 MG tablet Take 1 tablet by mouth daily With Food 30 tablet 3    albuterol (PROVENTIL) (2.5 MG/3ML) 0.083% nebulizer solution Take 3 mLs by nebulization every 6 hours as needed for Wheezing 120 each 5     No current facility-administered medications for this visit. Results for orders placed during the hospital encounter of 02/26/21    XR CHEST (2 VW)    Narrative  EXAM: CHEST, 2 VIEWS    CLINICAL HISTORY: J44.9 Chronic obstructive pulmonary disease, unspecified COPD type ([facility] [address] ICD10    COMPARISONS:  Chest x-ray from March 11, 2020    Findings: There is  a dual lead cardiac pacemaker in place  The cardiomediastinal silhouette is within normal limits.   The right lung is unremarkable. There is persistent blunting of the left costophrenic recess which may be secondary to scarring from prior infectious or inflammatory process. The visualized bones are demineralized. Impression  No radiographic evidence of acute intrathoracic process. No change since the previous study with chronic appearing scarring and blunting of the left costophrenic recess. Results for orders placed during the hospital encounter of 03/11/20    XR CHEST STANDARD (2 VW)    Narrative  EXAMINATION: XR CHEST (2 VW)    CLINICAL HISTORY: R06.02 SOB (shortness of breath) ICD10    COMPARISONS: April 27, 2019    FINDINGS:    Two views of the chest are submitted. The cardiac silhouette is of normal size configuration. Pulmonary vascular attenuated. Lungs are hyperinflated. Right sided trachea. No focal infiltrates. Trace/small left pleural effusion. No Pneumothoraces. Impression  TRACE/SMALL LEFT PLEURAL EFFUSION SUPERIMPOSED UPON COPD. Results for orders placed during the hospital encounter of 11/09/18    XR CHEST STANDARD (2 VW)    Narrative  EXAMINATION: CHEST TWO VIEWS    CLINICAL HISTORY: R06.00 Dyspnea, unspecified type ICD10    COMPARISONS: October 4, 2017    FINDINGS:    Two views of the chest are submitted. There is a left-sided ICD device. Leads overlying the cardiac silhouette. Unchanged The cardiac silhouette is unchanged configuration. .  The mediastinum is unremarkable. Pulmonary vascular is attenuated, lungs are hyperinflated and there is some widening of the AP diameter the chest and coarsening of the interstitium. Right sided trachea. No focal infiltrates. No effusions. There is blunting of left costophrenic angle again noted. May represent scarring. Unchanged  Pneumothoraces. Impression  NO ACUTE ACTIVE CARDIOPULMONARY PROCESS.   RADIOGRAPHIC FINDINGS OF COPD.  ]  Results for orders placed during the hospital encounter of 04/27/19    XR CHEST PORTABLE    Narrative  EXAMINATION: XR CHEST PORTABLE    CLINICAL HISTORY: STUMBLED AND FELL TO FLOOR. NO LOSS OF CONSCIOUSNESS    COMPARISONS: None available. FINDINGS: Pacemaker generator overlying left axilla with pacemaker lead tips in region of right atrium and right ventricle. Cardiopericardial silhouette is normal in size. Aorta is calcified. Left diaphragm elevated. Mild blunting costophrenic angles. Calcified granuloma right upper lung. Impression  SMALL BILATERAL PLEURAL EFFUSIONS VERSUS THICKENING. 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 Assessment/Plan:     1. Acute bronchitis, unspecified organism  She has been having C/o Cough with yellow  Sputum. She said it is difficult to bring it out and which is more than usual.  She also having wheezing and likely underlying bronchitis. She said her nebulizer is not working and need new machine. She will be given new nebulizer machine. She will be started on doxycycline. Advised to call office if no improvement. - doxycycline hyclate (VIBRA-TABS) 100 MG tablet; Take 1 tablet by mouth 2 times daily for 10 days  Dispense: 20 tablet; Refill: 0    2. Chronic obstructive pulmonary disease, unspecified COPD type ([address]  She is using  nebulizer with albuterol  prn C/o shortness of breath , worse with exertion. C/o Wheezing. C/o Cough with yellow  Sputum. She want new nebulizer machine   new nebulizer , she said she will come to pick it up from office tomorrow. Return in about 4 months (around 2/19/2022) for COPD.       Jamey Escoto MD

## 2021-11-29 ENCOUNTER — OFFICE VISIT (OUTPATIENT)
Dept: CARDIOLOGY CLINIC | Age: 86
End: 2021-11-29
Payer: MEDICARE

## 2021-11-29 VITALS
WEIGHT: 154 LBS | SYSTOLIC BLOOD PRESSURE: 110 MMHG | HEART RATE: 70 BPM | BODY MASS INDEX: 24.86 KG/M2 | DIASTOLIC BLOOD PRESSURE: 60 MMHG

## 2021-11-29 DIAGNOSIS — I10 ESSENTIAL HYPERTENSION: ICD-10-CM

## 2021-11-29 DIAGNOSIS — Z95.0 PACEMAKER: Chronic | ICD-10-CM

## 2021-11-29 DIAGNOSIS — E78.5 DYSLIPIDEMIA: ICD-10-CM

## 2021-11-29 DIAGNOSIS — I25.10 CORONARY ARTERY DISEASE INVOLVING NATIVE CORONARY ARTERY OF NATIVE HEART WITHOUT ANGINA PECTORIS: Primary | ICD-10-CM

## 2021-11-29 DIAGNOSIS — I48.91 ATRIAL FIBRILLATION, UNSPECIFIED TYPE (HCC): ICD-10-CM

## 2021-11-29 PROCEDURE — 99214 OFFICE O/P EST MOD 30 MIN: CPT | Performed by: INTERNAL MEDICINE

## 2021-11-29 PROCEDURE — 93000 ELECTROCARDIOGRAM COMPLETE: CPT | Performed by: INTERNAL MEDICINE

## 2021-11-29 PROCEDURE — 1123F ACP DISCUSS/DSCN MKR DOCD: CPT | Performed by: INTERNAL MEDICINE

## 2021-11-29 PROCEDURE — G8427 DOCREV CUR MEDS BY ELIG CLIN: HCPCS | Performed by: INTERNAL MEDICINE

## 2021-11-29 PROCEDURE — 1090F PRES/ABSN URINE INCON ASSESS: CPT | Performed by: INTERNAL MEDICINE

## 2021-11-29 PROCEDURE — 1036F TOBACCO NON-USER: CPT | Performed by: INTERNAL MEDICINE

## 2021-11-29 PROCEDURE — G8484 FLU IMMUNIZE NO ADMIN: HCPCS | Performed by: INTERNAL MEDICINE

## 2021-11-29 PROCEDURE — G8420 CALC BMI NORM PARAMETERS: HCPCS | Performed by: INTERNAL MEDICINE

## 2021-11-29 PROCEDURE — 4040F PNEUMOC VAC/ADMIN/RCVD: CPT | Performed by: INTERNAL MEDICINE

## 2021-11-29 ASSESSMENT — ENCOUNTER SYMPTOMS
COUGH: 0
WHEEZING: 0
BACK PAIN: 1
GASTROINTESTINAL NEGATIVE: 1
BLOOD IN STOOL: 0
CHEST TIGHTNESS: 0
RESPIRATORY NEGATIVE: 1
STRIDOR: 0
SHORTNESS OF BREATH: 0
EYES NEGATIVE: 1
NAUSEA: 0

## 2021-11-29 NOTE — PROGRESS NOTES
Subsequent Progress Note  Patient: Elena Calero  YOB: 1933  MRN: [id number]    Chief Complaint:AR CAD HTN ppm svt recent L hip sx  Chief Complaint   Patient presents with    Coronary Artery Disease       CV Data:  12/2017 spect negative  12/2017 echo ef 60% Trace AR  9/2018 LAD mid 50% near a small diagonal. Cors all calcified EF 55 EDP 15  4/2019 LTHA   6/2019 CUS mild  PPM  12/2020 Echo EF 60 2+ MR 1-2 AR  RVSP 55   8/21 CUS mild     Subjective/HPI: no cp no sob no further falls no bleed. Takes meds     11/22/2019 no cp no osb no falls no bleed. Can't walk well    7/13/2020 no cp no sob no falls   No bleed. No falls. No bleed. Eats well.     11/13/2020 no cp no sob no falls no bleed no palp    3/12/21 no cp no sob no falls no bleed LE edema no better    7/27/21 Tired all the time. No cp no sob no falls no bleed. Takes meds eats well. 11/29/21 doing well no cp no dizzy still bynum no falls no bleed. EKG: SR70 QTc 451    Past Medical History:   Diagnosis Date    Abnormality of gait and mobility due to Left femoral neck fracture S/P Left Hip Hemiarthroplasty. [facility] admit 05/01/19. 5/6/2019    This is an 80year old female with a medical history significant for COPD, SSS S/P PPM, paroxysmal SVT, HTN who was sent to [facility] from [facility] for further evaluation and management of left femoral neck fracture S/P fall. She states she was walking out of her bathroom when she tripped and fell. She denies any loss of consciousness and she was unable to get up. EMS transferred her to [facility] ER.       Arthritis     BILAT    CAD (coronary artery disease)     Closed fracture of head of left femur ([facility] 4/27/2019    Last Assessment & Plan:  S/p left hip arthroplasty Assessment:  Pain well  controlled  PLAN:  Pain control with bowel regimen Ortho  following, going to SNF    COPD (chronic obstructive pulmonary disease) ([facility]     Hip joint replacement by other means 5/6/2011    Hypertension     Left displaced femoral neck fracture ([facility] 5/1/2019    Lower leg edema     BILAT     Lung disease     Osteoarthritis     Pacemaker 2009    FOR SYNCOPE    Paroxysmal SVT (supraventricular tachycardia) (HCC)     Paroxysmal SVT (supraventricular tachycardia) (HCC)     Pneumonia     Sinusitis        Past Surgical History:   Procedure Laterality Date    CARDIAC PACEMAKER PLACEMENT      HIP FRACTURE SURGERY Bilateral     PACEMAKER INSERTION N/A 2009       Family History   Problem Relation Age of Onset    Cancer Mother     No Known Problems Father        Social History     Socioeconomic History    Marital status:       Spouse name: Not on file    Number of children: Not on file    Years of education: Not on file    Highest education level: Not on file   Occupational History    Not on file   Tobacco Use    Smoking status: Never Smoker    Smokeless tobacco: Never Used   Substance and Sexual Activity    Alcohol use: Not on file    Drug use: No    Sexual activity: Not on file   Other Topics Concern    Not on file   Social History Narrative         Lives With: Daughter    Type of Home: House    Home Layout: One level    Home Access: Stairs to enter with rails    Entrance Stairs - Number of Steps: 3    Entrance Stairs - Rails: None    Bathroom Shower/Tub: Tub/Shower unit, Shower chair with back    Home Equipment: [employer], Standard walker, Reacher, Sock aid, Long-handled shoehorn    ADL Assistance: Independent    Homemaking Assistance: Independent    Ambulation Assistance: Independent    Transfer Assistance: Independent    Active : Yes    Mode of Transportation: Car    Occupation: Retired    Type of occupation: Domestic     Leisure & Hobbies: Reading, sports, outdoor acts and being active     Social Determinants of Health     Financial Resource Strain:     Difficulty of Paying Living Expenses: Not on file   Food Insecurity:     Worried About [address] in the Last Year: Not on file    [address] in the Last Year: Not on file   Transportation Needs:     Lack of Transportation (Medical): Not on file    Lack of Transportation (Non-Medical): Not on file   Physical Activity:     Days of Exercise per Week: Not on file    Minutes of Exercise per Session: Not on file   Stress:     Feeling of Stress : Not on file   Social Connections:     Frequency of Communication with Friends and Family: Not on file    Frequency of Social Gatherings with Friends and Family: Not on file    Attends Tenri[employer] Services: Not on file    Active Member of [address] [place] [address] or Organizations: Not on file    Attends Club or Organization Meetings: Not on file    Marital Status: Not on file   Intimate Partner Violence:     Fear of Current or Ex-Partner: Not on file    Emotionally Abused: Not on file    Physically Abused: Not on file    Sexually Abused: Not on file   Housing Stability:     Unable to Pay for Housing in the Last Year: Not on file    Number of [place] in the Last Year: Not on file    Unstable Housing in the Last Year: Not on file       Allergies   Allergen Reactions    Codeine      Other reaction(s): GI Upset    Penicillins      Other reaction(s):  Intolerance       Current Outpatient Medications   Medication Sig Dispense Refill    sotalol (BETAPACE) 80 MG tablet Take 1 tablet by mouth 2 times daily 180 tablet 2    verapamil (CALAN SR) 180 MG extended release tablet Take 1 tablet by mouth daily 90 tablet 3    atorvastatin (LIPITOR) 20 MG tablet Take 1 tablet by mouth daily 90 tablet 4    triamterene-hydroCHLOROthiazide (MAXZIDE-25) 37.5-25 MG per tablet Take 1 tablet by mouth daily 90 tablet 3    Nebulizers (COMPRESSOR/NEBULIZER) MISC Nebulizer Supplies 1 each 1    aspirin 81 MG tablet Take 1 tablet by mouth daily With Food 30 tablet 3    albuterol (PROVENTIL) (2.5 MG/3ML) 0.083% nebulizer solution Take 3 mLs by nebulization every 6 hours as needed for Wheezing 120 each 5     No current facility-administered medications for this visit. Review of Systems:   Review of Systems   Constitutional: Negative. Negative for diaphoresis and fatigue. HENT: Negative. Eyes: Negative. Respiratory: Negative. Negative for cough, chest tightness, shortness of breath, wheezing and stridor. Cardiovascular: Negative. Negative for chest pain, palpitations and leg swelling. Gastrointestinal: Negative. Negative for blood in stool and nausea. Genitourinary: Negative. Musculoskeletal: Positive for arthralgias, back pain and gait problem. Skin: Negative. Neurological: Negative for dizziness, syncope, weakness and light-headedness. Hematological: Negative. Psychiatric/Behavioral: Negative. Physical Examination:    /60 (Site: Left Upper Arm, Position: Sitting, Cuff Size: Small Adult)   Pulse 70   Wt 154 lb (69.9 kg)   BMI 24.86 kg/m²    Physical Exam   Constitutional: She appears healthy. No distress. HENT:   Normal cephalic and Atraumatic   Eyes: Pupils are equal, round, and reactive to light. Neck: Thyroid normal. No JVD present. No neck adenopathy. No thyromegaly present. Cardiovascular: Normal rate, regular rhythm, intact distal pulses and normal pulses. Murmur heard. Pulmonary/Chest: Effort normal and breath sounds normal. She has no wheezes. She has no rales. She exhibits no tenderness. Abdominal: Soft. Bowel sounds are normal. There is no abdominal tenderness. Musculoskeletal:         General: Edema (mild) present. No tenderness. Normal range of motion. Cervical back: Normal range of motion and neck supple. Neurological: She is alert and oriented to person, place, and time. Skin: Skin is warm. No cyanosis. Nails show no clubbing.        LABS:  CBC:   Lab Results   Component Value Date    WBC 10.0 08/04/2021    RBC 4.27 08/04/2021    RBC 4.22 01/03/2012    HGB 13.5 08/04/2021    HCT 41.0 08/04/2021    MCV 96.0 08/04/2021    MCH 31.7 08/04/2021 MCHC 33.0 08/04/2021    RDW 14.2 08/04/2021     08/04/2021    MPV 8.2 06/27/2015     Lipids:  Lab Results   Component Value Date    CHOL 168 01/02/2020    CHOL 171 12/05/2017    CHOL 175 10/22/2014     Lab Results   Component Value Date    TRIG 61 01/02/2020    TRIG 71 12/05/2017    TRIG 65 10/22/2014     Lab Results   Component Value Date    HDL 63 (H) 08/04/2021    HDL 58 03/15/2021    HDL 63 (H) 01/02/2020     Lab Results   Component Value Date    LDLCALC 91 08/04/2021    LDLCALC 86 03/15/2021    LDLCALC 93 01/02/2020     No results found for: LABVLDL, VLDL  Lab Results   Component Value Date    CHOLHDLRATIO 3.2 01/03/2012     CMP:    Lab Results   Component Value Date     08/04/2021    K 4.3 08/04/2021    K 4.4 05/02/2019     08/04/2021    CO2 28 08/04/2021    BUN 24 08/04/2021    CREATININE 0.81 08/04/2021    GFRAA >60.0 08/04/2021    LABGLOM >60.0 08/04/2021    GLUCOSE 102 08/04/2021    GLUCOSE 93 01/03/2012    PROT 7.1 08/04/2021    LABALBU 4.1 08/04/2021    LABALBU 4.0 01/03/2012    CALCIUM 9.3 08/04/2021    BILITOT 0.6 08/04/2021    ALKPHOS 77 08/04/2021    AST 15 08/04/2021    ALT 7 08/04/2021     BMP:    Lab Results   Component Value Date     08/04/2021    K 4.3 08/04/2021    K 4.4 05/02/2019     08/04/2021    CO2 28 08/04/2021    BUN 24 08/04/2021    LABALBU 4.1 08/04/2021    LABALBU 4.0 01/03/2012    CREATININE 0.81 08/04/2021    CALCIUM 9.3 08/04/2021    GFRAA >60.0 08/04/2021    LABGLOM >60.0 08/04/2021    GLUCOSE 102 08/04/2021    GLUCOSE 93 01/03/2012     Magnesium:    Lab Results   Component Value Date    MG 2.0 08/04/2021    MG 2.0 01/03/2012     TSH:  Lab Results   Component Value Date    TSH 2.200 08/04/2021       Patient Active Problem List   Diagnosis    Paroxysmal SVT (supraventricular tachycardia) (HCC)    Pacemaker    Sick sinus syndrome (HCC)    Essential hypertension    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 Modified Medications    No medications on file       No orders of the defined types were placed in this encounter. Assessment/Plan:    1. Paroxysmal SVT (supraventricular tachycardia) (HCC)   stable QTc - on Sotalol. Stable QTc      2. Pacemaker   interrogate - reviewed. -interrogate - 2.5 years left. Reviewed with pt.     3. Essential hypertension   stable - continue meds. Low salt diet  - EKG 12 lead    4. Coronary artery disease involving native coronary artery of native heart without angina pectoris   no angina  - EKG 12 lead    5. AR- need echo - stable     6. LE Edeam- Maxzide. 7. Tired- will get labs. 8. HPL - continue statin. Low fat diet. Labs reviewed with pt. Need f/u CUS - stable     Fasting Labs ASAP  Counseling:  Heart Healthy Lifestyle, Low Salt Diet, Take Precautions to Prevent Falls and Walk Daily    Return in about 4 months (around 3/29/2022).       Electronically signed by Adela Zuniga MD on 11/29/2021 at 1:15 PM

## 2022-01-10 RX ORDER — SOTALOL HYDROCHLORIDE 80 MG/1
80 TABLET ORAL 2 TIMES DAILY
Qty: 180 TABLET | Refills: 2 | Status: SHIPPED | OUTPATIENT
Start: 2022-01-10 | End: 2022-08-17 | Stop reason: SDUPTHER

## 2022-01-10 NOTE — TELEPHONE ENCOUNTER
Patient is requesting medication refill.  Please approve or deny this request.    Rx requested:  Requested Prescriptions      No prescriptions requested or ordered in this encounter         Last Office Visit:   11/29/2021      Next Visit Date:  Future Appointments   Date Time Provider Randall Jauregui   1/24/2022  8:15 AM [address]   3/1/2022  1:00 PM Herber Whyte, [address] [facility]   3/30/2022 12:30 PM Humza Ventura MD [facility]

## 2022-01-24 ENCOUNTER — HOSPITAL ENCOUNTER (OUTPATIENT)
Dept: CARDIOLOGY | Age: 87
Discharge: HOME | End: 2022-01-24
Payer: MEDICARE

## 2022-01-24 PROCEDURE — 93296 REM INTERROG EVL PM/IDS: CPT

## 2022-03-01 ENCOUNTER — OFFICE VISIT (OUTPATIENT)
Dept: PULMONOLOGY | Age: 87
End: 2022-03-01
Payer: MEDICARE

## 2022-03-01 VITALS
HEART RATE: 88 BPM | BODY MASS INDEX: 24.75 KG/M2 | WEIGHT: 154 LBS | SYSTOLIC BLOOD PRESSURE: 133 MMHG | TEMPERATURE: 98.2 F | HEIGHT: 66 IN | OXYGEN SATURATION: 91 % | DIASTOLIC BLOOD PRESSURE: 64 MMHG

## 2022-03-01 DIAGNOSIS — J42 CHRONIC BRONCHITIS, UNSPECIFIED CHRONIC BRONCHITIS TYPE (HCC): ICD-10-CM

## 2022-03-01 DIAGNOSIS — J44.9 CHRONIC OBSTRUCTIVE PULMONARY DISEASE, UNSPECIFIED COPD TYPE (HCC): ICD-10-CM

## 2022-03-01 DIAGNOSIS — R06.02 SHORTNESS OF BREATH: Primary | ICD-10-CM

## 2022-03-01 PROCEDURE — 1123F ACP DISCUSS/DSCN MKR DOCD: CPT | Performed by: INTERNAL MEDICINE

## 2022-03-01 PROCEDURE — G8484 FLU IMMUNIZE NO ADMIN: HCPCS | Performed by: INTERNAL MEDICINE

## 2022-03-01 PROCEDURE — 99213 OFFICE O/P EST LOW 20 MIN: CPT | Performed by: INTERNAL MEDICINE

## 2022-03-01 PROCEDURE — 3023F SPIROM DOC REV: CPT | Performed by: INTERNAL MEDICINE

## 2022-03-01 PROCEDURE — 1090F PRES/ABSN URINE INCON ASSESS: CPT | Performed by: INTERNAL MEDICINE

## 2022-03-01 PROCEDURE — G8420 CALC BMI NORM PARAMETERS: HCPCS | Performed by: INTERNAL MEDICINE

## 2022-03-01 PROCEDURE — G8427 DOCREV CUR MEDS BY ELIG CLIN: HCPCS | Performed by: INTERNAL MEDICINE

## 2022-03-01 PROCEDURE — 1036F TOBACCO NON-USER: CPT | Performed by: INTERNAL MEDICINE

## 2022-03-01 PROCEDURE — 4040F PNEUMOC VAC/ADMIN/RCVD: CPT | Performed by: INTERNAL MEDICINE

## 2022-03-01 RX ORDER — ALBUTEROL SULFATE 2.5 MG/3ML
2.5 SOLUTION RESPIRATORY (INHALATION) EVERY 6 HOURS PRN
Qty: 120 EACH | Refills: 5 | Status: SHIPPED | OUTPATIENT
Start: 2022-03-01 | End: 2022-04-08

## 2022-03-01 ASSESSMENT — ENCOUNTER SYMPTOMS
ABDOMINAL PAIN: 0
DIARRHEA: 0
SINUS PRESSURE: 0
COUGH: 1
EYE DISCHARGE: 0
SORE THROAT: 0
VOMITING: 0
TROUBLE SWALLOWING: 0
SHORTNESS OF BREATH: 1
CHEST TIGHTNESS: 0
EYE ITCHING: 0
RHINORRHEA: 0
NAUSEA: 0
VOICE CHANGE: 0
WHEEZING: 1

## 2022-03-01 NOTE — PROGRESS NOTES
Bilateral     PACEMAKER INSERTION N/A 2009     Family History   Problem Relation Age of Onset   [facility] Cancer Mother     No Known Problems Father      Social History     Socioeconomic History    Marital status:      Spouse name: Not on file    Number of children: Not on file    Years of education: Not on file    Highest education level: Not on file   Occupational History    Not on file   Tobacco Use    Smoking status: Never Smoker    Smokeless tobacco: Never Used   Substance and Sexual Activity    Alcohol use: Not on file    Drug use: No    Sexual activity: Not on file   Other Topics Concern    Not on file   Social History Narrative         Lives With: Daughter    Type of Home: House    Home Layout: One level    Home Access: Stairs to enter with rails    Entrance Stairs - Number of Steps: 3    Entrance Stairs - Rails: None    Bathroom Shower/Tub: Tub/Shower unit, Shower chair with back    Home Equipment: [employer], Standard walker, Reacher, Sock aid, Long-handled shoehorn    ADL Assistance: Independent    Homemaking Assistance: Independent    Ambulation Assistance: Independent    Transfer Assistance: Independent    Active : Yes    Mode of Transportation: Car    Occupation: Retired    Type of occupation: Domestic     Leisure & [address]: Reading, sports, outdoor acts and being active     Social Determinants of Health     Financial Resource Strain:     Difficulty of Paying Living Expenses: Not on file   Food Insecurity:     Worried About [address] in the Last Year: Not on file    [address] [facility] [address] in the Last Year: Not on file   Transportation Needs:     Lack of Transportation (Medical): Not on file    Lack of Transportation (Non-Medical):  Not on file   Physical Activity:     Days of Exercise per Week: Not on file    Minutes of Exercise per Session: Not on file   Stress:     Feeling of Stress : Not on file   Social Connections:     Frequency of Communication with Friends and Family: Not on file    Frequency of Social Gatherings with Friends and Family: Not on file    Attends Shinto Services: Not on file    Active Member of Clubs or Organizations: Not on file    Attends Club or Organization Meetings: Not on file    Marital Status: Not on file   Intimate Partner Violence:     Fear of Current or Ex-Partner: Not on file    Emotionally Abused: Not on file    Physically Abused: Not on file    Sexually Abused: Not on file   Housing Stability:     Unable to Pay for Housing in the Last Year: Not on file    Number of [place] in the Last Year: Not on file    Unstable Housing in the Last Year: Not on file         Review of Systems   Constitutional: Negative for chills, diaphoresis, fatigue and fever. HENT: Negative for congestion, mouth sores, nosebleeds, postnasal drip, rhinorrhea, sinus pressure, sneezing, sore throat, trouble swallowing and voice change. Eyes: Negative for discharge, itching and visual disturbance. Respiratory: Positive for cough, shortness of breath and wheezing. Negative for chest tightness. Cardiovascular: Negative for chest pain, palpitations and leg swelling. Gastrointestinal: Negative for abdominal pain, diarrhea, nausea and vomiting. Genitourinary: Negative for difficulty urinating and hematuria. Musculoskeletal: Negative for arthralgias, joint swelling and myalgias. Skin: Negative for rash. Allergic/Immunologic: Negative for environmental allergies and food allergies. Neurological: Negative for dizziness, tremors, weakness and headaches.    Psychiatric/Behavioral: Negative for behavioral problems and sleep disturbance.         :     Vitals:    03/01/22 1300   BP: 133/64   Pulse: 88   Temp: 98.2 °F (36.8 °C)   SpO2: 91%   Weight: 154 lb (69.9 kg)   Height: 5' 6\" (1.676 m)     Wt Readings from Last 3 Encounters:   03/01/22 154 lb (69.9 kg)   11/29/21 154 lb (69.9 kg)   10/19/21 152 lb (68.9 kg)         Physical Exam  Constitutional: General: She is not in acute distress. Appearance: She is well-developed. She is not diaphoretic. HENT:      Head: Normocephalic and atraumatic. Nose: Nose normal.   Eyes:      Pupils: Pupils are equal, round, and reactive to light. Neck:      Thyroid: No thyromegaly. Vascular: No JVD. Trachea: No tracheal deviation. Cardiovascular:      Rate and Rhythm: Normal rate and regular rhythm. Heart sounds: No murmur heard. No friction rub. No gallop. Pulmonary:      Effort: No respiratory distress. Breath sounds: No wheezing or rales. Chest:      Chest wall: No tenderness. Abdominal:      General: There is no distension. Tenderness: There is no abdominal tenderness. There is no rebound. Musculoskeletal:         General: Normal range of motion. Lymphadenopathy:      Cervical: No cervical adenopathy. Skin:     General: Skin is warm and dry. Neurological:      Mental Status: She is alert and oriented to person, place, and time. Coordination: Coordination normal.         Current Outpatient Medications   Medication Sig Dispense Refill    albuterol (PROVENTIL) (2.5 MG/3ML) 0.083% nebulizer solution Take 3 mLs by nebulization every 6 hours as needed for Wheezing 120 each 5    sotalol (BETAPACE) 80 MG tablet Take 1 tablet by mouth 2 times daily 180 tablet 2    verapamil (CALAN SR) 180 MG extended release tablet Take 1 tablet by mouth daily 90 tablet 3    atorvastatin (LIPITOR) 20 MG tablet Take 1 tablet by mouth daily 90 tablet 4    Nebulizers (COMPRESSOR/NEBULIZER) MISC Nebulizer Supplies 1 each 1    aspirin 81 MG tablet Take 1 tablet by mouth daily With Food 30 tablet 3    triamterene-hydroCHLOROthiazide (MAXZIDE-25) 37.5-25 MG per tablet Take 1 tablet by mouth daily (Patient not taking: Reported on 3/1/2022) 90 tablet 3     No current facility-administered medications for this visit.        Results for orders placed during the hospital encounter of 02/26/21    XR CHEST (2 VW)    Narrative  EXAM: CHEST, 2 VIEWS    CLINICAL HISTORY: J44.9 Chronic obstructive pulmonary disease, unspecified COPD type ([address] ICD10    COMPARISONS:  Chest x-ray from March 11, 2020    Findings: There is  a dual lead cardiac pacemaker in place  The cardiomediastinal silhouette is within normal limits. The right lung is unremarkable. There is persistent blunting of the left costophrenic recess which may be secondary to scarring from prior infectious or inflammatory process. The visualized bones are demineralized. Impression  No radiographic evidence of acute intrathoracic process. No change since the previous study with chronic appearing scarring and blunting of the left costophrenic recess. Results for orders placed during the hospital encounter of 03/11/20    XR CHEST STANDARD (2 VW)    Narrative  EXAMINATION: XR CHEST (2 VW)    CLINICAL HISTORY: R06.02 SOB (shortness of breath) ICD10    COMPARISONS: April 27, 2019    FINDINGS:    Two views of the chest are submitted. The cardiac silhouette is of normal size configuration. Pulmonary vascular attenuated. Lungs are hyperinflated. Right sided trachea. No focal infiltrates. Trace/small left pleural effusion. No Pneumothoraces. Impression  TRACE/SMALL LEFT PLEURAL EFFUSION SUPERIMPOSED UPON COPD. Results for orders placed during the hospital encounter of 11/09/18    XR CHEST STANDARD (2 VW)    Narrative  EXAMINATION: CHEST TWO VIEWS    CLINICAL HISTORY: R06.00 Dyspnea, unspecified type ICD10    COMPARISONS: October 4, 2017    FINDINGS:    Two views of the chest are submitted. There is a left-sided ICD device. Leads overlying the cardiac silhouette. Unchanged The cardiac silhouette is unchanged configuration. .  The mediastinum is unremarkable. Pulmonary vascular is attenuated, lungs are hyperinflated and there is some widening of the AP diameter the chest and coarsening of the interstitium. Right sided trachea.   No focal infiltrates. No effusions. There is blunting of left costophrenic angle again noted. May represent scarring. Unchanged  Pneumothoraces. Impression  NO ACUTE ACTIVE CARDIOPULMONARY PROCESS. RADIOGRAPHIC FINDINGS OF COPD.  ]  Results for orders placed during the hospital encounter of 04/27/19    XR CHEST PORTABLE    Narrative  EXAMINATION: XR CHEST PORTABLE    CLINICAL HISTORY: STUMBLED AND FELL TO FLOOR. NO LOSS OF CONSCIOUSNESS    COMPARISONS: None available. FINDINGS: Pacemaker generator overlying left axilla with pacemaker lead tips in region of right atrium and right ventricle. Cardiopericardial silhouette is normal in size. Aorta is calcified. Left diaphragm elevated. Mild blunting costophrenic angles. Calcified granuloma right upper lung. Impression  SMALL BILATERAL PLEURAL EFFUSIONS VERSUS THICKENING. 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 Assessment/Plan:     1. Chronic obstructive pulmonary disease, unspecified COPD type ([address]  She is using  nebulizer with albuterol  BID or TID  prn . C/o shortness of breath  with exertion. C/o on and off Wheezing. C/o Cough with white to yellow  Sputum. Continue bronchodilator therapy as before    - albuterol (PROVENTIL) (2.5 MG/3ML) 0.083% nebulizer solution; Take 3 mLs by nebulization every 6 hours as needed for Wheezing  Dispense: 120 each; Refill: 5  - XR CHEST STANDARD (2 VW); Future    2. Shortness of breath  She having Chronic shortness of breath which is likely due to, continue Bronchodilatoras before. keep  Spo2 90% or above. 3. Chronic bronchitis, unspecified chronic bronchitis type ([address]  She is having chronic cough with white to yellow mucus. No significant change no need for antibiotic. Return in about 4 months (around 7/1/2022) for COPD.       Jose Do MD

## 2022-03-15 ENCOUNTER — HOSPITAL ENCOUNTER (OUTPATIENT)
Dept: GENERAL RADIOLOGY | Age: 87
Discharge: HOME | End: 2022-03-17
Payer: MEDICARE

## 2022-03-15 DIAGNOSIS — J44.9 CHRONIC OBSTRUCTIVE PULMONARY DISEASE, UNSPECIFIED COPD TYPE (HCC): ICD-10-CM

## 2022-03-15 PROCEDURE — 71046 X-RAY EXAM CHEST 2 VIEWS: CPT

## 2022-04-08 ENCOUNTER — OFFICE VISIT (OUTPATIENT)
Dept: CARDIOLOGY CLINIC | Age: 87
End: 2022-04-08
Payer: MEDICARE

## 2022-04-08 VITALS
SYSTOLIC BLOOD PRESSURE: 118 MMHG | DIASTOLIC BLOOD PRESSURE: 60 MMHG | HEIGHT: 66 IN | OXYGEN SATURATION: 93 % | BODY MASS INDEX: 24.11 KG/M2 | WEIGHT: 150 LBS | HEART RATE: 71 BPM

## 2022-04-08 DIAGNOSIS — I10 ESSENTIAL HYPERTENSION: ICD-10-CM

## 2022-04-08 DIAGNOSIS — I48.91 ATRIAL FIBRILLATION, UNSPECIFIED TYPE (HCC): ICD-10-CM

## 2022-04-08 DIAGNOSIS — I25.10 CORONARY ARTERY DISEASE INVOLVING NATIVE CORONARY ARTERY OF NATIVE HEART WITHOUT ANGINA PECTORIS: Primary | ICD-10-CM

## 2022-04-08 DIAGNOSIS — I25.10 CORONARY ARTERY DISEASE INVOLVING NATIVE CORONARY ARTERY OF NATIVE HEART WITHOUT ANGINA PECTORIS: ICD-10-CM

## 2022-04-08 PROCEDURE — 1036F TOBACCO NON-USER: CPT | Performed by: INTERNAL MEDICINE

## 2022-04-08 PROCEDURE — 4040F PNEUMOC VAC/ADMIN/RCVD: CPT | Performed by: INTERNAL MEDICINE

## 2022-04-08 PROCEDURE — G8420 CALC BMI NORM PARAMETERS: HCPCS | Performed by: INTERNAL MEDICINE

## 2022-04-08 PROCEDURE — 1123F ACP DISCUSS/DSCN MKR DOCD: CPT | Performed by: INTERNAL MEDICINE

## 2022-04-08 PROCEDURE — 1090F PRES/ABSN URINE INCON ASSESS: CPT | Performed by: INTERNAL MEDICINE

## 2022-04-08 PROCEDURE — G8427 DOCREV CUR MEDS BY ELIG CLIN: HCPCS | Performed by: INTERNAL MEDICINE

## 2022-04-08 PROCEDURE — 99214 OFFICE O/P EST MOD 30 MIN: CPT | Performed by: INTERNAL MEDICINE

## 2022-04-08 PROCEDURE — 93000 ELECTROCARDIOGRAM COMPLETE: CPT | Performed by: INTERNAL MEDICINE

## 2022-04-08 RX ORDER — ATORVASTATIN CALCIUM 20 MG/1
20 TABLET, FILM COATED ORAL DAILY
Qty: 90 TABLET | Refills: 4 | Status: SHIPPED | OUTPATIENT
Start: 2022-04-08 | End: 2022-08-17

## 2022-04-08 ASSESSMENT — ENCOUNTER SYMPTOMS
CHEST TIGHTNESS: 0
GASTROINTESTINAL NEGATIVE: 1
EYES NEGATIVE: 1
SHORTNESS OF BREATH: 0
NAUSEA: 0
COUGH: 0
BACK PAIN: 1
WHEEZING: 0
STRIDOR: 0
RESPIRATORY NEGATIVE: 1
BLOOD IN STOOL: 0

## 2022-04-08 NOTE — PROGRESS NOTES
Subsequent Progress Note  Patient: Natanael Ford  YOB: 1933  MRN: [id number]    Chief Complaint:AR CAD HTN ppm svt recent L hip sx  Chief Complaint   Patient presents with    Follow-up    Coronary Artery Disease    Hypertension    Atrial Fibrillation       CV Data:  12/2017 spect negative  12/2017 echo ef 60% Trace AR  9/2018 LAD mid 50% near a small diagonal. Cors all calcified EF 55 EDP 15  4/2019 LTHA   6/2019 CUS mild  PPM  12/2020 Echo EF 60 2+ MR 1-2 AR  RVSP 55   8/21 CUS mild     Subjective/HPI: no cp no sob no further falls no bleed. Takes meds     11/22/2019 no cp no osb no falls no bleed. Can't walk well    7/13/2020 no cp no sob no falls   No bleed. No falls. No bleed. Eats well.     11/13/2020 no cp no sob no falls no bleed no palp    3/12/21 no cp no sob no falls no bleed LE edema no better    7/27/21 Tired all the time. No cp no sob no falls no bleed. Takes meds eats well. 11/29/21 doing well no cp no dizzy still bynum no falls no bleed. 4/8/22 rare janette am palps. None during the day no syncope no cp has chronic sob. No bleed no falls    EKG: SR71 QTc 449    Past Medical History:   Diagnosis Date    Abnormality of gait and mobility due to Left femoral neck fracture S/P Left Hip Hemiarthroplasty. [facility] admit 05/01/19. 5/6/2019    This is an 80year old female with a medical history significant for COPD, SSS S/P PPM, paroxysmal SVT, HTN who was sent to [facility] from [facility] for further evaluation and management of left femoral neck fracture S/P fall. She states she was walking out of her bathroom when she tripped and fell. She denies any loss of consciousness and she was unable to get up. EMS transferred her to [facility] ER.       Arthritis     BILAT    CAD (coronary artery disease)     Closed fracture of head of left femur ([address] 4/27/2019    Last Assessment & Plan:  S/p left hip arthroplasty Assessment:  Pain well  controlled  PLAN:  Pain control with bowel regimen Ortho following, going to SNF    COPD [facility]chronic obstructive pulmonary disease) [facility]     Hip joint replacement by other means 5/6/2011    Hypertension     Left displaced femoral neck fracture ([facility] 5/1/2019    Lower leg edema     BILAT     Lung disease     Osteoarthritis     Pacemaker 2009    FOR SYNCOPE    Paroxysmal SVT (supraventricular tachycardia) (HCC)     Paroxysmal SVT (supraventricular tachycardia) (HCC)     Pneumonia     Sinusitis        Past Surgical History:   Procedure Laterality Date    CARDIAC PACEMAKER PLACEMENT      HIP FRACTURE SURGERY Bilateral     PACEMAKER INSERTION N/A 2009       Family History   Problem Relation Age of Onset    Cancer Mother     No Known Problems Father        Social History     Socioeconomic History    Marital status:       Spouse name: None    Number of children: None    Years of education: None    Highest education level: None   Occupational History    None   Tobacco Use    Smoking status: Never Smoker    Smokeless tobacco: Never Used   Substance and Sexual Activity    Alcohol use: None    Drug use: No    Sexual activity: None   Other Topics Concern    None   Social History Narrative         Lives With: Daughter    Type of Home: House    Home Layout: One level    Home Access: Stairs to enter with rails    Entrance Stairs - Number of Steps: 3    Entrance Stairs - Rails: None    Bathroom Shower/Tub: Tub/Shower unit, Shower chair with back    Home Equipment: Cane, Standard walker, Reacher, Sock aid, Long-handled shoehorn    ADL Assistance: Independent    Homemaking Assistance: Independent    Ambulation Assistance: Independent    Transfer Assistance: Independent    Active : Yes    Mode of Transportation: Car    Occupation: Retired    Type of occupation: Domestic     Leisure & Hobbies: Reading, sports, outdoor acts and being active     Social Determinants of Health     Financial Resource Strain:     Difficulty of Paying Living Expenses: Not on file   Food Insecurity:     Worried About [facility] in the Last Year: Not on file    Eleonora [employer] in the Last Year: Not on file   Transportation Needs:     Lack of Transportation (Medical): Not on file    Lack of Transportation (Non-Medical): Not on file   Physical Activity:     Days of Exercise per Week: Not on file    Minutes of Exercise per Session: Not on file   Stress:     Feeling of Stress : Not on file   Social Connections:     Frequency of Communication with Friends and Family: Not on file    Frequency of Social Gatherings with Friends and Family: Not on file    Attends Yarsanism Services: Not on file    Active Member of [address] [place] [address] or Organizations: Not on file    Attends Club or Organization Meetings: Not on file    Marital Status: Not on file   Intimate Partner Violence:     Fear of Current or Ex-Partner: Not on file    Emotionally Abused: Not on file    Physically Abused: Not on file    Sexually Abused: Not on file   Housing Stability:     Unable to Pay for Housing in the Last Year: Not on file    Number of [place] in the Last Year: Not on file    Unstable Housing in the Last Year: Not on file       Allergies   Allergen Reactions    Codeine      Other reaction(s): GI Upset    Penicillins      Other reaction(s): Intolerance       Current Outpatient Medications   Medication Sig Dispense Refill    verapamil (CALAN SR) 180 MG extended release tablet Take 1 tablet by mouth daily 90 tablet 3    atorvastatin (LIPITOR) 20 MG tablet Take 1 tablet by mouth daily 90 tablet 4    sotalol (BETAPACE) 80 MG tablet Take 1 tablet by mouth 2 times daily 180 tablet 2    Nebulizers (COMPRESSOR/NEBULIZER) MISC Nebulizer Supplies (Patient not taking: Reported on 4/8/2022) 1 each 1    aspirin 81 MG tablet Take 1 tablet by mouth daily With Food (Patient not taking: Reported on 4/8/2022) 30 tablet 3     No current facility-administered medications for this visit.        Review of 14.2 08/04/2021     08/04/2021    MPV 8.2 06/27/2015     Lipids:  Lab Results   Component Value Date    CHOL 168 01/02/2020    CHOL 171 12/05/2017    CHOL 175 10/22/2014     Lab Results   Component Value Date    TRIG 61 01/02/2020    TRIG 71 12/05/2017    TRIG 65 10/22/2014     Lab Results   Component Value Date    HDL 63 (H) 08/04/2021    HDL 58 03/15/2021    HDL 63 (H) 01/02/2020     Lab Results   Component Value Date    LDLCALC 91 08/04/2021    LDLCALC 86 03/15/2021    LDLCALC 93 01/02/2020     No results found for: LABVLDL, VLDL  Lab Results   Component Value Date    CHOLHDLRATIO 3.2 01/03/2012     CMP:    Lab Results   Component Value Date     08/04/2021    K 4.3 08/04/2021    K 4.4 05/02/2019     08/04/2021    CO2 28 08/04/2021    BUN 24 08/04/2021    CREATININE 0.81 08/04/2021    GFRAA >60.0 08/04/2021    LABGLOM >60.0 08/04/2021    GLUCOSE 102 08/04/2021    GLUCOSE 93 01/03/2012    PROT 7.1 08/04/2021    LABALBU 4.1 08/04/2021    LABALBU 4.0 01/03/2012    CALCIUM 9.3 08/04/2021    BILITOT 0.6 08/04/2021    ALKPHOS 77 08/04/2021    AST 15 08/04/2021    ALT 7 08/04/2021     BMP:    Lab Results   Component Value Date     08/04/2021    K 4.3 08/04/2021    K 4.4 05/02/2019     08/04/2021    CO2 28 08/04/2021    BUN 24 08/04/2021    LABALBU 4.1 08/04/2021    LABALBU 4.0 01/03/2012    CREATININE 0.81 08/04/2021    CALCIUM 9.3 08/04/2021    GFRAA >60.0 08/04/2021    LABGLOM >60.0 08/04/2021    GLUCOSE 102 08/04/2021    GLUCOSE 93 01/03/2012     Magnesium:    Lab Results   Component Value Date    MG 2.0 08/04/2021    MG 2.0 01/03/2012     TSH:  Lab Results   Component Value Date    TSH 2.200 08/04/2021       Patient Active Problem List   Diagnosis    Paroxysmal SVT (supraventricular tachycardia) (HCC)    Pacemaker    S[facility] sinus syndrome ([facility]    Essential hypertension    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)    Chronic obstructive pulmonary disease ([facility]    Hx of fracture of femur    BMI 26.0-26.9,adult    Visual disturbance    Coronary artery disease involving native coronary artery of native heart without angina pectoris    Pulmonary hypertension (HCC)    Unilateral inguinal hernia without obstruction or gangrene    Edema of both legs    Age-related osteoporosis without current pathological fracture    Shortness of breath    Acute bacterial sinusitis       Medications Discontinued During This Encounter   Medication Reason    albuterol (PROVENTIL) (2.5 MG/3ML) 0.083% nebulizer solution LIST CLEANUP    triamterene-hydroCHLOROthiazide (MAXZIDE-25) 37.5-25 MG per tablet LIST CLEANUP    verapamil (CALAN SR) 180 MG extended release tablet REORDER    atorvastatin (LIPITOR) 20 MG tablet REORDER       Modified Medications    Modified Medication Previous Medication    ATORVASTATIN (LIPITOR) 20 MG TABLET atorvastatin (LIPITOR) 20 MG tablet       Take 1 tablet by mouth daily    Take 1 tablet by mouth daily    VERAPAMIL (CALAN SR) 180 MG EXTENDED RELEASE TABLET verapamil (CALAN SR) 180 MG extended release tablet       Take 1 tablet by mouth daily    Take 1 tablet by mouth daily       Orders Placed This Encounter   Medications    verapamil (CALAN SR) 180 MG extended release tablet     Sig: Take 1 tablet by mouth daily     Dispense:  90 tablet     Refill:  3    atorvastatin (LIPITOR) 20 MG tablet     Sig: Take 1 tablet by mouth daily     Dispense:  90 tablet     Refill:  4       Assessment/Plan:    1. Paroxysmal SVT [facility]supraventricular tachycardia) ([facility])   stable QTc - on Sotalol. Stable QTc      2. Pacemaker   interrogate - reviewed. -interrogate - 1 yr 10 months years left. Reviewed with pt.     3. Essential hypertension   stable - continue meds. Low salt diet  - EKG 12 lead    4. Coronary artery disease involving native coronary artery of native heart without angina pectoris   no angina  - EKG 12 lead    5. AR- need echo - stable     6.  LE Edeam- Maxzide. 7. Tired- will get labs. 8. HPL - continue statin. Low fat diet. Labs reviewed with pt. Need f/u CUS - stable   9. Palpitation- rare. Did not see anythinng on PPM report. Counseling:  Heart Healthy Lifestyle, Low Salt Diet, Take Precautions to Prevent Falls and Walk Daily    Return in about 4 months (around 8/8/2022).       Electronically signed by Liu Mo MD on 4/8/2022 at 12:29 PM

## 2022-04-15 ENCOUNTER — TELEPHONE (OUTPATIENT)
Dept: FAMILY MEDICINE CLINIC | Age: 87
End: 2022-04-15

## 2022-04-26 ENCOUNTER — HOSPITAL ENCOUNTER (OUTPATIENT)
Dept: CARDIOLOGY | Age: 87
Discharge: HOME | End: 2022-04-26
Payer: MEDICARE

## 2022-04-26 PROCEDURE — 93280 PM DEVICE PROGR EVAL DUAL: CPT

## 2022-06-29 ENCOUNTER — OFFICE VISIT (OUTPATIENT)
Dept: PULMONOLOGY | Age: 87
End: 2022-06-29
Payer: MEDICARE

## 2022-06-29 VITALS
HEART RATE: 57 BPM | SYSTOLIC BLOOD PRESSURE: 136 MMHG | HEIGHT: 66 IN | OXYGEN SATURATION: 99 % | BODY MASS INDEX: 23.01 KG/M2 | DIASTOLIC BLOOD PRESSURE: 61 MMHG | WEIGHT: 143.2 LBS

## 2022-06-29 DIAGNOSIS — J20.9 ACUTE BRONCHITIS, UNSPECIFIED ORGANISM: ICD-10-CM

## 2022-06-29 DIAGNOSIS — J44.9 CHRONIC OBSTRUCTIVE PULMONARY DISEASE, UNSPECIFIED COPD TYPE (HCC): Primary | ICD-10-CM

## 2022-06-29 DIAGNOSIS — R06.02 SHORTNESS OF BREATH: ICD-10-CM

## 2022-06-29 PROCEDURE — G8420 CALC BMI NORM PARAMETERS: HCPCS | Performed by: INTERNAL MEDICINE

## 2022-06-29 PROCEDURE — G8427 DOCREV CUR MEDS BY ELIG CLIN: HCPCS | Performed by: INTERNAL MEDICINE

## 2022-06-29 PROCEDURE — 1123F ACP DISCUSS/DSCN MKR DOCD: CPT | Performed by: INTERNAL MEDICINE

## 2022-06-29 PROCEDURE — 99214 OFFICE O/P EST MOD 30 MIN: CPT | Performed by: INTERNAL MEDICINE

## 2022-06-29 PROCEDURE — 1036F TOBACCO NON-USER: CPT | Performed by: INTERNAL MEDICINE

## 2022-06-29 PROCEDURE — 1090F PRES/ABSN URINE INCON ASSESS: CPT | Performed by: INTERNAL MEDICINE

## 2022-06-29 PROCEDURE — 3023F SPIROM DOC REV: CPT | Performed by: INTERNAL MEDICINE

## 2022-06-29 RX ORDER — DOXYCYCLINE HYCLATE 100 MG
100 TABLET ORAL 2 TIMES DAILY
Qty: 20 TABLET | Refills: 0 | Status: SHIPPED | OUTPATIENT
Start: 2022-06-29 | End: 2022-07-09

## 2022-06-29 RX ORDER — GUAIFENESIN 600 MG/1
600 TABLET, EXTENDED RELEASE ORAL 2 TIMES DAILY
Qty: 60 TABLET | Refills: 5 | Status: SHIPPED | OUTPATIENT
Start: 2022-06-29 | End: 2022-07-29

## 2022-06-29 ASSESSMENT — ENCOUNTER SYMPTOMS
VOICE CHANGE: 0
EYE ITCHING: 0
DIARRHEA: 0
NAUSEA: 0
RHINORRHEA: 0
EYE DISCHARGE: 0
TROUBLE SWALLOWING: 0
VOMITING: 0
SORE THROAT: 0
CHEST TIGHTNESS: 0
COUGH: 1
ABDOMINAL PAIN: 0
SINUS PRESSURE: 0
SHORTNESS OF BREATH: 1
WHEEZING: 1

## 2022-06-29 NOTE — PROGRESS NOTES
Subjective:     Itzel Reynolds is a 80 y.o. female who complains today of:     Chief Complaint   Patient presents with    Shortness of Breath     follow up    COPD       HPI  She is using  nebulizer with albuterol   TID. C/o shortness of breath  with exertion.   C/o  Wheezing.   C/o Cough with yellow  thick  Sputum. No Hemoptysis. No Chest tightness. No Chest pain with radiation  or pleuritic pain. No  leg edema. No orthopnea. No Fever or chills. No Rhinorrhea and postnasal drip. Allergies:  Codeine and Penicillins  Past Medical History:   Diagnosis Date    Abnormality of gait and mobility due to Left femoral neck fracture S/P [facility]    This is an 80year old female with a medical history significant for COPD, SSS S/P PPM, paroxysmal SVT, HTN who was sent to [facility] from [address] [place] [address] for further evaluation and management of left femoral neck fracture S/P fall. She states she was walking out of her bathroom when she tripped and fell. She denies any loss of consciousness and she was unable to get up. EMS transferred her to [address] [place] [address] ER.       Arthritis     BILAT    CAD (coronary artery disease)     Closed fracture of head of left femur ([facility] 4/27/2019    Last Assessment & Plan:  S/p left hip arthroplasty Assessment:  Pain well  controlled  PLAN:  Pain control with bowel regimen Ortho  following, going to SNF    COPD (chronic obstructive pulmonary disease) ([facility]     Hip joint replacement by other means 5/6/2011    Hypertension     Left displaced femoral neck fracture ([address] 5/1/2019    Lower leg edema     BILAT     Lung disease     Osteoarthritis     Pacemaker 2009    FOR SYNCOPE    Paroxysmal SVT (supraventricular tachycardia) (HCC)     Paroxysmal SVT (supraventricular tachycardia) ([facility]     Pneumonia     Sinusitis      Past Surgical History:   Procedure Laterality Date    CARDIAC PACEMAKER PLACEMENT      HIP FRACTURE SURGERY Bilateral     PACEMAKER INSERTION N/A 2009     Family History   Problem Relation Age of Onset   Shena Vizcarra Cancer Mother     No Known Problems Father      Social History     Socioeconomic History    Marital status:      Spouse name: Not on file    Number of children: Not on file    Years of education: Not on file    Highest education level: Not on file   Occupational History    Not on file   Tobacco Use    Smoking status: Never Smoker    Smokeless tobacco: Never Used   Substance and Sexual Activity    Alcohol use: Not on file    Drug use: No    Sexual activity: Not on file   Other Topics Concern    Not on file   Social History Narrative         Lives With: Daughter    Type of Home: House    Home Layout: One level    Home Access: Stairs to enter with rails    Entrance Stairs - Number of Steps: 3    Entrance Stairs - Rails: None    Bathroom Shower/Tub: Tub/Shower unit, Shower chair with back    Home Equipment: [place], Standard walker, Reacher, Sock aid, Long-handled shoehorn    ADL Assistance: Independent    Homemaking Assistance: Independent    Ambulation Assistance: Independent    Transfer Assistance: Independent    Active : Yes    Mode of Transportation: Car    Occupation: Retired    Type of occupation: Domestic     Leisure & [address]: Reading, sports, outdoor acts and being active     Social Determinants of Health     Financial Resource Strain:     Difficulty of Paying Living Expenses: Not on file   Food Insecurity:     Worried About [address] in the Last Year: Not on file    [facility] in the Last Year: Not on file   Transportation Needs:     Lack of Transportation (Medical): Not on file    Lack of Transportation (Non-Medical):  Not on file   Physical Activity:     Days of Exercise per Week: Not on file    Minutes of Exercise per Session: Not on file   Stress:     Feeling of Stress : Not on file   Social Connections:     Frequency of Communication with Friends and Family: Not on file    Frequency of Social Gatherings with Friends and Family: Not on file    Attends Pentecostal Services: Not on file    Active Member of Clubs or Organizations: Not on file    Attends Club or Organization Meetings: Not on file    Marital Status: Not on file   Intimate Partner Violence:     Fear of Current or Ex-Partner: Not on file    Emotionally Abused: Not on file    Physically Abused: Not on file    Sexually Abused: Not on file   Housing Stability:     Unable to Pay for Housing in the Last Year: Not on file    Number of [place] in the Last Year: Not on file    Unstable Housing in the Last Year: Not on file         Review of Systems   Constitutional: Negative for chills, diaphoresis, fatigue and fever. HENT: Negative for congestion, mouth sores, nosebleeds, postnasal drip, rhinorrhea, sinus pressure, sneezing, sore throat, trouble swallowing and voice change. Eyes: Negative for discharge, itching and visual disturbance. Respiratory: Positive for cough, shortness of breath and wheezing. Negative for chest tightness. Cardiovascular: Negative for chest pain, palpitations and leg swelling. Gastrointestinal: Negative for abdominal pain, diarrhea, nausea and vomiting. Genitourinary: Negative for difficulty urinating and hematuria. Musculoskeletal: Negative for arthralgias, joint swelling and myalgias. Skin: Negative for rash. Allergic/Immunologic: Negative for environmental allergies and food allergies. Neurological: Negative for dizziness, tremors, weakness and headaches.    Psychiatric/Behavioral: Negative for behavioral problems and sleep disturbance.         :     Vitals:    06/29/22 1033   BP: 136/61   Pulse: 57   SpO2: 99%   Weight: 143 lb 3.2 oz (65 kg)   Height: 5' 6\" (1.676 m)     Wt Readings from Last 3 Encounters:   06/29/22 143 lb 3.2 oz (65 kg)   04/08/22 150 lb (68 kg)   03/01/22 154 lb (69.9 kg)         Physical Exam  Constitutional:       General: She is not in acute distress. Appearance: She is well-developed. She is not diaphoretic. HENT:      Head: Normocephalic and atraumatic. Nose: Nose normal.   Eyes:      Pupils: Pupils are equal, round, and reactive to light. Neck:      Thyroid: No thyromegaly. Vascular: No JVD. Trachea: No tracheal deviation. Cardiovascular:      Rate and Rhythm: Normal rate and regular rhythm. Heart sounds: No murmur heard. No friction rub. No gallop. Pulmonary:      Effort: No respiratory distress. Breath sounds: No wheezing or rales. Chest:      Chest wall: No tenderness. Abdominal:      General: There is no distension. Tenderness: There is no abdominal tenderness. There is no rebound. Musculoskeletal:         General: Normal range of motion. Lymphadenopathy:      Cervical: No cervical adenopathy. Skin:     General: Skin is warm and dry. Neurological:      Mental Status: She is alert and oriented to person, place, and time. Coordination: Coordination normal.         Current Outpatient Medications   Medication Sig Dispense Refill    doxycycline hyclate (VIBRA-TABS) 100 MG tablet Take 1 tablet by mouth 2 times daily for 10 days 20 tablet 0    guaiFENesin (MUCINEX) 600 MG extended release tablet Take 1 tablet by mouth 2 times daily 60 tablet 5    Nebulizers (COMPRESSOR/NEBULIZER) MISC Nebulizer Supplies 1 each 1    verapamil (CALAN SR) 180 MG extended release tablet Take 1 tablet by mouth daily 90 tablet 3    atorvastatin (LIPITOR) 20 MG tablet Take 1 tablet by mouth daily (Patient not taking: Reported on 6/29/2022) 90 tablet 4    sotalol (BETAPACE) 80 MG tablet Take 1 tablet by mouth 2 times daily 180 tablet 2    aspirin 81 MG tablet Take 1 tablet by mouth daily With Food (Patient not taking: Reported on 4/8/2022) 30 tablet 3     No current facility-administered medications for this visit.        Results for orders placed during the hospital encounter of 03/15/22    XR CHEST (2 VW)    Narrative  EXAMINATION: XR CHEST (2 VW)    CLINICAL HISTORY: COPD    COMPARISONS: CHEST RADIOGRAPH, FEBRUARY 26, 2021, MARCH 11, 2020    FINDINGS: Pacemaker generator and wires unchanged. Osseous structures are intact. Cardiopericardial silhouette is normal. Pulmonary vasculature is normal. Blunting, left costophrenic angle, unchanged. Impression  NO ACUTE CARDIOPULMONARY DISEASE. Results for orders placed during the hospital encounter of 02/26/21    XR CHEST (2 VW)    Narrative  EXAM: CHEST, 2 VIEWS    CLINICAL HISTORY: J44.9 Chronic obstructive pulmonary disease, unspecified COPD type ([facility] ICD10    COMPARISONS:  Chest x-ray from March 11, 2020    Findings: There is  a dual lead cardiac pacemaker in place  The cardiomediastinal silhouette is within normal limits. The right lung is unremarkable. There is persistent blunting of the left costophrenic recess which may be secondary to scarring from prior infectious or inflammatory process. The visualized bones are demineralized. Impression  No radiographic evidence of acute intrathoracic process. No change since the previous study with chronic appearing scarring and blunting of the left costophrenic recess. Results for orders placed during the hospital encounter of 03/11/20    XR CHEST STANDARD (2 VW)    Narrative  EXAMINATION: XR CHEST (2 VW)    CLINICAL HISTORY: R06.02 SOB (shortness of breath) ICD10    COMPARISONS: April 27, 2019    FINDINGS:    Two views of the chest are submitted. The cardiac silhouette is of normal size configuration. Pulmonary vascular attenuated. Lungs are hyperinflated. Right sided trachea. No focal infiltrates. Trace/small left pleural effusion. No Pneumothoraces.     Impression  TRACE/SMALL LEFT PLEURAL EFFUSION SUPERIMPOSED UPON COPD.  ]  Results for orders placed during the hospital encounter of 04/27/19    XR CHEST PORTABLE    Narrative  EXAMINATION: XR CHEST PORTABLE    CLINICAL

## 2022-07-26 ENCOUNTER — HOSPITAL ENCOUNTER (OUTPATIENT)
Dept: CARDIOLOGY | Age: 87
Discharge: HOME | End: 2022-07-26
Payer: MEDICARE

## 2022-07-26 PROCEDURE — 93296 REM INTERROG EVL PM/IDS: CPT

## 2022-07-30 ENCOUNTER — HOSPITAL ENCOUNTER (EMERGENCY)
Age: 87
Discharge: HOME | End: 2022-07-30
Attending: EMERGENCY MEDICINE
Payer: MEDICARE

## 2022-07-30 VITALS
DIASTOLIC BLOOD PRESSURE: 57 MMHG | HEIGHT: 65 IN | OXYGEN SATURATION: 98 % | HEART RATE: 70 BPM | RESPIRATION RATE: 15 BRPM | WEIGHT: 143 LBS | TEMPERATURE: 97.6 F | BODY MASS INDEX: 23.82 KG/M2 | SYSTOLIC BLOOD PRESSURE: 134 MMHG

## 2022-07-30 DIAGNOSIS — R00.2 PALPITATIONS: Primary | ICD-10-CM

## 2022-07-30 LAB
ALBUMIN SERPL-MCNC: 3.9 G/DL (ref 3.5–4.6)
ALP BLD-CCNC: 60 U/L (ref 40–130)
ALT SERPL-CCNC: 11 U/L (ref 0–33)
ANION GAP SERPL CALCULATED.3IONS-SCNC: 10 MEQ/L (ref 9–15)
AST SERPL-CCNC: 26 U/L (ref 0–35)
BASOPHILS ABSOLUTE: 0.1 K/UL (ref 0–0.2)
BASOPHILS RELATIVE PERCENT: 0.8 %
BILIRUB SERPL-MCNC: 0.5 MG/DL (ref 0.2–0.7)
BUN BLDV-MCNC: 28 MG/DL (ref 8–23)
CALCIUM SERPL-MCNC: 9.6 MG/DL (ref 8.5–9.9)
CHLORIDE BLD-SCNC: 102 MEQ/L (ref 95–107)
CO2: 27 MEQ/L (ref 20–31)
CREAT SERPL-MCNC: 0.72 MG/DL (ref 0.5–0.9)
EOSINOPHILS ABSOLUTE: 0.2 K/UL (ref 0–0.7)
EOSINOPHILS RELATIVE PERCENT: 2.9 %
GFR AFRICAN AMERICAN: >60
GFR NON-AFRICAN AMERICAN: >60
GLOBULIN: 3.6 G/DL (ref 2.3–3.5)
GLUCOSE BLD-MCNC: 105 MG/DL (ref 70–99)
HCT VFR BLD CALC: 40.4 % (ref 37–47)
HEMOGLOBIN: 13.4 G/DL (ref 12–16)
LYMPHOCYTES ABSOLUTE: 1.8 K/UL (ref 1–4.8)
LYMPHOCYTES RELATIVE PERCENT: 27.8 %
MAGNESIUM: 1.9 MG/DL (ref 1.7–2.4)
MCH RBC QN AUTO: 31.4 PG (ref 27–31.3)
MCHC RBC AUTO-ENTMCNC: 33.2 % (ref 33–37)
MCV RBC AUTO: 94.7 FL (ref 82–100)
MONOCYTES ABSOLUTE: 0.6 K/UL (ref 0.2–0.8)
MONOCYTES RELATIVE PERCENT: 9.7 %
NEUTROPHILS ABSOLUTE: 3.9 K/UL (ref 1.4–6.5)
NEUTROPHILS RELATIVE PERCENT: 58.8 %
PDW BLD-RTO: 15.8 % (ref 11.5–14.5)
PLATELET # BLD: 179 K/UL (ref 130–400)
POTASSIUM SERPL-SCNC: 4.7 MEQ/L (ref 3.4–4.9)
RBC # BLD: 4.27 M/UL (ref 4.2–5.4)
SODIUM BLD-SCNC: 139 MEQ/L (ref 135–144)
TOTAL PROTEIN: 7.5 G/DL (ref 6.3–8)
WBC # BLD: 6.6 K/UL (ref 4.8–10.8)

## 2022-07-30 PROCEDURE — 99284 EMERGENCY DEPT VISIT MOD MDM: CPT

## 2022-07-30 PROCEDURE — 36415 COLL VENOUS BLD VENIPUNCTURE: CPT

## 2022-07-30 PROCEDURE — 85025 COMPLETE CBC W/AUTO DIFF WBC: CPT

## 2022-07-30 PROCEDURE — 93005 ELECTROCARDIOGRAM TRACING: CPT | Performed by: EMERGENCY MEDICINE

## 2022-07-30 PROCEDURE — 80053 COMPREHEN METABOLIC PANEL: CPT

## 2022-07-30 PROCEDURE — 83735 ASSAY OF MAGNESIUM: CPT

## 2022-07-30 ASSESSMENT — ENCOUNTER SYMPTOMS
COUGH: 0
EYE DISCHARGE: 0
SHORTNESS OF BREATH: 0
ABDOMINAL DISTENTION: 0
SORE THROAT: 0
CHEST TIGHTNESS: 0
PHOTOPHOBIA: 0
ABDOMINAL PAIN: 0
WHEEZING: 0
VOMITING: 0

## 2022-07-30 ASSESSMENT — PAIN - FUNCTIONAL ASSESSMENT: PAIN_FUNCTIONAL_ASSESSMENT: NONE - DENIES PAIN

## 2022-07-30 NOTE — ED PROVIDER NOTES
[address] [facility] ED  eMERGENCY dEPARTMENT eNCOUnter      Pt Name: Nixon Cummings  MRN: [id number]  [place] 2/28/1933  Date of evaluation: 7/30/2022  Provider: Jamie Su MD    CHIEF COMPLAINT       Chief Complaint   Patient presents with    Tachycardia         HISTORY OF PRESENT ILLNESS   (Location/Symptom, Timing/Onset,Context/Setting, Quality, Duration, Modifying Factors, Severity)  Note limiting factors. Nixon Cummings is a 80 y.o. female who presents to the emergency department for evaluation of palpitations. Patient reports feeling palpitations at home x2 tonight. This made her nervous and she checked her pulse ox which was showing a heart rate of 80-90. No related chest pain. No nausea vomiting. She does have a pacemaker. She was not lightheaded or dizzy. HPI    NursingNotes were reviewed. REVIEW OF SYSTEMS    (2-9 systems for level 4, 10 or more for level 5)     Review of Systems   Constitutional:  Negative for chills and diaphoresis. HENT:  Negative for congestion, ear pain, mouth sores and sore throat. Eyes:  Negative for photophobia and discharge. Respiratory:  Negative for cough, chest tightness, shortness of breath and wheezing. Cardiovascular:  Positive for palpitations. Negative for chest pain. Gastrointestinal:  Negative for abdominal distention, abdominal pain and vomiting. Endocrine: Negative for cold intolerance. Genitourinary:  Negative for difficulty urinating. Musculoskeletal:  Negative for arthralgias. Skin:  Negative for pallor and rash. Allergic/Immunologic: Negative for immunocompromised state. Neurological:  Negative for dizziness and syncope. Hematological:  Negative for adenopathy. Psychiatric/Behavioral:  Negative for agitation and hallucinations. All other systems reviewed and are negative. Except as noted above the remainder of the review of systems was reviewed and negative.        PAST MEDICAL HISTORY     Past Medical History:   Diagnosis Date    Abnormality of gait and mobility due to Left femoral neck fracture S/P Left Hip Hemiarthroplasty. [facility] admit 05/01/19. 5/6/2019    This is an 80year old female with a medical history significant for COPD, SSS S/P PPM, paroxysmal SVT, HTN who was sent to [facility] from [facility] for further evaluation and management of left femoral neck fracture S/P fall. She states she was walking out of her bathroom when she tripped and fell. She denies any loss of consciousness and she was unable to get up. EMS transferred her to [facility] ER.       Arthritis     BILAT    CAD (coronary artery disease)     Closed fracture of head of left femur ([facility] [address] 4/27/2019    Last Assessment & Plan:  S/p left hip arthroplasty Assessment:  Pain well  controlled  PLAN:  Pain control with bowel regimen Ortho  following, going to SNF    COPD (chronic obstructive pulmonary disease) ([facility] [address]     Hip joint replacement by other means 5/6/2011    Hypertension     Left displaced femoral neck fracture ([facility] [address] 5/1/2019    Lower leg edema     BILAT     Lung disease     Osteoarthritis     Pacemaker 2009    FOR SYNCOPE    Paroxysmal SVT (supraventricular tachycardia) (HCC)     Paroxysmal SVT (supraventricular tachycardia) (HCC)     Pneumonia     Sinusitis          SURGICALHISTORY       Past Surgical History:   Procedure Laterality Date    CARDIAC PACEMAKER PLACEMENT      HIP FRACTURE SURGERY Bilateral     PACEMAKER INSERTION N/A 2009         CURRENT MEDICATIONS       Previous Medications    ASPIRIN 81 MG TABLET    Take 1 tablet by mouth daily With Food    ATORVASTATIN (LIPITOR) 20 MG TABLET    Take 1 tablet by mouth daily    NEBULIZERS (COMPRESSOR/NEBULIZER) MISC    Nebulizer Supplies    SOTALOL (BETAPACE) 80 MG TABLET    Take 1 tablet by mouth 2 times daily    VERAPAMIL (CALAN SR) 180 MG EXTENDED RELEASE TABLET    Take 1 tablet by mouth daily       ALLERGIES     Codeine and Penicillins    FAMILY HISTORY       Family History   Problem Relation Age of Onset    Cancer Mother     No Known Problems Father           SOCIAL HISTORY       Social History     Socioeconomic History    Marital status:    Tobacco Use    Smoking status: Never    Smokeless tobacco: Never   Substance and Sexual Activity    Drug use: No   Social History Narrative         Lives With: Daughter    Type of Home: House    Home Layout: One level    Home Access: Stairs to enter with rails    Entrance Stairs - Number of Steps: 3    Entrance Stairs - Rails: None    Bathroom Shower/Tub: Tub/Shower unit, Shower chair with back    Home Equipment: Cane, Standard walker, Reacher, Sock aid, Long-handled shoehorn    ADL Assistance: Independent    Homemaking Assistance: Independent    Ambulation Assistance: Independent    Transfer Assistance: Independent    Active : Yes    Mode of Transportation: Car    Occupation: Retired    Type of occupation: Domestic     Leisure & Hobbies: Reading, sports, outdoor acts and being active       [address] Opening: Spontaneous  Best Verbal Response: Oriented  Best Motor Response: Obeys commands  Tl Coma Scale Score: 15 @FLOW([id number])@      PHYSICAL EXAM    (up to 7 for level 4, 8 or more for level 5)     ED Triage Vitals [07/30/22 0437]   BP Temp Temp Source Heart Rate Resp SpO2 Height Weight   (!) 155/65 97.6 °F (36.4 °C) Oral 77 20 98 % 5' 5\" (1.651 m) 143 lb (64.9 kg)       Physical Exam  Vitals and nursing note reviewed. Constitutional:       Appearance: She is well-developed. HENT:      Head: Normocephalic. Nose: Nose normal.      Mouth/Throat:      Mouth: Mucous membranes are moist.   Eyes:      Conjunctiva/sclera: Conjunctivae normal.      Pupils: Pupils are equal, round, and reactive to light. Cardiovascular:      Rate and Rhythm: Normal rate and regular rhythm. Heart sounds: Normal heart sounds.    Pulmonary:      Effort: Pulmonary effort is normal.      Breath sounds: Normal breath sounds. Abdominal:      General: Bowel sounds are normal.      Palpations: Abdomen is soft. Tenderness: There is no abdominal tenderness. There is no guarding. Musculoskeletal:         General: Normal range of motion. Cervical back: Normal range of motion and neck supple. Skin:     General: Skin is warm and dry. Capillary Refill: Capillary refill takes less than 2 seconds. Neurological:      General: No focal deficit present. Mental Status: She is alert and oriented to person, place, and time. Psychiatric:         Mood and Affect: Mood normal.       DIAGNOSTIC RESULTS     EKG: All EKG's are interpreted by the Emergency Department Physician who either signs or Co-signsthis chart in the absence of a cardiologist.    EKG shows a dual paced rhythm rate of 70. Nonspecific ST changes secondary to the pacemaker. Normal axis. Abnormal EKG    RADIOLOGY:   Non-plain filmimages such as CT, Ultrasound and MRI are read by the radiologist. Plain radiographic images are visualized and preliminarily interpreted by the emergency physician with the below findings:      Interpretation per the Radiologist below, if available at the time ofthis note:    No orders to display         ED BEDSIDE ULTRASOUND:   Performed by ED Physician - none    LABS:  Labs Reviewed   COMPREHENSIVE METABOLIC PANEL - Abnormal; Notable for the following components:       Result Value    Glucose 105 (*)     BUN 28 (*)     Globulin 3.6 (*)     All other components within normal limits   CBC WITH AUTO DIFFERENTIAL - Abnormal; Notable for the following components:    MCH 31.4 (*)     RDW 15.8 (*)     All other components within normal limits   MAGNESIUM       All other labs were within normal range or not returned as of this dictation.     EMERGENCY DEPARTMENT COURSE and DIFFERENTIAL DIAGNOSIS/MDM:   Vitals:    Vitals:    07/30/22 0437 07/30/22 0500 07/30/22 0600   BP: (!) 155/65 (!) 132/53 (!) 125/52   Pulse: 77 73 70   Resp: 20 23 16   Temp: 97.6 °F (36.4 °C)     TempSrc: Oral     SpO2: 98% 97% 95%   Weight: 143 lb (64.9 kg)     Height: 5' 5\" (1.651 m)            MDM    Patient had normal monitoring here. Normal heart rate and blood pressure. Discharged home improved    CONSULTS:  None    PROCEDURES:  Unless otherwise noted below, none     Procedures    FINAL IMPRESSION      1.  Palpitations          DISPOSITION/PLAN   DISPOSITION Decision To Discharge 07/30/2022 06:38:59 AM      PATIENT REFERRED TO:  Madiha Mosher MD  [address] [facility]  [phone]          DISCHARGE MEDICATIONS:  New Prescriptions    No medications on file          (Please note that portions of this note were completed with a voice recognition program.  Efforts were made to edit the dictations but occasionally words are mis-transcribed.)    Warren Gonzalez MD (electronically signed)  Attending Emergency Physician         Warren Gonzalez MD  07/30/22 Efe Jose MD  07/30/22 [id number]

## 2022-07-30 NOTE — ED NOTES
Pt stable at discharge. Discharge instructions provided. Follow-up care reviewed. Understanding verbalized.       Jesus Chavez RN  07/30/22 [id number]

## 2022-07-30 NOTE — ED TRIAGE NOTES
Patient to ER for palpitations that occurred twice this evening. She states that she checked and her heart rate was in the 90s-100. No chest pain or shortness of breath during episodes, but describes a buzzing sound in her heart in sync with her heartbeat. Current reports this has resolved. She has a pacemaker, did not feel a shock.

## 2022-08-01 LAB
EKG ATRIAL RATE: 70 BPM
EKG Q-T INTERVAL: 498 MS
EKG QRS DURATION: 164 MS
EKG QTC CALCULATION (BAZETT): 537 MS
EKG R AXIS: 83 DEGREES
EKG T AXIS: -11 DEGREES
EKG VENTRICULAR RATE: 70 BPM

## 2022-08-01 PROCEDURE — 93010 ELECTROCARDIOGRAM REPORT: CPT | Performed by: INTERNAL MEDICINE

## 2022-08-10 ENCOUNTER — CARE COORDINATION (OUTPATIENT)
Dept: CARE COORDINATION | Age: 87
End: 2022-08-10

## 2022-08-10 NOTE — CARE COORDINATION
ACM called patient. Left message requesting a return call to introduce [facility] program.  Contact information supplied and letter sent via [address] mail.

## 2022-08-10 NOTE — LETTER
8/10/2022    [address] [place] [address]      Dear Ruth Rebolledo,    My name is Koko Rodriguez RN and I am a registered nurse who partners with Konstantin Corbett MD to improve patients' health. Konstantin Corbett MD believes you would benefit from working with me. As a member of your health care team, I would work with other providers involved in your care, offer education for your specific health conditions, and connect you with additional resources as needed. I will collaborate with Konstantin Corbett MD to support you in following your treatment plan. The additional support I provide is no additional cost to you. My primary focus is to help you achieve specific goals and improve your health. We are committed to walk with you on this journey and look forward to working with you. Please call me to further discuss your healthcare needs. I am available by phone or for appointments at the office. You can reach me at [phone].     In good health,     Koko Rodriguez RN

## 2022-08-17 ENCOUNTER — OFFICE VISIT (OUTPATIENT)
Dept: CARDIOLOGY CLINIC | Age: 87
End: 2022-08-17
Payer: MEDICARE

## 2022-08-17 VITALS
SYSTOLIC BLOOD PRESSURE: 110 MMHG | WEIGHT: 140 LBS | DIASTOLIC BLOOD PRESSURE: 70 MMHG | BODY MASS INDEX: 23.3 KG/M2 | HEART RATE: 70 BPM

## 2022-08-17 DIAGNOSIS — I48.91 ATRIAL FIBRILLATION, UNSPECIFIED TYPE (HCC): Primary | ICD-10-CM

## 2022-08-17 DIAGNOSIS — Z95.0 PACEMAKER: ICD-10-CM

## 2022-08-17 DIAGNOSIS — I25.10 CORONARY ARTERY DISEASE INVOLVING NATIVE CORONARY ARTERY OF NATIVE HEART WITHOUT ANGINA PECTORIS: ICD-10-CM

## 2022-08-17 DIAGNOSIS — E78.5 DYSLIPIDEMIA: ICD-10-CM

## 2022-08-17 DIAGNOSIS — I10 ESSENTIAL HYPERTENSION: ICD-10-CM

## 2022-08-17 DIAGNOSIS — R60.0 LEG EDEMA: ICD-10-CM

## 2022-08-17 DIAGNOSIS — I27.20 PULMONARY HYPERTENSION (HCC): ICD-10-CM

## 2022-08-17 DIAGNOSIS — R09.89 BILATERAL CAROTID BRUITS: ICD-10-CM

## 2022-08-17 DIAGNOSIS — I49.5 SICK SINUS SYNDROME (HCC): ICD-10-CM

## 2022-08-17 DIAGNOSIS — I47.1 PAROXYSMAL SVT (SUPRAVENTRICULAR TACHYCARDIA) (HCC): ICD-10-CM

## 2022-08-17 PROCEDURE — 99214 OFFICE O/P EST MOD 30 MIN: CPT | Performed by: INTERNAL MEDICINE

## 2022-08-17 PROCEDURE — 93000 ELECTROCARDIOGRAM COMPLETE: CPT | Performed by: INTERNAL MEDICINE

## 2022-08-17 PROCEDURE — G8427 DOCREV CUR MEDS BY ELIG CLIN: HCPCS | Performed by: INTERNAL MEDICINE

## 2022-08-17 PROCEDURE — 1123F ACP DISCUSS/DSCN MKR DOCD: CPT | Performed by: INTERNAL MEDICINE

## 2022-08-17 PROCEDURE — G8420 CALC BMI NORM PARAMETERS: HCPCS | Performed by: INTERNAL MEDICINE

## 2022-08-17 PROCEDURE — 1036F TOBACCO NON-USER: CPT | Performed by: INTERNAL MEDICINE

## 2022-08-17 PROCEDURE — 1090F PRES/ABSN URINE INCON ASSESS: CPT | Performed by: INTERNAL MEDICINE

## 2022-08-17 RX ORDER — ASPIRIN 81 MG/1
81 TABLET ORAL DAILY
Qty: 90 TABLET | Refills: 1
Start: 2022-08-17

## 2022-08-17 RX ORDER — ATORVASTATIN CALCIUM 20 MG/1
20 TABLET, FILM COATED ORAL DAILY
Qty: 90 TABLET | Refills: 3 | Status: SHIPPED | OUTPATIENT
Start: 2022-08-17

## 2022-08-17 RX ORDER — SOTALOL HYDROCHLORIDE 80 MG/1
80 TABLET ORAL 2 TIMES DAILY
Qty: 180 TABLET | Refills: 3 | Status: SHIPPED | OUTPATIENT
Start: 2022-08-17

## 2022-08-17 ASSESSMENT — ENCOUNTER SYMPTOMS
SHORTNESS OF BREATH: 0
STRIDOR: 0
BACK PAIN: 1
RESPIRATORY NEGATIVE: 1
GASTROINTESTINAL NEGATIVE: 1
WHEEZING: 0
CHEST TIGHTNESS: 0
NAUSEA: 0
BLOOD IN STOOL: 0
COUGH: 0
EYES NEGATIVE: 1

## 2022-08-17 NOTE — PROGRESS NOTES
Subsequent Progress Note  Patient: Naseem Cruz  YOB: 1933  MRN: [id number]    Chief Complaint:AR CAD HTN ppm svt recent L hip sx  Chief Complaint   Patient presents with    Coronary Artery Disease    Hypertension    Atrial Fibrillation       CV Data:  12/2017 spect negative  12/2017 echo ef 60% Trace AR  9/2018 LAD mid 50% near a small diagonal. Cors all calcified EF 55 EDP 15  4/2019 LTHA   6/2019 CUS mild  PPM  12/2020 Echo EF 60 2+ MR 1-2 AR  RVSP 55   8/21 CUS mild     Subjective/HPI: no cp no sob no further falls no bleed. Takes meds     11/22/2019 no cp no osb no falls no bleed. Can't walk well    7/13/2020 no cp no sob no falls   No bleed. No falls. No bleed. Eats well.     11/13/2020 no cp no sob no falls no bleed no palp    3/12/21 no cp no sob no falls no bleed LE edema no better    7/27/21 Tired all the time. No cp no sob no falls no bleed. Takes meds eats well. 11/29/21 doing well no cp no dizzy still bynum no falls no bleed. 4/8/22 rare janette am palps. None during the day no syncope no cp has chronic sob. No bleed no falls    8/17/22 no cp no sob occ tachy last 20 minutes or so. Occurs 1-2 times a month no falls no bleed. Still unsteady gait. No recent falls      EKG: V Paced  70 QTc 491    Past Medical History:   Diagnosis Date    Abnormality of gait and mobility due to Left femoral neck fracture S/P Left Hip Hemiarthroplasty. [facility] admit 05/01/19. 5/6/2019    This is an 80year old female with a medical history significant for COPD, SSS S/P PPM, paroxysmal SVT, HTN who was sent to [facility] from [facility] for further evaluation and management of left femoral neck fracture S/P fall. She states she was walking out of her bathroom when she tripped and fell. She denies any loss of consciousness and she was unable to get up. EMS transferred her to [facility] ER.       Arthritis     BILAT    CAD (coronary artery disease)     Closed fracture of head of left femur ([address] 4/27/2019    Last Assessment & Plan:  S/p left hip arthroplasty Assessment:  Pain well  controlled  PLAN:  Pain control with bowel regimen Ortho  following, going to SNF    COPD (chronic obstructive pulmonary disease) ([facility])     Hip joint replacement by other means 5/6/2011    Hypertension     Left displaced femoral neck fracture ([facility]) 5/1/2019    Lower leg edema     BILAT     Lung disease     Osteoarthritis     Pacemaker 2009    FOR SYNCOPE    Paroxysmal SVT (supraventricular tachycardia) ([facility])     Paroxysmal SVT (supraventricular tachycardia) ([facility])     Pneumonia     Sinusitis        Past Surgical History:   Procedure Laterality Date    CARDIAC PACEMAKER PLACEMENT      HIP FRACTURE SURGERY Bilateral     PACEMAKER INSERTION N/A 2009       Family History   Problem Relation Age of Onset    Cancer Mother     No Known Problems Father        Social History     Socioeconomic History    Marital status:    Tobacco Use    Smoking status: Never    Smokeless tobacco: Never   Substance and Sexual Activity    Drug use: No   Social History Narrative         Lives With: Daughter    Type of Home: House    Home Layout: One level    Home Access: Stairs to enter with rails    Entrance Stairs - Number of Steps: 3    Entrance Stairs - Rails: None    Bathroom Shower/Tub: Tub/Shower unit, Shower chair with back    Home Equipment: Cane, Standard walker, Reacher, Sock aid, Long-handled shoehorn    ADL Assistance: Independent    Homemaking Assistance: Independent    Ambulation Assistance: Independent    Transfer Assistance: Independent    Active : Yes    Mode of Transportation: Car    Occupation: Retired    Type of occupation: Domestic     Leisure & Hobbies: Reading, sports, outdoor acts and being active       Allergies   Allergen Reactions    Codeine      Other reaction(s): GI Upset    Penicillins      Other reaction(s):  Intolerance       Current Outpatient Medications   Medication Sig Dispense Refill    sotalol (BETAPACE) 80 MG tablet Take 1 tablet by mouth 2 times daily 180 tablet 3    verapamil (CALAN SR) 180 MG extended release tablet Take 1 tablet by mouth daily 90 tablet 3    atorvastatin (LIPITOR) 20 MG tablet Take 1 tablet by mouth daily 90 tablet 3    aspirin EC 81 MG EC tablet Take 1 tablet by mouth daily 90 tablet 1    Nebulizers (COMPRESSOR/NEBULIZER) MISC Nebulizer Supplies 1 each 1     No current facility-administered medications for this visit. Review of Systems:   Review of Systems   Constitutional: Negative. Negative for diaphoresis and fatigue. HENT: Negative. Eyes: Negative. Respiratory: Negative. Negative for cough, chest tightness, shortness of breath, wheezing and stridor. Cardiovascular: Negative. Negative for chest pain, palpitations and leg swelling. Gastrointestinal: Negative. Negative for blood in stool and nausea. Genitourinary: Negative. Musculoskeletal:  Positive for arthralgias, back pain and gait problem. Skin: Negative. Neurological:  Negative for dizziness, syncope, weakness and light-headedness. Hematological: Negative. Psychiatric/Behavioral: Negative. Physical Examination:    /70 (Site: Left Upper Arm, Position: Sitting, Cuff Size: Large Adult)   Pulse 70   Wt 140 lb (63.5 kg)   BMI 23.30 kg/m²    Physical Exam   Constitutional: She appears healthy. No distress. HENT:   Normal cephalic and Atraumatic   Eyes: Pupils are equal, round, and reactive to light. Neck: Thyroid normal. No JVD present. No neck adenopathy. No thyromegaly present. Cardiovascular: Normal rate, regular rhythm, intact distal pulses and normal pulses. Murmur heard. Pulmonary/Chest: Effort normal and breath sounds normal. She has no wheezes. She has no rales. She exhibits no tenderness. Abdominal: Soft. Bowel sounds are normal. There is no abdominal tenderness. Musculoskeletal:         General: Edema (mild) present. No tenderness. Normal range of motion.       Cervical back: Normal range of motion and neck supple. Neurological: She is alert and oriented to person, place, and time. Skin: Skin is warm. No cyanosis. Nails show no clubbing.      LABS:  CBC:   Lab Results   Component Value Date/Time    WBC 6.6 07/30/2022 05:49 AM    RBC 4.27 07/30/2022 05:49 AM    RBC 4.22 01/03/2012 07:26 AM    HGB 13.4 07/30/2022 05:49 AM    HCT 40.4 07/30/2022 05:49 AM    MCV 94.7 07/30/2022 05:49 AM    MCH 31.4 07/30/2022 05:49 AM    MCHC 33.2 07/30/2022 05:49 AM    RDW 15.8 07/30/2022 05:49 AM     07/30/2022 05:49 AM    MPV 8.2 06/27/2015 01:34 AM     Lipids:  Lab Results   Component Value Date    CHOL 168 01/02/2020    CHOL 171 12/05/2017    CHOL 175 10/22/2014     Lab Results   Component Value Date    TRIG 61 01/02/2020    TRIG 71 12/05/2017    TRIG 65 10/22/2014     Lab Results   Component Value Date    HDL 63 (H) 08/04/2021    HDL 58 03/15/2021    HDL 63 (H) 01/02/2020     Lab Results   Component Value Date    LDLCALC 91 08/04/2021    LDLCALC 86 03/15/2021    LDLCALC 93 01/02/2020     No results found for: LABVLDL, VLDL  Lab Results   Component Value Date    CHOLHDLRATIO 3.2 01/03/2012     CMP:    Lab Results   Component Value Date/Time     07/30/2022 05:15 AM    K 4.7 07/30/2022 05:15 AM    K 4.4 05/02/2019 06:22 AM     07/30/2022 05:15 AM    CO2 27 07/30/2022 05:15 AM    BUN 28 07/30/2022 05:15 AM    CREATININE 0.72 07/30/2022 05:15 AM    GFRAA >60.0 07/30/2022 05:15 AM    LABGLOM >60.0 07/30/2022 05:15 AM    GLUCOSE 105 07/30/2022 05:15 AM    GLUCOSE 93 01/03/2012 07:26 AM    PROT 7.5 07/30/2022 05:15 AM    LABALBU 3.9 07/30/2022 05:15 AM    LABALBU 4.0 01/03/2012 07:26 AM    CALCIUM 9.6 07/30/2022 05:15 AM    BILITOT 0.5 07/30/2022 05:15 AM    ALKPHOS 60 07/30/2022 05:15 AM    AST 26 07/30/2022 05:15 AM    ALT 11 07/30/2022 05:15 AM     BMP:    Lab Results   Component Value Date/Time     07/30/2022 05:15 AM    K 4.7 07/30/2022 05:15 AM    K 4.4 05/02/2019 06:22 AM  07/30/2022 05:15 AM    CO2 27 07/30/2022 05:15 AM    BUN 28 07/30/2022 05:15 AM    LABALBU 3.9 07/30/2022 05:15 AM    LABALBU 4.0 01/03/2012 07:26 AM    CREATININE 0.72 07/30/2022 05:15 AM    CALCIUM 9.6 07/30/2022 05:15 AM    GFRAA >60.0 07/30/2022 05:15 AM    LABGLOM >60.0 07/30/2022 05:15 AM    GLUCOSE 105 07/30/2022 05:15 AM    GLUCOSE 93 01/03/2012 07:26 AM     Magnesium:    Lab Results   Component Value Date/Time    MG 1.9 07/30/2022 05:15 AM    MG 2.0 01/03/2012 07:26 AM     TSH:  Lab Results   Component Value Date    TSH 2.200 08/04/2021       Patient Active Problem List   Diagnosis    Paroxysmal SVT (supraventricular tachycardia) (HCC)    Pacemaker    Sick sinus syndrome (HCC)    Essential hypertension    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)    Chronic obstructive pulmonary disease (HCC)    Acute bronchitis    Hx of fracture of femur    BMI 26.0-26.9,adult    Visual disturbance    Coronary artery disease involving native coronary artery of native heart without angina pectoris    Pulmonary hypertension (HCC)    Unilateral inguinal hernia without obstruction or gangrene    Edema of both legs    Age-related osteoporosis without current pathological fracture    Shortness of breath    Acute bacterial sinusitis       Medications Discontinued During This Encounter   Medication Reason    aspirin 81 MG tablet LIST CLEANUP    atorvastatin (LIPITOR) 20 MG tablet LIST CLEANUP    sotalol (BETAPACE) 80 MG tablet REORDER    verapamil (CALAN SR) 180 MG extended release tablet REORDER       Modified Medications    Modified Medication Previous Medication    ATORVASTATIN (LIPITOR) 20 MG TABLET atorvastatin (LIPITOR) 20 MG tablet       Take 1 tablet by mouth daily    Take 1 tablet by mouth daily    SOTALOL (BETAPACE) 80 MG TABLET sotalol (BETAPACE) 80 MG tablet       Take 1 tablet by mouth 2 times daily    Take 1 tablet by mouth 2 times daily    VERAPAMIL (CALAN SR) 180 MG EXTENDED RELEASE TABLET verapamil (CALAN SR) 180 MG extended release tablet       Take 1 tablet by mouth daily    Take 1 tablet by mouth daily       Orders Placed This Encounter   Medications    sotalol (BETAPACE) 80 MG tablet     Sig: Take 1 tablet by mouth 2 times daily     Dispense:  180 tablet     Refill:  3    verapamil (CALAN SR) 180 MG extended release tablet     Sig: Take 1 tablet by mouth daily     Dispense:  90 tablet     Refill:  3    atorvastatin (LIPITOR) 20 MG tablet     Sig: Take 1 tablet by mouth daily     Dispense:  90 tablet     Refill:  3    aspirin EC 81 MG EC tablet     Sig: Take 1 tablet by mouth daily     Dispense:  90 tablet     Refill:  1       Assessment/Plan:    1. Paroxysmal SVT (supraventricular tachycardia) (HCC)   stable QTc - on Sotalol. Stable QTc. No DOA due to multiple falls and fractures and continued risk of falls. 2. Pacemaker   interrogate - reviewed. -interrogate - 1 yr 1 months left. Reviewed with pt.     3. Essential hypertension   stable - continue meds. Low salt diet  - EKG 12 lead    4. Coronary artery disease involving native coronary artery of native heart without angina pectoris   no angina  - EKG 12 lead    5. AR- need echo - stable     6. LE Edeam- Maxzide. 7. Tired- will get labs. 8. HPL - continue statin. Low fat diet. Labs reviewed with pt. She was ntaking Statin for couple months. Will resume. discussed w [pt. Need f/u [facility] - stable          Counseling:  Heart Healthy Lifestyle, Low Salt Diet, Take Precautions to Prevent Falls and Walk Daily    Return in about 4 months (around 12/17/2022).       Electronically signed by Adan Noguera MD on 8/17/2022 at 12:34 PM

## 2022-08-18 ENCOUNTER — CARE COORDINATION (OUTPATIENT)
Dept: CARE COORDINATION | Age: 87
End: 2022-08-18

## 2022-08-18 NOTE — CARE COORDINATION
ACM called patient to introduce [facility] program.  Spoke with [facility]. She reports patient no longer has PCP as primary. Does not want to reestablish care with PCP and does not require [facility] program.  ACM provided contact information if needs change.

## 2022-10-03 ENCOUNTER — OFFICE VISIT (OUTPATIENT)
Dept: PULMONOLOGY | Age: 87
End: 2022-10-03
Payer: MEDICARE

## 2022-10-03 VITALS
WEIGHT: 140 LBS | OXYGEN SATURATION: 97 % | DIASTOLIC BLOOD PRESSURE: 62 MMHG | BODY MASS INDEX: 23.3 KG/M2 | HEART RATE: 70 BPM | SYSTOLIC BLOOD PRESSURE: 126 MMHG

## 2022-10-03 DIAGNOSIS — J44.9 CHRONIC OBSTRUCTIVE PULMONARY DISEASE, UNSPECIFIED COPD TYPE (HCC): Primary | ICD-10-CM

## 2022-10-03 DIAGNOSIS — J42 CHRONIC BRONCHITIS, UNSPECIFIED CHRONIC BRONCHITIS TYPE (HCC): ICD-10-CM

## 2022-10-03 PROCEDURE — G8484 FLU IMMUNIZE NO ADMIN: HCPCS | Performed by: INTERNAL MEDICINE

## 2022-10-03 PROCEDURE — 1123F ACP DISCUSS/DSCN MKR DOCD: CPT | Performed by: INTERNAL MEDICINE

## 2022-10-03 PROCEDURE — G8420 CALC BMI NORM PARAMETERS: HCPCS | Performed by: INTERNAL MEDICINE

## 2022-10-03 PROCEDURE — 1090F PRES/ABSN URINE INCON ASSESS: CPT | Performed by: INTERNAL MEDICINE

## 2022-10-03 PROCEDURE — 99213 OFFICE O/P EST LOW 20 MIN: CPT | Performed by: INTERNAL MEDICINE

## 2022-10-03 PROCEDURE — 3023F SPIROM DOC REV: CPT | Performed by: INTERNAL MEDICINE

## 2022-10-03 PROCEDURE — G8427 DOCREV CUR MEDS BY ELIG CLIN: HCPCS | Performed by: INTERNAL MEDICINE

## 2022-10-03 PROCEDURE — 1036F TOBACCO NON-USER: CPT | Performed by: INTERNAL MEDICINE

## 2022-10-03 RX ORDER — ALBUTEROL SULFATE 2.5 MG/3ML
SOLUTION RESPIRATORY (INHALATION)
COMMUNITY
Start: 2022-08-10

## 2022-10-03 RX ORDER — DOXYCYCLINE HYCLATE 100 MG
100 TABLET ORAL 2 TIMES DAILY
Qty: 20 TABLET | Refills: 0 | Status: SHIPPED | OUTPATIENT
Start: 2022-10-03 | End: 2022-10-13

## 2022-10-03 ASSESSMENT — ENCOUNTER SYMPTOMS
EYE ITCHING: 0
CHEST TIGHTNESS: 0
SINUS PRESSURE: 0
TROUBLE SWALLOWING: 0
WHEEZING: 1
SHORTNESS OF BREATH: 1
VOICE CHANGE: 0
NAUSEA: 0
ABDOMINAL PAIN: 0
RHINORRHEA: 0
SORE THROAT: 0
EYE DISCHARGE: 0
DIARRHEA: 0
VOMITING: 0
COUGH: 1

## 2022-10-03 NOTE — PROGRESS NOTES
Subjective:     Lc Hernandez is a 80 y.o. female who complains today of:     Chief Complaint   Patient presents with    COPD    Follow-up     3m f/u       HPI  She is using  nebulizer with albuterol  TID. C/o shortness of breath  with exertion. C/o  Wheezing. C/o Cough with white  yellow thick  Sputum. She said she want to get doxycycline  and want to get to have it . She want to have new nebulizer machine old quit working. She is on albuterol neb  BID , TID  No Chest tightness. No Chest pain with radiation  or pleuritic pain. No  leg edema. No orthopnea. No Fever or chills. No Rhinorrhea and postnasal drip. Allergies:  Codeine and Penicillins  Past Medical History:   Diagnosis Date    Abnormality of gait and mobility due to Left femoral neck fracture S/P Left Hip Hemiarthroplasty. [facility] admit 05/01/19. 5/6/2019    This is an 80year old female with a medical history significant for COPD, SSS S/P PPM, paroxysmal SVT, HTN who was sent to [facility] from [facility] for further evaluation and management of left femoral neck fracture S/P fall. She states she was walking out of her bathroom when she tripped and fell. She denies any loss of consciousness and she was unable to get up. EMS transferred her to [facility] ER.       Arthritis     BILAT    CAD (coronary artery disease)     Closed fracture of head of left femur ([facility] [address] 4/27/2019    Last Assessment & Plan:  S/p left hip arthroplasty Assessment:  Pain well  controlled  PLAN:  Pain control with bowel regimen Ortho  following, going to SNF    COPD (chronic obstructive pulmonary disease) ([address]     Hip joint replacement by other means 5/6/2011    Hypertension     Left displaced femoral neck fracture ([address] 5/1/2019    Lower leg edema     BILAT     Lung disease     Osteoarthritis     Pacemaker 2009    FOR SYNCOPE    Paroxysmal SVT (supraventricular tachycardia) (HCC)     Paroxysmal SVT (supraventricular tachycardia) (HCC)     Pneumonia     Sinusitis Past Surgical History:   Procedure Laterality Date    CARDIAC PACEMAKER PLACEMENT      HIP FRACTURE SURGERY Bilateral     PACEMAKER INSERTION N/A 2009     Family History   Problem Relation Age of Onset    Cancer Mother     No Known Problems Father      Social History     Socioeconomic History    Marital status:      Spouse name: Not on file    Number of children: Not on file    Years of education: Not on file    Highest education level: Not on file   Occupational History    Not on file   Tobacco Use    Smoking status: Never    Smokeless tobacco: Never   Substance and Sexual Activity    Alcohol use: Not on file    Drug use: No    Sexual activity: Not on file   Other Topics Concern    Not on file   Social History Narrative         Lives With: Daughter    Type of Home: House    Home Layout: One level    Home Access: Stairs to enter with rails    Entrance Stairs - Number of Steps: 3    Entrance Stairs - Rails: None    Bathroom Shower/Tub: Tub/Shower unit, Shower chair with back    Home Equipment: [employer], Standard walker, Reacher, Sock aid, Long-handled shoehorn    ADL Assistance: Independent    Homemaking Assistance: Independent    Ambulation Assistance: Independent    Transfer Assistance: Independent    Active : Yes    Mode of Transportation: Car    Occupation: Retired    Type of occupation: Domestic     Leisure & Hobbies: Reading, sports, outdoor acts and being active     Social Determinants of Health     Financial Resource Strain: Not on file   Food Insecurity: Not on file   Transportation Needs: Not on file   Physical Activity: Not on file   Stress: Not on file   Social Connections: Not on file   Intimate Partner Violence: Not on file   Housing Stability: Not on file         Review of Systems   Constitutional:  Negative for chills, diaphoresis, fatigue and fever.    HENT:  Negative for congestion, mouth sores, nosebleeds, postnasal drip, rhinorrhea, sinus pressure, sneezing, sore throat, trouble swallowing and voice change. Eyes:  Negative for discharge, itching and visual disturbance. Respiratory:  Positive for cough, shortness of breath and wheezing. Negative for chest tightness. Cardiovascular:  Negative for chest pain, palpitations and leg swelling. Gastrointestinal:  Negative for abdominal pain, diarrhea, nausea and vomiting. Genitourinary:  Negative for difficulty urinating and hematuria. Musculoskeletal:  Negative for arthralgias, joint swelling and myalgias. Skin:  Negative for rash. Allergic/Immunologic: Negative for environmental allergies and food allergies. Neurological:  Negative for dizziness, tremors, weakness and headaches. Psychiatric/Behavioral:  Negative for behavioral problems and sleep disturbance.        :     Vitals:    10/03/22 1017   BP: 126/62   Site: Right Upper Arm   Position: Sitting   Cuff Size: Medium Adult   Pulse: 70   SpO2: 97%   Weight: 140 lb (63.5 kg)     Wt Readings from Last 3 Encounters:   10/03/22 140 lb (63.5 kg)   08/17/22 140 lb (63.5 kg)   07/30/22 143 lb (64.9 kg)         Physical Exam  Constitutional:       General: She is not in acute distress. Appearance: She is well-developed. She is not diaphoretic. HENT:      Head: Normocephalic and atraumatic. Nose: Nose normal.   Eyes:      Pupils: Pupils are equal, round, and reactive to light. Neck:      Thyroid: No thyromegaly. Vascular: No JVD. Trachea: No tracheal deviation. Cardiovascular:      Rate and Rhythm: Normal rate and regular rhythm. Heart sounds: No murmur heard. No friction rub. No gallop. Pulmonary:      Effort: No respiratory distress. Breath sounds: Wheezing present. No rales. Comments: diminished Breath sound bilaterally. Chest:      Chest wall: No tenderness. Abdominal:      General: There is no distension. Tenderness: There is no abdominal tenderness. There is no rebound.    Musculoskeletal:         General: Normal range of motion. Lymphadenopathy:      Cervical: No cervical adenopathy. Skin:     General: Skin is warm and dry. Neurological:      Mental Status: She is alert and oriented to person, place, and time. Coordination: Coordination normal.   Psychiatric:         Mood and Affect: Mood normal.         Behavior: Behavior normal.       Current Outpatient Medications   Medication Sig Dispense Refill    albuterol (PROVENTIL) (2.5 MG/3ML) 0.083% nebulizer solution inhale contents of 1 vial ( 3 milliliters ) in nebulizer by mouth. ..  (REFER TO PRESCRIPTION NOTES). doxycycline hyclate (VIBRA-TABS) 100 MG tablet Take 1 tablet by mouth 2 times daily for 10 days 20 tablet 0    sotalol (BETAPACE) 80 MG tablet Take 1 tablet by mouth 2 times daily 180 tablet 3    verapamil (CALAN SR) 180 MG extended release tablet Take 1 tablet by mouth daily 90 tablet 3    atorvastatin (LIPITOR) 20 MG tablet Take 1 tablet by mouth daily 90 tablet 3    aspirin EC 81 MG EC tablet Take 1 tablet by mouth daily 90 tablet 1    Nebulizers (COMPRESSOR/NEBULIZER) MISC Nebulizer Supplies 1 each 1     No current facility-administered medications for this visit. Results for orders placed during the hospital encounter of 03/15/22    XR CHEST (2 VW)    Narrative  EXAMINATION: XR CHEST (2 VW)    CLINICAL HISTORY: COPD    COMPARISONS: CHEST RADIOGRAPH, FEBRUARY 26, 2021, MARCH 11, 2020    FINDINGS: Pacemaker generator and wires unchanged. Osseous structures are intact. Cardiopericardial silhouette is normal. Pulmonary vasculature is normal. Blunting, left costophrenic angle, unchanged. Impression  NO ACUTE CARDIOPULMONARY DISEASE. Results for orders placed during the hospital encounter of 02/26/21    XR CHEST (2 VW)    Narrative  EXAM: CHEST, 2 VIEWS    CLINICAL HISTORY: J44.9 Chronic obstructive pulmonary disease, unspecified COPD type ([facility] [address] ICD10    COMPARISONS:  Chest x-ray from March 11, 2020    Findings:     There is  a dual lead cardiac pacemaker in place  The cardiomediastinal silhouette is within normal limits. The right lung is unremarkable. There is persistent blunting of the left costophrenic recess which may be secondary to scarring from prior infectious or inflammatory process. The visualized bones are demineralized. Impression  No radiographic evidence of acute intrathoracic process. No change since the previous study with chronic appearing scarring and blunting of the left costophrenic recess. Results for orders placed during the hospital encounter of 03/11/20    XR CHEST STANDARD (2 VW)    Narrative  EXAMINATION: XR CHEST (2 VW)    CLINICAL HISTORY: R06.02 SOB (shortness of breath) ICD10    COMPARISONS: April 27, 2019    FINDINGS:    Two views of the chest are submitted. The cardiac silhouette is of normal size configuration. Pulmonary vascular attenuated. Lungs are hyperinflated. Right sided trachea. No focal infiltrates. Trace/small left pleural effusion. No Pneumothoraces. Impression  TRACE/SMALL LEFT PLEURAL EFFUSION SUPERIMPOSED UPON COPD.  ]  Results for orders placed during the hospital encounter of 04/27/19    XR CHEST PORTABLE    Narrative  EXAMINATION: XR CHEST PORTABLE    CLINICAL HISTORY: STUMBLED AND FELL TO FLOOR. NO LOSS OF CONSCIOUSNESS    COMPARISONS: None available. FINDINGS: Pacemaker generator overlying left axilla with pacemaker lead tips in region of right atrium and right ventricle. Cardiopericardial silhouette is normal in size. Aorta is calcified. Left diaphragm elevated. Mild blunting costophrenic angles. Calcified granuloma right upper lung. Impression  SMALL BILATERAL PLEURAL EFFUSIONS VERSUS THICKENING. 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  ]    Assessment/Plan:     1. Chronic obstructive pulmonary disease, unspecified COPD type ([address]  She is using  nebulizer with albuterol  TID. C/o shortness of breath  with exertion. C/o  Wheezing. C/o Cough with white  yellow thick  Sputum.  She said she want to get doxycycline  and want to get to have it . She want to have new nebulizer machine old quit working. She is on albuterol neb  BID , TID    She will be given new nebulizer machine from office today. 2. Chronic bronchitis, unspecified chronic bronchitis type ([address]  She said she was doing better after taking antibiotic but she started having cough with yellowish mucus again. Which is thick and difficult to bring out. I will write a prescription for doxycycline if cough worsen then she start taking it.    - doxycycline hyclate (VIBRA-TABS) 100 MG tablet; Take 1 tablet by mouth 2 times daily for 10 days  Dispense: 20 tablet; Refill: 0    Return in about 3 months (around 1/3/2023) for COPD.       Ashok Denver, MD

## 2022-10-25 ENCOUNTER — HOSPITAL ENCOUNTER (OUTPATIENT)
Dept: CARDIOLOGY | Age: 87
Discharge: HOME | End: 2022-10-25
Payer: MEDICARE

## 2022-10-25 PROCEDURE — 93296 REM INTERROG EVL PM/IDS: CPT

## 2022-12-13 ENCOUNTER — OFFICE VISIT (OUTPATIENT)
Dept: CARDIOLOGY CLINIC | Age: 87
End: 2022-12-13
Payer: MEDICARE

## 2022-12-13 VITALS
HEART RATE: 68 BPM | SYSTOLIC BLOOD PRESSURE: 120 MMHG | OXYGEN SATURATION: 92 % | BODY MASS INDEX: 22.96 KG/M2 | DIASTOLIC BLOOD PRESSURE: 56 MMHG | WEIGHT: 138 LBS

## 2022-12-13 DIAGNOSIS — R09.89 BILATERAL CAROTID BRUITS: ICD-10-CM

## 2022-12-13 DIAGNOSIS — I34.0 NONRHEUMATIC MITRAL VALVE REGURGITATION: ICD-10-CM

## 2022-12-13 DIAGNOSIS — R60.0 LEG EDEMA: ICD-10-CM

## 2022-12-13 DIAGNOSIS — I49.5 SICK SINUS SYNDROME (HCC): ICD-10-CM

## 2022-12-13 DIAGNOSIS — I47.1 PAROXYSMAL SVT (SUPRAVENTRICULAR TACHYCARDIA) (HCC): ICD-10-CM

## 2022-12-13 DIAGNOSIS — I10 ESSENTIAL HYPERTENSION: ICD-10-CM

## 2022-12-13 DIAGNOSIS — Z95.0 PACEMAKER: ICD-10-CM

## 2022-12-13 DIAGNOSIS — I27.20 PULMONARY HYPERTENSION (HCC): ICD-10-CM

## 2022-12-13 DIAGNOSIS — I25.10 CORONARY ARTERY DISEASE INVOLVING NATIVE CORONARY ARTERY OF NATIVE HEART WITHOUT ANGINA PECTORIS: ICD-10-CM

## 2022-12-13 DIAGNOSIS — I48.91 ATRIAL FIBRILLATION, UNSPECIFIED TYPE (HCC): Primary | ICD-10-CM

## 2022-12-13 DIAGNOSIS — E78.5 DYSLIPIDEMIA: ICD-10-CM

## 2022-12-13 PROCEDURE — G8484 FLU IMMUNIZE NO ADMIN: HCPCS | Performed by: INTERNAL MEDICINE

## 2022-12-13 PROCEDURE — G8420 CALC BMI NORM PARAMETERS: HCPCS | Performed by: INTERNAL MEDICINE

## 2022-12-13 PROCEDURE — 1036F TOBACCO NON-USER: CPT | Performed by: INTERNAL MEDICINE

## 2022-12-13 PROCEDURE — 93000 ELECTROCARDIOGRAM COMPLETE: CPT | Performed by: INTERNAL MEDICINE

## 2022-12-13 PROCEDURE — 1123F ACP DISCUSS/DSCN MKR DOCD: CPT | Performed by: INTERNAL MEDICINE

## 2022-12-13 PROCEDURE — 1090F PRES/ABSN URINE INCON ASSESS: CPT | Performed by: INTERNAL MEDICINE

## 2022-12-13 PROCEDURE — 99214 OFFICE O/P EST MOD 30 MIN: CPT | Performed by: INTERNAL MEDICINE

## 2022-12-13 PROCEDURE — G8427 DOCREV CUR MEDS BY ELIG CLIN: HCPCS | Performed by: INTERNAL MEDICINE

## 2022-12-13 ASSESSMENT — ENCOUNTER SYMPTOMS
EYES NEGATIVE: 1
GASTROINTESTINAL NEGATIVE: 1
WHEEZING: 0
COUGH: 0
RESPIRATORY NEGATIVE: 1
STRIDOR: 0
BLOOD IN STOOL: 0
NAUSEA: 0
SHORTNESS OF BREATH: 0
BACK PAIN: 1
CHEST TIGHTNESS: 0

## 2022-12-13 NOTE — PROGRESS NOTES
Subsequent Progress Note  Patient: Mari Wolf  YOB: 1933  MRN: [id number]    Chief Complaint:AR CAD HTN ppm svt recent L hip sx  Chief Complaint   Patient presents with    Follow-up     4 month    Atrial Fibrillation       CV Data:  12/2017 spect negative  12/2017 echo ef 60% Trace AR  9/2018 LAD mid 50% near a small diagonal. Cors all calcified EF 55 EDP 15  4/2019 LTHA   6/2019 CUS mild  PPM  12/2020 Echo EF 60 2+ MR 1-2 AR  RVSP 55   8/21 CUS mild     Subjective/HPI: no cp no sob no further falls no bleed. Takes meds     11/22/2019 no cp no osb no falls no bleed. Can't walk well    7/13/2020 no cp no sob no falls   No bleed. No falls. No bleed. Eats well.     11/13/2020 no cp no sob no falls no bleed no palp    3/12/21 no cp no sob no falls no bleed LE edema no better    7/27/21 Tired all the time. No cp no sob no falls no bleed. Takes meds eats well. 11/29/21 doing well no cp no dizzy still bynum no falls no bleed. 4/8/22 rare janette am palps. None during the day no syncope no cp has chronic sob. No bleed no falls    8/17/22 no cp no sob occ tachy last 20 minutes or so. Occurs 1-2 times a month no falls no bleed. Still unsteady gait. No recent falls    12/13/22 doing well no cp no sob no falls no bleed. No major changes. EKG: SR   70 QTc 447    Past Medical History:   Diagnosis Date    Abnormality of gait and mobility due to Left femoral neck fracture S/P Left Hip Hemiarthroplasty. [facility] admit 05/01/19. 5/6/2019    This is an 80year old female with a medical history significant for COPD, SSS S/P PPM, paroxysmal SVT, HTN who was sent to [facility] from [facility] for further evaluation and management of left femoral neck fracture S/P fall. She states she was walking out of her bathroom when she tripped and fell. She denies any loss of consciousness and she was unable to get up. EMS transferred her to [facility] ER.       Arthritis     BILAT    CAD (coronary artery disease)     Closed fracture of head of left femur ([facility] 4/27/2019    Last Assessment & Plan:  S/p left hip arthroplasty Assessment:  Pain well  controlled  PLAN:  Pain control with bowel regimen Ortho  following, going to SNF    COPD (chronic obstructive pulmonary disease) ([facility] [address]     Hip joint replacement by other means 5/6/2011    Hypertension     Left displaced femoral neck fracture ([facility] [address] 5/1/2019    Lower leg edema     BILAT     Lung disease     Osteoarthritis     Pacemaker 2009    FOR SYNCOPE    Paroxysmal SVT (supraventricular tachycardia) ([facility])     Paroxysmal SVT (supraventricular tachycardia) ([facility] [address]     Pneumonia     Sinusitis        Past Surgical History:   Procedure Laterality Date    CARDIAC PACEMAKER PLACEMENT      HIP FRACTURE SURGERY Bilateral     PACEMAKER INSERTION N/A 2009       Family History   Problem Relation Age of Onset    Cancer Mother     No Known Problems Father        Social History     Socioeconomic History    Marital status:       Spouse name: None    Number of children: None    Years of education: None    Highest education level: None   Tobacco Use    Smoking status: Never    Smokeless tobacco: Never   Substance and Sexual Activity    Drug use: No   Social History Narrative         Lives With: Daughter    Type of Home: House    Home Layout: One level    Home Access: Stairs to enter with rails    Entrance Stairs - Number of Steps: 3    Entrance Stairs - Rails: None    Bathroom Shower/Tub: Tub/Shower unit, Shower chair with back    Home Equipment: Cane, Standard walker, Reacher, Sock aid, Long-handled shoehorn    ADL Assistance: Independent    Homemaking Assistance: Independent    Ambulation Assistance: Independent    Transfer Assistance: Independent    Active : Yes    Mode of Transportation: Car    Occupation: Retired    Type of occupation: Domestic     Leisure & Hobbies: Reading, sports, outdoor acts and being active       Allergies   Allergen Reactions    Codeine      Other reaction(s): GI Upset    Penicillins      Other reaction(s): Intolerance       Current Outpatient Medications   Medication Sig Dispense Refill    albuterol (PROVENTIL) (2.5 MG/3ML) 0.083% nebulizer solution inhale contents of 1 vial ( 3 milliliters ) in nebulizer by mouth. ..  (REFER TO PRESCRIPTION NOTES). sotalol (BETAPACE) 80 MG tablet Take 1 tablet by mouth 2 times daily 180 tablet 3    verapamil (CALAN SR) 180 MG extended release tablet Take 1 tablet by mouth daily 90 tablet 3    atorvastatin (LIPITOR) 20 MG tablet Take 1 tablet by mouth daily 90 tablet 3    aspirin EC 81 MG EC tablet Take 1 tablet by mouth daily 90 tablet 1    Nebulizers (COMPRESSOR/NEBULIZER) MISC Nebulizer Supplies 1 each 1     No current facility-administered medications for this visit. Review of Systems:   Review of Systems   Constitutional: Negative. Negative for diaphoresis and fatigue. HENT: Negative. Eyes: Negative. Respiratory: Negative. Negative for cough, chest tightness, shortness of breath, wheezing and stridor. Cardiovascular: Negative. Negative for chest pain, palpitations and leg swelling. Gastrointestinal: Negative. Negative for blood in stool and nausea. Genitourinary: Negative. Musculoskeletal:  Positive for arthralgias, back pain and gait problem. Skin: Negative. Neurological:  Negative for dizziness, syncope, weakness and light-headedness. Hematological: Negative. Psychiatric/Behavioral: Negative. Physical Examination:    BP (!) 120/56 (Site: Right Upper Arm, Position: Sitting, Cuff Size: Medium Adult)   Pulse 68   Wt 138 lb (62.6 kg)   SpO2 92%   BMI 22.96 kg/m²    Physical Exam   Constitutional: She appears healthy. No distress. HENT:   Normal cephalic and Atraumatic   Eyes: Pupils are equal, round, and reactive to light. Neck: Thyroid normal. No JVD present. No neck adenopathy. No thyromegaly present.    Cardiovascular: Normal rate, regular rhythm, intact distal pulses and normal pulses. Murmur heard. Pulmonary/Chest: Effort normal and breath sounds normal. She has no wheezes. She has no rales. She exhibits no tenderness. Abdominal: Soft. Bowel sounds are normal. There is no abdominal tenderness. Musculoskeletal:         General: Edema (mild) present. No tenderness. Normal range of motion. Cervical back: Normal range of motion and neck supple. Neurological: She is alert and oriented to person, place, and time. Skin: Skin is warm. No cyanosis. Nails show no clubbing.      LABS:  CBC:   Lab Results   Component Value Date/Time    WBC 6.6 07/30/2022 05:49 AM    RBC 4.27 07/30/2022 05:49 AM    RBC 4.22 01/03/2012 07:26 AM    HGB 13.4 07/30/2022 05:49 AM    HCT 40.4 07/30/2022 05:49 AM    MCV 94.7 07/30/2022 05:49 AM    MCH 31.4 07/30/2022 05:49 AM    MCHC 33.2 07/30/2022 05:49 AM    RDW 15.8 07/30/2022 05:49 AM     07/30/2022 05:49 AM    MPV 8.2 06/27/2015 01:34 AM     Lipids:  Lab Results   Component Value Date    CHOL 168 01/02/2020    CHOL 171 12/05/2017    CHOL 175 10/22/2014     Lab Results   Component Value Date    TRIG 61 01/02/2020    TRIG 71 12/05/2017    TRIG 65 10/22/2014     Lab Results   Component Value Date    HDL 63 (H) 08/04/2021    HDL 58 03/15/2021    HDL 63 (H) 01/02/2020     Lab Results   Component Value Date    LDLCALC 91 08/04/2021    LDLCALC 86 03/15/2021    LDLCALC 93 01/02/2020     No results found for: LABVLDL, VLDL  Lab Results   Component Value Date    CHOLHDLRATIO 3.2 01/03/2012     CMP:    Lab Results   Component Value Date/Time     07/30/2022 05:15 AM    K 4.7 07/30/2022 05:15 AM    K 4.4 05/02/2019 06:22 AM     07/30/2022 05:15 AM    CO2 27 07/30/2022 05:15 AM    BUN 28 07/30/2022 05:15 AM    CREATININE 0.72 07/30/2022 05:15 AM    GFRAA >60.0 07/30/2022 05:15 AM    LABGLOM >60.0 07/30/2022 05:15 AM    GLUCOSE 105 07/30/2022 05:15 AM    GLUCOSE 93 01/03/2012 07:26 AM    PROT 7.5 07/30/2022 05:15 AM    LABALBU 3.9 07/30/2022 05:15 AM    LABALBU 4.0 01/03/2012 07:26 AM    CALCIUM 9.6 07/30/2022 05:15 AM    BILITOT 0.5 07/30/2022 05:15 AM    ALKPHOS 60 07/30/2022 05:15 AM    AST 26 07/30/2022 05:15 AM    ALT 11 07/30/2022 05:15 AM     BMP:    Lab Results   Component Value Date/Time     07/30/2022 05:15 AM    K 4.7 07/30/2022 05:15 AM    K 4.4 05/02/2019 06:22 AM     07/30/2022 05:15 AM    CO2 27 07/30/2022 05:15 AM    BUN 28 07/30/2022 05:15 AM    LABALBU 3.9 07/30/2022 05:15 AM    LABALBU 4.0 01/03/2012 07:26 AM    CREATININE 0.72 07/30/2022 05:15 AM    CALCIUM 9.6 07/30/2022 05:15 AM    GFRAA >60.0 07/30/2022 05:15 AM    LABGLOM >60.0 07/30/2022 05:15 AM    GLUCOSE 105 07/30/2022 05:15 AM    GLUCOSE 93 01/03/2012 07:26 AM     Magnesium:    Lab Results   Component Value Date/Time    MG 1.9 07/30/2022 05:15 AM    MG 2.0 01/03/2012 07:26 AM     TSH:  Lab Results   Component Value Date    TSH 2.200 08/04/2021       Patient Active Problem List   Diagnosis    Paroxysmal SVT (supraventricular tachycardia) (HCC)    Pacemaker    Sick sinus syndrome (HCC)    Essential hypertension    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 Modified Medications    No medications on file       No orders of the defined types were placed in this encounter. Assessment/Plan:    1. Paroxysmal SVT (supraventricular tachycardia) (HCC)   stable QTc - on Sotalol. Stable QTc.      2. Pacemaker   interrogate - reviewed. -interrogate - 1 yr 1 months left. Reviewed with pt.     3. Essential hypertension   stable - continue meds. Low salt diet  - EKG 12 lead    4. Coronary artery disease involving native coronary artery of native heart without angina pectoris   no angina  - EKG 12 lead    5. AR- need echo - stable     6. LE Edeam- Maxzide. 7. Tired- will get labs. 8. HPL - continue statin. Low fat diet. Labs reviewed with pt. She was ntaking Statin for couple months. Will resume. discussed w [pt. Need f/u CUS - stable     9. MR- Echo          Counseling:  Heart Healthy Lifestyle, Low Salt Diet, Take Precautions to Prevent Falls and Walk Daily    Return in about 4 months (around 4/13/2023).       Electronically signed by Janis Linton MD on 12/13/2022 at 1:03 PM

## 2023-01-01 ENCOUNTER — APPOINTMENT (OUTPATIENT)
Dept: CARDIOLOGY | Facility: HOSPITAL | Age: 88
DRG: 140 | End: 2023-01-01
Payer: MEDICARE

## 2023-01-01 ENCOUNTER — APPOINTMENT (OUTPATIENT)
Dept: RADIOLOGY | Facility: HOSPITAL | Age: 88
DRG: 140 | End: 2023-01-01
Payer: MEDICARE

## 2023-01-01 ENCOUNTER — HOSPITAL ENCOUNTER (OUTPATIENT)
Dept: CARDIOLOGY | Facility: HOSPITAL | Age: 88
Discharge: HOME | End: 2023-10-13
Payer: MEDICARE

## 2023-01-01 ENCOUNTER — ANCILLARY PROCEDURE (OUTPATIENT)
Dept: RADIOLOGY | Facility: CLINIC | Age: 88
End: 2023-01-01
Payer: MEDICARE

## 2023-01-01 ENCOUNTER — PHARMACY VISIT (OUTPATIENT)
Dept: PHARMACY | Facility: CLINIC | Age: 88
End: 2023-01-01
Payer: COMMERCIAL

## 2023-01-01 ENCOUNTER — HOSPITAL ENCOUNTER (INPATIENT)
Facility: HOSPITAL | Age: 88
LOS: 17 days | Discharge: HOME | DRG: 140 | End: 2023-10-21
Attending: OTOLARYNGOLOGY | Admitting: OTOLARYNGOLOGY
Payer: MEDICARE

## 2023-01-01 ENCOUNTER — DOCUMENTATION (OUTPATIENT)
Dept: OTOLARYNGOLOGY | Facility: HOSPITAL | Age: 88
End: 2023-01-01
Payer: MEDICARE

## 2023-01-01 ENCOUNTER — DOCUMENTATION (OUTPATIENT)
Dept: INTERNAL MEDICINE | Facility: HOSPITAL | Age: 88
End: 2023-01-01

## 2023-01-01 ENCOUNTER — ANESTHESIA EVENT (OUTPATIENT)
Dept: OPERATING ROOM | Facility: HOSPITAL | Age: 88
DRG: 140 | End: 2023-01-01
Payer: MEDICARE

## 2023-01-01 ENCOUNTER — ANESTHESIA (OUTPATIENT)
Dept: OPERATING ROOM | Facility: HOSPITAL | Age: 88
DRG: 140 | End: 2023-01-01
Payer: MEDICARE

## 2023-01-01 ENCOUNTER — OFFICE VISIT (OUTPATIENT)
Dept: OTOLARYNGOLOGY | Facility: CLINIC | Age: 88
End: 2023-01-01
Payer: MEDICARE

## 2023-01-01 ENCOUNTER — HOME HEALTH ADMISSION (OUTPATIENT)
Dept: HOME HEALTH SERVICES | Facility: HOME HEALTH | Age: 88
End: 2023-01-01
Payer: MEDICARE

## 2023-01-01 ENCOUNTER — ANCILLARY PROCEDURE (OUTPATIENT)
Dept: RADIOLOGY | Facility: CLINIC | Age: 88
DRG: 140 | End: 2023-01-01
Payer: MEDICARE

## 2023-01-01 ENCOUNTER — HOSPITAL ENCOUNTER (OUTPATIENT)
Dept: CARDIOLOGY | Facility: HOSPITAL | Age: 88
Discharge: HOME | End: 2023-10-23
Payer: MEDICARE

## 2023-01-01 ENCOUNTER — TELEPHONE (OUTPATIENT)
Dept: OTOLARYNGOLOGY | Facility: HOSPITAL | Age: 88
End: 2023-01-01
Payer: MEDICARE

## 2023-01-01 ENCOUNTER — OFFICE VISIT (OUTPATIENT)
Dept: UROLOGY | Facility: CLINIC | Age: 88
End: 2023-01-01
Payer: MEDICARE

## 2023-01-01 ENCOUNTER — HOSPITAL ENCOUNTER (OUTPATIENT)
Dept: CARDIOLOGY | Facility: HOSPITAL | Age: 88
Discharge: HOME | End: 2023-10-14
Payer: MEDICARE

## 2023-01-01 VITALS
DIASTOLIC BLOOD PRESSURE: 54 MMHG | HEIGHT: 65 IN | SYSTOLIC BLOOD PRESSURE: 155 MMHG | TEMPERATURE: 97.9 F | RESPIRATION RATE: 14 BRPM | BODY MASS INDEX: 24.39 KG/M2 | HEART RATE: 60 BPM | WEIGHT: 146.39 LBS | OXYGEN SATURATION: 96 %

## 2023-01-01 VITALS
DIASTOLIC BLOOD PRESSURE: 64 MMHG | HEIGHT: 66 IN | HEART RATE: 60 BPM | BODY MASS INDEX: 23.46 KG/M2 | WEIGHT: 146 LBS | SYSTOLIC BLOOD PRESSURE: 122 MMHG

## 2023-01-01 VITALS — HEIGHT: 66 IN | BODY MASS INDEX: 23.57 KG/M2

## 2023-01-01 DIAGNOSIS — C06.0 MALIGNANT NEOPLASM OF CHEEK MUCOSA (MULTI): ICD-10-CM

## 2023-01-01 DIAGNOSIS — C44.320 SQUAMOUS CELL CARCINOMA OF FACE: Primary | ICD-10-CM

## 2023-01-01 DIAGNOSIS — Z95.0 PACEMAKER: ICD-10-CM

## 2023-01-01 DIAGNOSIS — C44.320 SQUAMOUS CELL CARCINOMA OF FACE: ICD-10-CM

## 2023-01-01 DIAGNOSIS — I49.9 CARDIAC ARRHYTHMIA, UNSPECIFIED CARDIAC ARRHYTHMIA TYPE: ICD-10-CM

## 2023-01-01 DIAGNOSIS — J96.02 ACUTE RESPIRATORY FAILURE WITH HYPERCAPNIA (MULTI): ICD-10-CM

## 2023-01-01 DIAGNOSIS — Z45.02 ENCOUNTER FOR ADJUSTMENT AND MANAGEMENT OF AUTOMATIC IMPLANTABLE CARDIAC DEFIBRILLATOR: ICD-10-CM

## 2023-01-01 LAB
ABO GROUP (TYPE) IN BLOOD: NORMAL
ALBUMIN SERPL BCP-MCNC: 2 G/DL (ref 3.4–5)
ALBUMIN SERPL BCP-MCNC: 2.3 G/DL (ref 3.4–5)
ALBUMIN SERPL BCP-MCNC: 2.3 G/DL (ref 3.4–5)
ALBUMIN SERPL BCP-MCNC: 2.4 G/DL (ref 3.4–5)
ALBUMIN SERPL BCP-MCNC: 2.6 G/DL (ref 3.4–5)
ALBUMIN SERPL BCP-MCNC: 2.7 G/DL (ref 3.4–5)
ALBUMIN SERPL BCP-MCNC: 2.7 G/DL (ref 3.4–5)
ALBUMIN SERPL BCP-MCNC: 2.8 G/DL (ref 3.4–5)
ALBUMIN SERPL BCP-MCNC: 2.9 G/DL (ref 3.4–5)
ALP SERPL-CCNC: 45 U/L (ref 33–136)
ALP SERPL-CCNC: 63 U/L (ref 33–136)
ALP SERPL-CCNC: 64 U/L (ref 33–136)
ALP SERPL-CCNC: 70 U/L (ref 33–136)
ALT SERPL W P-5'-P-CCNC: 11 U/L (ref 7–45)
ALT SERPL W P-5'-P-CCNC: 7 U/L (ref 7–45)
ALT SERPL W P-5'-P-CCNC: 8 U/L (ref 7–45)
ALT SERPL W P-5'-P-CCNC: 9 U/L (ref 7–45)
AMMONIA PLAS-SCNC: 35 UMOL/L (ref 16–53)
AMMONIA PLAS-SCNC: 51 UMOL/L (ref 16–53)
ANION GAP BLDA CALCULATED.4IONS-SCNC: 3 MMO/L (ref 10–25)
ANION GAP BLDA CALCULATED.4IONS-SCNC: 5 MMO/L (ref 10–25)
ANION GAP BLDA CALCULATED.4IONS-SCNC: 5 MMO/L (ref 10–25)
ANION GAP BLDA CALCULATED.4IONS-SCNC: 6 MMO/L (ref 10–25)
ANION GAP BLDA CALCULATED.4IONS-SCNC: 6 MMO/L (ref 10–25)
ANION GAP BLDA CALCULATED.4IONS-SCNC: 8 MMO/L (ref 10–25)
ANION GAP BLDV CALCULATED.4IONS-SCNC: 6 MMOL/L (ref 10–25)
ANION GAP SERPL CALC-SCNC: 10 MMOL/L (ref 10–20)
ANION GAP SERPL CALC-SCNC: 11 MMOL/L (ref 10–20)
ANION GAP SERPL CALC-SCNC: 11 MMOL/L (ref 10–20)
ANION GAP SERPL CALC-SCNC: 12 MMOL/L (ref 10–20)
ANION GAP SERPL CALC-SCNC: 13 MMOL/L (ref 10–20)
ANION GAP SERPL CALC-SCNC: 14 MMOL/L (ref 10–20)
ANION GAP SERPL CALC-SCNC: 16 MMOL/L (ref 10–20)
ANION GAP SERPL CALC-SCNC: 18 MMOL/L (ref 10–20)
ANION GAP SERPL CALC-SCNC: 9 MMOL/L (ref 10–20)
ANTIBODY SCREEN: NORMAL
APPEARANCE UR: CLEAR
APTT PPP: 29 SECONDS (ref 27–38)
APTT PPP: 30 SECONDS (ref 27–38)
APTT PPP: 31 SECONDS (ref 27–38)
APTT PPP: 31 SECONDS (ref 27–38)
APTT PPP: 32 SECONDS (ref 27–38)
AST SERPL W P-5'-P-CCNC: 12 U/L (ref 9–39)
AST SERPL W P-5'-P-CCNC: 13 U/L (ref 9–39)
AST SERPL W P-5'-P-CCNC: 20 U/L (ref 9–39)
AST SERPL W P-5'-P-CCNC: 9 U/L (ref 9–39)
ATRIAL RATE: 52 BPM
ATRIAL RATE: 60 BPM
ATRIAL RATE: 61 BPM
ATRIAL RATE: 77 BPM
BACTERIA SPEC RESP CULT: ABNORMAL
BASE EXCESS BLDA CALC-SCNC: 2.6 MMOL/L (ref -2–3)
BASE EXCESS BLDA CALC-SCNC: 3.9 MMOL/L (ref -2–3)
BASE EXCESS BLDA CALC-SCNC: 4.4 MMOL/L (ref -2–3)
BASE EXCESS BLDA CALC-SCNC: 4.9 MMOL/L (ref -2–3)
BASE EXCESS BLDA CALC-SCNC: 5.8 MMOL/L (ref -2–3)
BASE EXCESS BLDA CALC-SCNC: 6.1 MMOL/L (ref -2–3)
BASE EXCESS BLDA CALC-SCNC: 7.8 MMOL/L (ref -2–3)
BASE EXCESS BLDA CALC-SCNC: 7.8 MMOL/L (ref -2–3)
BASE EXCESS BLDV CALC-SCNC: 4.1 MMOL/L (ref -2–3)
BASOPHILS # BLD AUTO: 0.01 X10*3/UL (ref 0–0.1)
BASOPHILS # BLD AUTO: 0.03 X10*3/UL (ref 0–0.1)
BASOPHILS NFR BLD AUTO: 0.1 %
BASOPHILS NFR BLD AUTO: 0.2 %
BILIRUB DIRECT SERPL-MCNC: 0.1 MG/DL (ref 0–0.3)
BILIRUB DIRECT SERPL-MCNC: 0.2 MG/DL (ref 0–0.3)
BILIRUB SERPL-MCNC: 0.3 MG/DL (ref 0–1.2)
BILIRUB SERPL-MCNC: 0.3 MG/DL (ref 0–1.2)
BILIRUB SERPL-MCNC: 0.4 MG/DL (ref 0–1.2)
BILIRUB SERPL-MCNC: 0.7 MG/DL (ref 0–1.2)
BILIRUB UR STRIP.AUTO-MCNC: NEGATIVE MG/DL
BODY TEMPERATURE: 37 DEGREES CELSIUS
BUN SERPL-MCNC: 19 MG/DL (ref 6–23)
BUN SERPL-MCNC: 21 MG/DL (ref 6–23)
BUN SERPL-MCNC: 22 MG/DL (ref 6–23)
BUN SERPL-MCNC: 24 MG/DL (ref 6–23)
BUN SERPL-MCNC: 25 MG/DL (ref 6–23)
BUN SERPL-MCNC: 29 MG/DL (ref 6–23)
BUN SERPL-MCNC: 32 MG/DL (ref 6–23)
BUN SERPL-MCNC: 32 MG/DL (ref 6–23)
BUN SERPL-MCNC: 34 MG/DL (ref 6–23)
BUN SERPL-MCNC: 34 MG/DL (ref 6–23)
BUN SERPL-MCNC: 37 MG/DL (ref 6–23)
BUN SERPL-MCNC: 37 MG/DL (ref 6–23)
BUN SERPL-MCNC: 38 MG/DL (ref 6–23)
BUN SERPL-MCNC: 39 MG/DL (ref 6–23)
BUN SERPL-MCNC: 39 MG/DL (ref 6–23)
BUN SERPL-MCNC: 40 MG/DL (ref 6–23)
BUN SERPL-MCNC: 44 MG/DL (ref 6–23)
CA-I BLD-SCNC: 1.04 MMOL/L (ref 1.1–1.33)
CA-I BLD-SCNC: 1.04 MMOL/L (ref 1.1–1.33)
CA-I BLD-SCNC: 1.1 MMOL/L (ref 1.1–1.33)
CA-I BLD-SCNC: 1.15 MMOL/L (ref 1.1–1.33)
CA-I BLD-SCNC: 1.19 MMOL/L (ref 1.1–1.33)
CA-I BLDA-SCNC: 1.17 MMOL/L (ref 1.1–1.33)
CA-I BLDA-SCNC: 1.18 MMOL/L (ref 1.1–1.33)
CA-I BLDA-SCNC: 1.23 MMOL/L (ref 1.1–1.33)
CA-I BLDA-SCNC: 1.24 MMOL/L (ref 1.1–1.33)
CA-I BLDA-SCNC: 1.24 MMOL/L (ref 1.1–1.33)
CA-I BLDA-SCNC: 1.29 MMOL/L (ref 1.1–1.33)
CA-I BLDV-SCNC: 1.24 MMOL/L (ref 1.1–1.33)
CALCIUM SERPL-MCNC: 6.9 MG/DL (ref 8.6–10.6)
CALCIUM SERPL-MCNC: 7.5 MG/DL (ref 8.6–10.6)
CALCIUM SERPL-MCNC: 7.8 MG/DL (ref 8.6–10.6)
CALCIUM SERPL-MCNC: 7.9 MG/DL (ref 8.6–10.6)
CALCIUM SERPL-MCNC: 8 MG/DL (ref 8.6–10.6)
CALCIUM SERPL-MCNC: 8 MG/DL (ref 8.6–10.6)
CALCIUM SERPL-MCNC: 8.2 MG/DL (ref 8.6–10.6)
CALCIUM SERPL-MCNC: 8.2 MG/DL (ref 8.6–10.6)
CALCIUM SERPL-MCNC: 8.3 MG/DL (ref 8.6–10.6)
CALCIUM SERPL-MCNC: 8.3 MG/DL (ref 8.6–10.6)
CALCIUM SERPL-MCNC: 8.4 MG/DL (ref 8.6–10.6)
CALCIUM SERPL-MCNC: 8.5 MG/DL (ref 8.6–10.6)
CALCIUM SERPL-MCNC: 8.6 MG/DL (ref 8.6–10.6)
CHLORIDE BLDA-SCNC: 102 MMOL/L (ref 98–107)
CHLORIDE BLDA-SCNC: 104 MMOL/L (ref 98–107)
CHLORIDE BLDV-SCNC: 101 MMOL/L (ref 98–107)
CHLORIDE SERPL-SCNC: 100 MMOL/L (ref 98–107)
CHLORIDE SERPL-SCNC: 102 MMOL/L (ref 98–107)
CHLORIDE SERPL-SCNC: 103 MMOL/L (ref 98–107)
CHLORIDE SERPL-SCNC: 103 MMOL/L (ref 98–107)
CHLORIDE SERPL-SCNC: 104 MMOL/L (ref 98–107)
CHLORIDE SERPL-SCNC: 105 MMOL/L (ref 98–107)
CHLORIDE SERPL-SCNC: 106 MMOL/L (ref 98–107)
CHLORIDE SERPL-SCNC: 106 MMOL/L (ref 98–107)
CO2 SERPL-SCNC: 25 MMOL/L (ref 21–32)
CO2 SERPL-SCNC: 26 MMOL/L (ref 21–32)
CO2 SERPL-SCNC: 26 MMOL/L (ref 21–32)
CO2 SERPL-SCNC: 27 MMOL/L (ref 21–32)
CO2 SERPL-SCNC: 27 MMOL/L (ref 21–32)
CO2 SERPL-SCNC: 28 MMOL/L (ref 21–32)
CO2 SERPL-SCNC: 29 MMOL/L (ref 21–32)
CO2 SERPL-SCNC: 30 MMOL/L (ref 21–32)
CO2 SERPL-SCNC: 31 MMOL/L (ref 21–32)
CO2 SERPL-SCNC: 32 MMOL/L (ref 21–32)
CO2 SERPL-SCNC: 32 MMOL/L (ref 21–32)
COLOR UR: ABNORMAL
CREAT SERPL-MCNC: 0.42 MG/DL (ref 0.5–1.05)
CREAT SERPL-MCNC: 0.46 MG/DL (ref 0.5–1.05)
CREAT SERPL-MCNC: 0.47 MG/DL (ref 0.5–1.05)
CREAT SERPL-MCNC: 0.5 MG/DL (ref 0.5–1.05)
CREAT SERPL-MCNC: 0.51 MG/DL (ref 0.5–1.05)
CREAT SERPL-MCNC: 0.57 MG/DL (ref 0.5–1.05)
CREAT SERPL-MCNC: 0.57 MG/DL (ref 0.5–1.05)
CREAT SERPL-MCNC: 0.62 MG/DL (ref 0.5–1.05)
CREAT SERPL-MCNC: 0.71 MG/DL (ref 0.5–1.05)
CREAT SERPL-MCNC: 0.75 MG/DL (ref 0.5–1.05)
CREAT SERPL-MCNC: 0.76 MG/DL (ref 0.5–1.05)
CREAT SERPL-MCNC: 0.82 MG/DL (ref 0.5–1.05)
CREAT SERPL-MCNC: 0.87 MG/DL (ref 0.5–1.05)
CREAT SERPL-MCNC: 0.87 MG/DL (ref 0.5–1.05)
CREAT SERPL-MCNC: 0.9 MG/DL (ref 0.5–1.05)
CREAT SERPL-MCNC: 0.93 MG/DL (ref 0.5–1.05)
CREAT SERPL-MCNC: 1.05 MG/DL (ref 0.5–1.05)
CREATININE (MG/DL) IN SER/PLAS: 0.69 MG/DL (ref 0.5–1.05)
EOSINOPHIL # BLD AUTO: 0 X10*3/UL (ref 0–0.4)
EOSINOPHIL # BLD AUTO: 0.06 X10*3/UL (ref 0–0.4)
EOSINOPHIL NFR BLD AUTO: 0 %
EOSINOPHIL NFR BLD AUTO: 0.6 %
ERYTHROCYTE [DISTWIDTH] IN BLOOD BY AUTOMATED COUNT: 15.9 % (ref 11.5–14.5)
ERYTHROCYTE [DISTWIDTH] IN BLOOD BY AUTOMATED COUNT: 16.1 % (ref 11.5–14.5)
ERYTHROCYTE [DISTWIDTH] IN BLOOD BY AUTOMATED COUNT: 16.4 % (ref 11.5–14.5)
ERYTHROCYTE [DISTWIDTH] IN BLOOD BY AUTOMATED COUNT: 16.5 % (ref 11.5–14.5)
ERYTHROCYTE [DISTWIDTH] IN BLOOD BY AUTOMATED COUNT: 16.8 % (ref 11.5–14.5)
ERYTHROCYTE [DISTWIDTH] IN BLOOD BY AUTOMATED COUNT: 17 % (ref 11.5–14.5)
ERYTHROCYTE [DISTWIDTH] IN BLOOD BY AUTOMATED COUNT: 18 % (ref 11.5–14.5)
ERYTHROCYTE [DISTWIDTH] IN BLOOD BY AUTOMATED COUNT: 18.1 % (ref 11.5–14.5)
ERYTHROCYTE [DISTWIDTH] IN BLOOD BY AUTOMATED COUNT: 18.2 % (ref 11.5–14.5)
ERYTHROCYTE [DISTWIDTH] IN BLOOD BY AUTOMATED COUNT: 18.6 % (ref 11.5–14.5)
ERYTHROCYTE [DISTWIDTH] IN BLOOD BY AUTOMATED COUNT: 18.7 % (ref 11.5–14.5)
ERYTHROCYTE [DISTWIDTH] IN BLOOD BY AUTOMATED COUNT: 18.7 % (ref 11.5–14.5)
GFR FEMALE: 82 ML/MIN/1.73M2
GFR SERPL CREATININE-BSD FRML MDRD: 51 ML/MIN/1.73M*2
GFR SERPL CREATININE-BSD FRML MDRD: 59 ML/MIN/1.73M*2
GFR SERPL CREATININE-BSD FRML MDRD: 61 ML/MIN/1.73M*2
GFR SERPL CREATININE-BSD FRML MDRD: 63 ML/MIN/1.73M*2
GFR SERPL CREATININE-BSD FRML MDRD: 63 ML/MIN/1.73M*2
GFR SERPL CREATININE-BSD FRML MDRD: 68 ML/MIN/1.73M*2
GFR SERPL CREATININE-BSD FRML MDRD: 75 ML/MIN/1.73M*2
GFR SERPL CREATININE-BSD FRML MDRD: 76 ML/MIN/1.73M*2
GFR SERPL CREATININE-BSD FRML MDRD: 81 ML/MIN/1.73M*2
GFR SERPL CREATININE-BSD FRML MDRD: 85 ML/MIN/1.73M*2
GFR SERPL CREATININE-BSD FRML MDRD: 86 ML/MIN/1.73M*2
GFR SERPL CREATININE-BSD FRML MDRD: 86 ML/MIN/1.73M*2
GFR SERPL CREATININE-BSD FRML MDRD: 89 ML/MIN/1.73M*2
GFR SERPL CREATININE-BSD FRML MDRD: 89 ML/MIN/1.73M*2
GFR SERPL CREATININE-BSD FRML MDRD: >90 ML/MIN/1.73M*2
GLUCOSE BLD MANUAL STRIP-MCNC: 100 MG/DL (ref 74–99)
GLUCOSE BLD MANUAL STRIP-MCNC: 101 MG/DL (ref 74–99)
GLUCOSE BLD MANUAL STRIP-MCNC: 101 MG/DL (ref 74–99)
GLUCOSE BLD MANUAL STRIP-MCNC: 104 MG/DL (ref 74–99)
GLUCOSE BLD MANUAL STRIP-MCNC: 105 MG/DL (ref 74–99)
GLUCOSE BLD MANUAL STRIP-MCNC: 105 MG/DL (ref 74–99)
GLUCOSE BLD MANUAL STRIP-MCNC: 108 MG/DL (ref 74–99)
GLUCOSE BLD MANUAL STRIP-MCNC: 109 MG/DL (ref 74–99)
GLUCOSE BLD MANUAL STRIP-MCNC: 110 MG/DL (ref 74–99)
GLUCOSE BLD MANUAL STRIP-MCNC: 110 MG/DL (ref 74–99)
GLUCOSE BLD MANUAL STRIP-MCNC: 113 MG/DL (ref 74–99)
GLUCOSE BLD MANUAL STRIP-MCNC: 113 MG/DL (ref 74–99)
GLUCOSE BLD MANUAL STRIP-MCNC: 114 MG/DL (ref 74–99)
GLUCOSE BLD MANUAL STRIP-MCNC: 115 MG/DL (ref 74–99)
GLUCOSE BLD MANUAL STRIP-MCNC: 116 MG/DL (ref 74–99)
GLUCOSE BLD MANUAL STRIP-MCNC: 120 MG/DL (ref 74–99)
GLUCOSE BLD MANUAL STRIP-MCNC: 125 MG/DL (ref 74–99)
GLUCOSE BLD MANUAL STRIP-MCNC: 130 MG/DL (ref 74–99)
GLUCOSE BLD MANUAL STRIP-MCNC: 131 MG/DL (ref 74–99)
GLUCOSE BLD MANUAL STRIP-MCNC: 132 MG/DL (ref 74–99)
GLUCOSE BLD MANUAL STRIP-MCNC: 135 MG/DL (ref 74–99)
GLUCOSE BLD MANUAL STRIP-MCNC: 72 MG/DL (ref 74–99)
GLUCOSE BLD MANUAL STRIP-MCNC: 74 MG/DL (ref 74–99)
GLUCOSE BLD MANUAL STRIP-MCNC: 78 MG/DL (ref 74–99)
GLUCOSE BLD MANUAL STRIP-MCNC: 79 MG/DL (ref 74–99)
GLUCOSE BLD MANUAL STRIP-MCNC: 80 MG/DL (ref 74–99)
GLUCOSE BLD MANUAL STRIP-MCNC: 83 MG/DL (ref 74–99)
GLUCOSE BLD MANUAL STRIP-MCNC: 84 MG/DL (ref 74–99)
GLUCOSE BLD MANUAL STRIP-MCNC: 85 MG/DL (ref 74–99)
GLUCOSE BLD MANUAL STRIP-MCNC: 87 MG/DL (ref 74–99)
GLUCOSE BLD MANUAL STRIP-MCNC: 89 MG/DL (ref 74–99)
GLUCOSE BLD MANUAL STRIP-MCNC: 90 MG/DL (ref 74–99)
GLUCOSE BLD MANUAL STRIP-MCNC: 91 MG/DL (ref 74–99)
GLUCOSE BLD MANUAL STRIP-MCNC: 91 MG/DL (ref 74–99)
GLUCOSE BLD MANUAL STRIP-MCNC: 92 MG/DL (ref 74–99)
GLUCOSE BLD MANUAL STRIP-MCNC: 93 MG/DL (ref 74–99)
GLUCOSE BLD MANUAL STRIP-MCNC: 94 MG/DL (ref 74–99)
GLUCOSE BLD MANUAL STRIP-MCNC: 94 MG/DL (ref 74–99)
GLUCOSE BLD MANUAL STRIP-MCNC: 96 MG/DL (ref 74–99)
GLUCOSE BLD MANUAL STRIP-MCNC: 97 MG/DL (ref 74–99)
GLUCOSE BLDA-MCNC: 106 MG/DL (ref 74–99)
GLUCOSE BLDA-MCNC: 110 MG/DL (ref 74–99)
GLUCOSE BLDA-MCNC: 116 MG/DL (ref 74–99)
GLUCOSE BLDA-MCNC: 117 MG/DL (ref 74–99)
GLUCOSE BLDA-MCNC: 129 MG/DL (ref 74–99)
GLUCOSE BLDA-MCNC: 131 MG/DL (ref 74–99)
GLUCOSE BLDA-MCNC: 137 MG/DL (ref 74–99)
GLUCOSE BLDA-MCNC: 96 MG/DL (ref 74–99)
GLUCOSE BLDV-MCNC: 115 MG/DL (ref 74–99)
GLUCOSE SERPL-MCNC: 104 MG/DL (ref 74–99)
GLUCOSE SERPL-MCNC: 104 MG/DL (ref 74–99)
GLUCOSE SERPL-MCNC: 105 MG/DL (ref 74–99)
GLUCOSE SERPL-MCNC: 105 MG/DL (ref 74–99)
GLUCOSE SERPL-MCNC: 107 MG/DL (ref 74–99)
GLUCOSE SERPL-MCNC: 107 MG/DL (ref 74–99)
GLUCOSE SERPL-MCNC: 115 MG/DL (ref 74–99)
GLUCOSE SERPL-MCNC: 125 MG/DL (ref 74–99)
GLUCOSE SERPL-MCNC: 140 MG/DL (ref 74–99)
GLUCOSE SERPL-MCNC: 145 MG/DL (ref 74–99)
GLUCOSE SERPL-MCNC: 175 MG/DL (ref 74–99)
GLUCOSE SERPL-MCNC: 56 MG/DL (ref 74–99)
GLUCOSE SERPL-MCNC: 87 MG/DL (ref 74–99)
GLUCOSE SERPL-MCNC: 92 MG/DL (ref 74–99)
GLUCOSE SERPL-MCNC: 94 MG/DL (ref 74–99)
GLUCOSE SERPL-MCNC: 95 MG/DL (ref 74–99)
GLUCOSE SERPL-MCNC: 97 MG/DL (ref 74–99)
GLUCOSE UR STRIP.AUTO-MCNC: NEGATIVE MG/DL
GRAM STN SPEC: ABNORMAL
HCO3 BLDA-SCNC: 28.5 MMOL/L (ref 22–26)
HCO3 BLDA-SCNC: 29.5 MMOL/L (ref 22–26)
HCO3 BLDA-SCNC: 29.9 MMOL/L (ref 22–26)
HCO3 BLDA-SCNC: 30.4 MMOL/L (ref 22–26)
HCO3 BLDA-SCNC: 30.6 MMOL/L (ref 22–26)
HCO3 BLDA-SCNC: 30.9 MMOL/L (ref 22–26)
HCO3 BLDA-SCNC: 32.4 MMOL/L (ref 22–26)
HCO3 BLDA-SCNC: 32.7 MMOL/L (ref 22–26)
HCO3 BLDV-SCNC: 34.3 MMOL/L (ref 22–26)
HCT VFR BLD AUTO: 24.2 % (ref 36–46)
HCT VFR BLD AUTO: 27.6 % (ref 36–46)
HCT VFR BLD AUTO: 28.5 % (ref 36–46)
HCT VFR BLD AUTO: 28.7 % (ref 36–46)
HCT VFR BLD AUTO: 29.7 % (ref 36–46)
HCT VFR BLD AUTO: 30.1 % (ref 36–46)
HCT VFR BLD AUTO: 31.8 % (ref 36–46)
HCT VFR BLD AUTO: 31.9 % (ref 36–46)
HCT VFR BLD AUTO: 32.2 % (ref 36–46)
HCT VFR BLD AUTO: 33 % (ref 36–46)
HCT VFR BLD AUTO: 33.4 % (ref 36–46)
HCT VFR BLD AUTO: 33.6 % (ref 36–46)
HCT VFR BLD AUTO: 34.6 % (ref 36–46)
HCT VFR BLD AUTO: 35.3 % (ref 36–46)
HCT VFR BLD AUTO: 37.1 % (ref 36–46)
HCT VFR BLD AUTO: 37.9 % (ref 36–46)
HCT VFR BLD AUTO: 41.3 % (ref 36–46)
HCT VFR BLD EST: 28 % (ref 36–46)
HCT VFR BLD EST: 28 % (ref 36–46)
HCT VFR BLD EST: 29 % (ref 36–46)
HCT VFR BLD EST: 31 % (ref 36–46)
HCT VFR BLD EST: 32 % (ref 36–46)
HCT VFR BLD EST: 32 % (ref 36–46)
HCT VFR BLD EST: 41 % (ref 36–46)
HGB BLD-MCNC: 10 G/DL (ref 12–16)
HGB BLD-MCNC: 10.3 G/DL (ref 12–16)
HGB BLD-MCNC: 10.4 G/DL (ref 12–16)
HGB BLD-MCNC: 10.9 G/DL (ref 12–16)
HGB BLD-MCNC: 10.9 G/DL (ref 12–16)
HGB BLD-MCNC: 11.4 G/DL (ref 12–16)
HGB BLD-MCNC: 11.8 G/DL (ref 12–16)
HGB BLD-MCNC: 12.4 G/DL (ref 12–16)
HGB BLD-MCNC: 13 G/DL (ref 12–16)
HGB BLD-MCNC: 7.7 G/DL (ref 12–16)
HGB BLD-MCNC: 9 G/DL (ref 12–16)
HGB BLD-MCNC: 9 G/DL (ref 12–16)
HGB BLD-MCNC: 9.1 G/DL (ref 12–16)
HGB BLD-MCNC: 9.3 G/DL (ref 12–16)
HGB BLD-MCNC: 9.7 G/DL (ref 12–16)
HGB BLDA-MCNC: 10.4 G/DL (ref 12–16)
HGB BLDA-MCNC: 10.5 G/DL (ref 12–16)
HGB BLDA-MCNC: 10.7 G/DL (ref 12–16)
HGB BLDA-MCNC: 13.8 G/DL (ref 12–16)
HGB BLDA-MCNC: 9.3 G/DL (ref 12–16)
HGB BLDA-MCNC: 9.4 G/DL (ref 12–16)
HGB BLDA-MCNC: 9.5 G/DL (ref 12–16)
HGB BLDA-MCNC: 9.6 G/DL (ref 12–16)
HGB BLDV-MCNC: 9.7 G/DL (ref 12–16)
IMM GRANULOCYTES # BLD AUTO: 0.07 X10*3/UL (ref 0–0.5)
IMM GRANULOCYTES # BLD AUTO: 0.1 X10*3/UL (ref 0–0.5)
IMM GRANULOCYTES NFR BLD AUTO: 0.7 % (ref 0–0.9)
IMM GRANULOCYTES NFR BLD AUTO: 0.8 % (ref 0–0.9)
INHALED O2 CONCENTRATION: 30 %
INHALED O2 CONCENTRATION: 30 %
INHALED O2 CONCENTRATION: 40 %
INHALED O2 CONCENTRATION: 50 %
INR PPP: 1 (ref 0.9–1.1)
INR PPP: 1 (ref 0.9–1.1)
INR PPP: 1.1 (ref 0.9–1.1)
KETONES UR STRIP.AUTO-MCNC: NEGATIVE MG/DL
LABORATORY COMMENT REPORT: NORMAL
LACTATE BLDA-SCNC: 0.6 MMOL/L (ref 0.4–2)
LACTATE BLDA-SCNC: 0.7 MMOL/L (ref 0.4–2)
LACTATE BLDA-SCNC: 0.8 MMOL/L (ref 0.4–2)
LACTATE BLDA-SCNC: 0.9 MMOL/L (ref 0.4–2)
LACTATE BLDA-SCNC: 1 MMOL/L (ref 0.4–2)
LACTATE BLDA-SCNC: 1.2 MMOL/L (ref 0.4–2)
LACTATE BLDA-SCNC: 1.4 MMOL/L (ref 0.4–2)
LACTATE BLDA-SCNC: 1.9 MMOL/L (ref 0.4–2)
LACTATE BLDV-SCNC: 0.8 MMOL/L (ref 0.4–2)
LACTATE SERPL-SCNC: 1 MMOL/L (ref 0.4–2)
LEUKOCYTE ESTERASE UR QL STRIP.AUTO: ABNORMAL
LYMPHOCYTES # BLD AUTO: 1.33 X10*3/UL (ref 0.8–3)
LYMPHOCYTES # BLD AUTO: 2.23 X10*3/UL (ref 0.8–3)
LYMPHOCYTES NFR BLD AUTO: 11 %
LYMPHOCYTES NFR BLD AUTO: 22.2 %
Lab: NORMAL
MAGNESIUM SERPL-MCNC: 1.72 MG/DL (ref 1.6–2.4)
MAGNESIUM SERPL-MCNC: 1.74 MG/DL (ref 1.6–2.4)
MAGNESIUM SERPL-MCNC: 1.86 MG/DL (ref 1.6–2.4)
MAGNESIUM SERPL-MCNC: 1.95 MG/DL (ref 1.6–2.4)
MAGNESIUM SERPL-MCNC: 1.99 MG/DL (ref 1.6–2.4)
MAGNESIUM SERPL-MCNC: 2.07 MG/DL (ref 1.6–2.4)
MAGNESIUM SERPL-MCNC: 2.13 MG/DL (ref 1.6–2.4)
MAGNESIUM SERPL-MCNC: 2.16 MG/DL (ref 1.6–2.4)
MAGNESIUM SERPL-MCNC: 2.17 MG/DL (ref 1.6–2.4)
MAGNESIUM SERPL-MCNC: 2.21 MG/DL (ref 1.6–2.4)
MAGNESIUM SERPL-MCNC: 2.22 MG/DL (ref 1.6–2.4)
MAGNESIUM SERPL-MCNC: 2.26 MG/DL (ref 1.6–2.4)
MAGNESIUM SERPL-MCNC: 2.27 MG/DL (ref 1.6–2.4)
MAGNESIUM SERPL-MCNC: 2.31 MG/DL (ref 1.6–2.4)
MAGNESIUM SERPL-MCNC: 2.39 MG/DL (ref 1.6–2.4)
MAGNESIUM SERPL-MCNC: 2.52 MG/DL (ref 1.6–2.4)
MCH RBC QN AUTO: 30 PG (ref 26–34)
MCH RBC QN AUTO: 30.1 PG (ref 26–34)
MCH RBC QN AUTO: 30.2 PG (ref 26–34)
MCH RBC QN AUTO: 30.2 PG (ref 26–34)
MCH RBC QN AUTO: 30.3 PG (ref 26–34)
MCH RBC QN AUTO: 30.4 PG (ref 26–34)
MCH RBC QN AUTO: 30.4 PG (ref 26–34)
MCH RBC QN AUTO: 30.5 PG (ref 26–34)
MCH RBC QN AUTO: 30.5 PG (ref 26–34)
MCH RBC QN AUTO: 30.6 PG (ref 26–34)
MCH RBC QN AUTO: 30.8 PG (ref 26–34)
MCH RBC QN AUTO: 31 PG (ref 26–34)
MCH RBC QN AUTO: 31.1 PG (ref 26–34)
MCH RBC QN AUTO: 31.3 PG (ref 26–34)
MCH RBC QN AUTO: 31.3 PG (ref 26–34)
MCHC RBC AUTO-ENTMCNC: 30.8 G/DL (ref 32–36)
MCHC RBC AUTO-ENTMCNC: 31.2 G/DL (ref 32–36)
MCHC RBC AUTO-ENTMCNC: 31.3 G/DL (ref 32–36)
MCHC RBC AUTO-ENTMCNC: 31.4 G/DL (ref 32–36)
MCHC RBC AUTO-ENTMCNC: 31.5 G/DL (ref 32–36)
MCHC RBC AUTO-ENTMCNC: 31.5 G/DL (ref 32–36)
MCHC RBC AUTO-ENTMCNC: 31.6 G/DL (ref 32–36)
MCHC RBC AUTO-ENTMCNC: 31.7 G/DL (ref 32–36)
MCHC RBC AUTO-ENTMCNC: 31.8 G/DL (ref 32–36)
MCHC RBC AUTO-ENTMCNC: 31.8 G/DL (ref 32–36)
MCHC RBC AUTO-ENTMCNC: 32 G/DL (ref 32–36)
MCHC RBC AUTO-ENTMCNC: 32.2 G/DL (ref 32–36)
MCHC RBC AUTO-ENTMCNC: 32.3 G/DL (ref 32–36)
MCHC RBC AUTO-ENTMCNC: 32.4 G/DL (ref 32–36)
MCHC RBC AUTO-ENTMCNC: 32.6 G/DL (ref 32–36)
MCHC RBC AUTO-ENTMCNC: 32.6 G/DL (ref 32–36)
MCHC RBC AUTO-ENTMCNC: 32.7 G/DL (ref 32–36)
MCV RBC AUTO: 92 FL (ref 80–100)
MCV RBC AUTO: 93 FL (ref 80–100)
MCV RBC AUTO: 94 FL (ref 80–100)
MCV RBC AUTO: 94 FL (ref 80–100)
MCV RBC AUTO: 95 FL (ref 80–100)
MCV RBC AUTO: 96 FL (ref 80–100)
MCV RBC AUTO: 96 FL (ref 80–100)
MCV RBC AUTO: 97 FL (ref 80–100)
MCV RBC AUTO: 98 FL (ref 80–100)
MCV RBC AUTO: 99 FL (ref 80–100)
MONOCYTES # BLD AUTO: 0.72 X10*3/UL (ref 0.05–0.8)
MONOCYTES # BLD AUTO: 0.83 X10*3/UL (ref 0.05–0.8)
MONOCYTES NFR BLD AUTO: 6.8 %
MONOCYTES NFR BLD AUTO: 7.2 %
MRSA DNA SPEC QL NAA+PROBE: NOT DETECTED
NEUTROPHILS # BLD AUTO: 6.94 X10*3/UL (ref 1.6–5.5)
NEUTROPHILS # BLD AUTO: 9.83 X10*3/UL (ref 1.6–5.5)
NEUTROPHILS NFR BLD AUTO: 69.2 %
NEUTROPHILS NFR BLD AUTO: 81.2 %
NITRITE UR QL STRIP.AUTO: NEGATIVE
NRBC BLD-RTO: 0 /100 WBCS (ref 0–0)
OXYHGB MFR BLDA: 95.9 % (ref 94–98)
OXYHGB MFR BLDA: 96.1 % (ref 94–98)
OXYHGB MFR BLDA: 97.1 % (ref 94–98)
OXYHGB MFR BLDA: 97.2 % (ref 94–98)
OXYHGB MFR BLDA: 97.5 % (ref 94–98)
OXYHGB MFR BLDA: 97.9 % (ref 94–98)
OXYHGB MFR BLDV: 23.8 % (ref 45–75)
P AXIS: 117 DEGREES
P AXIS: 40 DEGREES
P OFFSET: 164 MS
P OFFSET: 181 MS
P ONSET: 123 MS
P ONSET: 140 MS
PATH REPORT.FINAL DX SPEC: NORMAL
PATH REPORT.GROSS SPEC: NORMAL
PATH REPORT.RELEVANT HX SPEC: NORMAL
PATH REPORT.TOTAL CANCER: NORMAL
PATHOLOGY SYNOPTIC REPORT: NORMAL
PCO2 BLDA: 34 MM HG (ref 38–42)
PCO2 BLDA: 37 MM HG (ref 38–42)
PCO2 BLDA: 42 MM HG (ref 38–42)
PCO2 BLDA: 44 MM HG (ref 38–42)
PCO2 BLDA: 45 MM HG (ref 38–42)
PCO2 BLDA: 47 MM HG (ref 38–42)
PCO2 BLDA: 56 MM HG (ref 38–42)
PCO2 BLDA: 83 MM HG (ref 38–42)
PCO2 BLDV: 92 MM HG (ref 41–51)
PEEP CMH2O: 8 CM H2O
PH BLDA: 7.2 PH (ref 7.38–7.42)
PH BLDA: 7.35 PH (ref 7.38–7.42)
PH BLDA: 7.43 PH (ref 7.38–7.42)
PH BLDA: 7.44 PH (ref 7.38–7.42)
PH BLDA: 7.45 PH (ref 7.38–7.42)
PH BLDA: 7.45 PH (ref 7.38–7.42)
PH BLDA: 7.51 PH (ref 7.38–7.42)
PH BLDA: 7.56 PH (ref 7.38–7.42)
PH BLDV: 7.18 PH (ref 7.33–7.43)
PH UR STRIP.AUTO: 7 [PH]
PHOSPHATE SERPL-MCNC: 1.8 MG/DL (ref 2.5–4.9)
PHOSPHATE SERPL-MCNC: 2.1 MG/DL (ref 2.5–4.9)
PHOSPHATE SERPL-MCNC: 2.1 MG/DL (ref 2.5–4.9)
PHOSPHATE SERPL-MCNC: 2.3 MG/DL (ref 2.5–4.9)
PHOSPHATE SERPL-MCNC: 2.5 MG/DL (ref 2.5–4.9)
PHOSPHATE SERPL-MCNC: 2.9 MG/DL (ref 2.5–4.9)
PHOSPHATE SERPL-MCNC: 2.9 MG/DL (ref 2.5–4.9)
PHOSPHATE SERPL-MCNC: 3.2 MG/DL (ref 2.5–4.9)
PHOSPHATE SERPL-MCNC: 3.5 MG/DL (ref 2.5–4.9)
PHOSPHATE SERPL-MCNC: 3.6 MG/DL (ref 2.5–4.9)
PHOSPHATE SERPL-MCNC: 4.1 MG/DL (ref 2.5–4.9)
PHOSPHATE SERPL-MCNC: 4.1 MG/DL (ref 2.5–4.9)
PHOSPHATE SERPL-MCNC: 4.4 MG/DL (ref 2.5–4.9)
PHOSPHATE SERPL-MCNC: 4.7 MG/DL (ref 2.5–4.9)
PHOSPHATE SERPL-MCNC: 4.7 MG/DL (ref 2.5–4.9)
PHOSPHATE SERPL-MCNC: 6.2 MG/DL (ref 2.5–4.9)
PLATELET # BLD AUTO: 177 X10*3/UL (ref 150–450)
PLATELET # BLD AUTO: 200 X10*3/UL (ref 150–450)
PLATELET # BLD AUTO: 207 X10*3/UL (ref 150–450)
PLATELET # BLD AUTO: 213 X10*3/UL (ref 150–450)
PLATELET # BLD AUTO: 252 X10*3/UL (ref 150–450)
PLATELET # BLD AUTO: 253 X10*3/UL (ref 150–450)
PLATELET # BLD AUTO: 260 X10*3/UL (ref 150–450)
PLATELET # BLD AUTO: 265 X10*3/UL (ref 150–450)
PLATELET # BLD AUTO: 269 X10*3/UL (ref 150–450)
PLATELET # BLD AUTO: 269 X10*3/UL (ref 150–450)
PLATELET # BLD AUTO: 280 X10*3/UL (ref 150–450)
PLATELET # BLD AUTO: 286 X10*3/UL (ref 150–450)
PLATELET # BLD AUTO: 286 X10*3/UL (ref 150–450)
PLATELET # BLD AUTO: 303 X10*3/UL (ref 150–450)
PLATELET # BLD AUTO: 307 X10*3/UL (ref 150–450)
PLATELET # BLD AUTO: 321 X10*3/UL (ref 150–450)
PLATELET # BLD AUTO: 349 X10*3/UL (ref 150–450)
PMV BLD AUTO: 10 FL (ref 7.5–11.5)
PMV BLD AUTO: 10.1 FL (ref 7.5–11.5)
PMV BLD AUTO: 10.1 FL (ref 7.5–11.5)
PMV BLD AUTO: 10.2 FL (ref 7.5–11.5)
PMV BLD AUTO: 10.3 FL (ref 7.5–11.5)
PMV BLD AUTO: 10.4 FL (ref 7.5–11.5)
PMV BLD AUTO: 10.4 FL (ref 7.5–11.5)
PMV BLD AUTO: 10.5 FL (ref 7.5–11.5)
PMV BLD AUTO: 10.5 FL (ref 7.5–11.5)
PMV BLD AUTO: 10.8 FL (ref 7.5–11.5)
PMV BLD AUTO: 10.9 FL (ref 7.5–11.5)
PMV BLD AUTO: 9.8 FL (ref 7.5–11.5)
PMV BLD AUTO: 9.9 FL (ref 7.5–11.5)
PMV BLD AUTO: 9.9 FL (ref 7.5–11.5)
PO2 BLDA: 106 MM HG (ref 85–95)
PO2 BLDA: 110 MM HG (ref 85–95)
PO2 BLDA: 121 MM HG (ref 85–95)
PO2 BLDA: 124 MM HG (ref 85–95)
PO2 BLDA: 152 MM HG (ref 85–95)
PO2 BLDA: 175 MM HG (ref 85–95)
PO2 BLDA: 194 MM HG (ref 85–95)
PO2 BLDA: 95 MM HG (ref 85–95)
PO2 BLDV: 25 MM HG (ref 35–45)
POTASSIUM BLDA-SCNC: 4.1 MMOL/L (ref 3.5–5.3)
POTASSIUM BLDA-SCNC: 4.2 MMOL/L (ref 3.5–5.3)
POTASSIUM BLDA-SCNC: 4.3 MMOL/L (ref 3.5–5.3)
POTASSIUM BLDA-SCNC: 4.7 MMOL/L (ref 3.5–5.3)
POTASSIUM BLDA-SCNC: 5 MMOL/L (ref 3.5–5.3)
POTASSIUM BLDA-SCNC: 5.1 MMOL/L (ref 3.5–5.3)
POTASSIUM BLDA-SCNC: 5.2 MMOL/L (ref 3.5–5.3)
POTASSIUM BLDA-SCNC: 6 MMOL/L (ref 3.5–5.3)
POTASSIUM BLDV-SCNC: 4.9 MMOL/L (ref 3.5–5.3)
POTASSIUM SERPL-SCNC: 3.6 MMOL/L (ref 3.5–5.3)
POTASSIUM SERPL-SCNC: 3.6 MMOL/L (ref 3.5–5.3)
POTASSIUM SERPL-SCNC: 3.7 MMOL/L (ref 3.5–5.3)
POTASSIUM SERPL-SCNC: 3.9 MMOL/L (ref 3.5–5.3)
POTASSIUM SERPL-SCNC: 4 MMOL/L (ref 3.5–5.3)
POTASSIUM SERPL-SCNC: 4.2 MMOL/L (ref 3.5–5.3)
POTASSIUM SERPL-SCNC: 4.3 MMOL/L (ref 3.5–5.3)
POTASSIUM SERPL-SCNC: 4.3 MMOL/L (ref 3.5–5.3)
POTASSIUM SERPL-SCNC: 4.4 MMOL/L (ref 3.5–5.3)
POTASSIUM SERPL-SCNC: 4.4 MMOL/L (ref 3.5–5.3)
POTASSIUM SERPL-SCNC: 4.5 MMOL/L (ref 3.5–5.3)
POTASSIUM SERPL-SCNC: 4.5 MMOL/L (ref 3.5–5.3)
POTASSIUM SERPL-SCNC: 4.6 MMOL/L (ref 3.5–5.3)
POTASSIUM SERPL-SCNC: 4.7 MMOL/L (ref 3.5–5.3)
POTASSIUM SERPL-SCNC: 4.8 MMOL/L (ref 3.5–5.3)
POTASSIUM SERPL-SCNC: 4.9 MMOL/L (ref 3.5–5.3)
POTASSIUM SERPL-SCNC: 5.2 MMOL/L (ref 3.5–5.3)
PR INTERVAL: 156 MS
PR INTERVAL: 192 MS
PR INTERVAL: 268 MS
PROT SERPL-MCNC: 3.9 G/DL (ref 6.4–8.2)
PROT SERPL-MCNC: 5.8 G/DL (ref 6.4–8.2)
PROT SERPL-MCNC: 5.9 G/DL (ref 6.4–8.2)
PROT SERPL-MCNC: 6 G/DL (ref 6.4–8.2)
PROT UR STRIP.AUTO-MCNC: NEGATIVE MG/DL
PROTHROMBIN TIME: 10.9 SECONDS (ref 9.8–12.8)
PROTHROMBIN TIME: 11.7 SECONDS (ref 9.8–12.8)
PROTHROMBIN TIME: 12 SECONDS (ref 9.8–12.8)
PROTHROMBIN TIME: 12.2 SECONDS (ref 9.8–12.8)
PROTHROMBIN TIME: 12.9 SECONDS (ref 9.8–12.8)
Q ONSET: 217 MS
Q ONSET: 218 MS
Q ONSET: 218 MS
Q ONSET: 219 MS
QRS COUNT: 10 BEATS
QRS COUNT: 12 BEATS
QRS DURATION: 104 MS
QRS DURATION: 80 MS
QRS DURATION: 92 MS
QRS DURATION: 98 MS
QT INTERVAL: 412 MS
QT INTERVAL: 464 MS
QT INTERVAL: 480 MS
QT INTERVAL: 480 MS
QTC CALCULATION(BAZETT): 466 MS
QTC CALCULATION(BAZETT): 467 MS
QTC CALCULATION(BAZETT): 480 MS
QTC CALCULATION(BAZETT): 480 MS
QTC FREDERICIA: 447 MS
QTC FREDERICIA: 466 MS
QTC FREDERICIA: 480 MS
QTC FREDERICIA: 480 MS
R AXIS: 60 DEGREES
R AXIS: 62 DEGREES
R AXIS: 63 DEGREES
R AXIS: 68 DEGREES
RBC # BLD AUTO: 2.46 X10*6/UL (ref 4–5.2)
RBC # BLD AUTO: 2.92 X10*6/UL (ref 4–5.2)
RBC # BLD AUTO: 3 X10*6/UL (ref 4–5.2)
RBC # BLD AUTO: 3.03 X10*6/UL (ref 4–5.2)
RBC # BLD AUTO: 3.04 X10*6/UL (ref 4–5.2)
RBC # BLD AUTO: 3.1 X10*6/UL (ref 4–5.2)
RBC # BLD AUTO: 3.28 X10*6/UL (ref 4–5.2)
RBC # BLD AUTO: 3.36 X10*6/UL (ref 4–5.2)
RBC # BLD AUTO: 3.39 X10*6/UL (ref 4–5.2)
RBC # BLD AUTO: 3.39 X10*6/UL (ref 4–5.2)
RBC # BLD AUTO: 3.4 X10*6/UL (ref 4–5.2)
RBC # BLD AUTO: 3.61 X10*6/UL (ref 4–5.2)
RBC # BLD AUTO: 3.62 X10*6/UL (ref 4–5.2)
RBC # BLD AUTO: 3.74 X10*6/UL (ref 4–5.2)
RBC # BLD AUTO: 3.93 X10*6/UL (ref 4–5.2)
RBC # BLD AUTO: 3.99 X10*6/UL (ref 4–5.2)
RBC # BLD AUTO: 4.3 X10*6/UL (ref 4–5.2)
RBC # UR STRIP.AUTO: NEGATIVE /UL
RBC #/AREA URNS AUTO: NORMAL /HPF
RH FACTOR (ANTIGEN D): NORMAL
SAO2 % BLDA: 100 % (ref 94–100)
SAO2 % BLDA: 96 % (ref 94–100)
SAO2 % BLDA: 99 % (ref 94–100)
SAO2 % BLDV: 24 % (ref 45–75)
SODIUM BLDA-SCNC: 133 MMOL/L (ref 136–145)
SODIUM BLDA-SCNC: 133 MMOL/L (ref 136–145)
SODIUM BLDA-SCNC: 134 MMOL/L (ref 136–145)
SODIUM BLDA-SCNC: 134 MMOL/L (ref 136–145)
SODIUM BLDA-SCNC: 135 MMOL/L (ref 136–145)
SODIUM BLDV-SCNC: 136 MMOL/L (ref 136–145)
SODIUM SERPL-SCNC: 137 MMOL/L (ref 136–145)
SODIUM SERPL-SCNC: 139 MMOL/L (ref 136–145)
SODIUM SERPL-SCNC: 140 MMOL/L (ref 136–145)
SODIUM SERPL-SCNC: 140 MMOL/L (ref 136–145)
SODIUM SERPL-SCNC: 141 MMOL/L (ref 136–145)
SODIUM SERPL-SCNC: 142 MMOL/L (ref 136–145)
SODIUM SERPL-SCNC: 143 MMOL/L (ref 136–145)
SP GR UR STRIP.AUTO: 1.01
STAPHYLOCOCCUS SPEC CULT: NORMAL
T AXIS: 40 DEGREES
T AXIS: 43 DEGREES
T AXIS: 63 DEGREES
T AXIS: 64 DEGREES
T OFFSET: 425 MS
T OFFSET: 449 MS
T OFFSET: 458 MS
T OFFSET: 458 MS
TIDAL VOLUME: 350 ML
UREA NITROGEN (MG/DL) IN SER/PLAS: 29 MG/DL (ref 6–23)
UROBILINOGEN UR STRIP.AUTO-MCNC: <2 MG/DL
VANCOMYCIN SERPL-MCNC: 5.5 UG/ML (ref 5–20)
VENTILATOR RATE: 22 BPM
VENTRICULAR RATE: 60 BPM
VENTRICULAR RATE: 60 BPM
VENTRICULAR RATE: 61 BPM
VENTRICULAR RATE: 77 BPM
WBC # BLD AUTO: 10 X10*3/UL (ref 4.4–11.3)
WBC # BLD AUTO: 10.3 X10*3/UL (ref 4.4–11.3)
WBC # BLD AUTO: 10.6 X10*3/UL (ref 4.4–11.3)
WBC # BLD AUTO: 10.7 X10*3/UL (ref 4.4–11.3)
WBC # BLD AUTO: 11.7 X10*3/UL (ref 4.4–11.3)
WBC # BLD AUTO: 12.1 X10*3/UL (ref 4.4–11.3)
WBC # BLD AUTO: 12.1 X10*3/UL (ref 4.4–11.3)
WBC # BLD AUTO: 12.6 X10*3/UL (ref 4.4–11.3)
WBC # BLD AUTO: 12.7 X10*3/UL (ref 4.4–11.3)
WBC # BLD AUTO: 13.2 X10*3/UL (ref 4.4–11.3)
WBC # BLD AUTO: 13.7 X10*3/UL (ref 4.4–11.3)
WBC # BLD AUTO: 14.2 X10*3/UL (ref 4.4–11.3)
WBC # BLD AUTO: 14.9 X10*3/UL (ref 4.4–11.3)
WBC # BLD AUTO: 15.2 X10*3/UL (ref 4.4–11.3)
WBC # BLD AUTO: 15.4 X10*3/UL (ref 4.4–11.3)
WBC # BLD AUTO: 15.9 X10*3/UL (ref 4.4–11.3)
WBC # BLD AUTO: 16.2 X10*3/UL (ref 4.4–11.3)
WBC #/AREA URNS AUTO: NORMAL /HPF

## 2023-01-01 PROCEDURE — 99204 OFFICE O/P NEW MOD 45 MIN: CPT | Performed by: STUDENT IN AN ORGANIZED HEALTH CARE EDUCATION/TRAINING PROGRAM

## 2023-01-01 PROCEDURE — 2500000001 HC RX 250 WO HCPCS SELF ADMINISTERED DRUGS (ALT 637 FOR MEDICARE OP): Performed by: STUDENT IN AN ORGANIZED HEALTH CARE EDUCATION/TRAINING PROGRAM

## 2023-01-01 PROCEDURE — 96372 THER/PROPH/DIAG INJ SC/IM: CPT | Performed by: STUDENT IN AN ORGANIZED HEALTH CARE EDUCATION/TRAINING PROGRAM

## 2023-01-01 PROCEDURE — 38724 REMOVAL OF LYMPH NODES NECK: CPT | Performed by: OTOLARYNGOLOGY

## 2023-01-01 PROCEDURE — 15757 FREE SKIN FLAP MICROVASC: CPT | Performed by: OTOLARYNGOLOGY

## 2023-01-01 PROCEDURE — 2500000002 HC RX 250 W HCPCS SELF ADMINISTERED DRUGS (ALT 637 FOR MEDICARE OP, ALT 636 FOR OP/ED): Performed by: OTOLARYNGOLOGY

## 2023-01-01 PROCEDURE — 2500000004 HC RX 250 GENERAL PHARMACY W/ HCPCS (ALT 636 FOR OP/ED): Performed by: STUDENT IN AN ORGANIZED HEALTH CARE EDUCATION/TRAINING PROGRAM

## 2023-01-01 PROCEDURE — A4217 STERILE WATER/SALINE, 500 ML: HCPCS | Performed by: OTOLARYNGOLOGY

## 2023-01-01 PROCEDURE — 2500000002 HC RX 250 W HCPCS SELF ADMINISTERED DRUGS (ALT 637 FOR MEDICARE OP, ALT 636 FOR OP/ED)

## 2023-01-01 PROCEDURE — 36415 COLL VENOUS BLD VENIPUNCTURE: CPT

## 2023-01-01 PROCEDURE — 99024 POSTOP FOLLOW-UP VISIT: CPT | Performed by: OTOLARYNGOLOGY

## 2023-01-01 PROCEDURE — 94003 VENT MGMT INPAT SUBQ DAY: CPT

## 2023-01-01 PROCEDURE — 96372 THER/PROPH/DIAG INJ SC/IM: CPT

## 2023-01-01 PROCEDURE — 82947 ASSAY GLUCOSE BLOOD QUANT: CPT

## 2023-01-01 PROCEDURE — 2580000001 HC RX 258 IV SOLUTIONS: Performed by: STUDENT IN AN ORGANIZED HEALTH CARE EDUCATION/TRAINING PROGRAM

## 2023-01-01 PROCEDURE — 83735 ASSAY OF MAGNESIUM: CPT | Performed by: OTOLARYNGOLOGY

## 2023-01-01 PROCEDURE — 2500000004 HC RX 250 GENERAL PHARMACY W/ HCPCS (ALT 636 FOR OP/ED)

## 2023-01-01 PROCEDURE — 85027 COMPLETE CBC AUTOMATED: CPT | Performed by: STUDENT IN AN ORGANIZED HEALTH CARE EDUCATION/TRAINING PROGRAM

## 2023-01-01 PROCEDURE — 80069 RENAL FUNCTION PANEL: CPT | Performed by: STUDENT IN AN ORGANIZED HEALTH CARE EDUCATION/TRAINING PROGRAM

## 2023-01-01 PROCEDURE — 94002 VENT MGMT INPAT INIT DAY: CPT

## 2023-01-01 PROCEDURE — 99232 SBSQ HOSP IP/OBS MODERATE 35: CPT | Performed by: NURSE PRACTITIONER

## 2023-01-01 PROCEDURE — 37799 UNLISTED PX VASCULAR SURGERY: CPT | Mod: CMCLAB

## 2023-01-01 PROCEDURE — 2020000001 HC ICU ROOM DAILY

## 2023-01-01 PROCEDURE — 1159F MED LIST DOCD IN RCRD: CPT | Performed by: OTOLARYNGOLOGY

## 2023-01-01 PROCEDURE — 1200000002 HC GENERAL ROOM WITH TELEMETRY DAILY

## 2023-01-01 PROCEDURE — 0D20XUZ CHANGE FEEDING DEVICE IN UPPER INTESTINAL TRACT, EXTERNAL APPROACH: ICD-10-PCS | Performed by: RADIOLOGY

## 2023-01-01 PROCEDURE — 93286 PERI-PX EVAL PM/LDLS PM IP: CPT | Performed by: STUDENT IN AN ORGANIZED HEALTH CARE EDUCATION/TRAINING PROGRAM

## 2023-01-01 PROCEDURE — 80069 RENAL FUNCTION PANEL: CPT | Performed by: NURSE PRACTITIONER

## 2023-01-01 PROCEDURE — 82140 ASSAY OF AMMONIA: CPT

## 2023-01-01 PROCEDURE — 36415 COLL VENOUS BLD VENIPUNCTURE: CPT | Mod: CMCLAB

## 2023-01-01 PROCEDURE — 2500000005 HC RX 250 GENERAL PHARMACY W/O HCPCS: Performed by: STUDENT IN AN ORGANIZED HEALTH CARE EDUCATION/TRAINING PROGRAM

## 2023-01-01 PROCEDURE — 2500000001 HC RX 250 WO HCPCS SELF ADMINISTERED DRUGS (ALT 637 FOR MEDICARE OP): Performed by: PHYSICIAN ASSISTANT

## 2023-01-01 PROCEDURE — 3700000002 HC GENERAL ANESTHESIA TIME - EACH INCREMENTAL 1 MINUTE: Performed by: OTOLARYNGOLOGY

## 2023-01-01 PROCEDURE — 2580000001 HC RX 258 IV SOLUTIONS

## 2023-01-01 PROCEDURE — 83735 ASSAY OF MAGNESIUM: CPT | Performed by: NURSE PRACTITIONER

## 2023-01-01 PROCEDURE — 49452 REPLACE G-J TUBE PERC: CPT | Performed by: RADIOLOGY

## 2023-01-01 PROCEDURE — 31500 INSERT EMERGENCY AIRWAY: CPT | Mod: GC | Performed by: STUDENT IN AN ORGANIZED HEALTH CARE EDUCATION/TRAINING PROGRAM

## 2023-01-01 PROCEDURE — 99233 SBSQ HOSP IP/OBS HIGH 50: CPT

## 2023-01-01 PROCEDURE — 96372 THER/PROPH/DIAG INJ SC/IM: CPT | Performed by: NURSE PRACTITIONER

## 2023-01-01 PROCEDURE — 2500000001 HC RX 250 WO HCPCS SELF ADMINISTERED DRUGS (ALT 637 FOR MEDICARE OP)

## 2023-01-01 PROCEDURE — 0DH63UZ INSERTION OF FEEDING DEVICE INTO STOMACH, PERCUTANEOUS APPROACH: ICD-10-PCS | Performed by: SURGERY

## 2023-01-01 PROCEDURE — 99231 SBSQ HOSP IP/OBS SF/LOW 25: CPT | Performed by: PSYCHIATRY & NEUROLOGY

## 2023-01-01 PROCEDURE — 80069 RENAL FUNCTION PANEL: CPT

## 2023-01-01 PROCEDURE — 85025 COMPLETE CBC W/AUTO DIFF WBC: CPT | Performed by: OTOLARYNGOLOGY

## 2023-01-01 PROCEDURE — 2060000001 HC INTERMEDIATE ICU ROOM DAILY

## 2023-01-01 PROCEDURE — P9045 ALBUMIN (HUMAN), 5%, 250 ML: HCPCS | Mod: JZ,JG | Performed by: STUDENT IN AN ORGANIZED HEALTH CARE EDUCATION/TRAINING PROGRAM

## 2023-01-01 PROCEDURE — 74018 RADEX ABDOMEN 1 VIEW: CPT | Performed by: RADIOLOGY

## 2023-01-01 PROCEDURE — 83735 ASSAY OF MAGNESIUM: CPT

## 2023-01-01 PROCEDURE — 37799 UNLISTED PX VASCULAR SURGERY: CPT | Mod: CMCLAB | Performed by: STUDENT IN AN ORGANIZED HEALTH CARE EDUCATION/TRAINING PROGRAM

## 2023-01-01 PROCEDURE — 3078F DIAST BP <80 MM HG: CPT | Performed by: STUDENT IN AN ORGANIZED HEALTH CARE EDUCATION/TRAINING PROGRAM

## 2023-01-01 PROCEDURE — 1160F RVW MEDS BY RX/DR IN RCRD: CPT | Performed by: STUDENT IN AN ORGANIZED HEALTH CARE EDUCATION/TRAINING PROGRAM

## 2023-01-01 PROCEDURE — 80053 COMPREHEN METABOLIC PANEL: CPT

## 2023-01-01 PROCEDURE — 1111F DSCHRG MED/CURRENT MED MERGE: CPT | Performed by: OTOLARYNGOLOGY

## 2023-01-01 PROCEDURE — 2500000001 HC RX 250 WO HCPCS SELF ADMINISTERED DRUGS (ALT 637 FOR MEDICARE OP): Performed by: NURSE PRACTITIONER

## 2023-01-01 PROCEDURE — 0CR407Z REPLACEMENT OF BUCCAL MUCOSA WITH AUTOLOGOUS TISSUE SUBSTITUTE, OPEN APPROACH: ICD-10-PCS | Performed by: OTOLARYNGOLOGY

## 2023-01-01 PROCEDURE — S0166 INJ OLANZAPINE 2.5MG: HCPCS

## 2023-01-01 PROCEDURE — 71045 X-RAY EXAM CHEST 1 VIEW: CPT | Performed by: RADIOLOGY

## 2023-01-01 PROCEDURE — 85027 COMPLETE CBC AUTOMATED: CPT

## 2023-01-01 PROCEDURE — 83605 ASSAY OF LACTIC ACID: CPT | Performed by: OTOLARYNGOLOGY

## 2023-01-01 PROCEDURE — 31720 CLEARANCE OF AIRWAYS: CPT

## 2023-01-01 PROCEDURE — 82330 ASSAY OF CALCIUM: CPT | Mod: CMCLAB

## 2023-01-01 PROCEDURE — RXMED WILLOW AMBULATORY MEDICATION CHARGE

## 2023-01-01 PROCEDURE — 78816 PET IMAGE W/CT FULL BODY: CPT | Mod: WAIVER OF LIABILITY ON FILE | Performed by: STUDENT IN AN ORGANIZED HEALTH CARE EDUCATION/TRAINING PROGRAM

## 2023-01-01 PROCEDURE — 2500000004 HC RX 250 GENERAL PHARMACY W/ HCPCS (ALT 636 FOR OP/ED): Mod: JZ,JG | Performed by: STUDENT IN AN ORGANIZED HEALTH CARE EDUCATION/TRAINING PROGRAM

## 2023-01-01 PROCEDURE — 99100 ANES PT EXTEME AGE<1 YR&>70: CPT | Performed by: ANESTHESIOLOGY

## 2023-01-01 PROCEDURE — A31525 PR LARYNGOSCOPY,DIRECT,DIAGNOSTIC: Performed by: ANESTHESIOLOGY

## 2023-01-01 PROCEDURE — 13132 CMPLX RPR F/C/C/M/N/AX/G/H/F: CPT | Performed by: OTOLARYNGOLOGY

## 2023-01-01 PROCEDURE — 0CJS8ZZ INSPECTION OF LARYNX, VIA NATURAL OR ARTIFICIAL OPENING ENDOSCOPIC: ICD-10-PCS | Performed by: OTOLARYNGOLOGY

## 2023-01-01 PROCEDURE — 97530 THERAPEUTIC ACTIVITIES: CPT | Mod: GP | Performed by: PHYSICAL THERAPIST

## 2023-01-01 PROCEDURE — 2720000007 HC OR 272 NO HCPCS: Performed by: OTOLARYNGOLOGY

## 2023-01-01 PROCEDURE — 93005 ELECTROCARDIOGRAM TRACING: CPT

## 2023-01-01 PROCEDURE — 3600000004 HC OR TIME - INITIAL BASE CHARGE - PROCEDURE LEVEL FOUR: Performed by: OTOLARYNGOLOGY

## 2023-01-01 PROCEDURE — 83735 ASSAY OF MAGNESIUM: CPT | Mod: CMCLAB

## 2023-01-01 PROCEDURE — 2500000001 HC RX 250 WO HCPCS SELF ADMINISTERED DRUGS (ALT 637 FOR MEDICARE OP): Performed by: OTOLARYNGOLOGY

## 2023-01-01 PROCEDURE — 93010 ELECTROCARDIOGRAM REPORT: CPT | Performed by: INTERNAL MEDICINE

## 2023-01-01 PROCEDURE — 85018 HEMOGLOBIN: CPT | Mod: CMCLAB

## 2023-01-01 PROCEDURE — 2500000005 HC RX 250 GENERAL PHARMACY W/O HCPCS

## 2023-01-01 PROCEDURE — 71045 X-RAY EXAM CHEST 1 VIEW: CPT

## 2023-01-01 PROCEDURE — 84132 ASSAY OF SERUM POTASSIUM: CPT | Performed by: OTOLARYNGOLOGY

## 2023-01-01 PROCEDURE — 2500000005 HC RX 250 GENERAL PHARMACY W/O HCPCS: Performed by: OTOLARYNGOLOGY

## 2023-01-01 PROCEDURE — 82805 BLOOD GASES W/O2 SATURATION: CPT | Mod: CMCLAB | Performed by: STUDENT IN AN ORGANIZED HEALTH CARE EDUCATION/TRAINING PROGRAM

## 2023-01-01 PROCEDURE — 85027 COMPLETE CBC AUTOMATED: CPT | Mod: CMCLAB

## 2023-01-01 PROCEDURE — 51701 INSERT BLADDER CATHETER: CPT

## 2023-01-01 PROCEDURE — 13133 CMPLX RPR F/C/C/M/N/AX/G/H/F: CPT | Performed by: OTOLARYNGOLOGY

## 2023-01-01 PROCEDURE — 83605 ASSAY OF LACTIC ACID: CPT

## 2023-01-01 PROCEDURE — 36415 COLL VENOUS BLD VENIPUNCTURE: CPT | Performed by: STUDENT IN AN ORGANIZED HEALTH CARE EDUCATION/TRAINING PROGRAM

## 2023-01-01 PROCEDURE — 96372 THER/PROPH/DIAG INJ SC/IM: CPT | Performed by: OTOLARYNGOLOGY

## 2023-01-01 PROCEDURE — 82248 BILIRUBIN DIRECT: CPT

## 2023-01-01 PROCEDURE — 71045 X-RAY EXAM CHEST 1 VIEW: CPT | Mod: FY

## 2023-01-01 PROCEDURE — 49452 REPLACE G-J TUBE PERC: CPT

## 2023-01-01 PROCEDURE — 84100 ASSAY OF PHOSPHORUS: CPT

## 2023-01-01 PROCEDURE — 3074F SYST BP LT 130 MM HG: CPT | Performed by: OTOLARYNGOLOGY

## 2023-01-01 PROCEDURE — 83605 ASSAY OF LACTIC ACID: CPT | Performed by: STUDENT IN AN ORGANIZED HEALTH CARE EDUCATION/TRAINING PROGRAM

## 2023-01-01 PROCEDURE — 82330 ASSAY OF CALCIUM: CPT | Performed by: OTOLARYNGOLOGY

## 2023-01-01 PROCEDURE — 87081 CULTURE SCREEN ONLY: CPT | Performed by: STUDENT IN AN ORGANIZED HEALTH CARE EDUCATION/TRAINING PROGRAM

## 2023-01-01 PROCEDURE — 1170000001 HC PRIVATE ONCOLOGY ROOM DAILY

## 2023-01-01 PROCEDURE — 3074F SYST BP LT 130 MM HG: CPT | Performed by: STUDENT IN AN ORGANIZED HEALTH CARE EDUCATION/TRAINING PROGRAM

## 2023-01-01 PROCEDURE — 81001 URINALYSIS AUTO W/SCOPE: CPT

## 2023-01-01 PROCEDURE — 80069 RENAL FUNCTION PANEL: CPT | Mod: CMCLAB

## 2023-01-01 PROCEDURE — 80202 ASSAY OF VANCOMYCIN: CPT | Mod: CMCLAB | Performed by: STUDENT IN AN ORGANIZED HEALTH CARE EDUCATION/TRAINING PROGRAM

## 2023-01-01 PROCEDURE — A4217 STERILE WATER/SALINE, 500 ML: HCPCS

## 2023-01-01 PROCEDURE — 88309 TISSUE EXAM BY PATHOLOGIST: CPT | Performed by: DENTIST

## 2023-01-01 PROCEDURE — 2500000004 HC RX 250 GENERAL PHARMACY W/ HCPCS (ALT 636 FOR OP/ED): Performed by: NURSE PRACTITIONER

## 2023-01-01 PROCEDURE — 37799 UNLISTED PX VASCULAR SURGERY: CPT

## 2023-01-01 PROCEDURE — 82947 ASSAY GLUCOSE BLOOD QUANT: CPT | Mod: CMCLAB

## 2023-01-01 PROCEDURE — 0JB10ZZ EXCISION OF FACE SUBCUTANEOUS TISSUE AND FASCIA, OPEN APPROACH: ICD-10-PCS | Performed by: OTOLARYNGOLOGY

## 2023-01-01 PROCEDURE — 82435 ASSAY OF BLOOD CHLORIDE: CPT

## 2023-01-01 PROCEDURE — 85730 THROMBOPLASTIN TIME PARTIAL: CPT | Mod: CMCLAB | Performed by: OTOLARYNGOLOGY

## 2023-01-01 PROCEDURE — 88332 PATH CONSLTJ SURG EA ADD BLK: CPT | Performed by: DENTIST

## 2023-01-01 PROCEDURE — 83735 ASSAY OF MAGNESIUM: CPT | Performed by: STUDENT IN AN ORGANIZED HEALTH CARE EDUCATION/TRAINING PROGRAM

## 2023-01-01 PROCEDURE — 83735 ASSAY OF MAGNESIUM: CPT | Mod: CMCLAB | Performed by: STUDENT IN AN ORGANIZED HEALTH CARE EDUCATION/TRAINING PROGRAM

## 2023-01-01 PROCEDURE — 82435 ASSAY OF BLOOD CHLORIDE: CPT | Performed by: STUDENT IN AN ORGANIZED HEALTH CARE EDUCATION/TRAINING PROGRAM

## 2023-01-01 PROCEDURE — 94660 CPAP INITIATION&MGMT: CPT

## 2023-01-01 PROCEDURE — 3700000001 HC GENERAL ANESTHESIA TIME - INITIAL BASE CHARGE: Performed by: OTOLARYNGOLOGY

## 2023-01-01 PROCEDURE — 97168 OT RE-EVAL EST PLAN CARE: CPT | Mod: GO

## 2023-01-01 PROCEDURE — 99291 CRITICAL CARE FIRST HOUR: CPT

## 2023-01-01 PROCEDURE — 94640 AIRWAY INHALATION TREATMENT: CPT

## 2023-01-01 PROCEDURE — 84132 ASSAY OF SERUM POTASSIUM: CPT | Mod: CMCLAB

## 2023-01-01 PROCEDURE — 80069 RENAL FUNCTION PANEL: CPT | Mod: CMCLAB | Performed by: OTOLARYNGOLOGY

## 2023-01-01 PROCEDURE — 3078F DIAST BP <80 MM HG: CPT | Performed by: OTOLARYNGOLOGY

## 2023-01-01 PROCEDURE — 86900 BLOOD TYPING SEROLOGIC ABO: CPT

## 2023-01-01 PROCEDURE — 15757 FREE SKIN FLAP MICROVASC: CPT | Performed by: STUDENT IN AN ORGANIZED HEALTH CARE EDUCATION/TRAINING PROGRAM

## 2023-01-01 PROCEDURE — 3600000009 HC OR TIME - EACH INCREMENTAL 1 MINUTE - PROCEDURE LEVEL FOUR: Performed by: OTOLARYNGOLOGY

## 2023-01-01 PROCEDURE — 2580000001 HC RX 258 IV SOLUTIONS: Performed by: OTOLARYNGOLOGY

## 2023-01-01 PROCEDURE — 1111F DSCHRG MED/CURRENT MED MERGE: CPT | Performed by: STUDENT IN AN ORGANIZED HEALTH CARE EDUCATION/TRAINING PROGRAM

## 2023-01-01 PROCEDURE — 84075 ASSAY ALKALINE PHOSPHATASE: CPT

## 2023-01-01 PROCEDURE — 88307 TISSUE EXAM BY PATHOLOGIST: CPT | Performed by: DENTIST

## 2023-01-01 PROCEDURE — 85025 COMPLETE CBC W/AUTO DIFF WBC: CPT | Mod: CMCLAB | Performed by: OTOLARYNGOLOGY

## 2023-01-01 PROCEDURE — 2720000007 HC OR 272 NO HCPCS

## 2023-01-01 PROCEDURE — 82374 ASSAY BLOOD CARBON DIOXIDE: CPT

## 2023-01-01 PROCEDURE — 99291 CRITICAL CARE FIRST HOUR: CPT | Performed by: ANESTHESIOLOGY

## 2023-01-01 PROCEDURE — 93279 PRGRMG DEV EVAL PM/LDLS PM: CPT

## 2023-01-01 PROCEDURE — 2500000002 HC RX 250 W HCPCS SELF ADMINISTERED DRUGS (ALT 637 FOR MEDICARE OP, ALT 636 FOR OP/ED): Performed by: STUDENT IN AN ORGANIZED HEALTH CARE EDUCATION/TRAINING PROGRAM

## 2023-01-01 PROCEDURE — 2780000003 HC OR 278 NO HCPCS: Performed by: OTOLARYNGOLOGY

## 2023-01-01 PROCEDURE — 43246 EGD PLACE GASTROSTOMY TUBE: CPT | Performed by: SURGERY

## 2023-01-01 PROCEDURE — 85730 THROMBOPLASTIN TIME PARTIAL: CPT | Mod: CMCLAB

## 2023-01-01 PROCEDURE — 88332 PATH CONSLTJ SURG EA ADD BLK: CPT | Mod: TC,SUR,CMCLAB,MUE | Performed by: OTOLARYNGOLOGY

## 2023-01-01 PROCEDURE — 36415 COLL VENOUS BLD VENIPUNCTURE: CPT | Mod: CMCLAB | Performed by: OTOLARYNGOLOGY

## 2023-01-01 PROCEDURE — 31525 DX LARYNGOSCOPY EXCL NB: CPT | Performed by: OTOLARYNGOLOGY

## 2023-01-01 PROCEDURE — 97167 OT EVAL HIGH COMPLEX 60 MIN: CPT | Mod: GO

## 2023-01-01 PROCEDURE — 85027 COMPLETE CBC AUTOMATED: CPT | Performed by: NURSE PRACTITIONER

## 2023-01-01 PROCEDURE — 82330 ASSAY OF CALCIUM: CPT

## 2023-01-01 PROCEDURE — 84100 ASSAY OF PHOSPHORUS: CPT | Mod: CMCLAB | Performed by: STUDENT IN AN ORGANIZED HEALTH CARE EDUCATION/TRAINING PROGRAM

## 2023-01-01 PROCEDURE — 85610 PROTHROMBIN TIME: CPT | Mod: CMCLAB

## 2023-01-01 PROCEDURE — 5A1945Z RESPIRATORY VENTILATION, 24-96 CONSECUTIVE HOURS: ICD-10-PCS | Performed by: OTOLARYNGOLOGY

## 2023-01-01 PROCEDURE — 0KBR0ZZ EXCISION OF LEFT UPPER LEG MUSCLE, OPEN APPROACH: ICD-10-PCS | Performed by: OTOLARYNGOLOGY

## 2023-01-01 PROCEDURE — 36620 INSERTION CATHETER ARTERY: CPT | Performed by: STUDENT IN AN ORGANIZED HEALTH CARE EDUCATION/TRAINING PROGRAM

## 2023-01-01 PROCEDURE — 5A09357 ASSISTANCE WITH RESPIRATORY VENTILATION, LESS THAN 24 CONSECUTIVE HOURS, CONTINUOUS POSITIVE AIRWAY PRESSURE: ICD-10-PCS | Performed by: OTOLARYNGOLOGY

## 2023-01-01 PROCEDURE — 99232 SBSQ HOSP IP/OBS MODERATE 35: CPT | Performed by: STUDENT IN AN ORGANIZED HEALTH CARE EDUCATION/TRAINING PROGRAM

## 2023-01-01 PROCEDURE — 31500 INSERT EMERGENCY AIRWAY: CPT | Performed by: STUDENT IN AN ORGANIZED HEALTH CARE EDUCATION/TRAINING PROGRAM

## 2023-01-01 PROCEDURE — 0WU607Z SUPPLEMENT NECK WITH AUTOLOGOUS TISSUE SUBSTITUTE, OPEN APPROACH: ICD-10-PCS | Performed by: OTOLARYNGOLOGY

## 2023-01-01 PROCEDURE — 36600 WITHDRAWAL OF ARTERIAL BLOOD: CPT

## 2023-01-01 PROCEDURE — 1160F RVW MEDS BY RX/DR IN RCRD: CPT | Performed by: OTOLARYNGOLOGY

## 2023-01-01 PROCEDURE — 1036F TOBACCO NON-USER: CPT | Performed by: OTOLARYNGOLOGY

## 2023-01-01 PROCEDURE — 21016 RESECT FACE/SCALP TUM 2 CM/>: CPT | Performed by: OTOLARYNGOLOGY

## 2023-01-01 PROCEDURE — 84295 ASSAY OF SERUM SODIUM: CPT | Mod: CMCLAB | Performed by: STUDENT IN AN ORGANIZED HEALTH CARE EDUCATION/TRAINING PROGRAM

## 2023-01-01 PROCEDURE — 88331 PATH CONSLTJ SURG 1 BLK 1SPC: CPT | Performed by: DENTIST

## 2023-01-01 PROCEDURE — 83735 ASSAY OF MAGNESIUM: CPT | Mod: CMCLAB | Performed by: OTOLARYNGOLOGY

## 2023-01-01 PROCEDURE — 82947 ASSAY GLUCOSE BLOOD QUANT: CPT | Mod: CMCLAB | Performed by: STUDENT IN AN ORGANIZED HEALTH CARE EDUCATION/TRAINING PROGRAM

## 2023-01-01 PROCEDURE — 2580000001 HC RX 258 IV SOLUTIONS: Performed by: ANESTHESIOLOGY

## 2023-01-01 PROCEDURE — 3E0G76Z INTRODUCTION OF NUTRITIONAL SUBSTANCE INTO UPPER GI, VIA NATURAL OR ARTIFICIAL OPENING: ICD-10-PCS | Performed by: OTOLARYNGOLOGY

## 2023-01-01 PROCEDURE — 82947 ASSAY GLUCOSE BLOOD QUANT: CPT | Performed by: STUDENT IN AN ORGANIZED HEALTH CARE EDUCATION/TRAINING PROGRAM

## 2023-01-01 PROCEDURE — 51700 IRRIGATION OF BLADDER: CPT | Performed by: STUDENT IN AN ORGANIZED HEALTH CARE EDUCATION/TRAINING PROGRAM

## 2023-01-01 PROCEDURE — 2500000004 HC RX 250 GENERAL PHARMACY W/ HCPCS (ALT 636 FOR OP/ED): Performed by: OTOLARYNGOLOGY

## 2023-01-01 PROCEDURE — 36415 COLL VENOUS BLD VENIPUNCTURE: CPT | Mod: CMCLAB | Performed by: STUDENT IN AN ORGANIZED HEALTH CARE EDUCATION/TRAINING PROGRAM

## 2023-01-01 PROCEDURE — 88305 TISSUE EXAM BY PATHOLOGIST: CPT | Performed by: DENTIST

## 2023-01-01 PROCEDURE — 1159F MED LIST DOCD IN RCRD: CPT | Performed by: STUDENT IN AN ORGANIZED HEALTH CARE EDUCATION/TRAINING PROGRAM

## 2023-01-01 PROCEDURE — 97161 PT EVAL LOW COMPLEX 20 MIN: CPT | Mod: GP

## 2023-01-01 PROCEDURE — 82435 ASSAY OF BLOOD CHLORIDE: CPT | Mod: CMCLAB | Performed by: STUDENT IN AN ORGANIZED HEALTH CARE EDUCATION/TRAINING PROGRAM

## 2023-01-01 PROCEDURE — 7100000001 HC RECOVERY ROOM TIME - INITIAL BASE CHARGE: Performed by: OTOLARYNGOLOGY

## 2023-01-01 PROCEDURE — 83605 ASSAY OF LACTIC ACID: CPT | Mod: CMCLAB

## 2023-01-01 PROCEDURE — 0BH17EZ INSERTION OF ENDOTRACHEAL AIRWAY INTO TRACHEA, VIA NATURAL OR ARTIFICIAL OPENING: ICD-10-PCS | Performed by: STUDENT IN AN ORGANIZED HEALTH CARE EDUCATION/TRAINING PROGRAM

## 2023-01-01 PROCEDURE — 71045 X-RAY EXAM CHEST 1 VIEW: CPT | Performed by: STUDENT IN AN ORGANIZED HEALTH CARE EDUCATION/TRAINING PROGRAM

## 2023-01-01 PROCEDURE — 84520 ASSAY OF UREA NITROGEN: CPT

## 2023-01-01 PROCEDURE — 76000 FLUOROSCOPY <1 HR PHYS/QHP: CPT | Performed by: RADIOLOGY

## 2023-01-01 PROCEDURE — 85610 PROTHROMBIN TIME: CPT

## 2023-01-01 PROCEDURE — 97530 THERAPEUTIC ACTIVITIES: CPT | Mod: GP,CQ

## 2023-01-01 PROCEDURE — C1769 GUIDE WIRE: HCPCS

## 2023-01-01 PROCEDURE — 36415 COLL VENOUS BLD VENIPUNCTURE: CPT | Mod: CMCLAB | Performed by: NURSE PRACTITIONER

## 2023-01-01 PROCEDURE — 74018 RADEX ABDOMEN 1 VIEW: CPT

## 2023-01-01 PROCEDURE — 74018 RADEX ABDOMEN 1 VIEW: CPT | Mod: FY

## 2023-01-01 PROCEDURE — 7100000002 HC RECOVERY ROOM TIME - EACH INCREMENTAL 1 MINUTE: Performed by: OTOLARYNGOLOGY

## 2023-01-01 PROCEDURE — 0CB40ZZ EXCISION OF BUCCAL MUCOSA, OPEN APPROACH: ICD-10-PCS | Performed by: OTOLARYNGOLOGY

## 2023-01-01 PROCEDURE — 82330 ASSAY OF CALCIUM: CPT | Performed by: STUDENT IN AN ORGANIZED HEALTH CARE EDUCATION/TRAINING PROGRAM

## 2023-01-01 PROCEDURE — 83605 ASSAY OF LACTIC ACID: CPT | Mod: CMCLAB | Performed by: STUDENT IN AN ORGANIZED HEALTH CARE EDUCATION/TRAINING PROGRAM

## 2023-01-01 PROCEDURE — 1126F AMNT PAIN NOTED NONE PRSNT: CPT | Performed by: OTOLARYNGOLOGY

## 2023-01-01 PROCEDURE — 99222 1ST HOSP IP/OBS MODERATE 55: CPT | Performed by: STUDENT IN AN ORGANIZED HEALTH CARE EDUCATION/TRAINING PROGRAM

## 2023-01-01 PROCEDURE — 82040 ASSAY OF SERUM ALBUMIN: CPT

## 2023-01-01 PROCEDURE — 78815 PET IMAGE W/CT SKULL-THIGH: CPT | Mod: GA

## 2023-01-01 PROCEDURE — 1126F AMNT PAIN NOTED NONE PRSNT: CPT | Performed by: STUDENT IN AN ORGANIZED HEALTH CARE EDUCATION/TRAINING PROGRAM

## 2023-01-01 PROCEDURE — 99291 CRITICAL CARE FIRST HOUR: CPT | Performed by: STUDENT IN AN ORGANIZED HEALTH CARE EDUCATION/TRAINING PROGRAM

## 2023-01-01 PROCEDURE — 07T10ZZ RESECTION OF RIGHT NECK LYMPHATIC, OPEN APPROACH: ICD-10-PCS | Performed by: OTOLARYNGOLOGY

## 2023-01-01 PROCEDURE — 87640 STAPH A DNA AMP PROBE: CPT | Mod: CMCLAB | Performed by: STUDENT IN AN ORGANIZED HEALTH CARE EDUCATION/TRAINING PROGRAM

## 2023-01-01 PROCEDURE — 87205 SMEAR GRAM STAIN: CPT | Performed by: STUDENT IN AN ORGANIZED HEALTH CARE EDUCATION/TRAINING PROGRAM

## 2023-01-01 PROCEDURE — 97164 PT RE-EVAL EST PLAN CARE: CPT | Mod: GP | Performed by: PHYSICAL THERAPIST

## 2023-01-01 PROCEDURE — 99232 SBSQ HOSP IP/OBS MODERATE 35: CPT

## 2023-01-01 PROCEDURE — 85027 COMPLETE CBC AUTOMATED: CPT | Performed by: OTOLARYNGOLOGY

## 2023-01-01 PROCEDURE — 1036F TOBACCO NON-USER: CPT | Performed by: STUDENT IN AN ORGANIZED HEALTH CARE EDUCATION/TRAINING PROGRAM

## 2023-01-01 RX ORDER — TALC
3 POWDER (GRAM) TOPICAL DAILY
Status: DISCONTINUED | OUTPATIENT
Start: 2023-01-01 | End: 2023-01-01

## 2023-01-01 RX ORDER — NALOXONE HYDROCHLORIDE 0.4 MG/ML
0.2 INJECTION, SOLUTION INTRAMUSCULAR; INTRAVENOUS; SUBCUTANEOUS EVERY 5 MIN PRN
Status: DISCONTINUED | OUTPATIENT
Start: 2023-01-01 | End: 2023-01-01

## 2023-01-01 RX ORDER — MAGNESIUM SULFATE HEPTAHYDRATE 40 MG/ML
2 INJECTION, SOLUTION INTRAVENOUS ONCE
Status: COMPLETED | OUTPATIENT
Start: 2023-01-01 | End: 2023-01-01

## 2023-01-01 RX ORDER — CLINDAMYCIN PHOSPHATE 600 MG/50ML
INJECTION, SOLUTION INTRAVENOUS AS NEEDED
Status: DISCONTINUED | OUTPATIENT
Start: 2023-01-01 | End: 2023-01-01

## 2023-01-01 RX ORDER — PROPOFOL 10 MG/ML
INJECTION, EMULSION INTRAVENOUS AS NEEDED
Status: DISCONTINUED | OUTPATIENT
Start: 2023-01-01 | End: 2023-01-01

## 2023-01-01 RX ORDER — DEXAMETHASONE SODIUM PHOSPHATE 100 MG/10ML
INJECTION INTRAMUSCULAR; INTRAVENOUS AS NEEDED
Status: DISCONTINUED | OUTPATIENT
Start: 2023-01-01 | End: 2023-01-01

## 2023-01-01 RX ORDER — AMLODIPINE BESYLATE 5 MG/1
5 TABLET ORAL DAILY
Status: DISCONTINUED | OUTPATIENT
Start: 2023-01-01 | End: 2023-01-01

## 2023-01-01 RX ORDER — TRAMADOL HYDROCHLORIDE 50 MG/1
25 TABLET ORAL EVERY 6 HOURS PRN
Status: DISCONTINUED | OUTPATIENT
Start: 2023-01-01 | End: 2023-01-01

## 2023-01-01 RX ORDER — TALC
3 POWDER (GRAM) TOPICAL NIGHTLY
Status: DISCONTINUED | OUTPATIENT
Start: 2023-01-01 | End: 2023-01-01

## 2023-01-01 RX ORDER — POLYETHYLENE GLYCOL 3350 17 G/17G
17 POWDER, FOR SOLUTION ORAL DAILY
Status: DISCONTINUED | OUTPATIENT
Start: 2023-01-01 | End: 2023-01-01

## 2023-01-01 RX ORDER — QUETIAPINE FUMARATE 25 MG/1
25 TABLET, FILM COATED ORAL NIGHTLY
Status: DISCONTINUED | OUTPATIENT
Start: 2023-01-01 | End: 2023-01-01

## 2023-01-01 RX ORDER — SOTALOL HYDROCHLORIDE 80 MG/1
80 TABLET ORAL 2 TIMES DAILY
Status: DISCONTINUED | OUTPATIENT
Start: 2023-01-01 | End: 2023-01-01

## 2023-01-01 RX ORDER — AMLODIPINE BESYLATE 5 MG/1
5 TABLET ORAL DAILY
Qty: 30 TABLET | Refills: 1 | Status: SHIPPED | OUTPATIENT
Start: 2023-01-01

## 2023-01-01 RX ORDER — PHENYLEPHRINE 10 MG/250 ML(40 MCG/ML)IN 0.9 % SOD.CHLORIDE INTRAVENOUS
CONTINUOUS PRN
Status: DISCONTINUED | OUTPATIENT
Start: 2023-01-01 | End: 2023-01-01

## 2023-01-01 RX ORDER — TRAMADOL HYDROCHLORIDE 50 MG/1
25 TABLET ORAL EVERY 8 HOURS PRN
Status: DISCONTINUED | OUTPATIENT
Start: 2023-01-01 | End: 2023-01-01

## 2023-01-01 RX ORDER — FLUTICASONE FUROATE AND VILANTEROL 100; 25 UG/1; UG/1
1 POWDER RESPIRATORY (INHALATION) DAILY
COMMUNITY
Start: 2023-01-01

## 2023-01-01 RX ORDER — REMIFENTANIL HYDROCHLORIDE 1 MG/ML
INJECTION, POWDER, LYOPHILIZED, FOR SOLUTION INTRAVENOUS CONTINUOUS PRN
Status: DISCONTINUED | OUTPATIENT
Start: 2023-01-01 | End: 2023-01-01

## 2023-01-01 RX ORDER — SODIUM CHLORIDE, SODIUM LACTATE, POTASSIUM CHLORIDE, CALCIUM CHLORIDE 600; 310; 30; 20 MG/100ML; MG/100ML; MG/100ML; MG/100ML
50 INJECTION, SOLUTION INTRAVENOUS CONTINUOUS
Status: DISCONTINUED | OUTPATIENT
Start: 2023-01-01 | End: 2023-01-01

## 2023-01-01 RX ORDER — CHLORHEXIDINE GLUCONATE ORAL RINSE 1.2 MG/ML
15 SOLUTION DENTAL 3 TIMES DAILY
Status: DISCONTINUED | OUTPATIENT
Start: 2023-01-01 | End: 2023-01-01

## 2023-01-01 RX ORDER — LIDOCAINE HYDROCHLORIDE AND EPINEPHRINE 10; 10 MG/ML; UG/ML
INJECTION, SOLUTION INFILTRATION; PERINEURAL AS NEEDED
Status: DISCONTINUED | OUTPATIENT
Start: 2023-01-01 | End: 2023-01-01 | Stop reason: HOSPADM

## 2023-01-01 RX ORDER — ONDANSETRON HYDROCHLORIDE 2 MG/ML
4 INJECTION, SOLUTION INTRAVENOUS ONCE AS NEEDED
Status: DISCONTINUED | OUTPATIENT
Start: 2023-01-01 | End: 2023-01-01

## 2023-01-01 RX ORDER — SODIUM CHLORIDE, SODIUM LACTATE, POTASSIUM CHLORIDE, CALCIUM CHLORIDE 600; 310; 30; 20 MG/100ML; MG/100ML; MG/100ML; MG/100ML
100 INJECTION, SOLUTION INTRAVENOUS CONTINUOUS
Status: CANCELLED | OUTPATIENT
Start: 2023-01-01

## 2023-01-01 RX ORDER — POTASSIUM CHLORIDE 750 MG/1
1 TABLET, EXTENDED RELEASE ORAL 3 TIMES WEEKLY
COMMUNITY
Start: 2023-01-01

## 2023-01-01 RX ORDER — ALBUMIN HUMAN 250 G/1000ML
12.5 SOLUTION INTRAVENOUS ONCE
Status: DISCONTINUED | OUTPATIENT
Start: 2023-01-01 | End: 2023-01-01

## 2023-01-01 RX ORDER — CLINDAMYCIN PHOSPHATE 600 MG/50ML
600 INJECTION, SOLUTION INTRAVENOUS ONCE
Status: COMPLETED | OUTPATIENT
Start: 2023-01-01 | End: 2023-01-01

## 2023-01-01 RX ORDER — CALCIUM GLUCONATE 20 MG/ML
2 INJECTION, SOLUTION INTRAVENOUS EVERY 6 HOURS PRN
Status: DISCONTINUED | OUTPATIENT
Start: 2023-01-01 | End: 2023-01-01 | Stop reason: HOSPADM

## 2023-01-01 RX ORDER — CLINDAMYCIN PHOSPHATE 300 MG/50ML
300 INJECTION, SOLUTION INTRAVENOUS EVERY 8 HOURS
Status: DISCONTINUED | OUTPATIENT
Start: 2023-01-01 | End: 2023-01-01

## 2023-01-01 RX ORDER — POTASSIUM CHLORIDE 1.5 G/1.58G
40 POWDER, FOR SOLUTION ORAL EVERY 6 HOURS PRN
Status: DISCONTINUED | OUTPATIENT
Start: 2023-01-01 | End: 2023-01-01 | Stop reason: HOSPADM

## 2023-01-01 RX ORDER — CHLORHEXIDINE GLUCONATE ORAL RINSE 1.2 MG/ML
15 SOLUTION DENTAL
Qty: 473 ML | Refills: 1 | Status: SHIPPED | OUTPATIENT
Start: 2023-01-01

## 2023-01-01 RX ORDER — BACITRACIN 500 [USP'U]/G
OINTMENT TOPICAL 3 TIMES DAILY
Status: COMPLETED | OUTPATIENT
Start: 2023-01-01 | End: 2023-01-01

## 2023-01-01 RX ORDER — TAMSULOSIN HYDROCHLORIDE 0.4 MG/1
0.4 CAPSULE ORAL
Status: DISCONTINUED | OUTPATIENT
Start: 2023-01-01 | End: 2023-01-01

## 2023-01-01 RX ORDER — SOTALOL HYDROCHLORIDE 80 MG/1
80 TABLET ORAL 2 TIMES DAILY
Qty: 60 TABLET | Refills: 1 | Status: SHIPPED | OUTPATIENT
Start: 2023-01-01

## 2023-01-01 RX ORDER — ALBUTEROL SULFATE 0.83 MG/ML
2.5 SOLUTION RESPIRATORY (INHALATION) EVERY 4 HOURS PRN
Status: DISCONTINUED | OUTPATIENT
Start: 2023-01-01 | End: 2023-01-01

## 2023-01-01 RX ORDER — MAGNESIUM SULFATE HEPTAHYDRATE 40 MG/ML
2 INJECTION, SOLUTION INTRAVENOUS EVERY 6 HOURS PRN
Status: DISCONTINUED | OUTPATIENT
Start: 2023-01-01 | End: 2023-01-01 | Stop reason: HOSPADM

## 2023-01-01 RX ORDER — OLANZAPINE 10 MG/2ML
2.5 INJECTION, POWDER, FOR SOLUTION INTRAMUSCULAR EVERY 8 HOURS PRN
Status: DISCONTINUED | OUTPATIENT
Start: 2023-01-01 | End: 2023-01-01

## 2023-01-01 RX ORDER — PROPOFOL 10 MG/ML
INJECTION, EMULSION INTRAVENOUS
Status: DISPENSED
Start: 2023-01-01 | End: 2023-01-01

## 2023-01-01 RX ORDER — SOTALOL HYDROCHLORIDE 80 MG/1
80 TABLET ORAL 2 TIMES DAILY
Status: DISCONTINUED | OUTPATIENT
Start: 2023-01-01 | End: 2023-01-01 | Stop reason: HOSPADM

## 2023-01-01 RX ORDER — TALC
3 POWDER (GRAM) TOPICAL NIGHTLY PRN
Qty: 10 TABLET | Refills: 0 | Status: SHIPPED | OUTPATIENT
Start: 2023-01-01 | End: 2023-01-01

## 2023-01-01 RX ORDER — PROPOFOL 10 MG/ML
5-50 INJECTION, EMULSION INTRAVENOUS CONTINUOUS
Status: DISCONTINUED | OUTPATIENT
Start: 2023-01-01 | End: 2023-01-01

## 2023-01-01 RX ORDER — PETROLATUM 420 MG/G
1 OINTMENT TOPICAL 2 TIMES DAILY
Qty: 100 G | Refills: 1 | Status: SHIPPED | OUTPATIENT
Start: 2023-01-01

## 2023-01-01 RX ORDER — SODIUM CHLORIDE FOR INHALATION 3 %
3 VIAL, NEBULIZER (ML) INHALATION EVERY 6 HOURS SCHEDULED
Status: DISCONTINUED | OUTPATIENT
Start: 2023-01-01 | End: 2023-01-01

## 2023-01-01 RX ORDER — LANOLIN ALCOHOL/MO/W.PET/CERES
100 CREAM (GRAM) TOPICAL DAILY
Status: DISCONTINUED | OUTPATIENT
Start: 2023-01-01 | End: 2023-01-01 | Stop reason: HOSPADM

## 2023-01-01 RX ORDER — VALPROIC ACID 250 MG/5ML
125 SOLUTION ORAL 2 TIMES DAILY
Status: DISCONTINUED | OUTPATIENT
Start: 2023-01-01 | End: 2023-01-01

## 2023-01-01 RX ORDER — BACITRACIN 500 [USP'U]/G
OINTMENT TOPICAL AS NEEDED
Status: DISCONTINUED | OUTPATIENT
Start: 2023-01-01 | End: 2023-01-01 | Stop reason: HOSPADM

## 2023-01-01 RX ORDER — HYDROMORPHONE HYDROCHLORIDE 1 MG/ML
0.2 INJECTION, SOLUTION INTRAMUSCULAR; INTRAVENOUS; SUBCUTANEOUS ONCE
Status: DISCONTINUED | OUTPATIENT
Start: 2023-01-01 | End: 2023-01-01

## 2023-01-01 RX ORDER — ALBUTEROL SULFATE 0.83 MG/ML
2.5 SOLUTION RESPIRATORY (INHALATION) EVERY 4 HOURS PRN
COMMUNITY

## 2023-01-01 RX ORDER — SILODOSIN 4 MG/1
4 CAPSULE ORAL
Status: DISCONTINUED | OUTPATIENT
Start: 2023-01-01 | End: 2023-01-01 | Stop reason: HOSPADM

## 2023-01-01 RX ORDER — SODIUM CHLORIDE 0.9 G/100ML
IRRIGANT IRRIGATION AS NEEDED
Status: DISCONTINUED | OUTPATIENT
Start: 2023-01-01 | End: 2023-01-01 | Stop reason: HOSPADM

## 2023-01-01 RX ORDER — ASPIRIN 81 MG/1
1 TABLET ORAL DAILY
COMMUNITY
Start: 2023-01-01

## 2023-01-01 RX ORDER — MAGNESIUM SULFATE HEPTAHYDRATE 40 MG/ML
4 INJECTION, SOLUTION INTRAVENOUS EVERY 6 HOURS PRN
Status: DISCONTINUED | OUTPATIENT
Start: 2023-01-01 | End: 2023-01-01 | Stop reason: HOSPADM

## 2023-01-01 RX ORDER — SOTALOL HYDROCHLORIDE 80 MG/1
80 TABLET ORAL 2 TIMES DAILY
Status: ON HOLD | COMMUNITY
End: 2023-01-01 | Stop reason: SDUPTHER

## 2023-01-01 RX ORDER — ROCURONIUM BROMIDE 10 MG/ML
INJECTION, SOLUTION INTRAVENOUS AS NEEDED
Status: DISCONTINUED | OUTPATIENT
Start: 2023-01-01 | End: 2023-01-01

## 2023-01-01 RX ORDER — VERAPAMIL HYDROCHLORIDE 180 MG/1
180 TABLET, FILM COATED, EXTENDED RELEASE ORAL DAILY
COMMUNITY
End: 2023-01-01 | Stop reason: HOSPADM

## 2023-01-01 RX ORDER — DEXMEDETOMIDINE HYDROCHLORIDE 4 UG/ML
.1-1.5 INJECTION, SOLUTION INTRAVENOUS CONTINUOUS
Status: DISCONTINUED | OUTPATIENT
Start: 2023-01-01 | End: 2023-01-01

## 2023-01-01 RX ORDER — CALCIUM GLUCONATE 20 MG/ML
1 INJECTION, SOLUTION INTRAVENOUS EVERY 6 HOURS PRN
Status: DISCONTINUED | OUTPATIENT
Start: 2023-01-01 | End: 2023-01-01 | Stop reason: HOSPADM

## 2023-01-01 RX ORDER — PREDNISONE 20 MG/1
40 TABLET ORAL DAILY
Status: COMPLETED | OUTPATIENT
Start: 2023-01-01 | End: 2023-01-01

## 2023-01-01 RX ORDER — CHLORHEXIDINE GLUCONATE ORAL RINSE 1.2 MG/ML
15 SOLUTION DENTAL
Status: DISCONTINUED | OUTPATIENT
Start: 2023-01-01 | End: 2023-01-01

## 2023-01-01 RX ORDER — CHLORHEXIDINE GLUCONATE ORAL RINSE 1.2 MG/ML
15 SOLUTION DENTAL 3 TIMES DAILY
Status: DISCONTINUED | OUTPATIENT
Start: 2023-01-01 | End: 2023-01-01 | Stop reason: HOSPADM

## 2023-01-01 RX ORDER — PETROLATUM 420 MG/G
1 OINTMENT TOPICAL 3 TIMES DAILY
Status: DISCONTINUED | OUTPATIENT
Start: 2023-01-01 | End: 2023-01-01 | Stop reason: HOSPADM

## 2023-01-01 RX ORDER — ALBUTEROL SULFATE 0.83 MG/ML
2.5 SOLUTION RESPIRATORY (INHALATION) EVERY 4 HOURS PRN
Status: DISCONTINUED | OUTPATIENT
Start: 2023-01-01 | End: 2023-01-01 | Stop reason: HOSPADM

## 2023-01-01 RX ORDER — VERAPAMIL HYDROCHLORIDE 40 MG/1
60 TABLET ORAL EVERY 8 HOURS SCHEDULED
Qty: 135 TABLET | Refills: 0 | Status: SHIPPED | OUTPATIENT
Start: 2023-01-01 | End: 2023-01-01 | Stop reason: HOSPADM

## 2023-01-01 RX ORDER — ACETAMINOPHEN 160 MG/5ML
1000 SOLUTION ORAL EVERY 8 HOURS SCHEDULED
Status: DISCONTINUED | OUTPATIENT
Start: 2023-01-01 | End: 2023-01-01

## 2023-01-01 RX ORDER — NAPROXEN SODIUM 220 MG/1
325 TABLET, FILM COATED ORAL DAILY
Qty: 104 TABLET | Refills: 0 | Status: SHIPPED | OUTPATIENT
Start: 2023-01-01 | End: 2023-11-15

## 2023-01-01 RX ORDER — ALBUTEROL SULFATE 0.83 MG/ML
2.5 SOLUTION RESPIRATORY (INHALATION) EVERY 4 HOURS
Status: DISCONTINUED | OUTPATIENT
Start: 2023-01-01 | End: 2023-01-01

## 2023-01-01 RX ORDER — IPRATROPIUM BROMIDE AND ALBUTEROL SULFATE 2.5; .5 MG/3ML; MG/3ML
3 SOLUTION RESPIRATORY (INHALATION)
Status: DISCONTINUED | OUTPATIENT
Start: 2023-01-01 | End: 2023-01-01

## 2023-01-01 RX ORDER — HEPARIN SODIUM 5000 [USP'U]/ML
5000 INJECTION, SOLUTION INTRAVENOUS; SUBCUTANEOUS EVERY 12 HOURS
Status: DISCONTINUED | OUTPATIENT
Start: 2023-01-01 | End: 2023-01-01

## 2023-01-01 RX ORDER — PHENYLEPHRINE HCL IN 0.9% NACL 0.4MG/10ML
SYRINGE (ML) INTRAVENOUS
Status: COMPLETED
Start: 2023-01-01 | End: 2023-01-01

## 2023-01-01 RX ORDER — FLUTICASONE PROPIONATE 50 MCG
2 SPRAY, SUSPENSION (ML) NASAL DAILY
COMMUNITY
Start: 2015-06-04

## 2023-01-01 RX ORDER — PHENYLEPHRINE HCL IN 0.9% NACL 0.4MG/10ML
SYRINGE (ML) INTRAVENOUS
Status: DISPENSED
Start: 2023-01-01 | End: 2023-01-01

## 2023-01-01 RX ORDER — POTASSIUM CHLORIDE 20 MEQ/1
20 TABLET, EXTENDED RELEASE ORAL EVERY 6 HOURS PRN
Status: DISCONTINUED | OUTPATIENT
Start: 2023-01-01 | End: 2023-01-01

## 2023-01-01 RX ORDER — SILODOSIN 4 MG/1
4 CAPSULE ORAL DAILY
Status: DISCONTINUED | OUTPATIENT
Start: 2023-01-01 | End: 2023-01-01

## 2023-01-01 RX ORDER — ENOXAPARIN SODIUM 100 MG/ML
40 INJECTION SUBCUTANEOUS EVERY 24 HOURS
Status: DISCONTINUED | OUTPATIENT
Start: 2023-01-01 | End: 2023-01-01

## 2023-01-01 RX ORDER — VERAPAMIL HYDROCHLORIDE 40 MG/1
40 TABLET ORAL EVERY 8 HOURS SCHEDULED
Status: DISCONTINUED | OUTPATIENT
Start: 2023-01-01 | End: 2023-01-01

## 2023-01-01 RX ORDER — BACITRACIN ZINC 500 UNIT/G
OINTMENT IN PACKET (EA) TOPICAL 3 TIMES DAILY
Status: CANCELLED | OUTPATIENT
Start: 2023-01-01 | End: 2023-01-01

## 2023-01-01 RX ORDER — SODIUM CHLORIDE, SODIUM LACTATE, POTASSIUM CHLORIDE, CALCIUM CHLORIDE 600; 310; 30; 20 MG/100ML; MG/100ML; MG/100ML; MG/100ML
100 INJECTION, SOLUTION INTRAVENOUS CONTINUOUS
Status: DISCONTINUED | OUTPATIENT
Start: 2023-01-01 | End: 2023-01-01

## 2023-01-01 RX ORDER — VANCOMYCIN HYDROCHLORIDE 750 MG/150ML
750 INJECTION, SOLUTION INTRAVENOUS EVERY 12 HOURS
Status: DISCONTINUED | OUTPATIENT
Start: 2023-01-01 | End: 2023-01-01

## 2023-01-01 RX ORDER — SOTALOL HYDROCHLORIDE 80 MG/1
40 TABLET ORAL EVERY 12 HOURS
Status: DISCONTINUED | OUTPATIENT
Start: 2023-01-01 | End: 2023-01-01

## 2023-01-01 RX ORDER — TRAMADOL HYDROCHLORIDE 50 MG/1
50 TABLET ORAL EVERY 6 HOURS PRN
Status: DISCONTINUED | OUTPATIENT
Start: 2023-01-01 | End: 2023-01-01

## 2023-01-01 RX ORDER — GUAIFENESIN 600 MG/1
1 TABLET, EXTENDED RELEASE ORAL 2 TIMES DAILY
COMMUNITY
Start: 2023-01-01

## 2023-01-01 RX ORDER — TERAZOSIN 1 MG/1
1 CAPSULE ORAL NIGHTLY
Status: DISCONTINUED | OUTPATIENT
Start: 2023-01-01 | End: 2023-01-01

## 2023-01-01 RX ORDER — HYDROMORPHONE HYDROCHLORIDE 1 MG/ML
INJECTION, SOLUTION INTRAMUSCULAR; INTRAVENOUS; SUBCUTANEOUS AS NEEDED
Status: DISCONTINUED | OUTPATIENT
Start: 2023-01-01 | End: 2023-01-01

## 2023-01-01 RX ORDER — TRAMADOL HYDROCHLORIDE 50 MG/1
50 TABLET ORAL EVERY 8 HOURS PRN
Status: DISCONTINUED | OUTPATIENT
Start: 2023-01-01 | End: 2023-01-01

## 2023-01-01 RX ORDER — FLUDEOXYGLUCOSE F 18 200 MCI/ML
10.4 INJECTION, SOLUTION INTRAVENOUS
Status: DISCONTINUED | OUTPATIENT
Start: 2023-01-01 | End: 2023-01-01 | Stop reason: HOSPADM

## 2023-01-01 RX ORDER — SILODOSIN 4 MG/1
4 CAPSULE ORAL ONCE
Status: COMPLETED | OUTPATIENT
Start: 2023-01-01 | End: 2023-01-01

## 2023-01-01 RX ORDER — POTASSIUM CHLORIDE 1.5 G/1.58G
40 POWDER, FOR SOLUTION ORAL ONCE
Status: COMPLETED | OUTPATIENT
Start: 2023-01-01 | End: 2023-01-01

## 2023-01-01 RX ORDER — VANCOMYCIN HYDROCHLORIDE 750 MG/150ML
15 INJECTION, SOLUTION INTRAVENOUS EVERY 12 HOURS
Status: DISCONTINUED | OUTPATIENT
Start: 2023-01-01 | End: 2023-01-01

## 2023-01-01 RX ORDER — SOTALOL HYDROCHLORIDE 80 MG/1
80 TABLET ORAL DAILY
Status: DISCONTINUED | OUTPATIENT
Start: 2023-01-01 | End: 2023-01-01

## 2023-01-01 RX ORDER — QUETIAPINE FUMARATE 25 MG/1
12.5 TABLET, FILM COATED ORAL EVERY 8 HOURS PRN
Status: DISCONTINUED | OUTPATIENT
Start: 2023-01-01 | End: 2023-01-01

## 2023-01-01 RX ORDER — PHENYLEPHRINE HCL IN 0.9% NACL 0.4MG/10ML
SYRINGE (ML) INTRAVENOUS AS NEEDED
Status: DISCONTINUED | OUTPATIENT
Start: 2023-01-01 | End: 2023-01-01

## 2023-01-01 RX ORDER — ONDANSETRON HYDROCHLORIDE 2 MG/ML
INJECTION, SOLUTION INTRAVENOUS AS NEEDED
Status: DISCONTINUED | OUTPATIENT
Start: 2023-01-01 | End: 2023-01-01

## 2023-01-01 RX ORDER — SODIUM,POTASSIUM PHOSPHATES 280-250MG
1 POWDER IN PACKET (EA) ORAL EVERY 6 HOURS
Status: COMPLETED | OUTPATIENT
Start: 2023-01-01 | End: 2023-01-01

## 2023-01-01 RX ORDER — VERAPAMIL HYDROCHLORIDE 40 MG/1
60 TABLET ORAL EVERY 8 HOURS SCHEDULED
Status: DISCONTINUED | OUTPATIENT
Start: 2023-01-01 | End: 2023-01-01

## 2023-01-01 RX ORDER — WATER 1 ML/ML
IRRIGANT IRRIGATION AS NEEDED
Status: DISCONTINUED | OUTPATIENT
Start: 2023-01-01 | End: 2023-01-01 | Stop reason: HOSPADM

## 2023-01-01 RX ORDER — ACETAMINOPHEN 500MG/15ML
1000 LIQUID (ML) ORAL EVERY 8 HOURS SCHEDULED
Qty: 711 ML | Refills: 0 | Status: SHIPPED | OUTPATIENT
Start: 2023-01-01

## 2023-01-01 RX ORDER — INSULIN LISPRO 100 [IU]/ML
0-5 INJECTION, SOLUTION INTRAVENOUS; SUBCUTANEOUS EVERY 4 HOURS
Status: DISCONTINUED | OUTPATIENT
Start: 2023-01-01 | End: 2023-01-01 | Stop reason: HOSPADM

## 2023-01-01 RX ORDER — LIDOCAINE HCL/PF 100 MG/5ML
SYRINGE (ML) INTRAVENOUS AS NEEDED
Status: DISCONTINUED | OUTPATIENT
Start: 2023-01-01 | End: 2023-01-01

## 2023-01-01 RX ORDER — ROCURONIUM BROMIDE 10 MG/ML
INJECTION, SOLUTION INTRAVENOUS
Status: COMPLETED
Start: 2023-01-01 | End: 2023-01-01

## 2023-01-01 RX ORDER — AMLODIPINE BESYLATE 5 MG/1
5 TABLET ORAL DAILY
Status: DISCONTINUED | OUTPATIENT
Start: 2023-01-01 | End: 2023-01-01 | Stop reason: HOSPADM

## 2023-01-01 RX ORDER — LORAZEPAM 2 MG/ML
0.5 INJECTION INTRAMUSCULAR EVERY 4 HOURS PRN
Status: DISCONTINUED | OUTPATIENT
Start: 2023-01-01 | End: 2023-01-01

## 2023-01-01 RX ORDER — WATER 1000 ML/1000ML
INJECTION, SOLUTION INTRAVENOUS
Status: COMPLETED
Start: 2023-01-01 | End: 2023-01-01

## 2023-01-01 RX ORDER — OLANZAPINE 10 MG/2ML
5 INJECTION, POWDER, FOR SOLUTION INTRAMUSCULAR ONCE AS NEEDED
Status: COMPLETED | OUTPATIENT
Start: 2023-01-01 | End: 2023-01-01

## 2023-01-01 RX ORDER — HYDROGEN PEROXIDE 3 %
1 SOLUTION, NON-ORAL MISCELLANEOUS 3 TIMES DAILY
Status: DISCONTINUED | OUTPATIENT
Start: 2023-01-01 | End: 2023-01-01

## 2023-01-01 RX ORDER — ALBUMIN HUMAN 50 G/1000ML
SOLUTION INTRAVENOUS AS NEEDED
Status: DISCONTINUED | OUTPATIENT
Start: 2023-01-01 | End: 2023-01-01

## 2023-01-01 RX ORDER — SOTALOL HYDROCHLORIDE 80 MG/1
1 TABLET ORAL
COMMUNITY
Start: 2023-01-01

## 2023-01-01 RX ORDER — DEXTROSE 50 % IN WATER (D50W) INTRAVENOUS SYRINGE
25
Status: DISCONTINUED | OUTPATIENT
Start: 2023-01-01 | End: 2023-01-01 | Stop reason: HOSPADM

## 2023-01-01 RX ORDER — ENOXAPARIN SODIUM 100 MG/ML
40 INJECTION SUBCUTANEOUS EVERY 24 HOURS
Status: CANCELLED | OUTPATIENT
Start: 2023-01-01

## 2023-01-01 RX ORDER — SOTALOL HYDROCHLORIDE 80 MG/1
80 TABLET ORAL 2 TIMES DAILY
Start: 2023-01-01 | End: 2023-01-01 | Stop reason: HOSPADM

## 2023-01-01 RX ORDER — PROPOFOL 10 MG/ML
INJECTION, EMULSION INTRAVENOUS
Status: COMPLETED
Start: 2023-01-01 | End: 2023-01-01

## 2023-01-01 RX ORDER — ACETAMINOPHEN 160 MG/5ML
1000 SOLUTION ORAL EVERY 8 HOURS PRN
Status: DISCONTINUED | OUTPATIENT
Start: 2023-01-01 | End: 2023-01-01 | Stop reason: HOSPADM

## 2023-01-01 RX ORDER — DEXTROSE MONOHYDRATE 100 MG/ML
0.3 INJECTION, SOLUTION INTRAVENOUS ONCE AS NEEDED
Status: DISCONTINUED | OUTPATIENT
Start: 2023-01-01 | End: 2023-01-01 | Stop reason: HOSPADM

## 2023-01-01 RX ORDER — LIDOCAINE HYDROCHLORIDE 10 MG/ML
0.1 INJECTION, SOLUTION EPIDURAL; INFILTRATION; INTRACAUDAL; PERINEURAL ONCE
Status: CANCELLED | OUTPATIENT
Start: 2023-01-01 | End: 2023-01-01

## 2023-01-01 RX ORDER — POTASSIUM CHLORIDE 1.5 G/1.58G
20 POWDER, FOR SOLUTION ORAL EVERY 6 HOURS PRN
Status: DISCONTINUED | OUTPATIENT
Start: 2023-01-01 | End: 2023-01-01 | Stop reason: HOSPADM

## 2023-01-01 RX ORDER — ATORVASTATIN CALCIUM 20 MG/1
1 TABLET, FILM COATED ORAL DAILY
COMMUNITY
Start: 2023-01-01

## 2023-01-01 RX ORDER — NAPROXEN SODIUM 220 MG/1
325 TABLET, FILM COATED ORAL DAILY
Status: DISCONTINUED | OUTPATIENT
Start: 2023-01-01 | End: 2023-01-01 | Stop reason: HOSPADM

## 2023-01-01 RX ORDER — HEPARIN SODIUM 5000 [USP'U]/ML
5000 INJECTION, SOLUTION INTRAVENOUS; SUBCUTANEOUS EVERY 12 HOURS
Status: DISCONTINUED | OUTPATIENT
Start: 2023-01-01 | End: 2023-01-01 | Stop reason: HOSPADM

## 2023-01-01 RX ORDER — ESOMEPRAZOLE MAGNESIUM 40 MG/1
40 GRANULE, DELAYED RELEASE ORAL
Status: DISCONTINUED | OUTPATIENT
Start: 2023-01-01 | End: 2023-01-01 | Stop reason: HOSPADM

## 2023-01-01 RX ORDER — SODIUM CHLORIDE 9 MG/ML
75 INJECTION, SOLUTION INTRAVENOUS CONTINUOUS
Status: DISCONTINUED | OUTPATIENT
Start: 2023-01-01 | End: 2023-01-01

## 2023-01-01 RX ORDER — ACETAMINOPHEN 500 MG
2 TABLET ORAL EVERY 8 HOURS PRN
COMMUNITY
Start: 2023-01-01

## 2023-01-01 RX ORDER — LORAZEPAM 2 MG/ML
0.25 INJECTION INTRAMUSCULAR 2 TIMES DAILY PRN
Status: DISCONTINUED | OUTPATIENT
Start: 2023-01-01 | End: 2023-01-01

## 2023-01-01 RX ORDER — FUROSEMIDE 40 MG/1
1 TABLET ORAL DAILY
COMMUNITY

## 2023-01-01 RX ORDER — VERAPAMIL HYDROCHLORIDE 180 MG/1
1 TABLET, FILM COATED, EXTENDED RELEASE ORAL DAILY
COMMUNITY
Start: 2023-01-01

## 2023-01-01 RX ORDER — POTASSIUM CHLORIDE 20 MEQ/1
40 TABLET, EXTENDED RELEASE ORAL EVERY 6 HOURS PRN
Status: DISCONTINUED | OUTPATIENT
Start: 2023-01-01 | End: 2023-01-01

## 2023-01-01 RX ORDER — MIDAZOLAM HYDROCHLORIDE 1 MG/ML
INJECTION INTRAMUSCULAR; INTRAVENOUS AS NEEDED
Status: DISCONTINUED | OUTPATIENT
Start: 2023-01-01 | End: 2023-01-01

## 2023-01-01 RX ORDER — HYDRALAZINE HYDROCHLORIDE 20 MG/ML
5 INJECTION INTRAMUSCULAR; INTRAVENOUS ONCE
Status: COMPLETED | OUTPATIENT
Start: 2023-01-01 | End: 2023-01-01

## 2023-01-01 RX ORDER — ENOXAPARIN SODIUM 100 MG/ML
40 INJECTION SUBCUTANEOUS DAILY
Status: DISCONTINUED | OUTPATIENT
Start: 2023-01-01 | End: 2023-01-01

## 2023-01-01 RX ORDER — TRAMADOL HYDROCHLORIDE 50 MG/1
25 TABLET ORAL EVERY 8 HOURS PRN
Qty: 10 TABLET | Refills: 0 | Status: SHIPPED | OUTPATIENT
Start: 2023-01-01 | End: 2023-01-01

## 2023-01-01 RX ORDER — LANOLIN ALCOHOL/MO/W.PET/CERES
100 CREAM (GRAM) TOPICAL DAILY
Status: DISCONTINUED | OUTPATIENT
Start: 2023-01-01 | End: 2023-01-01

## 2023-01-01 RX ORDER — HEPARIN SODIUM 5000 [USP'U]/ML
INJECTION, SOLUTION INTRAVENOUS; SUBCUTANEOUS
Status: COMPLETED
Start: 2023-01-01 | End: 2023-01-01

## 2023-01-01 RX ORDER — HYDRALAZINE HYDROCHLORIDE 20 MG/ML
5 INJECTION INTRAMUSCULAR; INTRAVENOUS EVERY 4 HOURS PRN
Status: COMPLETED | OUTPATIENT
Start: 2023-01-01 | End: 2023-01-01

## 2023-01-01 RX ORDER — ASPIRIN 325 MG
TABLET ORAL
Status: DISPENSED
Start: 2023-01-01 | End: 2023-01-01

## 2023-01-01 RX ORDER — SODIUM CHLORIDE, SODIUM LACTATE, POTASSIUM CHLORIDE, CALCIUM CHLORIDE 600; 310; 30; 20 MG/100ML; MG/100ML; MG/100ML; MG/100ML
10 INJECTION, SOLUTION INTRAVENOUS CONTINUOUS
Status: DISCONTINUED | OUTPATIENT
Start: 2023-01-01 | End: 2023-01-01

## 2023-01-01 RX ORDER — ASPIRIN 325 MG
TABLET ORAL
Status: COMPLETED
Start: 2023-01-01 | End: 2023-01-01

## 2023-01-01 RX ORDER — HYDROMORPHONE HCL/0.9% NACL/PF 15 MG/30ML
PATIENT CONTROLLED ANALGESIA SYRINGE INTRAVENOUS CONTINUOUS
Status: DISCONTINUED | OUTPATIENT
Start: 2023-01-01 | End: 2023-01-01

## 2023-01-01 RX ORDER — NALOXONE HYDROCHLORIDE 0.4 MG/ML
0.2 INJECTION, SOLUTION INTRAMUSCULAR; INTRAVENOUS; SUBCUTANEOUS AS NEEDED
Status: DISCONTINUED | OUTPATIENT
Start: 2023-01-01 | End: 2023-01-01

## 2023-01-01 RX ORDER — ACETAMINOPHEN 500 MG
5 TABLET ORAL DAILY
Status: DISCONTINUED | OUTPATIENT
Start: 2023-01-01 | End: 2023-01-01 | Stop reason: HOSPADM

## 2023-01-01 RX ADMIN — THIAMINE HCL TAB 100 MG 100 MG: 100 TAB at 09:04

## 2023-01-01 RX ADMIN — ASPIRIN 81 MG CHEWABLE TABLET 325 MG: 81 TABLET CHEWABLE at 08:39

## 2023-01-01 RX ADMIN — VERAPAMIL HYDROCHLORIDE 60 MG: 120 TABLET ORAL at 17:11

## 2023-01-01 RX ADMIN — ACETAMINOPHEN 1000 MG: 650 SOLUTION ORAL at 10:48

## 2023-01-01 RX ADMIN — PIPERACILLIN SODIUM AND TAZOBACTAM SODIUM 3.38 G: 3; .375 INJECTION, SOLUTION INTRAVENOUS at 02:16

## 2023-01-01 RX ADMIN — CHLORHEXIDINE GLUCONATE 0.12% ORAL RINSE 15 ML: 1.2 LIQUID ORAL at 13:25

## 2023-01-01 RX ADMIN — ESOMEPRAZOLE MAGNESIUM 40 MG: 40 FOR SUSPENSION ORAL at 06:08

## 2023-01-01 RX ADMIN — Medication 5 MG: at 21:45

## 2023-01-01 RX ADMIN — VERAPAMIL HYDROCHLORIDE 60 MG: 120 TABLET ORAL at 22:18

## 2023-01-01 RX ADMIN — VERAPAMIL HYDROCHLORIDE 60 MG: 120 TABLET ORAL at 14:34

## 2023-01-01 RX ADMIN — TRAMADOL HYDROCHLORIDE 50 MG: 50 TABLET, COATED ORAL at 03:06

## 2023-01-01 RX ADMIN — SOTALOL HYDROCHLORIDE 80 MG: 80 TABLET ORAL at 22:50

## 2023-01-01 RX ADMIN — TRAMADOL HYDROCHLORIDE 50 MG: 50 TABLET, COATED ORAL at 21:29

## 2023-01-01 RX ADMIN — PETROLATUM 1 APPLICATION: 1 OINTMENT TOPICAL at 12:07

## 2023-01-01 RX ADMIN — CHLORHEXIDINE GLUCONATE 0.12% ORAL RINSE 15 ML: 1.2 LIQUID ORAL at 10:00

## 2023-01-01 RX ADMIN — PREDNISONE 40 MG: 20 TABLET ORAL at 08:46

## 2023-01-01 RX ADMIN — ENOXAPARIN SODIUM 40 MG: 100 INJECTION SUBCUTANEOUS at 10:44

## 2023-01-01 RX ADMIN — PETROLATUM 1 APPLICATION: 1 OINTMENT TOPICAL at 14:00

## 2023-01-01 RX ADMIN — ASPIRIN 81 MG CHEWABLE TABLET 325 MG: 81 TABLET CHEWABLE at 08:19

## 2023-01-01 RX ADMIN — PIPERACILLIN SODIUM AND TAZOBACTAM SODIUM 3.38 G: 3; .375 INJECTION, SOLUTION INTRAVENOUS at 07:32

## 2023-01-01 RX ADMIN — PETROLATUM 1 APPLICATION: 1 OINTMENT TOPICAL at 00:58

## 2023-01-01 RX ADMIN — ROCURONIUM BROMIDE 50 MG: 10 INJECTION, SOLUTION INTRAVENOUS at 08:59

## 2023-01-01 RX ADMIN — PETROLATUM 1 APPLICATION: 1 OINTMENT TOPICAL at 09:00

## 2023-01-01 RX ADMIN — THIAMINE HCL TAB 100 MG 100 MG: 100 TAB at 09:25

## 2023-01-01 RX ADMIN — SOTALOL HYDROCHLORIDE 80 MG: 80 TABLET ORAL at 08:57

## 2023-01-01 RX ADMIN — IPRATROPIUM BROMIDE AND ALBUTEROL SULFATE 3 ML: .5; 3 SOLUTION RESPIRATORY (INHALATION) at 08:30

## 2023-01-01 RX ADMIN — PETROLATUM 1 APPLICATION: 1 OINTMENT TOPICAL at 21:00

## 2023-01-01 RX ADMIN — CHLORHEXIDINE GLUCONATE 15 ML: 1.2 SOLUTION ORAL at 17:29

## 2023-01-01 RX ADMIN — Medication: at 08:00

## 2023-01-01 RX ADMIN — ACETAMINOPHEN 1000 MG: 650 SOLUTION ORAL at 14:34

## 2023-01-01 RX ADMIN — CHLORHEXIDINE GLUCONATE 15 ML: 1.2 SOLUTION ORAL at 14:26

## 2023-01-01 RX ADMIN — THIAMINE HYDROCHLORIDE 500 MG: 100 INJECTION, SOLUTION INTRAMUSCULAR; INTRAVENOUS at 09:05

## 2023-01-01 RX ADMIN — ESOMEPRAZOLE MAGNESIUM 40 MG: 40 FOR SUSPENSION ORAL at 08:40

## 2023-01-01 RX ADMIN — ACETAMINOPHEN 1000 MG: 650 SOLUTION ORAL at 13:29

## 2023-01-01 RX ADMIN — HYDRALAZINE HYDROCHLORIDE 5 MG: 20 INJECTION INTRAMUSCULAR; INTRAVENOUS at 05:46

## 2023-01-01 RX ADMIN — Medication 3 MG: at 17:49

## 2023-01-01 RX ADMIN — ASPIRIN 81 MG CHEWABLE TABLET 325 MG: 81 TABLET CHEWABLE at 09:42

## 2023-01-01 RX ADMIN — VERAPAMIL HYDROCHLORIDE 60 MG: 120 TABLET ORAL at 18:28

## 2023-01-01 RX ADMIN — SODIUM CHLORIDE, POTASSIUM CHLORIDE, SODIUM LACTATE AND CALCIUM CHLORIDE 50 ML/HR: 600; 310; 30; 20 INJECTION, SOLUTION INTRAVENOUS at 09:08

## 2023-01-01 RX ADMIN — HEPARIN SODIUM 5000 UNITS: 5000 INJECTION, SOLUTION INTRAVENOUS; SUBCUTANEOUS at 09:14

## 2023-01-01 RX ADMIN — SOTALOL HYDROCHLORIDE 80 MG: 80 TABLET ORAL at 08:50

## 2023-01-01 RX ADMIN — ACETAMINOPHEN 1000 MG: 650 SOLUTION ORAL at 01:04

## 2023-01-01 RX ADMIN — SODIUM CHLORIDE 30 MG/ML INHALATION SOLUTION 3 ML: 30 SOLUTION INHALANT at 20:23

## 2023-01-01 RX ADMIN — ENOXAPARIN SODIUM 40 MG: 40 INJECTION SUBCUTANEOUS at 21:29

## 2023-01-01 RX ADMIN — VERAPAMIL HYDROCHLORIDE 60 MG: 120 TABLET ORAL at 16:54

## 2023-01-01 RX ADMIN — ROCURONIUM BROMIDE 10 MG: 10 INJECTION, SOLUTION INTRAVENOUS at 13:40

## 2023-01-01 RX ADMIN — THIAMINE HCL TAB 100 MG 100 MG: 100 TAB at 09:16

## 2023-01-01 RX ADMIN — BACITRACIN: 500 OINTMENT TOPICAL at 21:07

## 2023-01-01 RX ADMIN — VALPROIC ACID 125 MG: 250 SOLUTION ORAL at 21:11

## 2023-01-01 RX ADMIN — VERAPAMIL HYDROCHLORIDE 60 MG: 120 TABLET ORAL at 08:41

## 2023-01-01 RX ADMIN — Medication 5 MG: at 17:10

## 2023-01-01 RX ADMIN — PETROLATUM 1 APPLICATION: 1 OINTMENT TOPICAL at 15:00

## 2023-01-01 RX ADMIN — BACITRACIN: 500 OINTMENT TOPICAL at 21:40

## 2023-01-01 RX ADMIN — CHLORHEXIDINE GLUCONATE 15 ML: 1.2 SOLUTION ORAL at 14:31

## 2023-01-01 RX ADMIN — SODIUM CHLORIDE 30 MG/ML INHALATION SOLUTION 3 ML: 30 SOLUTION INHALANT at 09:05

## 2023-01-01 RX ADMIN — SOTALOL HYDROCHLORIDE 80 MG: 80 TABLET ORAL at 21:49

## 2023-01-01 RX ADMIN — TAMSULOSIN HYDROCHLORIDE 0.4 MG: 0.4 CAPSULE ORAL at 08:59

## 2023-01-01 RX ADMIN — Medication 5 MG: at 18:25

## 2023-01-01 RX ADMIN — ESOMEPRAZOLE MAGNESIUM 40 MG: 40 FOR SUSPENSION ORAL at 05:47

## 2023-01-01 RX ADMIN — CHLORHEXIDINE GLUCONATE 15 ML: 1.2 SOLUTION ORAL at 17:58

## 2023-01-01 RX ADMIN — ENOXAPARIN SODIUM 40 MG: 40 INJECTION SUBCUTANEOUS at 22:19

## 2023-01-01 RX ADMIN — ENOXAPARIN SODIUM 40 MG: 40 INJECTION SUBCUTANEOUS at 21:06

## 2023-01-01 RX ADMIN — PETROLATUM 1 APPLICATION: 1 OINTMENT TOPICAL at 13:00

## 2023-01-01 RX ADMIN — PETROLATUM 1 APPLICATION: 1 OINTMENT TOPICAL at 18:36

## 2023-01-01 RX ADMIN — SODIUM CHLORIDE 500 ML: 9 INJECTION, SOLUTION INTRAVENOUS at 17:15

## 2023-01-01 RX ADMIN — IPRATROPIUM BROMIDE AND ALBUTEROL SULFATE 3 ML: .5; 3 SOLUTION RESPIRATORY (INHALATION) at 15:24

## 2023-01-01 RX ADMIN — CHLORHEXIDINE GLUCONATE 0.12% ORAL RINSE 15 ML: 1.2 LIQUID ORAL at 05:16

## 2023-01-01 RX ADMIN — SODIUM CHLORIDE 30 MG/ML INHALATION SOLUTION 3 ML: 30 SOLUTION INHALANT at 04:46

## 2023-01-01 RX ADMIN — ALBUTEROL SULFATE 2.5 MG: 2.5 SOLUTION RESPIRATORY (INHALATION) at 18:28

## 2023-01-01 RX ADMIN — PIPERACILLIN SODIUM AND TAZOBACTAM SODIUM 3.38 G: 3; .375 INJECTION, SOLUTION INTRAVENOUS at 20:15

## 2023-01-01 RX ADMIN — MAGNESIUM SULFATE HEPTAHYDRATE 2 G: 40 INJECTION, SOLUTION INTRAVENOUS at 17:46

## 2023-01-01 RX ADMIN — SOTALOL HYDROCHLORIDE 80 MG: 80 TABLET ORAL at 09:14

## 2023-01-01 RX ADMIN — TAMSULOSIN HYDROCHLORIDE 0.4 MG: 0.4 CAPSULE ORAL at 06:34

## 2023-01-01 RX ADMIN — HYDROGEN PEROXIDE 1 APPLICATION: 30 LIQUID TOPICAL at 21:00

## 2023-01-01 RX ADMIN — CALCIUM GLUCONATE 2 G: 20 INJECTION, SOLUTION INTRAVENOUS at 05:42

## 2023-01-01 RX ADMIN — SODIUM CHLORIDE, POTASSIUM CHLORIDE, SODIUM LACTATE AND CALCIUM CHLORIDE 50 ML/HR: 600; 310; 30; 20 INJECTION, SOLUTION INTRAVENOUS at 00:59

## 2023-01-01 RX ADMIN — PETROLATUM 1 APPLICATION: 1 OINTMENT TOPICAL at 20:03

## 2023-01-01 RX ADMIN — SODIUM CHLORIDE 30 MG/ML INHALATION SOLUTION 3 ML: 30 SOLUTION INHALANT at 08:00

## 2023-01-01 RX ADMIN — VERAPAMIL HYDROCHLORIDE 60 MG: 120 TABLET ORAL at 17:20

## 2023-01-01 RX ADMIN — POTASSIUM PHOSPHATE, MONOBASIC POTASSIUM PHOSPHATE, DIBASIC 15 MMOL: 224; 236 INJECTION, SOLUTION, CONCENTRATE INTRAVENOUS at 13:29

## 2023-01-01 RX ADMIN — PROPOFOL 50 MG: 10 INJECTION, EMULSION INTRAVENOUS at 22:00

## 2023-01-01 RX ADMIN — VERAPAMIL HYDROCHLORIDE 60 MG: 120 TABLET ORAL at 13:50

## 2023-01-01 RX ADMIN — SOTALOL HYDROCHLORIDE 80 MG: 80 TABLET ORAL at 09:04

## 2023-01-01 RX ADMIN — PROPOFOL 5 MCG/KG/MIN: 10 INJECTION, EMULSION INTRAVENOUS at 22:03

## 2023-01-01 RX ADMIN — PIPERACILLIN SODIUM AND TAZOBACTAM SODIUM 3.38 G: 3; .375 INJECTION, SOLUTION INTRAVENOUS at 02:49

## 2023-01-01 RX ADMIN — SOTALOL HYDROCHLORIDE 80 MG: 80 TABLET ORAL at 22:26

## 2023-01-01 RX ADMIN — VERAPAMIL HYDROCHLORIDE 60 MG: 120 TABLET ORAL at 18:02

## 2023-01-01 RX ADMIN — Medication 1 L/MIN: at 10:10

## 2023-01-01 RX ADMIN — CHLORHEXIDINE GLUCONATE 15 ML: 1.2 SOLUTION ORAL at 13:50

## 2023-01-01 RX ADMIN — CHLORHEXIDINE GLUCONATE 15 ML: 1.2 SOLUTION ORAL at 21:45

## 2023-01-01 RX ADMIN — CHLORHEXIDINE GLUCONATE 15 ML: 1.2 SOLUTION ORAL at 17:49

## 2023-01-01 RX ADMIN — ASPIRIN 81 MG CHEWABLE TABLET 325 MG: 81 TABLET CHEWABLE at 08:00

## 2023-01-01 RX ADMIN — SODIUM CHLORIDE 30 MG/ML INHALATION SOLUTION 3 ML: 30 SOLUTION INHALANT at 15:10

## 2023-01-01 RX ADMIN — Medication 5 MG: at 18:28

## 2023-01-01 RX ADMIN — HYDROGEN PEROXIDE 1 APPLICATION: 30 LIQUID TOPICAL at 09:00

## 2023-01-01 RX ADMIN — SODIUM PHOSPHATE, MONOBASIC, MONOHYDRATE AND SODIUM PHOSPHATE, DIBASIC, ANHYDROUS 21 MMOL: 142; 276 INJECTION, SOLUTION INTRAVENOUS at 11:51

## 2023-01-01 RX ADMIN — THIAMINE HCL TAB 100 MG 100 MG: 100 TAB at 08:59

## 2023-01-01 RX ADMIN — SODIUM CHLORIDE 1000 ML: 9 INJECTION, SOLUTION INTRAVENOUS at 00:58

## 2023-01-01 RX ADMIN — CHLORHEXIDINE GLUCONATE 0.12% ORAL RINSE 15 ML: 1.2 LIQUID ORAL at 09:45

## 2023-01-01 RX ADMIN — ASPIRIN 81 MG CHEWABLE TABLET 325 MG: 81 TABLET CHEWABLE at 08:30

## 2023-01-01 RX ADMIN — PETROLATUM 1 APPLICATION: 1 OINTMENT TOPICAL at 09:14

## 2023-01-01 RX ADMIN — VERAPAMIL HYDROCHLORIDE 60 MG: 120 TABLET ORAL at 05:56

## 2023-01-01 RX ADMIN — ACETAMINOPHEN 1000 MG: 650 SOLUTION ORAL at 06:27

## 2023-01-01 RX ADMIN — CALCIUM GLUCONATE 1 G: 20 INJECTION, SOLUTION INTRAVENOUS at 05:09

## 2023-01-01 RX ADMIN — PIPERACILLIN SODIUM AND TAZOBACTAM SODIUM 3.38 G: 3; .375 INJECTION, SOLUTION INTRAVENOUS at 14:11

## 2023-01-01 RX ADMIN — CHLORHEXIDINE GLUCONATE 15 ML: 1.2 SOLUTION ORAL at 13:41

## 2023-01-01 RX ADMIN — SODIUM CHLORIDE, POTASSIUM CHLORIDE, SODIUM LACTATE AND CALCIUM CHLORIDE 60 ML/HR: 600; 310; 30; 20 INJECTION, SOLUTION INTRAVENOUS at 06:48

## 2023-01-01 RX ADMIN — HYDROGEN PEROXIDE 1 APPLICATION: 30 LIQUID TOPICAL at 15:00

## 2023-01-01 RX ADMIN — THIAMINE HCL TAB 100 MG 100 MG: 100 TAB at 08:30

## 2023-01-01 RX ADMIN — CHLORHEXIDINE GLUCONATE 15 ML: 1.2 SOLUTION ORAL at 15:45

## 2023-01-01 RX ADMIN — PETROLATUM 1 APPLICATION: 1 OINTMENT TOPICAL at 08:51

## 2023-01-01 RX ADMIN — CHLORHEXIDINE GLUCONATE 15 ML: 1.2 SOLUTION ORAL at 17:22

## 2023-01-01 RX ADMIN — CHLORHEXIDINE GLUCONATE 15 ML: 1.2 SOLUTION ORAL at 08:00

## 2023-01-01 RX ADMIN — VERAPAMIL HYDROCHLORIDE 40 MG: 40 TABLET ORAL at 16:46

## 2023-01-01 RX ADMIN — SOTALOL HYDROCHLORIDE 80 MG: 80 TABLET ORAL at 21:31

## 2023-01-01 RX ADMIN — ACETAMINOPHEN 1000 MG: 650 SOLUTION ORAL at 18:28

## 2023-01-01 RX ADMIN — IPRATROPIUM BROMIDE AND ALBUTEROL SULFATE 3 ML: .5; 3 SOLUTION RESPIRATORY (INHALATION) at 03:00

## 2023-01-01 RX ADMIN — ACETAMINOPHEN 1000 MG: 650 SOLUTION ORAL at 17:10

## 2023-01-01 RX ADMIN — PIPERACILLIN SODIUM AND TAZOBACTAM SODIUM 3.38 G: 3; .375 INJECTION, SOLUTION INTRAVENOUS at 09:13

## 2023-01-01 RX ADMIN — VERAPAMIL HYDROCHLORIDE 60 MG: 120 TABLET ORAL at 09:03

## 2023-01-01 RX ADMIN — SODIUM CHLORIDE, POTASSIUM CHLORIDE, SODIUM LACTATE AND CALCIUM CHLORIDE: 600; 310; 30; 20 INJECTION, SOLUTION INTRAVENOUS at 08:43

## 2023-01-01 RX ADMIN — ACETAMINOPHEN 1000 MG: 650 SOLUTION ORAL at 21:30

## 2023-01-01 RX ADMIN — ENOXAPARIN SODIUM 40 MG: 40 INJECTION SUBCUTANEOUS at 21:30

## 2023-01-01 RX ADMIN — SOTALOL HYDROCHLORIDE 80 MG: 80 TABLET ORAL at 08:46

## 2023-01-01 RX ADMIN — PIPERACILLIN SODIUM AND TAZOBACTAM SODIUM 3.38 G: 3; .375 INJECTION, SOLUTION INTRAVENOUS at 14:04

## 2023-01-01 RX ADMIN — IPRATROPIUM BROMIDE AND ALBUTEROL SULFATE 3 ML: .5; 3 SOLUTION RESPIRATORY (INHALATION) at 15:09

## 2023-01-01 RX ADMIN — ASPIRIN 81 MG CHEWABLE TABLET 325 MG: 81 TABLET CHEWABLE at 08:44

## 2023-01-01 RX ADMIN — PIPERACILLIN SODIUM AND TAZOBACTAM SODIUM 3.38 G: 3; .375 INJECTION, SOLUTION INTRAVENOUS at 02:00

## 2023-01-01 RX ADMIN — VERAPAMIL HYDROCHLORIDE 60 MG: 120 TABLET ORAL at 08:45

## 2023-01-01 RX ADMIN — SOTALOL HYDROCHLORIDE 80 MG: 80 TABLET ORAL at 08:31

## 2023-01-01 RX ADMIN — VERAPAMIL HYDROCHLORIDE 60 MG: 120 TABLET ORAL at 10:46

## 2023-01-01 RX ADMIN — ROCURONIUM BROMIDE 100 MG: 10 INJECTION, SOLUTION INTRAVENOUS at 22:00

## 2023-01-01 RX ADMIN — ESOMEPRAZOLE MAGNESIUM 40 MG: 40 FOR SUSPENSION ORAL at 06:34

## 2023-01-01 RX ADMIN — POTASSIUM CHLORIDE 40 MEQ: 1.5 POWDER, FOR SOLUTION ORAL at 05:38

## 2023-01-01 RX ADMIN — VALPROIC ACID 125 MG: 250 SOLUTION ORAL at 08:50

## 2023-01-01 RX ADMIN — ESOMEPRAZOLE MAGNESIUM 40 MG: 40 FOR SUSPENSION ORAL at 08:57

## 2023-01-01 RX ADMIN — WATER: 1 INJECTION INTRAMUSCULAR; INTRAVENOUS; SUBCUTANEOUS at 00:00

## 2023-01-01 RX ADMIN — TERAZOSIN HYDROCHLORIDE 1 MG: 1 CAPSULE ORAL at 20:15

## 2023-01-01 RX ADMIN — CLINDAMYCIN IN 5 PERCENT DEXTROSE 900 MG: 12 INJECTION, SOLUTION INTRAVENOUS at 08:46

## 2023-01-01 RX ADMIN — REMIFENTANIL HYDROCHLORIDE 0.05 MCG/KG/MIN: 1 INJECTION, POWDER, LYOPHILIZED, FOR SOLUTION INTRAVENOUS at 09:30

## 2023-01-01 RX ADMIN — VERAPAMIL HYDROCHLORIDE 60 MG: 120 TABLET ORAL at 17:58

## 2023-01-01 RX ADMIN — OLANZAPINE 5 MG: 10 INJECTION, POWDER, LYOPHILIZED, FOR SOLUTION INTRAMUSCULAR at 23:54

## 2023-01-01 RX ADMIN — HYDROMORPHONE HYDROCHLORIDE 0.2 MG: 1 INJECTION, SOLUTION INTRAMUSCULAR; INTRAVENOUS; SUBCUTANEOUS at 16:17

## 2023-01-01 RX ADMIN — PETROLATUM 1 APPLICATION: 1 OINTMENT TOPICAL at 15:45

## 2023-01-01 RX ADMIN — ASPIRIN 81 MG CHEWABLE TABLET 325 MG: 81 TABLET CHEWABLE at 09:24

## 2023-01-01 RX ADMIN — VERAPAMIL HYDROCHLORIDE 60 MG: 120 TABLET ORAL at 06:18

## 2023-01-01 RX ADMIN — ACETAMINOPHEN 1000 MG: 650 SOLUTION ORAL at 21:07

## 2023-01-01 RX ADMIN — BACITRACIN: 500 OINTMENT TOPICAL at 09:07

## 2023-01-01 RX ADMIN — THIAMINE HCL TAB 100 MG 100 MG: 100 TAB at 08:56

## 2023-01-01 RX ADMIN — CHLORHEXIDINE GLUCONATE 15 ML: 1.2 SOLUTION ORAL at 20:14

## 2023-01-01 RX ADMIN — THIAMINE HCL TAB 100 MG 100 MG: 100 TAB at 09:39

## 2023-01-01 RX ADMIN — PIPERACILLIN SODIUM AND TAZOBACTAM SODIUM 3.38 G: 3; .375 INJECTION, SOLUTION INTRAVENOUS at 19:58

## 2023-01-01 RX ADMIN — HEPARIN SODIUM 5000 UNITS: 5000 INJECTION INTRAVENOUS; SUBCUTANEOUS at 08:20

## 2023-01-01 RX ADMIN — ASPIRIN 325 MG ORAL TABLET 325 MG: 325 PILL ORAL at 08:51

## 2023-01-01 RX ADMIN — CHLORHEXIDINE GLUCONATE 15 ML: 1.2 SOLUTION ORAL at 09:04

## 2023-01-01 RX ADMIN — DEXMEDETOMIDINE HYDROCHLORIDE 0.9 MCG/KG/HR: 400 INJECTION INTRAVENOUS at 04:34

## 2023-01-01 RX ADMIN — VANCOMYCIN HYDROCHLORIDE 750 MG: 750 INJECTION, SOLUTION INTRAVENOUS at 23:21

## 2023-01-01 RX ADMIN — SOTALOL HYDROCHLORIDE 80 MG: 80 TABLET ORAL at 09:00

## 2023-01-01 RX ADMIN — VALPROIC ACID 125 MG: 250 SOLUTION ORAL at 20:19

## 2023-01-01 RX ADMIN — ENOXAPARIN SODIUM 40 MG: 40 INJECTION SUBCUTANEOUS at 20:21

## 2023-01-01 RX ADMIN — OLANZAPINE 5 MG: 10 INJECTION, POWDER, LYOPHILIZED, FOR SOLUTION INTRAMUSCULAR at 12:08

## 2023-01-01 RX ADMIN — HEPARIN SODIUM 5000 UNITS: 5000 INJECTION, SOLUTION INTRAVENOUS; SUBCUTANEOUS at 20:01

## 2023-01-01 RX ADMIN — ROCURONIUM BROMIDE 10 MG: 10 INJECTION, SOLUTION INTRAVENOUS at 16:07

## 2023-01-01 RX ADMIN — PROPOFOL 100 MG: 10 INJECTION, EMULSION INTRAVENOUS at 08:58

## 2023-01-01 RX ADMIN — ACETAMINOPHEN 1000 MG: 650 SOLUTION ORAL at 09:05

## 2023-01-01 RX ADMIN — HEPARIN SODIUM 5000 UNITS: 5000 INJECTION INTRAVENOUS; SUBCUTANEOUS at 21:45

## 2023-01-01 RX ADMIN — THIAMINE HCL TAB 100 MG 100 MG: 100 TAB at 08:34

## 2023-01-01 RX ADMIN — SILODOSIN 4 MG: 4 CAPSULE ORAL at 08:20

## 2023-01-01 RX ADMIN — ACETAMINOPHEN 1000 MG: 650 SOLUTION ORAL at 22:18

## 2023-01-01 RX ADMIN — CHLORHEXIDINE GLUCONATE 15 ML: 1.2 SOLUTION ORAL at 09:24

## 2023-01-01 RX ADMIN — ESOMEPRAZOLE MAGNESIUM 40 MG: 40 FOR SUSPENSION ORAL at 08:34

## 2023-01-01 RX ADMIN — PIPERACILLIN SODIUM AND TAZOBACTAM SODIUM 3.38 G: 3; .375 INJECTION, SOLUTION INTRAVENOUS at 20:14

## 2023-01-01 RX ADMIN — CHLORHEXIDINE GLUCONATE 15 ML: 1.2 SOLUTION ORAL at 18:34

## 2023-01-01 RX ADMIN — VERAPAMIL HYDROCHLORIDE 40 MG: 40 TABLET ORAL at 01:00

## 2023-01-01 RX ADMIN — PETROLATUM 1 APPLICATION: 1 OINTMENT TOPICAL at 14:56

## 2023-01-01 RX ADMIN — ASPIRIN 81 MG CHEWABLE TABLET 325 MG: 81 TABLET CHEWABLE at 09:00

## 2023-01-01 RX ADMIN — BACITRACIN: 500 OINTMENT TOPICAL at 08:50

## 2023-01-01 RX ADMIN — TRAMADOL HYDROCHLORIDE 50 MG: 50 TABLET, COATED ORAL at 20:22

## 2023-01-01 RX ADMIN — VERAPAMIL HYDROCHLORIDE 60 MG: 120 TABLET ORAL at 09:26

## 2023-01-01 RX ADMIN — HYDRALAZINE HYDROCHLORIDE 5 MG: 20 INJECTION INTRAMUSCULAR; INTRAVENOUS at 05:14

## 2023-01-01 RX ADMIN — PIPERACILLIN SODIUM AND TAZOBACTAM SODIUM 3.38 G: 3; .375 INJECTION, SOLUTION INTRAVENOUS at 09:42

## 2023-01-01 RX ADMIN — IPRATROPIUM BROMIDE AND ALBUTEROL SULFATE 3 ML: .5; 3 SOLUTION RESPIRATORY (INHALATION) at 02:50

## 2023-01-01 RX ADMIN — ASPIRIN 81 MG CHEWABLE TABLET 325 MG: 81 TABLET CHEWABLE at 08:58

## 2023-01-01 RX ADMIN — PIPERACILLIN SODIUM AND TAZOBACTAM SODIUM 3.38 G: 3; .375 INJECTION, SOLUTION INTRAVENOUS at 21:36

## 2023-01-01 RX ADMIN — ALBUTEROL SULFATE 2.5 MG: 2.5 SOLUTION RESPIRATORY (INHALATION) at 14:34

## 2023-01-01 RX ADMIN — SOTALOL HYDROCHLORIDE 80 MG: 80 TABLET ORAL at 09:03

## 2023-01-01 RX ADMIN — IPRATROPIUM BROMIDE AND ALBUTEROL SULFATE 3 ML: .5; 3 SOLUTION RESPIRATORY (INHALATION) at 20:05

## 2023-01-01 RX ADMIN — SOTALOL HYDROCHLORIDE 80 MG: 80 TABLET ORAL at 09:48

## 2023-01-01 RX ADMIN — ASPIRIN 81 MG CHEWABLE TABLET 325 MG: 81 TABLET CHEWABLE at 10:05

## 2023-01-01 RX ADMIN — CHLORHEXIDINE GLUCONATE 15 ML: 1.2 SOLUTION ORAL at 08:30

## 2023-01-01 RX ADMIN — CLINDAMYCIN PHOSPHATE 300 MG: 300 INJECTION, SOLUTION INTRAVENOUS at 15:42

## 2023-01-01 RX ADMIN — SUGAMMADEX 200 MG: 100 INJECTION, SOLUTION INTRAVENOUS at 16:54

## 2023-01-01 RX ADMIN — ALBUMIN HUMAN 250 ML: 0.05 INJECTION, SOLUTION INTRAVENOUS at 12:06

## 2023-01-01 RX ADMIN — PIPERACILLIN SODIUM AND TAZOBACTAM SODIUM 3.38 G: 3; .375 INJECTION, SOLUTION INTRAVENOUS at 07:30

## 2023-01-01 RX ADMIN — ONDANSETRON 4 MG: 2 INJECTION INTRAMUSCULAR; INTRAVENOUS at 16:32

## 2023-01-01 RX ADMIN — CHLORHEXIDINE GLUCONATE 15 ML: 1.2 SOLUTION ORAL at 14:33

## 2023-01-01 RX ADMIN — ALBUTEROL SULFATE 2.5 MG: 2.5 SOLUTION RESPIRATORY (INHALATION) at 17:04

## 2023-01-01 RX ADMIN — THIAMINE HCL TAB 100 MG 100 MG: 100 TAB at 10:06

## 2023-01-01 RX ADMIN — SODIUM CHLORIDE, POTASSIUM CHLORIDE, SODIUM LACTATE AND CALCIUM CHLORIDE 50 ML/HR: 600; 310; 30; 20 INJECTION, SOLUTION INTRAVENOUS at 17:00

## 2023-01-01 RX ADMIN — Medication 5 MG: at 20:00

## 2023-01-01 RX ADMIN — POTASSIUM PHOSPHATE, MONOBASIC 500 MG: 500 TABLET, SOLUBLE ORAL at 10:46

## 2023-01-01 RX ADMIN — CHLORHEXIDINE GLUCONATE 15 ML: 1.2 SOLUTION ORAL at 09:03

## 2023-01-01 RX ADMIN — POTASSIUM & SODIUM PHOSPHATES POWDER PACK 280-160-250 MG 1 PACKET: 280-160-250 PACK at 11:42

## 2023-01-01 RX ADMIN — SODIUM PHOSPHATE, MONOBASIC, MONOHYDRATE AND SODIUM PHOSPHATE, DIBASIC, ANHYDROUS 15 MMOL: 142; 276 INJECTION, SOLUTION INTRAVENOUS at 13:53

## 2023-01-01 RX ADMIN — SODIUM CHLORIDE 500 ML: 0.9 INJECTION, SOLUTION INTRAVENOUS at 04:45

## 2023-01-01 RX ADMIN — HYDROGEN PEROXIDE 1 APPLICATION: 30 LIQUID TOPICAL at 08:51

## 2023-01-01 RX ADMIN — ESOMEPRAZOLE MAGNESIUM 40 MG: 40 FOR SUSPENSION ORAL at 06:27

## 2023-01-01 RX ADMIN — THIAMINE HYDROCHLORIDE 500 MG: 200 INJECTION, SOLUTION INTRAMUSCULAR; INTRAVENOUS at 21:29

## 2023-01-01 RX ADMIN — POLYETHYLENE GLYCOL 3350 17 G: 17 POWDER, FOR SOLUTION ORAL at 08:49

## 2023-01-01 RX ADMIN — PIPERACILLIN SODIUM AND TAZOBACTAM SODIUM 3.38 G: 3; .375 INJECTION, SOLUTION INTRAVENOUS at 14:33

## 2023-01-01 RX ADMIN — CHLORHEXIDINE GLUCONATE 0.12% ORAL RINSE 15 ML: 1.2 LIQUID ORAL at 18:25

## 2023-01-01 RX ADMIN — Medication: at 20:30

## 2023-01-01 RX ADMIN — THIAMINE HYDROCHLORIDE 500 MG: 200 INJECTION, SOLUTION INTRAMUSCULAR; INTRAVENOUS at 05:17

## 2023-01-01 RX ADMIN — CHLORHEXIDINE GLUCONATE 15 ML: 1.2 SOLUTION ORAL at 08:50

## 2023-01-01 RX ADMIN — CHLORHEXIDINE GLUCONATE 15 ML: 1.2 SOLUTION ORAL at 13:29

## 2023-01-01 RX ADMIN — DEXMEDETOMIDINE HYDROCHLORIDE 0.9 MCG/KG/HR: 400 INJECTION INTRAVENOUS at 17:57

## 2023-01-01 RX ADMIN — VERAPAMIL HYDROCHLORIDE 60 MG: 120 TABLET ORAL at 06:28

## 2023-01-01 RX ADMIN — SOTALOL HYDROCHLORIDE 80 MG: 80 TABLET ORAL at 20:15

## 2023-01-01 RX ADMIN — MAGNESIUM SULFATE HEPTAHYDRATE 2 G: 40 INJECTION, SOLUTION INTRAVENOUS at 11:18

## 2023-01-01 RX ADMIN — Medication: at 21:00

## 2023-01-01 RX ADMIN — CHLORHEXIDINE GLUCONATE 15 ML: 1.2 SOLUTION ORAL at 09:50

## 2023-01-01 RX ADMIN — PETROLATUM 1 APPLICATION: 1 OINTMENT TOPICAL at 08:33

## 2023-01-01 RX ADMIN — SODIUM CHLORIDE, POTASSIUM CHLORIDE, SODIUM LACTATE AND CALCIUM CHLORIDE 50 ML/HR: 600; 310; 30; 20 INJECTION, SOLUTION INTRAVENOUS at 20:01

## 2023-01-01 RX ADMIN — VERAPAMIL HYDROCHLORIDE 60 MG: 120 TABLET ORAL at 17:49

## 2023-01-01 RX ADMIN — ENOXAPARIN SODIUM 40 MG: 100 INJECTION SUBCUTANEOUS at 07:58

## 2023-01-01 RX ADMIN — Medication 5 MG: at 18:00

## 2023-01-01 RX ADMIN — SOTALOL HYDROCHLORIDE 80 MG: 80 TABLET ORAL at 08:34

## 2023-01-01 RX ADMIN — PROPOFOL 5 MCG/KG/MIN: 10 INJECTION, EMULSION INTRAVENOUS at 08:44

## 2023-01-01 RX ADMIN — ENOXAPARIN SODIUM 40 MG: 40 INJECTION SUBCUTANEOUS at 20:31

## 2023-01-01 RX ADMIN — BACITRACIN: 500 OINTMENT TOPICAL at 15:00

## 2023-01-01 RX ADMIN — VERAPAMIL HYDROCHLORIDE 60 MG: 120 TABLET ORAL at 13:42

## 2023-01-01 RX ADMIN — AMLODIPINE BESYLATE 5 MG: 5 TABLET ORAL at 08:59

## 2023-01-01 RX ADMIN — BACITRACIN: 500 OINTMENT TOPICAL at 15:43

## 2023-01-01 RX ADMIN — CHLORHEXIDINE GLUCONATE 15 ML: 1.2 SOLUTION ORAL at 16:51

## 2023-01-01 RX ADMIN — CLINDAMYCIN PHOSPHATE 300 MG: 300 INJECTION, SOLUTION INTRAVENOUS at 02:38

## 2023-01-01 RX ADMIN — Medication: at 13:00

## 2023-01-01 RX ADMIN — ASPIRIN 81 MG CHEWABLE TABLET 325 MG: 81 TABLET CHEWABLE at 09:03

## 2023-01-01 RX ADMIN — Medication 5 MG: at 18:54

## 2023-01-01 RX ADMIN — CLINDAMYCIN PHOSPHATE 300 MG: 300 INJECTION, SOLUTION INTRAVENOUS at 23:13

## 2023-01-01 RX ADMIN — VERAPAMIL HYDROCHLORIDE 60 MG: 120 TABLET ORAL at 08:30

## 2023-01-01 RX ADMIN — VERAPAMIL HYDROCHLORIDE 60 MG: 120 TABLET ORAL at 02:55

## 2023-01-01 RX ADMIN — ALBUMIN HUMAN 250 ML: 0.05 INJECTION, SOLUTION INTRAVENOUS at 11:02

## 2023-01-01 RX ADMIN — ACETAMINOPHEN 1000 MG: 650 SOLUTION ORAL at 17:49

## 2023-01-01 RX ADMIN — ESOMEPRAZOLE MAGNESIUM 40 MG: 40 FOR SUSPENSION ORAL at 08:18

## 2023-01-01 RX ADMIN — CHLORHEXIDINE GLUCONATE 15 ML: 1.2 SOLUTION ORAL at 08:56

## 2023-01-01 RX ADMIN — SOTALOL HYDROCHLORIDE 80 MG: 80 TABLET ORAL at 20:34

## 2023-01-01 RX ADMIN — VERAPAMIL HYDROCHLORIDE 60 MG: 120 TABLET ORAL at 03:04

## 2023-01-01 RX ADMIN — SOTALOL HYDROCHLORIDE 80 MG: 80 TABLET ORAL at 21:29

## 2023-01-01 RX ADMIN — CHLORHEXIDINE GLUCONATE 15 ML: 1.2 SOLUTION ORAL at 21:37

## 2023-01-01 RX ADMIN — CHLORHEXIDINE GLUCONATE 15 ML: 1.2 SOLUTION ORAL at 17:41

## 2023-01-01 RX ADMIN — DEXAMETHASONE SODIUM PHOSPHATE 4 MG: 10 INJECTION INTRAMUSCULAR; INTRAVENOUS at 09:05

## 2023-01-01 RX ADMIN — CHLORHEXIDINE GLUCONATE 15 ML: 1.2 SOLUTION ORAL at 18:28

## 2023-01-01 RX ADMIN — POTASSIUM CHLORIDE 40 MEQ: 1.5 POWDER, FOR SOLUTION ORAL at 11:19

## 2023-01-01 RX ADMIN — HYDROGEN PEROXIDE 1 APPLICATION: 30 LIQUID TOPICAL at 13:00

## 2023-01-01 RX ADMIN — PETROLATUM 1 APPLICATION: 1 OINTMENT TOPICAL at 21:45

## 2023-01-01 RX ADMIN — ACETAMINOPHEN 1000 MG: 650 SOLUTION ORAL at 01:23

## 2023-01-01 RX ADMIN — ASPIRIN 81 MG CHEWABLE TABLET 325 MG: 81 TABLET CHEWABLE at 09:04

## 2023-01-01 RX ADMIN — HYDROGEN PEROXIDE 1 APPLICATION: 30 LIQUID TOPICAL at 08:50

## 2023-01-01 RX ADMIN — POTASSIUM PHOSPHATE, MONOBASIC 500 MG: 500 TABLET, SOLUBLE ORAL at 13:09

## 2023-01-01 RX ADMIN — POLYETHYLENE GLYCOL 3350 17 G: 17 POWDER, FOR SOLUTION ORAL at 09:04

## 2023-01-01 RX ADMIN — ENOXAPARIN SODIUM 40 MG: 40 INJECTION SUBCUTANEOUS at 21:40

## 2023-01-01 RX ADMIN — AMLODIPINE BESYLATE 5 MG: 5 TABLET ORAL at 09:14

## 2023-01-01 RX ADMIN — VERAPAMIL HYDROCHLORIDE 60 MG: 120 TABLET ORAL at 01:23

## 2023-01-01 RX ADMIN — ROCURONIUM BROMIDE 10 MG: 10 INJECTION, SOLUTION INTRAVENOUS at 12:59

## 2023-01-01 RX ADMIN — CHLORHEXIDINE GLUCONATE 15 ML: 1.2 SOLUTION ORAL at 12:40

## 2023-01-01 RX ADMIN — ACETAMINOPHEN 1000 MG: 650 SOLUTION ORAL at 09:30

## 2023-01-01 RX ADMIN — SODIUM CHLORIDE 30 MG/ML INHALATION SOLUTION 3 ML: 30 SOLUTION INHALANT at 08:04

## 2023-01-01 RX ADMIN — HYDRALAZINE HYDROCHLORIDE 5 MG: 20 INJECTION INTRAMUSCULAR; INTRAVENOUS at 01:22

## 2023-01-01 RX ADMIN — ACETAMINOPHEN 1000 MG: 650 SOLUTION ORAL at 06:08

## 2023-01-01 RX ADMIN — SOTALOL HYDROCHLORIDE 80 MG: 80 TABLET ORAL at 21:07

## 2023-01-01 RX ADMIN — CHLORHEXIDINE GLUCONATE 15 ML: 1.2 SOLUTION ORAL at 13:23

## 2023-01-01 RX ADMIN — ESOMEPRAZOLE MAGNESIUM 40 MG: 40 FOR SUSPENSION ORAL at 09:25

## 2023-01-01 RX ADMIN — Medication 3 MG: at 18:34

## 2023-01-01 RX ADMIN — PIPERACILLIN SODIUM AND TAZOBACTAM SODIUM 3.38 G: 3; .375 INJECTION, SOLUTION INTRAVENOUS at 18:25

## 2023-01-01 RX ADMIN — IPRATROPIUM BROMIDE AND ALBUTEROL SULFATE 3 ML: .5; 3 SOLUTION RESPIRATORY (INHALATION) at 20:22

## 2023-01-01 RX ADMIN — ESOMEPRAZOLE MAGNESIUM 40 MG: 40 FOR SUSPENSION ORAL at 09:49

## 2023-01-01 RX ADMIN — SODIUM CHLORIDE, POTASSIUM CHLORIDE, SODIUM LACTATE AND CALCIUM CHLORIDE 500 ML: 600; 310; 30; 20 INJECTION, SOLUTION INTRAVENOUS at 02:42

## 2023-01-01 RX ADMIN — IPRATROPIUM BROMIDE AND ALBUTEROL SULFATE 3 ML: .5; 3 SOLUTION RESPIRATORY (INHALATION) at 08:04

## 2023-01-01 RX ADMIN — SOTALOL HYDROCHLORIDE 80 MG: 80 TABLET ORAL at 20:02

## 2023-01-01 RX ADMIN — CHLORHEXIDINE GLUCONATE 15 ML: 1.2 SOLUTION ORAL at 08:18

## 2023-01-01 RX ADMIN — SOTALOL HYDROCHLORIDE 80 MG: 80 TABLET ORAL at 08:49

## 2023-01-01 RX ADMIN — PREDNISONE 40 MG: 20 TABLET ORAL at 10:44

## 2023-01-01 RX ADMIN — CHLORHEXIDINE GLUCONATE 0.12% ORAL RINSE 15 ML: 1.2 LIQUID ORAL at 22:08

## 2023-01-01 RX ADMIN — VERAPAMIL HYDROCHLORIDE 60 MG: 120 TABLET ORAL at 22:00

## 2023-01-01 RX ADMIN — SILODOSIN 4 MG: 4 CAPSULE ORAL at 09:23

## 2023-01-01 RX ADMIN — CLINDAMYCIN PHOSPHATE 600 MG: 600 INJECTION, SOLUTION INTRAVENOUS at 08:18

## 2023-01-01 RX ADMIN — CHLORHEXIDINE GLUCONATE 15 ML: 1.2 SOLUTION ORAL at 18:02

## 2023-01-01 RX ADMIN — ENOXAPARIN SODIUM 40 MG: 40 INJECTION SUBCUTANEOUS at 21:11

## 2023-01-01 RX ADMIN — SOTALOL HYDROCHLORIDE 80 MG: 80 TABLET ORAL at 21:10

## 2023-01-01 RX ADMIN — THIAMINE HCL TAB 100 MG 100 MG: 100 TAB at 08:00

## 2023-01-01 RX ADMIN — POTASSIUM & SODIUM PHOSPHATES POWDER PACK 280-160-250 MG 1 PACKET: 280-160-250 PACK at 18:01

## 2023-01-01 RX ADMIN — SOTALOL HYDROCHLORIDE 80 MG: 80 TABLET ORAL at 08:20

## 2023-01-01 RX ADMIN — ACETAMINOPHEN 1000 MG: 650 SOLUTION ORAL at 05:27

## 2023-01-01 RX ADMIN — Medication 160 MCG: at 10:48

## 2023-01-01 RX ADMIN — HYDROGEN PEROXIDE 1 APPLICATION: 30 LIQUID TOPICAL at 14:55

## 2023-01-01 RX ADMIN — ACETAMINOPHEN 1000 MG: 650 SOLUTION ORAL at 09:49

## 2023-01-01 RX ADMIN — ACETAMINOPHEN 1000 MG: 650 SOLUTION ORAL at 06:18

## 2023-01-01 RX ADMIN — CHLORHEXIDINE GLUCONATE 15 ML: 1.2 SOLUTION ORAL at 08:49

## 2023-01-01 RX ADMIN — IPRATROPIUM BROMIDE AND ALBUTEROL SULFATE 3 ML: .5; 3 SOLUTION RESPIRATORY (INHALATION) at 09:03

## 2023-01-01 RX ADMIN — Medication 160 MCG: at 10:37

## 2023-01-01 RX ADMIN — METHYLPREDNISOLONE SODIUM SUCCINATE 125 MG: 125 INJECTION, POWDER, FOR SOLUTION INTRAMUSCULAR; INTRAVENOUS at 02:48

## 2023-01-01 RX ADMIN — ESOMEPRAZOLE MAGNESIUM 40 MG: 40 FOR SUSPENSION ORAL at 06:18

## 2023-01-01 RX ADMIN — SOTALOL HYDROCHLORIDE 80 MG: 80 TABLET ORAL at 09:25

## 2023-01-01 RX ADMIN — ESOMEPRAZOLE MAGNESIUM 40 MG: 40 FOR SUSPENSION ORAL at 06:15

## 2023-01-01 RX ADMIN — ROCURONIUM BROMIDE 10 MG: 10 INJECTION, SOLUTION INTRAVENOUS at 15:11

## 2023-01-01 RX ADMIN — PIPERACILLIN SODIUM AND TAZOBACTAM SODIUM 3.38 G: 3; .375 INJECTION, SOLUTION INTRAVENOUS at 22:59

## 2023-01-01 RX ADMIN — CHLORHEXIDINE GLUCONATE 15 ML: 1.2 SOLUTION ORAL at 08:38

## 2023-01-01 RX ADMIN — ASPIRIN 81 MG CHEWABLE TABLET 325 MG: 81 TABLET CHEWABLE at 09:14

## 2023-01-01 RX ADMIN — MAGNESIUM SULFATE HEPTAHYDRATE 2 G: 40 INJECTION, SOLUTION INTRAVENOUS at 06:49

## 2023-01-01 RX ADMIN — TERAZOSIN HYDROCHLORIDE 1 MG: 1 CAPSULE ORAL at 21:49

## 2023-01-01 RX ADMIN — PIPERACILLIN SODIUM AND TAZOBACTAM SODIUM 3.38 G: 3; .375 INJECTION, SOLUTION INTRAVENOUS at 02:15

## 2023-01-01 RX ADMIN — Medication 0.4 MCG/KG/MIN: at 10:40

## 2023-01-01 RX ADMIN — AMLODIPINE BESYLATE 5 MG: 5 TABLET ORAL at 17:45

## 2023-01-01 RX ADMIN — PREDNISONE 40 MG: 20 TABLET ORAL at 09:03

## 2023-01-01 RX ADMIN — THIAMINE HCL TAB 100 MG 100 MG: 100 TAB at 08:19

## 2023-01-01 RX ADMIN — HYDROGEN PEROXIDE 1 APPLICATION: 30 LIQUID TOPICAL at 14:00

## 2023-01-01 RX ADMIN — CHLORHEXIDINE GLUCONATE 15 ML: 1.2 SOLUTION ORAL at 09:43

## 2023-01-01 RX ADMIN — HYDROGEN PEROXIDE 1 APPLICATION: 30 LIQUID TOPICAL at 12:06

## 2023-01-01 RX ADMIN — SODIUM CHLORIDE 30 MG/ML INHALATION SOLUTION 3 ML: 30 SOLUTION INHALANT at 15:24

## 2023-01-01 RX ADMIN — VALPROIC ACID 125 MG: 250 SOLUTION ORAL at 08:41

## 2023-01-01 RX ADMIN — POTASSIUM CHLORIDE 40 MEQ: 1.5 POWDER, FOR SOLUTION ORAL at 12:07

## 2023-01-01 RX ADMIN — POTASSIUM PHOSPHATE, MONOBASIC 500 MG: 500 TABLET, SOLUBLE ORAL at 21:49

## 2023-01-01 RX ADMIN — PROPOFOL 25 MCG/KG/MIN: 10 INJECTION, EMULSION INTRAVENOUS at 05:36

## 2023-01-01 RX ADMIN — ESOMEPRAZOLE MAGNESIUM 40 MG: 40 FOR SUSPENSION ORAL at 08:54

## 2023-01-01 RX ADMIN — CHLORHEXIDINE GLUCONATE 15 ML: 1.2 SOLUTION ORAL at 12:08

## 2023-01-01 RX ADMIN — ESOMEPRAZOLE MAGNESIUM 40 MG: 40 FOR SUSPENSION ORAL at 07:30

## 2023-01-01 RX ADMIN — ACETAMINOPHEN 1000 MG: 650 SOLUTION ORAL at 18:01

## 2023-01-01 RX ADMIN — POTASSIUM PHOSPHATE, MONOBASIC 500 MG: 500 TABLET, SOLUBLE ORAL at 16:54

## 2023-01-01 RX ADMIN — PIPERACILLIN SODIUM AND TAZOBACTAM SODIUM 3.38 G: 3; .375 INJECTION, SOLUTION INTRAVENOUS at 08:58

## 2023-01-01 RX ADMIN — VERAPAMIL HYDROCHLORIDE 60 MG: 120 TABLET ORAL at 17:21

## 2023-01-01 RX ADMIN — ENOXAPARIN SODIUM 40 MG: 40 INJECTION SUBCUTANEOUS at 22:27

## 2023-01-01 RX ADMIN — ENOXAPARIN SODIUM 40 MG: 40 INJECTION SUBCUTANEOUS at 08:35

## 2023-01-01 RX ADMIN — PIPERACILLIN SODIUM AND TAZOBACTAM SODIUM 3.38 G: 3; .375 INJECTION, SOLUTION INTRAVENOUS at 21:45

## 2023-01-01 RX ADMIN — ESOMEPRAZOLE MAGNESIUM 40 MG: 40 FOR SUSPENSION ORAL at 08:51

## 2023-01-01 RX ADMIN — Medication 5 MG: at 18:01

## 2023-01-01 RX ADMIN — VERAPAMIL HYDROCHLORIDE 60 MG: 120 TABLET ORAL at 08:50

## 2023-01-01 RX ADMIN — SOTALOL HYDROCHLORIDE 80 MG: 80 TABLET ORAL at 08:40

## 2023-01-01 RX ADMIN — PIPERACILLIN SODIUM AND TAZOBACTAM SODIUM 3.38 G: 3; .375 INJECTION, SOLUTION INTRAVENOUS at 02:05

## 2023-01-01 RX ADMIN — SOTALOL HYDROCHLORIDE 80 MG: 80 TABLET ORAL at 20:20

## 2023-01-01 RX ADMIN — ASPIRIN 81 MG CHEWABLE TABLET 325 MG: 81 TABLET CHEWABLE at 08:49

## 2023-01-01 RX ADMIN — VALPROIC ACID 125 MG: 250 SOLUTION ORAL at 20:21

## 2023-01-01 RX ADMIN — PROPOFOL 10 MCG/KG/MIN: 10 INJECTION, EMULSION INTRAVENOUS at 09:39

## 2023-01-01 RX ADMIN — CHLORHEXIDINE GLUCONATE 15 ML: 1.2 SOLUTION ORAL at 14:12

## 2023-01-01 RX ADMIN — VERAPAMIL HYDROCHLORIDE 40 MG: 40 TABLET ORAL at 08:00

## 2023-01-01 RX ADMIN — SOTALOL HYDROCHLORIDE 80 MG: 80 TABLET ORAL at 20:21

## 2023-01-01 RX ADMIN — ACETAMINOPHEN 1000 MG: 650 SOLUTION ORAL at 22:51

## 2023-01-01 RX ADMIN — ACETAMINOPHEN 1000 MG: 650 SOLUTION ORAL at 02:54

## 2023-01-01 RX ADMIN — PROPOFOL 25 MCG/KG/MIN: 10 INJECTION, EMULSION INTRAVENOUS at 23:30

## 2023-01-01 RX ADMIN — PIPERACILLIN SODIUM AND TAZOBACTAM SODIUM 3.38 G: 3; .375 INJECTION, SOLUTION INTRAVENOUS at 08:30

## 2023-01-01 RX ADMIN — Medication: at 17:58

## 2023-01-01 RX ADMIN — PREDNISONE 40 MG: 20 TABLET ORAL at 08:00

## 2023-01-01 RX ADMIN — PIPERACILLIN SODIUM AND TAZOBACTAM SODIUM 3.38 G: 3; .375 INJECTION, SOLUTION INTRAVENOUS at 15:43

## 2023-01-01 RX ADMIN — VALPROIC ACID 125 MG: 250 SOLUTION ORAL at 18:34

## 2023-01-01 RX ADMIN — ENOXAPARIN SODIUM 40 MG: 40 INJECTION SUBCUTANEOUS at 20:20

## 2023-01-01 RX ADMIN — ALBUTEROL SULFATE 2.5 MG: 2.5 SOLUTION RESPIRATORY (INHALATION) at 09:19

## 2023-01-01 RX ADMIN — CLINDAMYCIN IN 5 PERCENT DEXTROSE 900 MG: 12 INJECTION, SOLUTION INTRAVENOUS at 14:51

## 2023-01-01 RX ADMIN — SODIUM CHLORIDE, POTASSIUM CHLORIDE, SODIUM LACTATE AND CALCIUM CHLORIDE 50 ML/HR: 600; 310; 30; 20 INJECTION, SOLUTION INTRAVENOUS at 02:44

## 2023-01-01 RX ADMIN — LIDOCAINE HYDROCHLORIDE 90 MG: 20 INJECTION INTRAVENOUS at 08:57

## 2023-01-01 RX ADMIN — ACETAMINOPHEN 1000 MG: 650 SOLUTION ORAL at 08:50

## 2023-01-01 RX ADMIN — VERAPAMIL HYDROCHLORIDE 60 MG: 120 TABLET ORAL at 01:05

## 2023-01-01 RX ADMIN — CHLORHEXIDINE GLUCONATE 15 ML: 1.2 SOLUTION ORAL at 08:44

## 2023-01-01 RX ADMIN — MIDAZOLAM HYDROCHLORIDE 1 MG: 1 INJECTION, SOLUTION INTRAMUSCULAR; INTRAVENOUS at 08:55

## 2023-01-01 RX ADMIN — TRAMADOL HYDROCHLORIDE 50 MG: 50 TABLET, COATED ORAL at 22:27

## 2023-01-01 RX ADMIN — CHLORHEXIDINE GLUCONATE 15 ML: 1.2 SOLUTION ORAL at 10:04

## 2023-01-01 RX ADMIN — ESOMEPRAZOLE MAGNESIUM 40 MG: 40 FOR SUSPENSION ORAL at 07:33

## 2023-01-01 RX ADMIN — ACETAMINOPHEN 1000 MG: 650 SOLUTION ORAL at 18:34

## 2023-01-01 RX ADMIN — ACETAMINOPHEN 1000 MG: 650 SOLUTION ORAL at 14:26

## 2023-01-01 RX ADMIN — DEXMEDETOMIDINE HYDROCHLORIDE 0.2 MCG/KG/HR: 400 INJECTION INTRAVENOUS at 11:19

## 2023-01-01 RX ADMIN — SOTALOL HYDROCHLORIDE 80 MG: 80 TABLET ORAL at 10:05

## 2023-01-01 RX ADMIN — Medication 120 MCG: at 22:15

## 2023-01-01 RX ADMIN — ENOXAPARIN SODIUM 40 MG: 100 INJECTION SUBCUTANEOUS at 08:57

## 2023-01-01 RX ADMIN — VERAPAMIL HYDROCHLORIDE 60 MG: 120 TABLET ORAL at 21:29

## 2023-01-01 RX ADMIN — SOTALOL HYDROCHLORIDE 80 MG: 80 TABLET ORAL at 11:45

## 2023-01-01 RX ADMIN — THIAMINE HCL TAB 100 MG 100 MG: 100 TAB at 09:00

## 2023-01-01 RX ADMIN — SOTALOL HYDROCHLORIDE 80 MG: 80 TABLET ORAL at 22:18

## 2023-01-01 RX ADMIN — AMLODIPINE BESYLATE 5 MG: 5 TABLET ORAL at 08:19

## 2023-01-01 RX ADMIN — Medication 5 MG: at 17:54

## 2023-01-01 SDOH — SOCIAL STABILITY: SOCIAL INSECURITY: FEELS SAFE LIVING IN HOME: YES

## 2023-01-01 SDOH — SOCIAL STABILITY: SOCIAL INSECURITY: ARE YOU OR HAVE YOU BEEN THREATENED OR ABUSED PHYSICALLY, EMOTIONALLY, OR SEXUALLY BY ANYONE?: NO

## 2023-01-01 SDOH — SOCIAL STABILITY: SOCIAL INSECURITY: DOES ANYONE TRY TO KEEP YOU FROM HAVING/CONTACTING OTHER FRIENDS OR DOING THINGS OUTSIDE YOUR HOME?: NO

## 2023-01-01 SDOH — SOCIAL STABILITY: SOCIAL INSECURITY: ARE THERE ANY APPARENT SIGNS OF INJURIES/BEHAVIORS THAT COULD BE RELATED TO ABUSE/NEGLECT?: NO

## 2023-01-01 SDOH — SOCIAL STABILITY: SOCIAL INSECURITY: DO YOU FEEL UNSAFE GOING BACK TO THE PLACE WHERE YOU ARE LIVING?: NO

## 2023-01-01 SDOH — SOCIAL STABILITY: SOCIAL INSECURITY: HAS ANYONE EVER THREATENED TO HURT YOUR FAMILY OR YOUR PETS?: NO

## 2023-01-01 SDOH — HEALTH STABILITY: MENTAL HEALTH: CURRENT SMOKER: 0

## 2023-01-01 SDOH — SOCIAL STABILITY: SOCIAL INSECURITY: DO YOU FEEL ANYONE HAS EXPLOITED OR TAKEN ADVANTAGE OF YOU FINANCIALLY OR OF YOUR PERSONAL PROPERTY?: NO

## 2023-01-01 SDOH — SOCIAL STABILITY: SOCIAL INSECURITY: WERE YOU ABLE TO COMPLETE ALL THE BEHAVIORAL HEALTH SCREENINGS?: NO

## 2023-01-01 SDOH — SOCIAL STABILITY: SOCIAL INSECURITY: ABUSE: ADULT

## 2023-01-01 SDOH — SOCIAL STABILITY: SOCIAL INSECURITY: HAVE YOU HAD THOUGHTS OF HARMING ANYONE ELSE?: NO

## 2023-01-01 ASSESSMENT — PAIN - FUNCTIONAL ASSESSMENT
PAIN_FUNCTIONAL_ASSESSMENT: 0-10
PAIN_FUNCTIONAL_ASSESSMENT: PAINAD (PAIN ASSESSMENT IN ADVANCED DEMENTIA SCALE)
PAIN_FUNCTIONAL_ASSESSMENT: 0-10
PAIN_FUNCTIONAL_ASSESSMENT: CPOT (CRITICAL CARE PAIN OBSERVATION TOOL)
PAIN_FUNCTIONAL_ASSESSMENT: 0-10
PAIN_FUNCTIONAL_ASSESSMENT: PAINAD (PAIN ASSESSMENT IN ADVANCED DEMENTIA SCALE)
PAIN_FUNCTIONAL_ASSESSMENT: 0-10
PAIN_FUNCTIONAL_ASSESSMENT: 0-10
PAIN_FUNCTIONAL_ASSESSMENT: PAINAD (PAIN ASSESSMENT IN ADVANCED DEMENTIA SCALE)
PAIN_FUNCTIONAL_ASSESSMENT: 0-10
PAIN_FUNCTIONAL_ASSESSMENT: PAINAD (PAIN ASSESSMENT IN ADVANCED DEMENTIA SCALE)
PAIN_FUNCTIONAL_ASSESSMENT: PAINAD (PAIN ASSESSMENT IN ADVANCED DEMENTIA SCALE)

## 2023-01-01 ASSESSMENT — COGNITIVE AND FUNCTIONAL STATUS - GENERAL
MOBILITY SCORE: 6
EATING MEALS: A LITTLE
STANDING UP FROM CHAIR USING ARMS: A LITTLE
WALKING IN HOSPITAL ROOM: A LITTLE
TOILETING: A LITTLE
CLIMB 3 TO 5 STEPS WITH RAILING: TOTAL
DRESSING REGULAR LOWER BODY CLOTHING: A LITTLE
MOVING FROM LYING ON BACK TO SITTING ON SIDE OF FLAT BED WITH BEDRAILS: A LITTLE
MOVING FROM LYING ON BACK TO SITTING ON SIDE OF FLAT BED WITH BEDRAILS: A LOT
CLIMB 3 TO 5 STEPS WITH RAILING: TOTAL
CLIMB 3 TO 5 STEPS WITH RAILING: A LITTLE
DAILY ACTIVITIY SCORE: 18
HELP NEEDED FOR BATHING: A LITTLE
MOBILITY SCORE: 21
HELP NEEDED FOR BATHING: TOTAL
EATING MEALS: A LITTLE
PERSONAL GROOMING: A LITTLE
STANDING UP FROM CHAIR USING ARMS: A LOT
MOVING FROM LYING ON BACK TO SITTING ON SIDE OF FLAT BED WITH BEDRAILS: TOTAL
WALKING IN HOSPITAL ROOM: A LITTLE
STANDING UP FROM CHAIR USING ARMS: A LOT
DAILY ACTIVITIY SCORE: 18
PERSONAL GROOMING: A LITTLE
EATING MEALS: A LITTLE
STANDING UP FROM CHAIR USING ARMS: A LOT
CLIMB 3 TO 5 STEPS WITH RAILING: A LITTLE
CLIMB 3 TO 5 STEPS WITH RAILING: A LITTLE
HELP NEEDED FOR BATHING: A LITTLE
CLIMB 3 TO 5 STEPS WITH RAILING: TOTAL
TOILETING: A LITTLE
WALKING IN HOSPITAL ROOM: A LITTLE
TOILETING: A LOT
HELP NEEDED FOR BATHING: A LITTLE
STANDING UP FROM CHAIR USING ARMS: TOTAL
MOVING TO AND FROM BED TO CHAIR: A LITTLE
TOILETING: A LITTLE
MOVING FROM LYING ON BACK TO SITTING ON SIDE OF FLAT BED WITH BEDRAILS: A LITTLE
DAILY ACTIVITIY SCORE: 13
EATING MEALS: A LITTLE
TOILETING: A LITTLE
WALKING IN HOSPITAL ROOM: A LITTLE
CLIMB 3 TO 5 STEPS WITH RAILING: A LITTLE
DRESSING REGULAR UPPER BODY CLOTHING: A LITTLE
STANDING UP FROM CHAIR USING ARMS: TOTAL
HELP NEEDED FOR BATHING: A LITTLE
PERSONAL GROOMING: A LITTLE
DRESSING REGULAR UPPER BODY CLOTHING: A LITTLE
CLIMB 3 TO 5 STEPS WITH RAILING: A LITTLE
PERSONAL GROOMING: A LOT
PERSONAL GROOMING: A LITTLE
MOVING FROM LYING ON BACK TO SITTING ON SIDE OF FLAT BED WITH BEDRAILS: A LOT
TOILETING: A LOT
DRESSING REGULAR UPPER BODY CLOTHING: A LOT
EATING MEALS: A LITTLE
DRESSING REGULAR UPPER BODY CLOTHING: TOTAL
DRESSING REGULAR UPPER BODY CLOTHING: TOTAL
MOVING FROM LYING ON BACK TO SITTING ON SIDE OF FLAT BED WITH BEDRAILS: A LITTLE
TURNING FROM BACK TO SIDE WHILE IN FLAT BAD: A LITTLE
WALKING IN HOSPITAL ROOM: A LITTLE
TURNING FROM BACK TO SIDE WHILE IN FLAT BAD: A LITTLE
MOVING TO AND FROM BED TO CHAIR: A LITTLE
MOBILITY SCORE: 14
HELP NEEDED FOR BATHING: A LITTLE
TOILETING: TOTAL
MOVING FROM LYING ON BACK TO SITTING ON SIDE OF FLAT BED WITH BEDRAILS: TOTAL
TOILETING: A LOT
PERSONAL GROOMING: A LITTLE
DAILY ACTIVITIY SCORE: 12
TURNING FROM BACK TO SIDE WHILE IN FLAT BAD: A LITTLE
MOVING FROM LYING ON BACK TO SITTING ON SIDE OF FLAT BED WITH BEDRAILS: A LOT
MOBILITY SCORE: 12
MOBILITY SCORE: 6
DRESSING REGULAR UPPER BODY CLOTHING: A LITTLE
MOVING TO AND FROM BED TO CHAIR: A LITTLE
WALKING IN HOSPITAL ROOM: A LITTLE
DAILY ACTIVITIY SCORE: 18
DRESSING REGULAR LOWER BODY CLOTHING: A LOT
CLIMB 3 TO 5 STEPS WITH RAILING: TOTAL
DAILY ACTIVITIY SCORE: 20
EATING MEALS: A LOT
DAILY ACTIVITIY SCORE: 18
PERSONAL GROOMING: A LOT
CLIMB 3 TO 5 STEPS WITH RAILING: A LOT
DAILY ACTIVITIY SCORE: 19
HELP NEEDED FOR BATHING: A LITTLE
DAILY ACTIVITIY SCORE: 15
DRESSING REGULAR LOWER BODY CLOTHING: A LITTLE
TURNING FROM BACK TO SIDE WHILE IN FLAT BAD: A LOT
DRESSING REGULAR LOWER BODY CLOTHING: A LITTLE
TURNING FROM BACK TO SIDE WHILE IN FLAT BAD: A LITTLE
DRESSING REGULAR UPPER BODY CLOTHING: A LITTLE
HELP NEEDED FOR BATHING: A LITTLE
DRESSING REGULAR LOWER BODY CLOTHING: A LITTLE
TOILETING: A LOT
DRESSING REGULAR LOWER BODY CLOTHING: A LITTLE
DRESSING REGULAR LOWER BODY CLOTHING: A LITTLE
MOVING TO AND FROM BED TO CHAIR: A LITTLE
DRESSING REGULAR LOWER BODY CLOTHING: A LOT
MOVING FROM LYING ON BACK TO SITTING ON SIDE OF FLAT BED WITH BEDRAILS: A LOT
CLIMB 3 TO 5 STEPS WITH RAILING: TOTAL
MOVING TO AND FROM BED TO CHAIR: A LITTLE
EATING MEALS: TOTAL
CLIMB 3 TO 5 STEPS WITH RAILING: A LITTLE
DAILY ACTIVITIY SCORE: 12
CLIMB 3 TO 5 STEPS WITH RAILING: A LITTLE
MOBILITY SCORE: 12
STANDING UP FROM CHAIR USING ARMS: A LOT
EATING MEALS: A LITTLE
MOVING TO AND FROM BED TO CHAIR: A LITTLE
DRESSING REGULAR UPPER BODY CLOTHING: A LITTLE
STANDING UP FROM CHAIR USING ARMS: A LITTLE
DAILY ACTIVITIY SCORE: 18
STANDING UP FROM CHAIR USING ARMS: A LITTLE
MOVING TO AND FROM BED TO CHAIR: A LOT
WALKING IN HOSPITAL ROOM: TOTAL
MOBILITY SCORE: 16
TURNING FROM BACK TO SIDE WHILE IN FLAT BAD: A LITTLE
WALKING IN HOSPITAL ROOM: A LOT
MOVING TO AND FROM BED TO CHAIR: A LITTLE
TURNING FROM BACK TO SIDE WHILE IN FLAT BAD: A LITTLE
CLIMB 3 TO 5 STEPS WITH RAILING: A LITTLE
TOILETING: A LITTLE
STANDING UP FROM CHAIR USING ARMS: A LITTLE
TOILETING: A LITTLE
CLIMB 3 TO 5 STEPS WITH RAILING: A LITTLE
PERSONAL GROOMING: A LITTLE
STANDING UP FROM CHAIR USING ARMS: A LITTLE
STANDING UP FROM CHAIR USING ARMS: A LOT
MOBILITY SCORE: 12
STANDING UP FROM CHAIR USING ARMS: A LITTLE
DRESSING REGULAR UPPER BODY CLOTHING: A LITTLE
MOVING TO AND FROM BED TO CHAIR: A LOT
DAILY ACTIVITIY SCORE: 13
WALKING IN HOSPITAL ROOM: TOTAL
MOBILITY SCORE: 20
CLIMB 3 TO 5 STEPS WITH RAILING: A LITTLE
MOBILITY SCORE: 20
DRESSING REGULAR LOWER BODY CLOTHING: A LITTLE
MOVING TO AND FROM BED TO CHAIR: TOTAL
TOILETING: A LITTLE
WALKING IN HOSPITAL ROOM: A LITTLE
HELP NEEDED FOR BATHING: A LITTLE
WALKING IN HOSPITAL ROOM: A LOT
STANDING UP FROM CHAIR USING ARMS: TOTAL
CLIMB 3 TO 5 STEPS WITH RAILING: A LITTLE
WALKING IN HOSPITAL ROOM: TOTAL
TOILETING: A LOT
MOVING FROM LYING ON BACK TO SITTING ON SIDE OF FLAT BED WITH BEDRAILS: A LITTLE
MOBILITY SCORE: 6
STANDING UP FROM CHAIR USING ARMS: A LITTLE
HELP NEEDED FOR BATHING: A LOT
WALKING IN HOSPITAL ROOM: A LITTLE
PERSONAL GROOMING: TOTAL
HELP NEEDED FOR BATHING: A LITTLE
MOVING TO AND FROM BED TO CHAIR: A LITTLE
CLIMB 3 TO 5 STEPS WITH RAILING: TOTAL
MOBILITY SCORE: 20
MOVING TO AND FROM BED TO CHAIR: A LITTLE
DRESSING REGULAR LOWER BODY CLOTHING: A LITTLE
WALKING IN HOSPITAL ROOM: A LITTLE
EATING MEALS: A LITTLE
MOBILITY SCORE: 18
TURNING FROM BACK TO SIDE WHILE IN FLAT BAD: TOTAL
DRESSING REGULAR LOWER BODY CLOTHING: A LITTLE
STANDING UP FROM CHAIR USING ARMS: A LITTLE
TURNING FROM BACK TO SIDE WHILE IN FLAT BAD: A LOT
PERSONAL GROOMING: TOTAL
MOVING TO AND FROM BED TO CHAIR: A LITTLE
MOBILITY SCORE: 18
PERSONAL GROOMING: A LITTLE
TURNING FROM BACK TO SIDE WHILE IN FLAT BAD: A LITTLE
WALKING IN HOSPITAL ROOM: A LOT
DRESSING REGULAR UPPER BODY CLOTHING: A LITTLE
DAILY ACTIVITIY SCORE: 18
MOBILITY SCORE: 12
HELP NEEDED FOR BATHING: A LOT
HELP NEEDED FOR BATHING: A LOT
WALKING IN HOSPITAL ROOM: A LOT
CLIMB 3 TO 5 STEPS WITH RAILING: A LITTLE
MOVING TO AND FROM BED TO CHAIR: A LOT
TOILETING: TOTAL
HELP NEEDED FOR BATHING: A LOT
DRESSING REGULAR UPPER BODY CLOTHING: A LITTLE
STANDING UP FROM CHAIR USING ARMS: A LITTLE
MOVING FROM LYING ON BACK TO SITTING ON SIDE OF FLAT BED WITH BEDRAILS: A LITTLE
HELP NEEDED FOR BATHING: A LOT
DRESSING REGULAR UPPER BODY CLOTHING: A LOT
STANDING UP FROM CHAIR USING ARMS: A LITTLE
MOVING TO AND FROM BED TO CHAIR: A LITTLE
MOVING TO AND FROM BED TO CHAIR: A LITTLE
WALKING IN HOSPITAL ROOM: A LOT
DRESSING REGULAR UPPER BODY CLOTHING: A LITTLE
MOVING FROM LYING ON BACK TO SITTING ON SIDE OF FLAT BED WITH BEDRAILS: A LITTLE
EATING MEALS: A LOT
WALKING IN HOSPITAL ROOM: A LITTLE
MOVING TO AND FROM BED TO CHAIR: A LITTLE
TOILETING: A LITTLE
WALKING IN HOSPITAL ROOM: A LITTLE
MOVING TO AND FROM BED TO CHAIR: TOTAL
MOVING TO AND FROM BED TO CHAIR: A LITTLE
STANDING UP FROM CHAIR USING ARMS: A LITTLE
MOVING FROM LYING ON BACK TO SITTING ON SIDE OF FLAT BED WITH BEDRAILS: A LITTLE
WALKING IN HOSPITAL ROOM: TOTAL
PERSONAL GROOMING: A LITTLE
MOBILITY SCORE: 18
WALKING IN HOSPITAL ROOM: A LITTLE
DRESSING REGULAR LOWER BODY CLOTHING: A LOT
DRESSING REGULAR UPPER BODY CLOTHING: A LOT
TURNING FROM BACK TO SIDE WHILE IN FLAT BAD: A LITTLE
PERSONAL GROOMING: A LOT
STANDING UP FROM CHAIR USING ARMS: A LITTLE
MOBILITY SCORE: 18
MOBILITY SCORE: 10
DRESSING REGULAR UPPER BODY CLOTHING: A LITTLE
HELP NEEDED FOR BATHING: A LITTLE
CLIMB 3 TO 5 STEPS WITH RAILING: TOTAL
TOILETING: A LITTLE
DRESSING REGULAR UPPER BODY CLOTHING: A LOT
EATING MEALS: A LOT
MOVING FROM LYING ON BACK TO SITTING ON SIDE OF FLAT BED WITH BEDRAILS: A LITTLE
DAILY ACTIVITIY SCORE: 20
TURNING FROM BACK TO SIDE WHILE IN FLAT BAD: A LITTLE
TURNING FROM BACK TO SIDE WHILE IN FLAT BAD: TOTAL
CLIMB 3 TO 5 STEPS WITH RAILING: A LOT
CLIMB 3 TO 5 STEPS WITH RAILING: A LITTLE
TURNING FROM BACK TO SIDE WHILE IN FLAT BAD: A LOT
MOVING TO AND FROM BED TO CHAIR: A LOT
TOILETING: A LITTLE
PERSONAL GROOMING: A LITTLE
WALKING IN HOSPITAL ROOM: A LITTLE
STANDING UP FROM CHAIR USING ARMS: A LOT
TURNING FROM BACK TO SIDE WHILE IN FLAT BAD: A LITTLE
STANDING UP FROM CHAIR USING ARMS: A LITTLE
DAILY ACTIVITIY SCORE: 18
DAILY ACTIVITIY SCORE: 14
MOVING FROM LYING ON BACK TO SITTING ON SIDE OF FLAT BED WITH BEDRAILS: A LITTLE
DAILY ACTIVITIY SCORE: 19
MOVING TO AND FROM BED TO CHAIR: A LOT
EATING MEALS: A LITTLE
HELP NEEDED FOR BATHING: TOTAL
CLIMB 3 TO 5 STEPS WITH RAILING: A LITTLE
HELP NEEDED FOR BATHING: A LITTLE
EATING MEALS: A LITTLE
MOBILITY SCORE: 17
EATING MEALS: TOTAL
PERSONAL GROOMING: A LOT
DAILY ACTIVITIY SCORE: 12
DRESSING REGULAR LOWER BODY CLOTHING: A LITTLE
DAILY ACTIVITIY SCORE: 6
STANDING UP FROM CHAIR USING ARMS: A LOT
PERSONAL GROOMING: A LITTLE
HELP NEEDED FOR BATHING: A LOT
CLIMB 3 TO 5 STEPS WITH RAILING: A LOT
MOBILITY SCORE: 18
STANDING UP FROM CHAIR USING ARMS: A LITTLE
DRESSING REGULAR UPPER BODY CLOTHING: A LOT
PATIENT BASELINE BEDBOUND: NO
MOVING TO AND FROM BED TO CHAIR: A LITTLE
WALKING IN HOSPITAL ROOM: A LOT
TURNING FROM BACK TO SIDE WHILE IN FLAT BAD: A LITTLE
MOVING TO AND FROM BED TO CHAIR: TOTAL
DRESSING REGULAR LOWER BODY CLOTHING: TOTAL
CLIMB 3 TO 5 STEPS WITH RAILING: A LOT
PERSONAL GROOMING: A LOT
TOILETING: A LOT
TURNING FROM BACK TO SIDE WHILE IN FLAT BAD: A LOT
MOVING TO AND FROM BED TO CHAIR: A LOT
TURNING FROM BACK TO SIDE WHILE IN FLAT BAD: TOTAL
PERSONAL GROOMING: A LITTLE
MOVING FROM LYING ON BACK TO SITTING ON SIDE OF FLAT BED WITH BEDRAILS: A LITTLE
DRESSING REGULAR UPPER BODY CLOTHING: A LITTLE
HELP NEEDED FOR BATHING: A LITTLE
DRESSING REGULAR LOWER BODY CLOTHING: A LOT
MOVING TO AND FROM BED TO CHAIR: A LITTLE
STANDING UP FROM CHAIR USING ARMS: A LITTLE
DRESSING REGULAR LOWER BODY CLOTHING: TOTAL
TURNING FROM BACK TO SIDE WHILE IN FLAT BAD: A LITTLE
DRESSING REGULAR UPPER BODY CLOTHING: A LOT
DRESSING REGULAR LOWER BODY CLOTHING: A LITTLE
TURNING FROM BACK TO SIDE WHILE IN FLAT BAD: A LITTLE
MOBILITY SCORE: 18
MOBILITY SCORE: 19
MOBILITY SCORE: 19
PERSONAL GROOMING: A LITTLE
MOBILITY SCORE: 18
WALKING IN HOSPITAL ROOM: A LITTLE
DRESSING REGULAR UPPER BODY CLOTHING: A LITTLE
EATING MEALS: A LITTLE
TOILETING: A LITTLE
DAILY ACTIVITIY SCORE: 19
WALKING IN HOSPITAL ROOM: A LITTLE
STANDING UP FROM CHAIR USING ARMS: A LITTLE
DAILY ACTIVITIY SCORE: 6
MOBILITY SCORE: 18
EATING MEALS: A LITTLE
PERSONAL GROOMING: A LITTLE
HELP NEEDED FOR BATHING: A LOT
DRESSING REGULAR UPPER BODY CLOTHING: A LITTLE
MOBILITY SCORE: 16
TOILETING: A LITTLE
TOILETING: A LITTLE
DRESSING REGULAR LOWER BODY CLOTHING: A LITTLE
EATING MEALS: A LITTLE
WALKING IN HOSPITAL ROOM: A LITTLE
DRESSING REGULAR LOWER BODY CLOTHING: A LOT
MOVING FROM LYING ON BACK TO SITTING ON SIDE OF FLAT BED WITH BEDRAILS: TOTAL
TURNING FROM BACK TO SIDE WHILE IN FLAT BAD: A LOT
CLIMB 3 TO 5 STEPS WITH RAILING: TOTAL
DRESSING REGULAR LOWER BODY CLOTHING: A LITTLE
MOVING FROM LYING ON BACK TO SITTING ON SIDE OF FLAT BED WITH BEDRAILS: A LITTLE
TOILETING: TOTAL
HELP NEEDED FOR BATHING: A LITTLE
DRESSING REGULAR LOWER BODY CLOTHING: A LOT
DRESSING REGULAR UPPER BODY CLOTHING: A LITTLE

## 2023-01-01 ASSESSMENT — PAIN SCALES - GENERAL
PAINLEVEL_OUTOF10: 0 - NO PAIN
PAINLEVEL_OUTOF10: 5 - MODERATE PAIN
PAINLEVEL_OUTOF10: 0 - NO PAIN
PAINLEVEL_OUTOF10: 0 - NO PAIN
PAINLEVEL_OUTOF10: 5 - MODERATE PAIN
PAINLEVEL_OUTOF10: 0 - NO PAIN
PAINLEVEL_OUTOF10: 7
PAINLEVEL_OUTOF10: 0 - NO PAIN
PAINLEVEL_OUTOF10: 1
PAINLEVEL_OUTOF10: 5 - MODERATE PAIN
PAINLEVEL_OUTOF10: 0 - NO PAIN
PAIN_LEVEL: 0
PAINLEVEL_OUTOF10: 0 - NO PAIN
PAINLEVEL_OUTOF10: 2
PAINLEVEL_OUTOF10: 0 - NO PAIN
PAINLEVEL_OUTOF10: 2
PAINLEVEL_OUTOF10: 0 - NO PAIN
PAINLEVEL_OUTOF10: 3
PAINLEVEL_OUTOF10: 0 - NO PAIN
PAINLEVEL_OUTOF10: 5 - MODERATE PAIN
PAINLEVEL_OUTOF10: 0 - NO PAIN

## 2023-01-01 ASSESSMENT — PAIN SCALES - PAIN ASSESSMENT IN ADVANCED DEMENTIA (PAINAD)
TOTALSCORE: MEDICATION (SEE MAR)
BREATHING: NORMAL
NEGVOCALIZATION: OCCASIONAL MOAN/GROAN, LOW SPEECH, NEGATIVE/DISAPPROVING QUALITY
TOTALSCORE: 6
FACIALEXPRESSION: SMILING OR INEXPRESSIVE
FACIALEXPRESSION: SMILING OR INEXPRESSIVE
BODYLANGUAGE: TENSE, DISTRESSED PACING, FIDGETING
BODYLANGUAGE: RELAXED
TOTALSCORE: 5
FACIALEXPRESSION: FACIAL GRIMACING
CONSOLABILITY: NO NEED TO CONSOLE
CONSOLABILITY: DISTRACTED OR REASSURED BY VOICE/TOUCH
BODYLANGUAGE: TENSE, DISTRESSED PACING, FIDGETING
TOTALSCORE: 1
CONSOLABILITY: NO NEED TO CONSOLE
BREATHING: NORMAL
BODYLANGUAGE: TENSE, DISTRESSED PACING, FIDGETING
TOTALSCORE: 2
BREATHING: OCCASIONAL LABORED BREATHING, SHORT PERIOD OF HYPERVENTILATION
TOTALSCORE: 5
FACIALEXPRESSION: FACIAL GRIMACING
NEGVOCALIZATION: OCCASIONAL MOAN/GROAN, LOW SPEECH, NEGATIVE/DISAPPROVING QUALITY
FACIALEXPRESSION: FACIAL GRIMACING
CONSOLABILITY: UNABLE TO CONSOLE, DISTRACT OR REASSURE
CONSOLABILITY: DISTRACTED OR REASSURED BY VOICE/TOUCH
BODYLANGUAGE: TENSE, DISTRESSED PACING, FIDGETING
NEGVOCALIZATION: REPEATED TROUBLED CALLING OUT, LOUD MOANING/GROANING, CRYING

## 2023-01-01 ASSESSMENT — ACTIVITIES OF DAILY LIVING (ADL)
WALKS IN HOME: NEEDS ASSISTANCE
ADL_ASSISTANCE: INDEPENDENT
ADL_ASSISTANCE: INDEPENDENT
PATIENT'S MEMORY ADEQUATE TO SAFELY COMPLETE DAILY ACTIVITIES?: YES
ADL_ASSISTANCE: INDEPENDENT
BATHING_ASSISTANCE: MINIMAL
LACK_OF_TRANSPORTATION: PATIENT DECLINED
TOILETING: NEEDS ASSISTANCE
ASSISTIVE_DEVICE: WALKER
ADL_ASSISTANCE: INDEPENDENT
ASSISTIVE_DEVICE: WHEELCHAIR
HEARING - LEFT EAR: FUNCTIONAL
FEEDING YOURSELF: NEEDS ASSISTANCE
ADEQUATE_TO_COMPLETE_ADL: YES
JUDGMENT_ADEQUATE_SAFELY_COMPLETE_DAILY_ACTIVITIES: YES
BATHING_ASSISTANCE: TOTAL
GROOMING: NEEDS ASSISTANCE
DRESSING YOURSELF: NEEDS ASSISTANCE
HEARING - RIGHT EAR: FUNCTIONAL
BATHING: NEEDS ASSISTANCE

## 2023-01-01 ASSESSMENT — PATIENT HEALTH QUESTIONNAIRE - PHQ9
1. LITTLE INTEREST OR PLEASURE IN DOING THINGS: NOT AT ALL
SUM OF ALL RESPONSES TO PHQ9 QUESTIONS 1 & 2: 0
SUM OF ALL RESPONSES TO PHQ9 QUESTIONS 1 & 2: 0
1. LITTLE INTEREST OR PLEASURE IN DOING THINGS: NOT AT ALL
2. FEELING DOWN, DEPRESSED OR HOPELESS: NOT AT ALL
2. FEELING DOWN, DEPRESSED OR HOPELESS: NOT AT ALL
1. LITTLE INTEREST OR PLEASURE IN DOING THINGS: NOT AT ALL
SUM OF ALL RESPONSES TO PHQ9 QUESTIONS 1 & 2: 0
2. FEELING DOWN, DEPRESSED OR HOPELESS: NOT AT ALL

## 2023-01-01 ASSESSMENT — LIFESTYLE VARIABLES
AUDIT-C TOTAL SCORE: 0
HOW OFTEN DO YOU HAVE A DRINK CONTAINING ALCOHOL: NEVER
HOW OFTEN DO YOU HAVE 6 OR MORE DRINKS ON ONE OCCASION: NEVER
HOW MANY STANDARD DRINKS CONTAINING ALCOHOL DO YOU HAVE ON A TYPICAL DAY: PATIENT DOES NOT DRINK
HOW OFTEN DO YOU HAVE A DRINK CONTAINING ALCOHOL: NEVER
AUDIT-C TOTAL SCORE: 0
PRESCIPTION_ABUSE_PAST_12_MONTHS: NO
AUDIT-C TOTAL SCORE: 0
HOW OFTEN DO YOU HAVE 6 OR MORE DRINKS ON ONE OCCASION: NEVER
SKIP TO QUESTIONS 9-10: 1
AUDIT-C TOTAL SCORE: 0
SKIP TO QUESTIONS 9-10: 1
SUBSTANCE_ABUSE_PAST_12_MONTHS: NO
HOW MANY STANDARD DRINKS CONTAINING ALCOHOL DO YOU HAVE ON A TYPICAL DAY: PATIENT DOES NOT DRINK

## 2023-01-01 ASSESSMENT — ENCOUNTER SYMPTOMS
ABDOMINAL DISTENTION: 0
FEVER: 0
ABDOMINAL PAIN: 0
AGITATION: 1
SHORTNESS OF BREATH: 1
CHILLS: 0
FACIAL SWELLING: 1
DIFFICULTY URINATING: 1

## 2023-01-01 ASSESSMENT — COLUMBIA-SUICIDE SEVERITY RATING SCALE - C-SSRS
2. HAVE YOU ACTUALLY HAD ANY THOUGHTS OF KILLING YOURSELF?: NO
6. HAVE YOU EVER DONE ANYTHING, STARTED TO DO ANYTHING, OR PREPARED TO DO ANYTHING TO END YOUR LIFE?: NO
1. IN THE PAST MONTH, HAVE YOU WISHED YOU WERE DEAD OR WISHED YOU COULD GO TO SLEEP AND NOT WAKE UP?: NO

## 2023-01-01 ASSESSMENT — PAIN SCALES - WONG BAKER
WONGBAKER_NUMERICALRESPONSE: HURTS LITTLE BIT
WONGBAKER_NUMERICALRESPONSE: NO HURT
WONGBAKER_NUMERICALRESPONSE: HURTS WHOLE LOT
WONGBAKER_NUMERICALRESPONSE: NO HURT
WONGBAKER_NUMERICALRESPONSE: NO HURT
WONGBAKER_NUMERICALRESPONSE: HURTS WHOLE LOT

## 2023-01-01 ASSESSMENT — PAIN DESCRIPTION - DESCRIPTORS
DESCRIPTORS: ACHING

## 2023-01-03 ENCOUNTER — OFFICE VISIT (OUTPATIENT)
Dept: PULMONOLOGY | Age: 88
End: 2023-01-03
Payer: MEDICARE

## 2023-01-03 VITALS
BODY MASS INDEX: 23.63 KG/M2 | SYSTOLIC BLOOD PRESSURE: 138 MMHG | OXYGEN SATURATION: 98 % | WEIGHT: 142 LBS | DIASTOLIC BLOOD PRESSURE: 62 MMHG | HEART RATE: 72 BPM

## 2023-01-03 DIAGNOSIS — J44.9 CHRONIC OBSTRUCTIVE PULMONARY DISEASE, UNSPECIFIED COPD TYPE (HCC): Primary | ICD-10-CM

## 2023-01-03 DIAGNOSIS — J42 CHRONIC BRONCHITIS, UNSPECIFIED CHRONIC BRONCHITIS TYPE (HCC): ICD-10-CM

## 2023-01-03 DIAGNOSIS — R06.02 SHORTNESS OF BREATH: ICD-10-CM

## 2023-01-03 PROCEDURE — G8420 CALC BMI NORM PARAMETERS: HCPCS | Performed by: INTERNAL MEDICINE

## 2023-01-03 PROCEDURE — 3023F SPIROM DOC REV: CPT | Performed by: INTERNAL MEDICINE

## 2023-01-03 PROCEDURE — 1090F PRES/ABSN URINE INCON ASSESS: CPT | Performed by: INTERNAL MEDICINE

## 2023-01-03 PROCEDURE — G8484 FLU IMMUNIZE NO ADMIN: HCPCS | Performed by: INTERNAL MEDICINE

## 2023-01-03 PROCEDURE — 99213 OFFICE O/P EST LOW 20 MIN: CPT | Performed by: INTERNAL MEDICINE

## 2023-01-03 PROCEDURE — G8427 DOCREV CUR MEDS BY ELIG CLIN: HCPCS | Performed by: INTERNAL MEDICINE

## 2023-01-03 PROCEDURE — 1123F ACP DISCUSS/DSCN MKR DOCD: CPT | Performed by: INTERNAL MEDICINE

## 2023-01-03 PROCEDURE — 1036F TOBACCO NON-USER: CPT | Performed by: INTERNAL MEDICINE

## 2023-01-03 ASSESSMENT — ENCOUNTER SYMPTOMS
ABDOMINAL PAIN: 0
RHINORRHEA: 0
WHEEZING: 1
SINUS PRESSURE: 0
NAUSEA: 0
VOICE CHANGE: 0
VOMITING: 0
EYE DISCHARGE: 0
TROUBLE SWALLOWING: 0
COUGH: 1
STRIDOR: 0
CHEST TIGHTNESS: 0
EYE ITCHING: 0
SHORTNESS OF BREATH: 1
DIARRHEA: 0
SORE THROAT: 0

## 2023-01-03 NOTE — PROGRESS NOTES
Subjective:     Laurie Stewart is a 89 y.o. female who complains today of:     Chief Complaint   Patient presents with    Follow-up     3m f/u for COPD        HPI  She is using  nebulizer with albuterol  TID. C/o shortness of breath  with exertion.   C/o  Wheezing. C/o Cough with yellow thick Sputum.  No Hemoptysis.  No Chest tightness.   No Chest pain with radiation  or pleuritic pain.  C/o leg edema.   No orthopnea.  No Fever or chills.   No Rhinorrhea and postnasal drip    Allergies:  Codeine and Penicillins  Past Medical History:   Diagnosis Date    Abnormality of gait and mobility due to Left femoral neck fracture S/P Left Hip Hemiarthroplasty.  [facility] admit 05/01/19. 5/6/2019    This is an 86 year old female with a medical history significant for COPD, SSS S/P PPM, paroxysmal SVT, HTN who was sent to [facility] from [facility] for further evaluation and management of left femoral neck fracture S/P fall.  She states she was walking out of her bathroom when she tripped and fell.  She denies any loss of consciousness and she was unable to get up.  EMS transferred her to [facility] ER.      Arthritis     BILAT    CAD (coronary artery disease)     Closed fracture of head of left femur (HCC) 4/27/2019    Last Assessment & Plan:  S/p left hip arthroplasty Assessment:  Pain well  controlled  PLAN:  Pain control with bowel regimen Ortho  following, going to SNF    COPD (chronic obstructive pulmonary disease) ([facility])     Hip joint replacement by other means 5/6/2011    Hypertension     Left displaced femoral neck fracture ([facility]) 5/1/2019    Lower leg edema     BILAT     Lung disease     Osteoarthritis     Pacemaker 2009    FOR SYNCOPE    Paroxysmal SVT (supraventricular tachycardia) ([facility])     Paroxysmal SVT (supraventricular tachycardia) ([facility])     Pneumonia     Sinusitis      Past Surgical History:   Procedure Laterality Date    CARDIAC PACEMAKER PLACEMENT      HIP FRACTURE SURGERY Bilateral     PACEMAKER INSERTION N/A 2009  Family History   Problem Relation Age of Onset    Cancer Mother     No Known Problems Father      Social History     Socioeconomic History    Marital status:      Spouse name: Not on file    Number of children: Not on file    Years of education: Not on file    Highest education level: Not on file   Occupational History    Not on file   Tobacco Use    Smoking status: Never    Smokeless tobacco: Never   Substance and Sexual Activity    Alcohol use: Not on file    Drug use: No    Sexual activity: Not on file   Other Topics Concern    Not on file   Social History Narrative         Lives With: Daughter    Type of Home: House    Home Layout: One level    Home Access: Stairs to enter with rails    Entrance Stairs - Number of Steps: 3    Entrance Stairs - Rails: None    Bathroom Shower/Tub: Tub/Shower unit, Shower chair with back    Home Equipment: Thelma Lennox, Standard walker, Reacher, Sock aid, Long-handled shoehorn    ADL Assistance: Independent    Homemaking Assistance: Independent    Ambulation Assistance: Independent    Transfer Assistance: Independent    Active : Yes    Mode of Transportation: Car    Occupation: Retired    Type of occupation: Domestic     Leisure & Hobbies: Reading, sports, outdoor acts and being active     Social Determinants of Health     Financial Resource Strain: Not on file   Food Insecurity: Not on file   Transportation Needs: Not on file   Physical Activity: Not on file   Stress: Not on file   Social Connections: Not on file   Intimate Partner Violence: Not on file   Housing Stability: Not on file         Review of Systems   Constitutional:  Negative for chills, diaphoresis, fatigue and fever. HENT:  Negative for congestion, mouth sores, nosebleeds, postnasal drip, rhinorrhea, sinus pressure, sneezing, sore throat, trouble swallowing and voice change. Eyes:  Negative for discharge, itching and visual disturbance.    Respiratory:  Positive for cough, shortness of breath and wheezing. Negative for chest tightness and stridor. Cardiovascular:  Positive for leg swelling. Negative for chest pain and palpitations. Gastrointestinal:  Negative for abdominal pain, diarrhea, nausea and vomiting. Genitourinary:  Negative for difficulty urinating and hematuria. Musculoskeletal:  Negative for arthralgias, joint swelling and myalgias. Skin:  Negative for rash. Allergic/Immunologic: Negative for environmental allergies and food allergies. Neurological:  Negative for dizziness, tremors, weakness and headaches. Psychiatric/Behavioral:  Negative for behavioral problems and sleep disturbance.        :     Vitals:    01/03/23 0936   BP: 138/62   Site: Right Upper Arm   Position: Sitting   Cuff Size: Medium Adult   Pulse: 72   SpO2: 98%   Weight: 142 lb (64.4 kg)     Wt Readings from Last 3 Encounters:   01/03/23 142 lb (64.4 kg)   12/13/22 138 lb (62.6 kg)   10/03/22 140 lb (63.5 kg)         Physical Exam  Constitutional:       General: She is not in acute distress. Appearance: She is well-developed. She is not diaphoretic. HENT:      Head: Normocephalic and atraumatic. Nose: Nose normal.   Eyes:      Pupils: Pupils are equal, round, and reactive to light. Neck:      Thyroid: No thyromegaly. Vascular: No JVD. Trachea: No tracheal deviation. Cardiovascular:      Rate and Rhythm: Normal rate and regular rhythm. Heart sounds: No murmur heard. No friction rub. No gallop. Pulmonary:      Effort: No respiratory distress. Breath sounds: No wheezing or rales. Comments: diminished Breath sound bilaterally. Chest:      Chest wall: No tenderness. Abdominal:      General: There is no distension. Tenderness: There is no abdominal tenderness. There is no rebound. Musculoskeletal:         General: Normal range of motion. Lymphadenopathy:      Cervical: No cervical adenopathy. Skin:     General: Skin is warm and dry.    Neurological: Mental Status: She is alert and oriented to person, place, and time. Coordination: Coordination normal.   Psychiatric:         Mood and Affect: Mood normal.         Behavior: Behavior normal.       Current Outpatient Medications   Medication Sig Dispense Refill    albuterol (PROVENTIL) (2.5 MG/3ML) 0.083% nebulizer solution inhale contents of 1 vial ( 3 milliliters ) in nebulizer by mouth. ..  (REFER TO PRESCRIPTION NOTES). sotalol (BETAPACE) 80 MG tablet Take 1 tablet by mouth 2 times daily 180 tablet 3    verapamil (CALAN SR) 180 MG extended release tablet Take 1 tablet by mouth daily 90 tablet 3    atorvastatin (LIPITOR) 20 MG tablet Take 1 tablet by mouth daily 90 tablet 3    aspirin EC 81 MG EC tablet Take 1 tablet by mouth daily 90 tablet 1    Nebulizers (COMPRESSOR/NEBULIZER) MISC Nebulizer Supplies 1 each 1     No current facility-administered medications for this visit. Results for orders placed during the hospital encounter of 03/15/22    XR CHEST (2 VW)    Narrative  EXAMINATION: XR CHEST (2 VW)    CLINICAL HISTORY: COPD    COMPARISONS: CHEST RADIOGRAPH, FEBRUARY 26, 2021, MARCH 11, 2020    FINDINGS: Pacemaker generator and wires unchanged. Osseous structures are intact. Cardiopericardial silhouette is normal. Pulmonary vasculature is normal. Blunting, left costophrenic angle, unchanged. Impression  NO ACUTE CARDIOPULMONARY DISEASE. Results for orders placed during the hospital encounter of 02/26/21    XR CHEST (2 VW)    Narrative  EXAM: CHEST, 2 VIEWS    CLINICAL HISTORY: J44.9 Chronic obstructive pulmonary disease, unspecified COPD type ([facility] [address] ICD10    COMPARISONS:  Chest x-ray from March 11, 2020    Findings: There is  a dual lead cardiac pacemaker in place  The cardiomediastinal silhouette is within normal limits. The right lung is unremarkable.  There is persistent blunting of the left costophrenic recess which may be secondary to scarring from prior infectious or inflammatory process. The visualized bones are demineralized. Impression  No radiographic evidence of acute intrathoracic process. No change since the previous study with chronic appearing scarring and blunting of the left costophrenic recess. Results for orders placed during the hospital encounter of 03/11/20    XR CHEST STANDARD (2 VW)    Narrative  EXAMINATION: XR CHEST (2 VW)    CLINICAL HISTORY: R06.02 SOB (shortness of breath) ICD10    COMPARISONS: April 27, 2019    FINDINGS:    Two views of the chest are submitted. The cardiac silhouette is of normal size configuration. Pulmonary vascular attenuated. Lungs are hyperinflated. Right sided trachea. No focal infiltrates. Trace/small left pleural effusion. No Pneumothoraces. Impression  TRACE/SMALL LEFT PLEURAL EFFUSION SUPERIMPOSED UPON COPD.  ]  Results for orders placed during the hospital encounter of 04/27/19    XR CHEST PORTABLE    Narrative  EXAMINATION: XR CHEST PORTABLE    CLINICAL HISTORY: STUMBLED AND FELL TO FLOOR. NO LOSS OF CONSCIOUSNESS    COMPARISONS: None available. FINDINGS: Pacemaker generator overlying left axilla with pacemaker lead tips in region of right atrium and right ventricle. Cardiopericardial silhouette is normal in size. Aorta is calcified. Left diaphragm elevated. Mild blunting costophrenic angles. Calcified granuloma right upper lung. Impression  SMALL BILATERAL PLEURAL EFFUSIONS VERSUS THICKENING. 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 Assessment/Plan:     1. Chronic obstructive pulmonary disease, unspecified COPD type ([address]  She is using  nebulizer with albuterol  TID. C/o shortness of breath  with exertion. C/o  Wheezing. C/o Cough with yellow thick Sputum. No Chest tightness. No Chest pain with radiation  or pleuritic pain. C/o leg edema. Continue bronchodilator as before , CXR before next visit . She does not want to do PFT     - XR CHEST STANDARD (2 VW); Future    2.  Chronic bronchitis, unspecified chronic bronchitis type ([address]  C/o Cough with yellow thick Sputum. She has chronic cough , no change. 3. Shortness of breath  She having  Chronic shortness of breath which is likely due to COPD, continue  Bronchodilator as before. keep  Spo2 90% or above. Return in about 3 months (around 4/3/2023) for COPD, CXR result.       Rodger Gill MD

## 2023-01-11 ENCOUNTER — HOSPITAL ENCOUNTER (OUTPATIENT)
Dept: NON INVASIVE DIAGNOSTICS | Age: 88
Discharge: HOME | End: 2023-01-11
Payer: MEDICARE

## 2023-01-11 ENCOUNTER — HOSPITAL ENCOUNTER (OUTPATIENT)
Dept: GENERAL RADIOLOGY | Age: 88
Discharge: HOME | End: 2023-01-13
Payer: MEDICARE

## 2023-01-11 DIAGNOSIS — I34.0 NONRHEUMATIC MITRAL VALVE REGURGITATION: ICD-10-CM

## 2023-01-11 DIAGNOSIS — J44.9 CHRONIC OBSTRUCTIVE PULMONARY DISEASE, UNSPECIFIED COPD TYPE (HCC): ICD-10-CM

## 2023-01-11 LAB
LV EF: 55 %
LVEF MODALITY: NORMAL

## 2023-01-11 PROCEDURE — 71046 X-RAY EXAM CHEST 2 VIEWS: CPT

## 2023-01-11 PROCEDURE — 93306 TTE W/DOPPLER COMPLETE: CPT

## 2023-01-30 ENCOUNTER — OFFICE VISIT (OUTPATIENT)
Dept: CARDIOLOGY CLINIC | Age: 88
End: 2023-01-30
Payer: MEDICARE

## 2023-01-30 ENCOUNTER — PREP FOR PROCEDURE (OUTPATIENT)
Dept: CARDIOLOGY CLINIC | Age: 88
End: 2023-01-30

## 2023-01-30 VITALS
OXYGEN SATURATION: 94 % | HEART RATE: 73 BPM | WEIGHT: 145 LBS | DIASTOLIC BLOOD PRESSURE: 80 MMHG | SYSTOLIC BLOOD PRESSURE: 136 MMHG | BODY MASS INDEX: 24.13 KG/M2

## 2023-01-30 DIAGNOSIS — I10 ESSENTIAL HYPERTENSION: ICD-10-CM

## 2023-01-30 DIAGNOSIS — I25.10 CORONARY ARTERY DISEASE INVOLVING NATIVE CORONARY ARTERY OF NATIVE HEART WITHOUT ANGINA PECTORIS: ICD-10-CM

## 2023-01-30 DIAGNOSIS — Z45.010 ENCOUNTER FOR PACEMAKER AT END OF BATTERY LIFE: Primary | ICD-10-CM

## 2023-01-30 DIAGNOSIS — I48.91 ATRIAL FIBRILLATION, UNSPECIFIED TYPE (HCC): ICD-10-CM

## 2023-01-30 DIAGNOSIS — I49.5 SICK SINUS SYNDROME (HCC): ICD-10-CM

## 2023-01-30 DIAGNOSIS — R60.0 LEG EDEMA: ICD-10-CM

## 2023-01-30 DIAGNOSIS — I27.20 PULMONARY HYPERTENSION (HCC): ICD-10-CM

## 2023-01-30 DIAGNOSIS — I34.0 NONRHEUMATIC MITRAL VALVE REGURGITATION: ICD-10-CM

## 2023-01-30 DIAGNOSIS — R09.89 BILATERAL CAROTID BRUITS: ICD-10-CM

## 2023-01-30 DIAGNOSIS — Z95.0 PACEMAKER: ICD-10-CM

## 2023-01-30 DIAGNOSIS — I47.1 PAROXYSMAL SVT (SUPRAVENTRICULAR TACHYCARDIA) (HCC): ICD-10-CM

## 2023-01-30 DIAGNOSIS — E78.5 DYSLIPIDEMIA: ICD-10-CM

## 2023-01-30 LAB
ANION GAP SERPL CALCULATED.3IONS-SCNC: 11 MEQ/L (ref 9–15)
BUN BLDV-MCNC: 27 MG/DL (ref 8–23)
CALCIUM SERPL-MCNC: 9.2 MG/DL (ref 8.5–9.9)
CHLORIDE BLD-SCNC: 103 MEQ/L (ref 95–107)
CO2: 28 MEQ/L (ref 20–31)
CREAT SERPL-MCNC: 0.78 MG/DL (ref 0.5–0.9)
GFR SERPL CREATININE-BSD FRML MDRD: >60 ML/MIN/{1.73_M2}
GLUCOSE BLD-MCNC: 92 MG/DL (ref 70–99)
HCT VFR BLD CALC: 39.7 % (ref 37–47)
HEMOGLOBIN: 12.9 G/DL (ref 12–16)
MCH RBC QN AUTO: 31 PG (ref 27–31.3)
MCHC RBC AUTO-ENTMCNC: 32.5 % (ref 33–37)
MCV RBC AUTO: 95.2 FL (ref 79.4–94.8)
PDW BLD-RTO: 14.6 % (ref 11.5–14.5)
PLATELET # BLD: 267 K/UL (ref 130–400)
POTASSIUM SERPL-SCNC: 4.2 MEQ/L (ref 3.4–4.9)
RBC # BLD: 4.17 M/UL (ref 4.2–5.4)
SODIUM BLD-SCNC: 142 MEQ/L (ref 135–144)
WBC # BLD: 7.5 K/UL (ref 4.8–10.8)

## 2023-01-30 PROCEDURE — 1123F ACP DISCUSS/DSCN MKR DOCD: CPT | Performed by: INTERNAL MEDICINE

## 2023-01-30 PROCEDURE — 1090F PRES/ABSN URINE INCON ASSESS: CPT | Performed by: INTERNAL MEDICINE

## 2023-01-30 PROCEDURE — G8427 DOCREV CUR MEDS BY ELIG CLIN: HCPCS | Performed by: INTERNAL MEDICINE

## 2023-01-30 PROCEDURE — 99215 OFFICE O/P EST HI 40 MIN: CPT | Performed by: INTERNAL MEDICINE

## 2023-01-30 PROCEDURE — G8420 CALC BMI NORM PARAMETERS: HCPCS | Performed by: INTERNAL MEDICINE

## 2023-01-30 PROCEDURE — 1036F TOBACCO NON-USER: CPT | Performed by: INTERNAL MEDICINE

## 2023-01-30 PROCEDURE — 93000 ELECTROCARDIOGRAM COMPLETE: CPT | Performed by: INTERNAL MEDICINE

## 2023-01-30 PROCEDURE — G8484 FLU IMMUNIZE NO ADMIN: HCPCS | Performed by: INTERNAL MEDICINE

## 2023-01-30 RX ORDER — SODIUM CHLORIDE 9 MG/ML
INJECTION, SOLUTION INTRAVENOUS PRN
Status: CANCELLED | OUTPATIENT
Start: 2023-01-30

## 2023-01-30 RX ORDER — SODIUM CHLORIDE 0.9 % (FLUSH) 0.9 %
5-40 SYRINGE (ML) INJECTION EVERY 12 HOURS SCHEDULED
Status: CANCELLED | OUTPATIENT
Start: 2023-01-30

## 2023-01-30 RX ORDER — SODIUM CHLORIDE 0.9 % (FLUSH) 0.9 %
5-40 SYRINGE (ML) INJECTION PRN
Status: CANCELLED | OUTPATIENT
Start: 2023-01-30

## 2023-01-30 ASSESSMENT — ENCOUNTER SYMPTOMS
BACK PAIN: 1
CHEST TIGHTNESS: 0
EYES NEGATIVE: 1
GASTROINTESTINAL NEGATIVE: 1
BLOOD IN STOOL: 0
STRIDOR: 0
WHEEZING: 0
NAUSEA: 0
COUGH: 0
RESPIRATORY NEGATIVE: 1
SHORTNESS OF BREATH: 0

## 2023-01-30 NOTE — PROGRESS NOTES
Subsequent Progress Note  Patient: Meliton Owen  YOB: 1933  MRN: [id number]    Chief Complaint:AR CAD HTN ppm svt recent L hip sx  Chief Complaint   Patient presents with    Device Check     Pacemaker not working       CV Data:  12/2017 spect negative  12/2017 echo ef 60% Trace AR  9/2018 LAD mid 50% near a small diagonal. Cors all calcified EF 55 EDP 15  4/2019 LTHA   6/2019 CUS mild  PPM [employer] ADDRL1 Adapta  12/2020 Echo EF 60 2+ MR 1-2 AR  RVSP 55   8/21 CUS mild   1/23 Echo EF 55 1+ MR RVSP 51     Subjective/HPI: no cp no sob no further falls no bleed. Takes meds     11/22/2019 no cp no osb no falls no bleed. Can't walk well    7/13/2020 no cp no sob no falls   No bleed. No falls. No bleed. Eats well.     11/13/2020 no cp no sob no falls no bleed no palp    3/12/21 no cp no sob no falls no bleed LE edema no better    7/27/21 Tired all the time. No cp no sob no falls no bleed. Takes meds eats well. 11/29/21 doing well no cp no dizzy still bynum no falls no bleed. 4/8/22 rare janette am palps. None during the day no syncope no cp has chronic sob. No bleed no falls    8/17/22 no cp no sob occ tachy last 20 minutes or so. Occurs 1-2 times a month no falls no bleed. Still unsteady gait. No recent falls    12/13/22 doing well no cp no sob no falls no bleed. No major changes. 1/30/23 pt here for f/u. Doing well no cp no sob no falls no bleed. Not dizzy. [facility] PPM [facility] at [facility]       EKG: SR 72 QTc 449    Past Medical History:   Diagnosis Date    Abnormality of gait and mobility due to Left femoral neck fracture S/P Left Hip Hemiarthroplasty. [facility] admit 05/01/19. 5/6/2019    This is an 80year old female with a medical history significant for COPD, SSS S/P PPM, paroxysmal SVT, HTN who was sent to [facility] from [id number] [facility] for further evaluation and management of left femoral neck fracture S/P fall. She states she was walking out of her bathroom when she tripped and fell.   She denies any loss of consciousness and she was unable to get up. EMS transferred her to [facility] ER. Arthritis     BILAT    CAD (coronary artery disease)     Closed fracture of head of left femur ([facility] [address] 4/27/2019    Last Assessment & Plan:  S/p left hip arthroplasty Assessment:  Pain well  controlled  PLAN:  Pain control with bowel regimen Ortho  following, going to SNF    COPD (chronic obstructive pulmonary disease) ([address]     Hip joint replacement by other means 5/6/2011    Hypertension     Left displaced femoral neck fracture ([address] 5/1/2019    Lower leg edema     BILAT     Lung disease     Osteoarthritis     Pacemaker 2009    FOR SYNCOPE    Paroxysmal SVT (supraventricular tachycardia) ([facility])     Paroxysmal SVT (supraventricular tachycardia) ([facility] [address]     Pneumonia     Sinusitis        Past Surgical History:   Procedure Laterality Date    CARDIAC PACEMAKER PLACEMENT      HIP FRACTURE SURGERY Bilateral     PACEMAKER INSERTION N/A 2009       Family History   Problem Relation Age of Onset    Cancer Mother     No Known Problems Father        Social History     Socioeconomic History    Marital status:       Spouse name: None    Number of children: None    Years of education: None    Highest education level: None   Tobacco Use    Smoking status: Never    Smokeless tobacco: Never   Substance and Sexual Activity    Drug use: No   Social History Narrative         Lives With: Daughter    Type of Home: House    Home Layout: One level    Home Access: Stairs to enter with rails    Entrance Stairs - Number of Steps: 3    Entrance Stairs - Rails: None    Bathroom Shower/Tub: Tub/Shower unit, Shower chair with back    Home Equipment: Cane, Standard walker, Reacher, Sock aid, Long-handled shoehorn    ADL Assistance: Independent    Homemaking Assistance: Independent    Ambulation Assistance: Independent    Transfer Assistance: Independent    Active : Yes    Mode of Transportation: Car    Occupation: Retired    Type of occupation: Domestic     Leisure & Hobbies: Reading, sports, outdoor acts and being active       Allergies   Allergen Reactions    Codeine      Other reaction(s): GI Upset    Penicillins      Other reaction(s): Intolerance       Current Outpatient Medications   Medication Sig Dispense Refill    albuterol (PROVENTIL) (2.5 MG/3ML) 0.083% nebulizer solution inhale contents of 1 vial ( 3 milliliters ) in nebulizer by mouth. ..  (REFER TO PRESCRIPTION NOTES). sotalol (BETAPACE) 80 MG tablet Take 1 tablet by mouth 2 times daily 180 tablet 3    verapamil (CALAN SR) 180 MG extended release tablet Take 1 tablet by mouth daily 90 tablet 3    atorvastatin (LIPITOR) 20 MG tablet Take 1 tablet by mouth daily 90 tablet 3    aspirin EC 81 MG EC tablet Take 1 tablet by mouth daily 90 tablet 1    Nebulizers (COMPRESSOR/NEBULIZER) MISC Nebulizer Supplies 1 each 1     No current facility-administered medications for this visit. Review of Systems:   Review of Systems   Constitutional: Negative. Negative for diaphoresis and fatigue. HENT: Negative. Eyes: Negative. Respiratory: Negative. Negative for cough, chest tightness, shortness of breath, wheezing and stridor. Cardiovascular: Negative. Negative for chest pain, palpitations and leg swelling. Gastrointestinal: Negative. Negative for blood in stool and nausea. Genitourinary: Negative. Musculoskeletal:  Positive for arthralgias, back pain and gait problem. Skin: Negative. Neurological:  Negative for dizziness, syncope, weakness and light-headedness. Hematological: Negative. Psychiatric/Behavioral: Negative. Physical Examination:    /80 (Site: Right Upper Arm, Position: Sitting, Cuff Size: Medium Adult)   Pulse 73   Wt 145 lb (65.8 kg)   SpO2 94%   BMI 24.13 kg/m²    Physical Exam   Constitutional: She appears healthy. No distress. HENT:   Normal cephalic and Atraumatic   Eyes: Pupils are equal, round, and reactive to light.    Neck: Thyroid normal. No JVD present. No neck adenopathy. No thyromegaly present. Cardiovascular: Normal rate, regular rhythm, intact distal pulses and normal pulses. Murmur heard. Pulmonary/Chest: Effort normal and breath sounds normal. She has no wheezes. She has no rales. She exhibits no tenderness. Abdominal: Soft. Bowel sounds are normal. There is no abdominal tenderness. Musculoskeletal:         General: Edema (mild) present. No tenderness. Normal range of motion. Cervical back: Normal range of motion and neck supple. Neurological: She is alert and oriented to person, place, and time. Skin: Skin is warm. No cyanosis. Nails show no clubbing.      LABS:  CBC:   Lab Results   Component Value Date/Time    WBC 6.6 07/30/2022 05:49 AM    RBC 4.27 07/30/2022 05:49 AM    RBC 4.22 01/03/2012 07:26 AM    HGB 13.4 07/30/2022 05:49 AM    HCT 40.4 07/30/2022 05:49 AM    MCV 94.7 07/30/2022 05:49 AM    MCH 31.4 07/30/2022 05:49 AM    MCHC 33.2 07/30/2022 05:49 AM    RDW 15.8 07/30/2022 05:49 AM     07/30/2022 05:49 AM    MPV 8.2 06/27/2015 01:34 AM     Lipids:  Lab Results   Component Value Date    CHOL 168 01/02/2020    CHOL 171 12/05/2017    CHOL 175 10/22/2014     Lab Results   Component Value Date    TRIG 61 01/02/2020    TRIG 71 12/05/2017    TRIG 65 10/22/2014     Lab Results   Component Value Date    HDL 63 (H) 08/04/2021    HDL 58 03/15/2021    HDL 63 (H) 01/02/2020     Lab Results   Component Value Date    LDLCALC 91 08/04/2021    LDLCALC 86 03/15/2021    LDLCALC 93 01/02/2020     No results found for: LABVLDL, VLDL  Lab Results   Component Value Date    CHOLHDLRATIO 3.2 01/03/2012     CMP:    Lab Results   Component Value Date/Time     07/30/2022 05:15 AM    K 4.7 07/30/2022 05:15 AM    K 4.4 05/02/2019 06:22 AM     07/30/2022 05:15 AM    CO2 27 07/30/2022 05:15 AM    BUN 28 07/30/2022 05:15 AM    CREATININE 0.72 07/30/2022 05:15 AM    GFRAA >60.0 07/30/2022 05:15 AM    LABGLOM >60.0 07/30/2022 05:15 AM    GLUCOSE 105 07/30/2022 05:15 AM    GLUCOSE 93 01/03/2012 07:26 AM    PROT 7.5 07/30/2022 05:15 AM    LABALBU 3.9 07/30/2022 05:15 AM    LABALBU 4.0 01/03/2012 07:26 AM    CALCIUM 9.6 07/30/2022 05:15 AM    BILITOT 0.5 07/30/2022 05:15 AM    ALKPHOS 60 07/30/2022 05:15 AM    AST 26 07/30/2022 05:15 AM    ALT 11 07/30/2022 05:15 AM     BMP:    Lab Results   Component Value Date/Time     07/30/2022 05:15 AM    K 4.7 07/30/2022 05:15 AM    K 4.4 05/02/2019 06:22 AM     07/30/2022 05:15 AM    CO2 27 07/30/2022 05:15 AM    BUN 28 07/30/2022 05:15 AM    LABALBU 3.9 07/30/2022 05:15 AM    LABALBU 4.0 01/03/2012 07:26 AM    CREATININE 0.72 07/30/2022 05:15 AM    CALCIUM 9.6 07/30/2022 05:15 AM    GFRAA >60.0 07/30/2022 05:15 AM    LABGLOM >60.0 07/30/2022 05:15 AM    GLUCOSE 105 07/30/2022 05:15 AM    GLUCOSE 93 01/03/2012 07:26 AM     Magnesium:    Lab Results   Component Value Date/Time    MG 1.9 07/30/2022 05:15 AM    MG 2.0 01/03/2012 07:26 AM     TSH:  Lab Results   Component Value Date    TSH 2.200 08/04/2021       Patient Active Problem List   Diagnosis    Paroxysmal SVT (supraventricular tachycardia) (HCC)    Pacemaker    Sick sinus syndrome (HCC)    Essential hypertension    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       Modified Medications    No medications on file       No orders of the defined types were placed in this encounter. Assessment/Plan:    1. Paroxysmal SVT (supraventricular tachycardia) (HCC)   stable QTc - on Sotalol. Stable QTc.      2. Pacemaker   interrogate - reviewed. -interrogate - at JORGE and reached RRT - RBA discussed we will proceed. Labs today. Hold Verapamil to minimize AVN suppressin until generator change. 3. Essential hypertension   stable - continue meds. Low salt diet  - EKG 12 lead    4. Coronary artery disease involving native coronary artery of native heart without angina pectoris   no angina  - EKG 12 lead    5. AR- need echo - stable     6. LE Edeam- Maxzide - conintue    7. Tired- will get labs. 8. HPL - continue statin. Low fat diet. Labs reviewed with pt. She was ntaking Statin for couple months. Will resume. discussed w [pt. Need f/u CUS - stable     9. MR- Echo stable          Counseling:  Heart Healthy Lifestyle, Low Salt Diet, Take Precautions to Prevent Falls and Walk Daily    No follow-ups on file.       Electronically signed by Janis Linton MD on 1/30/2023 at 1:31 PM

## 2023-02-02 ENCOUNTER — HOSPITAL ENCOUNTER (OUTPATIENT)
Dept: CARDIAC CATH/INVASIVE PROCEDURES | Age: 88
Discharge: HOME | End: 2023-02-02
Attending: INTERNAL MEDICINE | Admitting: INTERNAL MEDICINE
Payer: MEDICARE

## 2023-02-02 VITALS
RESPIRATION RATE: 18 BRPM | BODY MASS INDEX: 23.32 KG/M2 | HEIGHT: 65 IN | HEART RATE: 63 BPM | DIASTOLIC BLOOD PRESSURE: 39 MMHG | OXYGEN SATURATION: 93 % | SYSTOLIC BLOOD PRESSURE: 107 MMHG | TEMPERATURE: 97.1 F | WEIGHT: 140 LBS

## 2023-02-02 DIAGNOSIS — Z45.010 ENCOUNTER FOR PACEMAKER AT END OF BATTERY LIFE: ICD-10-CM

## 2023-02-02 DIAGNOSIS — I48.91 ATRIAL FIBRILLATION, UNSPECIFIED TYPE (HCC): ICD-10-CM

## 2023-02-02 DIAGNOSIS — I47.1 PAROXYSMAL SVT (SUPRAVENTRICULAR TACHYCARDIA) (HCC): ICD-10-CM

## 2023-02-02 DIAGNOSIS — Z95.0 PACEMAKER: ICD-10-CM

## 2023-02-02 DIAGNOSIS — I49.5 SICK SINUS SYNDROME (HCC): ICD-10-CM

## 2023-02-02 PROCEDURE — 2709999900 HC NON-CHARGEABLE SUPPLY

## 2023-02-02 PROCEDURE — 33228 REMV&REPLC PM GEN DUAL LEAD: CPT | Performed by: INTERNAL MEDICINE

## 2023-02-02 PROCEDURE — 2580000003 HC RX 258: Performed by: INTERNAL MEDICINE

## 2023-02-02 PROCEDURE — C1785 PMKR, DUAL, RATE-RESP: HCPCS

## 2023-02-02 PROCEDURE — 99152 MOD SED SAME PHYS/QHP 5/>YRS: CPT

## 2023-02-02 PROCEDURE — 99152 MOD SED SAME PHYS/QHP 5/>YRS: CPT | Performed by: INTERNAL MEDICINE

## 2023-02-02 PROCEDURE — 6360000002 HC RX W HCPCS: Performed by: INTERNAL MEDICINE

## 2023-02-02 PROCEDURE — 6360000002 HC RX W HCPCS

## 2023-02-02 PROCEDURE — 2500000003 HC RX 250 WO HCPCS

## 2023-02-02 PROCEDURE — 2580000003 HC RX 258

## 2023-02-02 PROCEDURE — 33228 REMV&REPLC PM GEN DUAL LEAD: CPT

## 2023-02-02 RX ORDER — ACETAMINOPHEN 325 MG/1
650 TABLET ORAL EVERY 4 HOURS PRN
Status: DISCONTINUED | OUTPATIENT
Start: 2023-02-02 | End: 2023-02-02 | Stop reason: HOSPADM

## 2023-02-02 RX ORDER — ONDANSETRON 2 MG/ML
4 INJECTION INTRAMUSCULAR; INTRAVENOUS EVERY 6 HOURS PRN
Status: DISCONTINUED | OUTPATIENT
Start: 2023-02-02 | End: 2023-02-02 | Stop reason: HOSPADM

## 2023-02-02 RX ORDER — MIDAZOLAM HYDROCHLORIDE 2 MG/2ML
2 INJECTION, SOLUTION INTRAMUSCULAR; INTRAVENOUS
Status: DISCONTINUED | OUTPATIENT
Start: 2023-02-02 | End: 2023-02-02 | Stop reason: HOSPADM

## 2023-02-02 RX ORDER — SODIUM CHLORIDE 0.9 % (FLUSH) 0.9 %
5-40 SYRINGE (ML) INJECTION PRN
Status: DISCONTINUED | OUTPATIENT
Start: 2023-02-02 | End: 2023-02-02 | Stop reason: HOSPADM

## 2023-02-02 RX ORDER — SODIUM CHLORIDE 9 MG/ML
INJECTION, SOLUTION INTRAVENOUS PRN
Status: DISCONTINUED | OUTPATIENT
Start: 2023-02-02 | End: 2023-02-02 | Stop reason: HOSPADM

## 2023-02-02 RX ORDER — FENTANYL CITRATE 0.05 MG/ML
25 INJECTION, SOLUTION INTRAMUSCULAR; INTRAVENOUS
Status: DISCONTINUED | OUTPATIENT
Start: 2023-02-02 | End: 2023-02-02 | Stop reason: HOSPADM

## 2023-02-02 RX ORDER — SODIUM CHLORIDE 0.9 % (FLUSH) 0.9 %
5-40 SYRINGE (ML) INJECTION EVERY 12 HOURS SCHEDULED
Status: DISCONTINUED | OUTPATIENT
Start: 2023-02-02 | End: 2023-02-02 | Stop reason: HOSPADM

## 2023-02-02 RX ADMIN — CEFAZOLIN 1000 MG: 1 INJECTION, POWDER, FOR SOLUTION INTRAMUSCULAR; INTRAVENOUS at 08:43

## 2023-02-02 NOTE — BRIEF OP NOTE
Section of Cardiology  Adult Brief Pacemaker Procedure Note        Procedure(s): Dual pacemaker generator change, MRI compatible    Pre-operative Diagnosis:  Pacer JORGE    H&P Status: Completed and reviewed.      Post-operative Diagnosis:  Successful dual-chamber permanent pacemaker generator replacement    Findings: see full report    Complications:  none    Primary Proceduralist:   Dr. Nadya Woodward       Full procedure note to follow

## 2023-02-02 NOTE — DISCHARGE INSTRUCTIONS
No driving for 24 hours. Keep dressing on and dry for 7 days. May resume normal activities as tolerated.

## 2023-02-02 NOTE — PROGRESS NOTES
Pt. Arrives to pre/post cath from procedure. Awake and alert. L subclavian chest aquacel dressing is clean dry and intact. No signs of bleeding or hematoma. Vital signs are stable. No complaints at this time. Family updated in waiting room. [id number] Discharge instructions given with a demonstration of understanding. 1000 Pt discharged home with son.

## 2023-02-06 NOTE — PROCEDURES
Tiffanie Drake [address]                      [address], [address] [facility]                                 PROCEDURE NOTE    PATIENT NAME: Gerardo Castro                    :        1933  MED REC NO:   [id number]                            ROOM:  ACCOUNT NO:   [de-identified]                           ADMIT DATE: 2023  PROVIDER:     Kenneth Guillermo DO    DATE OF PROCEDURE:  2023    PROCEDURE PERFORMED:  Dual chamber generator replacement. INDICATIONS:  Pacemaker JORGE. PROCEDURE PERFORMED BY:  Damian Guillermo D.O.    COMPLICATIONS:  None. DESCRIPTION OF PROCEDURE:  The patient was brought to the cardiac cath  lab suite where she was sterilely prepped and draped in the usual  fashion. 1% lidocaine was used the to anesthetize the left subclavian  site over the previous pacemaker generator and incision was made and  dissection was carried down to the level of the generator pocket and the  generator was freed from the pocket and leads were released from the  previous old generator and inserted a new generator. In the correct  _____ and securing the placement of such were provided. Adequate  sensing and pacing thresholds were obtained. The pacemaker generator  was placed back in the pocket with a leads facing posteriorly and  incision was approximated for usual technique with dissolvable sutures. Sterile dressing was overlaid. The patient was transferred to [facility]  holding area in stable and satisfactory condition. Moderate IV conscious sedation was given with IV fentanyl as well as  Versed for the duration of the procedure/greater than 50 minutes under  my direct supervision as well as independent trained  observer/circulating RN. GENERATOR DATA:  Medtronic model [id number], serial number is [id number]. LEAD DATA:  RIGHT ATRIAL LEAD DATA:  Model number is [id number], serial number is  [id number].     RIGHT VENTRICLE LEAD DATA:  Model number is 1779-38, serial number is  [id number]. STIMULATION THRESHOLDS:  Right atrium is 0.5 volts, impedance 418 ohms,  P-wave sensing is 1.6 mV. For the right ventricle, 0.7 volts, impedance  513 ohms. PARAMETER SETTINGS:  Pacing mode is AAIR with mode switch to DDDD. Lower tracking rate is 70. Upper tracking rate is 100. ASSESSMENT:  Status post successful dual chamber permanent pacemaker  generator replacement, MRI compatible.     PLAN:  Post-procedure care as usual.        JAIME GONZALES DO    D: 02/05/2023 22:02:27       T: 02/05/2023 23:15:31     [email or web address]  Job#: [id number]     Doc#: [id number]    CC:

## 2023-02-09 ENCOUNTER — HOSPITAL ENCOUNTER (OUTPATIENT)
Dept: ULTRASOUND IMAGING | Age: 88
Discharge: HOME | End: 2023-02-11
Payer: MEDICARE

## 2023-02-09 ENCOUNTER — OFFICE VISIT (OUTPATIENT)
Dept: CARDIOLOGY CLINIC | Age: 88
End: 2023-02-09
Payer: MEDICARE

## 2023-02-09 VITALS
DIASTOLIC BLOOD PRESSURE: 64 MMHG | HEIGHT: 65 IN | RESPIRATION RATE: 14 BRPM | WEIGHT: 144 LBS | HEART RATE: 77 BPM | SYSTOLIC BLOOD PRESSURE: 118 MMHG | OXYGEN SATURATION: 97 % | BODY MASS INDEX: 23.99 KG/M2

## 2023-02-09 DIAGNOSIS — I48.91 ATRIAL FIBRILLATION, UNSPECIFIED TYPE (HCC): Primary | ICD-10-CM

## 2023-02-09 DIAGNOSIS — R60.0 LEG EDEMA: ICD-10-CM

## 2023-02-09 DIAGNOSIS — E78.5 DYSLIPIDEMIA: ICD-10-CM

## 2023-02-09 DIAGNOSIS — I27.20 PULMONARY HYPERTENSION (HCC): ICD-10-CM

## 2023-02-09 DIAGNOSIS — R09.89 BILATERAL CAROTID BRUITS: ICD-10-CM

## 2023-02-09 DIAGNOSIS — I49.5 SICK SINUS SYNDROME (HCC): ICD-10-CM

## 2023-02-09 DIAGNOSIS — Z95.0 PACEMAKER: ICD-10-CM

## 2023-02-09 DIAGNOSIS — I10 ESSENTIAL HYPERTENSION: ICD-10-CM

## 2023-02-09 DIAGNOSIS — I25.10 CORONARY ARTERY DISEASE INVOLVING NATIVE CORONARY ARTERY OF NATIVE HEART WITHOUT ANGINA PECTORIS: ICD-10-CM

## 2023-02-09 DIAGNOSIS — Z45.010 ENCOUNTER FOR PACEMAKER AT END OF BATTERY LIFE: ICD-10-CM

## 2023-02-09 DIAGNOSIS — Z76.89 ENCOUNTER TO ESTABLISH CARE: ICD-10-CM

## 2023-02-09 DIAGNOSIS — I47.1 PAROXYSMAL SVT (SUPRAVENTRICULAR TACHYCARDIA) (HCC): ICD-10-CM

## 2023-02-09 DIAGNOSIS — I34.0 NONRHEUMATIC MITRAL VALVE REGURGITATION: ICD-10-CM

## 2023-02-09 PROCEDURE — G8420 CALC BMI NORM PARAMETERS: HCPCS | Performed by: INTERNAL MEDICINE

## 2023-02-09 PROCEDURE — 93970 EXTREMITY STUDY: CPT

## 2023-02-09 PROCEDURE — 99214 OFFICE O/P EST MOD 30 MIN: CPT | Performed by: INTERNAL MEDICINE

## 2023-02-09 PROCEDURE — G8427 DOCREV CUR MEDS BY ELIG CLIN: HCPCS | Performed by: INTERNAL MEDICINE

## 2023-02-09 PROCEDURE — 1090F PRES/ABSN URINE INCON ASSESS: CPT | Performed by: INTERNAL MEDICINE

## 2023-02-09 PROCEDURE — 93000 ELECTROCARDIOGRAM COMPLETE: CPT | Performed by: INTERNAL MEDICINE

## 2023-02-09 PROCEDURE — 1036F TOBACCO NON-USER: CPT | Performed by: INTERNAL MEDICINE

## 2023-02-09 PROCEDURE — 1123F ACP DISCUSS/DSCN MKR DOCD: CPT | Performed by: INTERNAL MEDICINE

## 2023-02-09 PROCEDURE — 93970 EXTREMITY STUDY: CPT | Performed by: INTERNAL MEDICINE

## 2023-02-09 PROCEDURE — G8484 FLU IMMUNIZE NO ADMIN: HCPCS | Performed by: INTERNAL MEDICINE

## 2023-02-09 ASSESSMENT — ENCOUNTER SYMPTOMS
COUGH: 0
WHEEZING: 0
NAUSEA: 0
GASTROINTESTINAL NEGATIVE: 1
EYES NEGATIVE: 1
SHORTNESS OF BREATH: 0
CHEST TIGHTNESS: 0
BACK PAIN: 1
BLOOD IN STOOL: 0
RESPIRATORY NEGATIVE: 1
STRIDOR: 0

## 2023-02-09 NOTE — PROGRESS NOTES
Subsequent Progress Note  Patient: Tavares Castro  YOB: 1933  MRN: [id number]    Chief Complaint:AR CAD HTN ppm svt recent L hip sx  Chief Complaint   Patient presents with    Post-Op Check     Pacemaker Change 02/02       CV Data:  12/2017 spect negative  12/2017 echo ef 60% Trace AR  9/2018 LAD mid 50% near a small diagonal. Cors all calcified EF 55 EDP 15  4/2019 LTHA   6/2019 CUS mild  PPM Medtronic ADDRL1 Adapta  12/2020 Echo EF 60 2+ MR 1-2 AR  RVSP 55   8/21 CUS mild   1/23 Echo EF 55 1+ MR RVSP 51   2/2/2023 PPM generator change     Subjective/HPI: no cp no sob no further falls no bleed. Takes meds     11/22/2019 no cp no osb no falls no bleed. Can't walk well    7/13/2020 no cp no sob no falls   No bleed. No falls. No bleed. Eats well.     11/13/2020 no cp no sob no falls no bleed no palp    3/12/21 no cp no sob no falls no bleed LE edema no better    7/27/21 Tired all the time. No cp no sob no falls no bleed. Takes meds eats well. 11/29/21 doing well no cp no dizzy still bynum no falls no bleed. 4/8/22 rare janette am palps. None during the day no syncope no cp has chronic sob. No bleed no falls    8/17/22 no cp no sob occ tachy last 20 minutes or so. Occurs 1-2 times a month no falls no bleed. Still unsteady gait. No recent falls    12/13/22 doing well no cp no sob no falls no bleed. No major changes. 1/30/23 pt here for f/u. Doing well no cp no sob no falls no bleed. Not dizzy. Her PPM is at [facility]     2/9/23 had PPM change for JORGE. Since then Both leg pain and severe edema R>L. No cp no sob. No falls no bleed      EKG: SR 66 QTc 452    Past Medical History:   Diagnosis Date    Abnormality of gait and mobility due to Left femoral neck fracture S/P Left Hip Hemiarthroplasty.   [facility] admit 05/01/19. 5/6/2019    This is an 80year old female with a medical history significant for COPD, SSS S/P PPM, paroxysmal SVT, HTN who was sent to [place] from [facility] for further evaluation and management of left femoral neck fracture S/P fall. She states she was walking out of her bathroom when she tripped and fell. She denies any loss of consciousness and she was unable to get up. EMS transferred her to [facility]. Arthritis     BILAT    CAD (coronary artery disease)     Closed fracture of head of left femur ([facility] 4/27/2019    Last Assessment & Plan:  S/p left hip arthroplasty Assessment:  Pain well  controlled  PLAN:  Pain control with bowel regimen Ortho  following, going to SNF    COPD (chronic obstructive pulmonary disease) ([facility] [address]     Hip joint replacement by other means 5/6/2011    Hypertension     Left displaced femoral neck fracture ([facility] [address] 5/1/2019    Lower leg edema     BILAT     Lung disease     Osteoarthritis     Pacemaker 2009    FOR SYNCOPE    Paroxysmal SVT (supraventricular tachycardia) ([facility]     Paroxysmal SVT (supraventricular tachycardia) ([facility] [address]     Pneumonia     Sinusitis        Past Surgical History:   Procedure Laterality Date    CARDIAC PACEMAKER PLACEMENT      HIP FRACTURE SURGERY Bilateral     PACEMAKER INSERTION N/A 2009       Family History   Problem Relation Age of Onset    Cancer Mother     No Known Problems Father        Social History     Socioeconomic History    Marital status:     Tobacco Use    Smoking status: Never    Smokeless tobacco: Never   Substance and Sexual Activity    Drug use: No   Social History Narrative         Lives With: Daughter    Type of Home: House    Home Layout: One level    Home Access: Stairs to enter with rails    Entrance Stairs - Number of Steps: 3    Entrance Stairs - Rails: None    Bathroom Shower/Tub: Tub/Shower unit, Shower chair with back    Home Equipment: Cane, Standard walker, Reacher, Sock aid, Long-handled shoehorn    ADL Assistance: Independent    Homemaking Assistance: Independent    Ambulation Assistance: Independent    Transfer Assistance: Independent    Active : Yes    Mode of Transportation: Car    Occupation: Retired    Type of occupation: Domestic     Leisure & Hobbies: Reading, sports, outdoor acts and being active       Allergies   Allergen Reactions    Codeine      Other reaction(s): GI Upset    Penicillins      Other reaction(s): Intolerance       Current Outpatient Medications   Medication Sig Dispense Refill    albuterol (PROVENTIL) (2.5 MG/3ML) 0.083% nebulizer solution inhale contents of 1 vial ( 3 milliliters ) in nebulizer by mouth. ..  (REFER TO PRESCRIPTION NOTES). sotalol (BETAPACE) 80 MG tablet Take 1 tablet by mouth 2 times daily 180 tablet 3    verapamil (CALAN SR) 180 MG extended release tablet Take 1 tablet by mouth daily 90 tablet 3    atorvastatin (LIPITOR) 20 MG tablet Take 1 tablet by mouth daily 90 tablet 3    aspirin EC 81 MG EC tablet Take 1 tablet by mouth daily 90 tablet 1    Nebulizers (COMPRESSOR/NEBULIZER) MISC Nebulizer Supplies 1 each 1     No current facility-administered medications for this visit. Review of Systems:   Review of Systems   Constitutional: Negative. Negative for diaphoresis and fatigue. HENT: Negative. Eyes: Negative. Respiratory: Negative. Negative for cough, chest tightness, shortness of breath, wheezing and stridor. Cardiovascular: Negative. Negative for chest pain, palpitations and leg swelling. Gastrointestinal: Negative. Negative for blood in stool and nausea. Genitourinary: Negative. Musculoskeletal:  Positive for arthralgias, back pain and gait problem. Skin: Negative. Neurological:  Negative for dizziness, syncope, weakness and light-headedness. Hematological: Negative. Psychiatric/Behavioral: Negative. Physical Examination:    /64 (Site: Right Upper Arm, Position: Sitting, Cuff Size: Small Adult)   Pulse 77   Resp 14   Ht 5' 5\" (1.651 m)   Wt 144 lb (65.3 kg)   SpO2 97%   BMI 23.96 kg/m²    Physical Exam   Constitutional: She appears healthy. No distress.    HENT:   Normal cephalic and Atraumatic   Eyes: Pupils are equal, round, and reactive to light. Neck: Thyroid normal. No JVD present. No neck adenopathy. No thyromegaly present. Cardiovascular: Normal rate, regular rhythm, intact distal pulses and normal pulses. Murmur heard. Pulmonary/Chest: Effort normal and breath sounds normal. She has no wheezes. She has no rales. She exhibits no tenderness. Abdominal: Soft. Bowel sounds are normal. There is no abdominal tenderness. Musculoskeletal:         General: Edema (mild) present. No tenderness. Normal range of motion. Cervical back: Normal range of motion and neck supple. Neurological: She is alert and oriented to person, place, and time. Skin: Skin is warm. No cyanosis. Nails show no clubbing.      LABS:  CBC:   Lab Results   Component Value Date/Time    WBC 7.5 01/30/2023 02:02 PM    RBC 4.17 01/30/2023 02:02 PM    RBC 4.22 01/03/2012 07:26 AM    HGB 12.9 01/30/2023 02:02 PM    HCT 39.7 01/30/2023 02:02 PM    MCV 95.2 01/30/2023 02:02 PM    MCH 31.0 01/30/2023 02:02 PM    MCHC 32.5 01/30/2023 02:02 PM    RDW 14.6 01/30/2023 02:02 PM     01/30/2023 02:02 PM    MPV 8.2 06/27/2015 01:34 AM     Lipids:  Lab Results   Component Value Date    CHOL 168 01/02/2020    CHOL 171 12/05/2017    CHOL 175 10/22/2014     Lab Results   Component Value Date    TRIG 61 01/02/2020    TRIG 71 12/05/2017    TRIG 65 10/22/2014     Lab Results   Component Value Date    HDL 63 (H) 08/04/2021    HDL 58 03/15/2021    HDL 63 (H) 01/02/2020     Lab Results   Component Value Date    LDLCALC 91 08/04/2021    LDLCALC 86 03/15/2021    LDLCALC 93 01/02/2020     No results found for: LABVLDL, VLDL  Lab Results   Component Value Date    CHOLHDLRATIO 3.2 01/03/2012     CMP:    Lab Results   Component Value Date/Time     01/30/2023 02:02 PM    K 4.2 01/30/2023 02:02 PM    K 4.4 05/02/2019 06:22 AM     01/30/2023 02:02 PM    CO2 28 01/30/2023 02:02 PM    BUN 27 01/30/2023 02:02 PM    CREATININE 0.78 01/30/2023 02:02 PM    GFRAA >60.0 07/30/2022 05:15 AM    LABGLOM >60.0 01/30/2023 02:02 PM    GLUCOSE 92 01/30/2023 02:02 PM    GLUCOSE 93 01/03/2012 07:26 AM    PROT 7.5 07/30/2022 05:15 AM    LABALBU 3.9 07/30/2022 05:15 AM    LABALBU 4.0 01/03/2012 07:26 AM    CALCIUM 9.2 01/30/2023 02:02 PM    BILITOT 0.5 07/30/2022 05:15 AM    ALKPHOS 60 07/30/2022 05:15 AM    AST 26 07/30/2022 05:15 AM    ALT 11 07/30/2022 05:15 AM     BMP:    Lab Results   Component Value Date/Time     01/30/2023 02:02 PM    K 4.2 01/30/2023 02:02 PM    K 4.4 05/02/2019 06:22 AM     01/30/2023 02:02 PM    CO2 28 01/30/2023 02:02 PM    BUN 27 01/30/2023 02:02 PM    LABALBU 3.9 07/30/2022 05:15 AM    LABALBU 4.0 01/03/2012 07:26 AM    CREATININE 0.78 01/30/2023 02:02 PM    CALCIUM 9.2 01/30/2023 02:02 PM    GFRAA >60.0 07/30/2022 05:15 AM    LABGLOM >60.0 01/30/2023 02:02 PM    GLUCOSE 92 01/30/2023 02:02 PM    GLUCOSE 93 01/03/2012 07:26 AM     Magnesium:    Lab Results   Component Value Date/Time    MG 1.9 07/30/2022 05:15 AM    MG 2.0 01/03/2012 07:26 AM     TSH:  Lab Results   Component Value Date    TSH 2.200 08/04/2021       Patient Active Problem List   Diagnosis    Paroxysmal SVT (supraventricular tachycardia) (HCC)    Pacemaker    Sick sinus syndrome (HCC)    Essential hypertension    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 Modified Medications    No medications on file       No orders of the defined types were placed in this encounter. Assessment/Plan:    1. Paroxysmal SVT (supraventricular tachycardia) (HCC)   stable QTc - on Sotalol. Stable QTc.      2. Pacemaker   interrogate - reviewed. -interrogate - at JORGE and reached RRT - RBA discussed we will proceed. Labs today. Hold Verapamil to minimize AVN suppressin until generator change. PPM site stalbe. 3. Essential hypertension   stable - continue meds. Low salt diet  - EKG 12 lead    4. Coronary artery disease involving native coronary artery of native heart without angina pectoris   no angina  - EKG 12 lead    5. AR- need echo - stable     6. LE Edeam- Maxzide - conintue    7. Tired- will get labs. 8. HPL - continue statin. Low fat diet. Labs reviewed with pt. She was ntaking Statin for couple months. Will resume. discussed w [pt. Need f/u CUS - stable     9. MR- Echo stable      10. New LE Edema and pain R>L - STAT LE Venous DUplex. ADD: reviewed no DVT    Counseling:  Heart Healthy Lifestyle, Low Salt Diet, Take Precautions to Prevent Falls and Walk Daily    Return in about 1 month (around 3/9/2023).       Electronically signed by Lucrecia Chandler MD on 2/9/2023 at 12:26 PM

## 2023-03-31 ENCOUNTER — APPOINTMENT (OUTPATIENT)
Dept: CT IMAGING | Age: 88
End: 2023-03-31
Payer: MEDICARE

## 2023-03-31 ENCOUNTER — HOSPITAL ENCOUNTER (OUTPATIENT)
Age: 88
Setting detail: OBSERVATION
Discharge: HOME | End: 2023-04-01
Attending: EMERGENCY MEDICINE | Admitting: INTERNAL MEDICINE
Payer: MEDICARE

## 2023-03-31 ENCOUNTER — APPOINTMENT (OUTPATIENT)
Dept: GENERAL RADIOLOGY | Age: 88
End: 2023-03-31
Payer: MEDICARE
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
## 2023-03-31 DIAGNOSIS — T68.XXXA HYPOTHERMIA, INITIAL ENCOUNTER: ICD-10-CM

## 2023-03-31 DIAGNOSIS — R41.82 ALTERED MENTAL STATUS, UNSPECIFIED ALTERED MENTAL STATUS TYPE: ICD-10-CM

## 2023-03-31 LAB
ALBUMIN SERPL-MCNC: 3.6 G/DL (ref 3.5–4.6)
ALP SERPL-CCNC: 95 U/L (ref 40–130)
ALT SERPL-CCNC: 11 U/L (ref 0–33)
ANION GAP SERPL CALCULATED.3IONS-SCNC: 13 MEQ/L (ref 9–15)
AST SERPL-CCNC: 23 U/L (ref 0–35)
BACTERIA URNS QL MICRO: NEGATIVE /HPF
BASOPHILS # BLD: 0 K/UL (ref 0–0.2)
BASOPHILS NFR BLD: 0.1 %
BILIRUB SERPL-MCNC: 1.7 MG/DL (ref 0.2–0.7)
BILIRUB UR QL STRIP: NEGATIVE
BUN SERPL-MCNC: 30 MG/DL (ref 8–23)
CALCIUM SERPL-MCNC: 9.3 MG/DL (ref 8.5–9.9)
CHLORIDE SERPL-SCNC: 99 MEQ/L (ref 95–107)
CK SERPL-CCNC: 233 U/L (ref 0–170)
CLARITY UR: CLEAR
CO2 SERPL-SCNC: 27 MEQ/L (ref 20–31)
COLOR UR: YELLOW
CREAT SERPL-MCNC: 0.51 MG/DL (ref 0.5–0.9)
EOSINOPHIL # BLD: 0 K/UL (ref 0–0.7)
EOSINOPHIL NFR BLD: 0 %
EPI CELLS #/AREA URNS AUTO: ABNORMAL /HPF (ref 0–5)
ERYTHROCYTE [DISTWIDTH] IN BLOOD BY AUTOMATED COUNT: 15 % (ref 11.5–14.5)
GLOBULIN SER CALC-MCNC: 4 G/DL (ref 2.3–3.5)
GLUCOSE SERPL-MCNC: 96 MG/DL (ref 70–99)
GLUCOSE UR STRIP-MCNC: NEGATIVE MG/DL
HCT VFR BLD AUTO: 46.2 % (ref 37–47)
HGB BLD-MCNC: 14.9 G/DL (ref 12–16)
HGB UR QL STRIP: ABNORMAL
HYALINE CASTS #/AREA URNS AUTO: ABNORMAL /HPF (ref 0–5)
KETONES UR STRIP-MCNC: 15 MG/DL
LACTATE BLDV-SCNC: 1.4 MMOL/L (ref 0.5–2.2)
LEUKOCYTE ESTERASE UR QL STRIP: NEGATIVE
LYMPHOCYTES # BLD: 1.2 K/UL (ref 1–4.8)
LYMPHOCYTES NFR BLD: 8.4 %
MCH RBC QN AUTO: 30.4 PG (ref 27–31.3)
MCHC RBC AUTO-ENTMCNC: 32.3 % (ref 33–37)
MCV RBC AUTO: 94.2 FL (ref 79.4–94.8)
MONOCYTES # BLD: 1.1 K/UL (ref 0.2–0.8)
MONOCYTES NFR BLD: 7.4 %
NEUTROPHILS # BLD: 12.5 K/UL (ref 1.4–6.5)
NEUTS SEG NFR BLD: 84.1 %
NITRITE UR QL STRIP: NEGATIVE
PH UR STRIP: 6 [PH] (ref 5–9)
PLATELET # BLD AUTO: 240 K/UL (ref 130–400)
POTASSIUM SERPL-SCNC: 4.2 MEQ/L (ref 3.4–4.9)
PROT SERPL-MCNC: 7.6 G/DL (ref 6.3–8)
PROT UR STRIP-MCNC: 100 MG/DL
RBC # BLD AUTO: 4.9 M/UL (ref 4.2–5.4)
RBC #/AREA URNS AUTO: ABNORMAL /HPF (ref 0–5)
SODIUM SERPL-SCNC: 139 MEQ/L (ref 135–144)
SP GR UR STRIP: 1.01 (ref 1–1.03)
TROPONIN T SERPL-MCNC: <0.01 NG/ML (ref 0–0.01)
URINE REFLEX TO CULTURE: ABNORMAL
UROBILINOGEN UR STRIP-ACNC: 1 E.U./DL
WBC # BLD AUTO: 14.8 K/UL (ref 4.8–10.8)
WBC #/AREA URNS AUTO: ABNORMAL /HPF (ref 0–5)

## 2023-03-31 PROCEDURE — 85025 COMPLETE CBC W/AUTO DIFF WBC: CPT

## 2023-03-31 PROCEDURE — 2580000003 HC RX 258: Performed by: INTERNAL MEDICINE

## 2023-03-31 PROCEDURE — 84484 ASSAY OF TROPONIN QUANT: CPT

## 2023-03-31 PROCEDURE — 82550 ASSAY OF CK (CPK): CPT

## 2023-03-31 PROCEDURE — 96360 HYDRATION IV INFUSION INIT: CPT

## 2023-03-31 PROCEDURE — 6830039000 HC L3 TRAUMA ALERT

## 2023-03-31 PROCEDURE — 94640 AIRWAY INHALATION TREATMENT: CPT

## 2023-03-31 PROCEDURE — 2580000003 HC RX 258: Performed by: EMERGENCY MEDICINE

## 2023-03-31 PROCEDURE — 83605 ASSAY OF LACTIC ACID: CPT

## 2023-03-31 PROCEDURE — 99285 EMERGENCY DEPT VISIT HI MDM: CPT

## 2023-03-31 PROCEDURE — 6360000002 HC RX W HCPCS: Performed by: INTERNAL MEDICINE

## 2023-03-31 PROCEDURE — 94761 N-INVAS EAR/PLS OXIMETRY MLT: CPT

## 2023-03-31 PROCEDURE — 96361 HYDRATE IV INFUSION ADD-ON: CPT

## 2023-03-31 PROCEDURE — 81001 URINALYSIS AUTO W/SCOPE: CPT

## 2023-03-31 PROCEDURE — 6370000000 HC RX 637 (ALT 250 FOR IP): Performed by: INTERNAL MEDICINE

## 2023-03-31 PROCEDURE — G0378 HOSPITAL OBSERVATION PER HR: HCPCS

## 2023-03-31 PROCEDURE — 36415 COLL VENOUS BLD VENIPUNCTURE: CPT

## 2023-03-31 PROCEDURE — 70450 CT HEAD/BRAIN W/O DYE: CPT

## 2023-03-31 PROCEDURE — 93005 ELECTROCARDIOGRAM TRACING: CPT | Performed by: EMERGENCY MEDICINE

## 2023-03-31 PROCEDURE — 80053 COMPREHEN METABOLIC PANEL: CPT

## 2023-03-31 PROCEDURE — 71045 X-RAY EXAM CHEST 1 VIEW: CPT

## 2023-03-31 PROCEDURE — 94664 DEMO&/EVAL PT USE INHALER: CPT

## 2023-03-31 RX ORDER — SODIUM CHLORIDE 0.9 % (FLUSH) 0.9 %
5-40 SYRINGE (ML) INJECTION PRN
Status: DISCONTINUED | OUTPATIENT
Start: 2023-03-31 | End: 2023-04-01 | Stop reason: HOSPADM

## 2023-03-31 RX ORDER — ACETAMINOPHEN 325 MG/1
650 TABLET ORAL EVERY 6 HOURS PRN
Status: DISCONTINUED | OUTPATIENT
Start: 2023-03-31 | End: 2023-04-01 | Stop reason: HOSPADM

## 2023-03-31 RX ORDER — 0.9 % SODIUM CHLORIDE 0.9 %
500 INTRAVENOUS SOLUTION INTRAVENOUS ONCE
Status: COMPLETED | OUTPATIENT
Start: 2023-03-31 | End: 2023-03-31

## 2023-03-31 RX ORDER — ALBUTEROL SULFATE 2.5 MG/3ML
2.5 SOLUTION RESPIRATORY (INHALATION) 3 TIMES DAILY
Status: DISCONTINUED | OUTPATIENT
Start: 2023-04-01 | End: 2023-04-01 | Stop reason: HOSPADM

## 2023-03-31 RX ORDER — SODIUM CHLORIDE, SODIUM LACTATE, POTASSIUM CHLORIDE, CALCIUM CHLORIDE 600; 310; 30; 20 MG/100ML; MG/100ML; MG/100ML; MG/100ML
INJECTION, SOLUTION INTRAVENOUS CONTINUOUS
Status: DISPENSED | OUTPATIENT
Start: 2023-03-31 | End: 2023-04-01

## 2023-03-31 RX ORDER — SOTALOL HYDROCHLORIDE 80 MG/1
80 TABLET ORAL 2 TIMES DAILY
Status: DISCONTINUED | OUTPATIENT
Start: 2023-03-31 | End: 2023-04-01 | Stop reason: HOSPADM

## 2023-03-31 RX ORDER — ACETAMINOPHEN 650 MG/1
650 SUPPOSITORY RECTAL EVERY 6 HOURS PRN
Status: DISCONTINUED | OUTPATIENT
Start: 2023-03-31 | End: 2023-04-01 | Stop reason: HOSPADM

## 2023-03-31 RX ORDER — ONDANSETRON 2 MG/ML
4 INJECTION INTRAMUSCULAR; INTRAVENOUS EVERY 6 HOURS PRN
Status: DISCONTINUED | OUTPATIENT
Start: 2023-03-31 | End: 2023-04-01 | Stop reason: HOSPADM

## 2023-03-31 RX ORDER — SODIUM CHLORIDE 9 MG/ML
INJECTION, SOLUTION INTRAVENOUS PRN
Status: DISCONTINUED | OUTPATIENT
Start: 2023-03-31 | End: 2023-04-01 | Stop reason: HOSPADM

## 2023-03-31 RX ORDER — ASPIRIN 81 MG/1
81 TABLET ORAL DAILY
Status: DISCONTINUED | OUTPATIENT
Start: 2023-04-01 | End: 2023-04-01 | Stop reason: HOSPADM

## 2023-03-31 RX ORDER — ONDANSETRON 4 MG/1
4 TABLET, ORALLY DISINTEGRATING ORAL EVERY 8 HOURS PRN
Status: DISCONTINUED | OUTPATIENT
Start: 2023-03-31 | End: 2023-04-01 | Stop reason: HOSPADM

## 2023-03-31 RX ORDER — ENOXAPARIN SODIUM 100 MG/ML
40 INJECTION SUBCUTANEOUS DAILY
Status: DISCONTINUED | OUTPATIENT
Start: 2023-04-01 | End: 2023-04-01 | Stop reason: HOSPADM

## 2023-03-31 RX ORDER — ATORVASTATIN CALCIUM 20 MG/1
20 TABLET, FILM COATED ORAL DAILY
Status: DISCONTINUED | OUTPATIENT
Start: 2023-04-01 | End: 2023-04-01 | Stop reason: HOSPADM

## 2023-03-31 RX ORDER — POLYETHYLENE GLYCOL 3350 17 G/17G
17 POWDER, FOR SOLUTION ORAL DAILY PRN
Status: DISCONTINUED | OUTPATIENT
Start: 2023-03-31 | End: 2023-04-01 | Stop reason: HOSPADM

## 2023-03-31 RX ORDER — ALBUTEROL SULFATE 2.5 MG/3ML
2.5 SOLUTION RESPIRATORY (INHALATION) EVERY 6 HOURS PRN
Status: DISCONTINUED | OUTPATIENT
Start: 2023-03-31 | End: 2023-03-31

## 2023-03-31 RX ORDER — SODIUM CHLORIDE 0.9 % (FLUSH) 0.9 %
5-40 SYRINGE (ML) INJECTION EVERY 12 HOURS SCHEDULED
Status: DISCONTINUED | OUTPATIENT
Start: 2023-03-31 | End: 2023-04-01 | Stop reason: HOSPADM

## 2023-03-31 RX ADMIN — SODIUM CHLORIDE, POTASSIUM CHLORIDE, SODIUM LACTATE AND CALCIUM CHLORIDE: 600; 310; 30; 20 INJECTION, SOLUTION INTRAVENOUS at 22:04

## 2023-03-31 RX ADMIN — ALBUTEROL SULFATE 2.5 MG: 2.5 SOLUTION RESPIRATORY (INHALATION) at 19:34

## 2023-03-31 RX ADMIN — Medication 5 ML: at 22:00

## 2023-03-31 RX ADMIN — SOTALOL HYDROCHLORIDE 80 MG: 80 TABLET ORAL at 22:15

## 2023-03-31 RX ADMIN — SODIUM CHLORIDE 500 ML: 9 INJECTION, SOLUTION INTRAVENOUS at 16:52

## 2023-03-31 ASSESSMENT — PAIN - FUNCTIONAL ASSESSMENT: PAIN_FUNCTIONAL_ASSESSMENT: NONE - DENIES PAIN

## 2023-03-31 ASSESSMENT — LIFESTYLE VARIABLES
HOW MANY STANDARD DRINKS CONTAINING ALCOHOL DO YOU HAVE ON A TYPICAL DAY: PATIENT DOES NOT DRINK
HOW OFTEN DO YOU HAVE A DRINK CONTAINING ALCOHOL: NEVER

## 2023-03-31 NOTE — ED NOTES
Pt's temp has increased and the pt feel warm, she is more talkative and isn't shivering anymore.      Cathy Olvera RN  03/31/23 [id number]

## 2023-03-31 NOTE — PROGRESS NOTES
[facility] EMERGENCY [facility] AT [place] Respiratory Therapy Evaluation   Current Order:  PRN albuterol      Home Regimen: PRN albuterol      Ordering Physician: Anna Ngo  Re-evaluation Date:  4/3     Diagnosis: AMS      Patient Status: Stable / Unstable + Physician notified    The following MDI Criteria must be met in order to convert aerosol to MDI with spacer. If unable to meet, MDI will be converted to aerosol:  []  Patient able to demonstrate the ability to use MDI effectively  []  Patient alert and cooperative  []  Patient able to take deep breath with 5-10 second hold  []  Medication(s) available in this delivery method   []  Peak flow greater than or equal to 200 ml/min            Current Order Substituted To  (same drug, same frequency)   Aerosol to MDI [] Albuterol Sulfate 0.083% unit dose by aerosol Albuterol Sulfate MDI 2 puffs by inhalation with spacer    [] Levalbuterol 1.25 mg unit dose by aerosol Levalbuterol MDI 2 puffs by inhalation with spacer    [] Levalbuterol 0.63 mg unit dose by aerosol Levalbuterol MDI 2 puffs by inhalation with spacer    [] Ipratropium Bromide 0.02% unit dose by aerosol Ipratropium Bromide MDI 2 puffs by inhalation with spacer    [] Duoneb (Ipratropium + Albuterol) unit dose by aerosol Ipratropium MDI + Albuterol MDI 2 puffs by inhalation w/spacer   MDI to Aerosol [] Albuterol Sulfate MDI Albuterol Sulfate 0.083% unit dose by aerosol    [] Levalbuterol MDI 2 puffs by inhalation Levalbuterol 1.25 mg unit dose by aerosol    [] Ipratropium Bromide MDI by inhalation Ipratropium Bromide 0.02% unit dose by aerosol    [] Combivent (Ipratropium + Albuterol) MDI by inhalation Duoneb (Ipratropium + Albuterol) unit dose by aerosol       Treatment Assessment [Frequency/Schedule]:  Change frequency to: __________TID albuterol________________________________________per Protocol, P&T, MEC      Points 0 1 2 3 4   Pulmonary Status  Non-Smoker  []   Smoking history   < 20 pack years  []   Smoking history  ?  20 pack years  []   Pulmonary Disorder  (acute or chronic)  [x]   Severe or Chronic w/ Exacerbation  []     Surgical Status No [x]   Surgeries     General []   Surgery Lower []   Abdominal Thoracic or []   Upper Abdominal Thoracic with  PulmonaryDisorder  []     Chest X-ray Clear/Not  Ordered     [x]  Chronic Changes  Results Pending  []  Infiltrates, atelectasis, pleural effusion, or edema  []  Infiltrates in more than one lobe []  Infiltrate + Atelectasis, &/or pleural effusion  []    Respiratory Pattern Regular,  RR = 12-20 [x]  Increased,  RR = 21-25 []  DUMONT, irregular,  or RR = 26-30 []  Decreased FEV1  or RR = 31-35 []  Severe SOB, use  of accessory muscles, or RR ? 35  []    Mental Status Alert, oriented,  Cooperative []  Confused but Follows commands [x]  Lethargic or unable to follow commands []  Obtunded  []  Comatose  []    Breath Sounds Clear to  auscultation  []  Decreased unilaterally or  in bases only []  Decreased  bilaterally  []  Crackles or intermittent wheezes [x]  Wheezes []    Cough Strong, Spontan., & nonproductive [x]  Strong,  spontaneous, &  productive []  Weak,  Nonproductive []  Weak, productive or  with wheezes []  No spontaneous  cough or may require suctioning []    Level of Activity Ambulatory []  Ambulatory w/ Assist  [x]  Non-ambulatory []  Paraplegic []  Quadriplegic []    Total    Score:__8_____     Triage Score:___4_____      Tri       Triage:     1. (>20) Freq: Q3    2. (16-20) Freq: Q4   3. (11-15) Freq: QID & Albuterol Q2 PRN    4. (6-10) Freq: TID & Albuterol Q2 PRN    5. (0-5) Freq Q4prn

## 2023-03-31 NOTE — H&P
replacement by other means 5/6/2011    Hypertension     Left displaced femoral neck fracture ([facility] 5/1/2019    Lower leg edema     BILAT     Lung disease     Osteoarthritis     Pacemaker 2009    FOR SYNCOPE    Paroxysmal SVT (supraventricular tachycardia) (HCC)     Paroxysmal SVT (supraventricular tachycardia) ([facility] [address]     Pneumonia     Sinusitis        Past Surgical History:      Procedure Laterality Date    CARDIAC PACEMAKER PLACEMENT      HIP FRACTURE SURGERY Bilateral     PACEMAKER INSERTION N/A 2009       Medications Prior to Admission:    Prior to Admission medications    Medication Sig Start Date End Date Taking? Authorizing Provider   albuterol (PROVENTIL) (2.5 MG/3ML) 0.083% nebulizer solution inhale contents of 1 vial ( 3 milliliters ) in nebulizer by mouth. ..  (REFER TO PRESCRIPTION NOTES). 8/10/22   Historical Provider, MD   sotalol (BETAPACE) 80 MG tablet Take 1 tablet by mouth 2 times daily 8/17/22   Santo Loza MD   verapamil (CALAN SR) 180 MG extended release tablet Take 1 tablet by mouth daily 8/17/22   Santo Loza MD   atorvastatin (LIPITOR) 20 MG tablet Take 1 tablet by mouth daily 8/17/22   Santo Loza MD   aspirin EC 81 MG EC tablet Take 1 tablet by mouth daily 8/17/22   Santo Loza MD   Nebulizers (COMPRESSOR/NEBULIZER) MISC Nebulizer Supplies 5/26/20   Jamey Escoto MD       Allergies:  Codeine and Penicillins    Social History:   TOBACCO:   reports that she has never smoked. She has never used smokeless tobacco.  ETOH:   has no history on file for alcohol use.       Family History:       Problem Relation Age of Onset    Cancer Mother     No Known Problems Father        REVIEW OF SYSTEMS:  Ten systems reviewed and negative except for stated in HPI    Physical Exam:    Vitals: BP (!) 109/91   Pulse 72   Temp (!) 96.2 °F (35.7 °C) (Temporal)   Resp 27   Ht 5' 5\" (1.651 m)   Wt 144 lb (65.3 kg)   SpO2 99%   BMI 23.96 kg/m²   General appearance: alert, cooperative  Skin: Skin color, texture, turgor normal.   HEENT: Head: Normocephalic, no lesions, without obvious abnormality. Eye: Normal external eye, conjunctiva, lids cornea, CHARANJIT. Neck: supple, symmetrical, trachea midline  Lungs: clear to auscultation bilaterally  Heart: S1/S2,RRR  Abdomen: soft, active BS  Extremities: no edema  Neurologic: Mental status: awake, no gross motor deficits     Recent Labs     03/31/23  1330   WBC 14.8*   HGB 14.9        Recent Labs     03/31/23  1330      K 4.2   CL 99   CO2 27   BUN 30*   CREATININE 0.51   GLUCOSE 96   AST 23   ALT 11   BILITOT 1.7*   ALKPHOS 95     Troponin T:   Recent Labs     03/31/23  1330   TROPONINI <0.010       ABGs: No results found for: PHART, PO2ART, AQN6NGO  INR: No results for input(s): INR in the last 72 hours. URINALYSIS:  Recent Labs     03/31/23  1330   COLORU Yellow   PHUR 6.0   WBCUA 0-2   RBCUA 6-10*   BACTERIA Negative   CLARITYU Clear   SPECGRAV 1.014   LEUKOCYTESUR Negative   UROBILINOGEN 1.0   BILIRUBINUR Negative   BLOODU SMALL*   GLUCOSEU Negative     -----------------------------------------------------------------   CT Head W/O Contrast    Result Date: 3/31/2023  EXAMINATION: CT OF THE HEAD WITHOUT CONTRAST  3/31/2023 2:19 pm TECHNIQUE: CT of the head was performed without the administration of intravenous contrast. Automated exposure control, iterative reconstruction, and/or weight based adjustment of the mA/kV was utilized to reduce the radiation dose to as low as reasonably achievable. COMPARISON: None. HISTORY: ORDERING SYSTEM PROVIDED HISTORY: ams TECHNOLOGIST PROVIDED HISTORY: Reason for exam:->ams Has a \"code stroke\" or \"stroke alert\" been called? ->No Decision Support Exception - unselect if not a suspected or confirmed emergency medical condition->Emergency Medical Condition (MA) What reading provider will be dictating this exam?->CRC FINDINGS: BRAIN/VENTRICLES: There is no acute intracranial hemorrhage, mass effect or midline shift.   No

## 2023-03-31 NOTE — ED PROVIDER NOTES
[address] [facility] ED  EMERGENCY DEPARTMENT ENCOUNTER      Pt Name: Duyen Richardson  MRN: [id number]  Ameenagfstephanie 2/28/1933  Date of evaluation: 3/31/2023  Provider: Shashi Barnett MD    [address] [place] [address]       Chief Complaint   Patient presents with    Fall         HISTORY OF PRESENT ILLNESS   (Location/Symptom, Timing/Onset, Context/Setting, Quality, Duration, Modifying Factors, Severity)  Note limiting factors. 80-year-old female presenting after reported fall. Family states she has been confused for the last couple of days. Normally alert and oriented and conversant. Unknown how long she was down for. Daughter reports that when she called her this morning she seemed quite confused, prompting visit from family. When they found her today she was on the ground. History difficult to obtain. Nursing Notes were reviewed. REVIEW OF SYSTEMS    (2-9 systems for level 4, 10 or more for level 5)     Review of Systems   Unable to perform ROS: Mental status change     Except as noted above the remainder of the review of systems was reviewed and negative. PAST MEDICAL HISTORY     Past Medical History:   Diagnosis Date    Abnormality of gait and mobility due to Left femoral neck fracture S/P Left Hip Hemiarthroplasty. [facility] Re[facility] admit 05/01/19. 5/6/2019    This is an 80year old female with a medical history significant for COPD, SSS S/P PPM, paroxysmal SVT, HTN who was sent to [facility] from [address] [place] [address] for further evaluation and management of left femoral neck fracture S/P fall. She states she was walking out of her bathroom when she tripped and fell. She denies any loss of consciousness and she was unable to get up. EMS transferred her to [address] [place] [address] ER.       Arthritis     BILAT    CAD (coronary artery disease)     Closed fracture of head of left femur ([facility] [address] 4/27/2019    Last Assessment & Plan:  S/p left hip arthroplasty Assessment:  Pain well  controlled  PLAN:  Pain control with bowel regimen Ortho  following,

## 2023-03-31 NOTE — ED NOTES
Pt is more alert now than when she arrived, she is talking a lot, sometimes changes topics mid sentence.      Adonis Mccray, NICKOLAS  03/31/23 [id number]

## 2023-03-31 NOTE — ED NOTES
Pt's temp raised from 88.2 to 91.4, the hug-a-bear was placed on the pt.      Darlin Betts RN  03/31/23 [id number]

## 2023-03-31 NOTE — ED NOTES
A [place] catheter with thermometer was placed for acurate temp readings.        Ty Coley RN  03/31/23 0382

## 2023-03-31 NOTE — ED TRIAGE NOTES
Pt arrived via EMS from home d/t a fall from standing. Pt is alert and knows her name and is close with date. Pt's electric is off and it's very cold in her house, the pt is very cold to the touch and she was on the floor long enough to urinate on herself. Pt's skin is WNL but she has an old abrasion on the left side of her forehead and a hematoma on the right side of her head. There is no active bleeding.

## 2023-04-01 VITALS
BODY MASS INDEX: 23.99 KG/M2 | WEIGHT: 144 LBS | HEIGHT: 65 IN | HEART RATE: 78 BPM | DIASTOLIC BLOOD PRESSURE: 40 MMHG | SYSTOLIC BLOOD PRESSURE: 119 MMHG | OXYGEN SATURATION: 95 % | TEMPERATURE: 98.1 F | RESPIRATION RATE: 18 BRPM

## 2023-04-01 LAB
ALBUMIN SERPL-MCNC: 2.6 G/DL (ref 3.5–4.6)
ANION GAP SERPL CALCULATED.3IONS-SCNC: 14 MEQ/L (ref 9–15)
BASOPHILS # BLD: 0 K/UL (ref 0–0.2)
BASOPHILS NFR BLD: 0.2 %
BUN SERPL-MCNC: 36 MG/DL (ref 8–23)
CALCIUM SERPL-MCNC: 8.5 MG/DL (ref 8.5–9.9)
CHLORIDE SERPL-SCNC: 106 MEQ/L (ref 95–107)
CK SERPL-CCNC: 141 U/L (ref 0–170)
CO2 SERPL-SCNC: 22 MEQ/L (ref 20–31)
CREAT SERPL-MCNC: 0.65 MG/DL (ref 0.5–0.9)
EKG ATRIAL RATE: 64 BPM
EKG P AXIS: 73 DEGREES
EKG P-R INTERVAL: 232 MS
EKG Q-T INTERVAL: 534 MS
EKG QRS DURATION: 92 MS
EKG QTC CALCULATION (BAZETT): 550 MS
EKG R AXIS: 71 DEGREES
EKG T AXIS: 48 DEGREES
EKG VENTRICULAR RATE: 64 BPM
EOSINOPHIL # BLD: 0 K/UL (ref 0–0.7)
EOSINOPHIL NFR BLD: 0 %
ERYTHROCYTE [DISTWIDTH] IN BLOOD BY AUTOMATED COUNT: 14.7 % (ref 11.5–14.5)
GLUCOSE SERPL-MCNC: 56 MG/DL (ref 70–99)
HCT VFR BLD AUTO: 34.6 % (ref 37–47)
HGB BLD-MCNC: 11.6 G/DL (ref 12–16)
LYMPHOCYTES # BLD: 1.1 K/UL (ref 1–4.8)
LYMPHOCYTES NFR BLD: 9.6 %
MAGNESIUM SERPL-MCNC: 1.7 MG/DL (ref 1.7–2.4)
MCH RBC QN AUTO: 31.3 PG (ref 27–31.3)
MCHC RBC AUTO-ENTMCNC: 33.5 % (ref 33–37)
MCV RBC AUTO: 93.4 FL (ref 79.4–94.8)
MONOCYTES # BLD: 1 K/UL (ref 0.2–0.8)
MONOCYTES NFR BLD: 8.7 %
NEUTROPHILS # BLD: 9.3 K/UL (ref 1.4–6.5)
NEUTS SEG NFR BLD: 81.5 %
PHOSPHATE SERPL-MCNC: 3.7 MG/DL (ref 2.3–4.8)
PLATELET # BLD AUTO: 201 K/UL (ref 130–400)
POTASSIUM SERPL-SCNC: 3.6 MEQ/L (ref 3.4–4.9)
RBC # BLD AUTO: 3.7 M/UL (ref 4.2–5.4)
SODIUM SERPL-SCNC: 142 MEQ/L (ref 135–144)
WBC # BLD AUTO: 11.4 K/UL (ref 4.8–10.8)

## 2023-04-01 PROCEDURE — 85025 COMPLETE CBC W/AUTO DIFF WBC: CPT

## 2023-04-01 PROCEDURE — 6370000000 HC RX 637 (ALT 250 FOR IP): Performed by: INTERNAL MEDICINE

## 2023-04-01 PROCEDURE — 6360000002 HC RX W HCPCS: Performed by: INTERNAL MEDICINE

## 2023-04-01 PROCEDURE — 94761 N-INVAS EAR/PLS OXIMETRY MLT: CPT

## 2023-04-01 PROCEDURE — 96361 HYDRATE IV INFUSION ADD-ON: CPT

## 2023-04-01 PROCEDURE — 96372 THER/PROPH/DIAG INJ SC/IM: CPT

## 2023-04-01 PROCEDURE — G0378 HOSPITAL OBSERVATION PER HR: HCPCS

## 2023-04-01 PROCEDURE — 82550 ASSAY OF CK (CPK): CPT

## 2023-04-01 PROCEDURE — 94640 AIRWAY INHALATION TREATMENT: CPT

## 2023-04-01 PROCEDURE — 2580000003 HC RX 258: Performed by: INTERNAL MEDICINE

## 2023-04-01 PROCEDURE — 36415 COLL VENOUS BLD VENIPUNCTURE: CPT

## 2023-04-01 PROCEDURE — 83735 ASSAY OF MAGNESIUM: CPT

## 2023-04-01 PROCEDURE — 80069 RENAL FUNCTION PANEL: CPT

## 2023-04-01 RX ADMIN — ALBUTEROL SULFATE 2.5 MG: 2.5 SOLUTION RESPIRATORY (INHALATION) at 07:34

## 2023-04-01 RX ADMIN — ALBUTEROL SULFATE 2.5 MG: 2.5 SOLUTION RESPIRATORY (INHALATION) at 13:26

## 2023-04-01 RX ADMIN — SOTALOL HYDROCHLORIDE 80 MG: 80 TABLET ORAL at 10:34

## 2023-04-01 RX ADMIN — ENOXAPARIN SODIUM 40 MG: 100 INJECTION SUBCUTANEOUS at 10:36

## 2023-04-01 RX ADMIN — ASPIRIN 81 MG: 81 TABLET, COATED ORAL at 10:34

## 2023-04-01 RX ADMIN — VERAPAMIL HYDROCHLORIDE 180 MG: 180 TABLET, FILM COATED, EXTENDED RELEASE ORAL at 10:34

## 2023-04-01 RX ADMIN — ATORVASTATIN CALCIUM 20 MG: 20 TABLET, FILM COATED ORAL at 10:34

## 2023-04-01 RX ADMIN — Medication 10 ML: at 10:37

## 2023-04-01 NOTE — PROGRESS NOTES
Pt up to floor via cart, meds recced - Dr. Caleb Barros aware. Pt axillary temp 96.6, Dr. Caleb Barros aware. Nursing communication order placed for ok to remove hammond & bear-hugger.     Electronically signed by Cullen Shah RN on 3/31/23 at 8:04 PM EDT

## 2023-04-01 NOTE — PROGRESS NOTES
Shift assessment complete. Meds as ordered. Free from falls/injuries at this time. No new skin impairments noted/reported. Temp WNL. Able to voice needs, confusion/forgetfulness at times. Ambulated to BR with standby assist. Bed in lowest position. Call light within reach. Will continue to monitor.

## 2023-04-01 NOTE — DISCHARGE SUMMARY
hemorrhage, mass effect or midline shift. No abnormal extra-axial fluid collection. Bilateral symmetric periventricular areas decreased attenuation. No evidence of an acute infarct. There is no evidence of hydrocephalus ventricles mildly enlarged with sulci mildly prominent. ORBITS: The visualized portion of the orbits demonstrate no acute abnormality. SINUSES: The visualized paranasal sinuses and mastoid air cells demonstrate no acute abnormality. SOFT TISSUES/SKULL:  No acute abnormality of the visualized skull or soft tissues. No acute intracranial abnormality. Mild cerebral atrophy. Chronic ischemic white matter disease. XR CHEST PORTABLE    Result Date: 3/31/2023  EXAMINATION: ONE XRAY VIEW OF THE CHEST 3/31/2023 1:46 pm COMPARISON: None. HISTORY: ORDERING SYSTEM PROVIDED HISTORY: weakness TECHNOLOGIST PROVIDED HISTORY: Reason for exam:->weakness What reading provider will be dictating this exam?->CRC FINDINGS: The cardiac silhouette is at upper limits of normal in size. There is a dual lead cardiac pacer on the left The lungs are clear. There is no focal infiltrate or effusion. Mild cardiomegaly. There are no findings of failure or pneumonia. Tania Stands Discharge Medications:         Medication List        CONTINUE taking these medications      albuterol (2.5 MG/3ML) 0.083% nebulizer solution  Commonly known as: PROVENTIL     aspirin EC 81 MG EC tablet  Take 1 tablet by mouth daily     Compressor/Nebulizer Misc  Nebulizer Supplies     sotalol 80 MG tablet  Commonly known as: BETAPACE  Take 1 tablet by mouth 2 times daily     verapamil 180 MG extended release tablet  Commonly known as: CALAN SR  Take 1 tablet by mouth daily            ASK your doctor about these medications      atorvastatin 20 MG tablet  Commonly known as: Lipitor  Take 1 tablet by mouth daily              Disposition:   Discharged to Home.  Any C needs that were indicated and/or required as been addressed and set up by Social

## 2023-04-01 NOTE — PROGRESS NOTES
Hospitalist Progress Note      PCP: Scar Donnelly MD    Date of Admission: 3/31/2023    Chief Complaint:  no acute events, afebrile, stable HD, on RA, needs some assistance with ambulation, uses a walker at home per son at the bedside    Medications:  Reviewed    Infusion Medications    sodium chloride       Scheduled Medications    sodium chloride flush  5-40 mL IntraVENous 2 times per day    enoxaparin  40 mg SubCUTAneous Daily    albuterol  2.5 mg Nebulization TID    aspirin EC  81 mg Oral Daily    atorvastatin  20 mg Oral Daily    sotalol  80 mg Oral BID    verapamil  180 mg Oral Daily     PRN Meds: sodium chloride flush, sodium chloride, ondansetron **OR** ondansetron, polyethylene glycol, acetaminophen **OR** acetaminophen      Intake/Output Summary (Last 24 hours) at 4/1/2023 1255  Last data filed at 4/1/2023 0745  Gross per 24 hour   Intake 1052.36 ml   Output 1150 ml   Net -97.64 ml       Exam:    BP (!) 119/40   Pulse 78   Temp 98.1 °F (36.7 °C) (Oral)   Resp 18   Ht 5' 5\" (1.651 m)   Wt 144 lb (65.3 kg)   SpO2 95%   BMI 23.96 kg/m²     General appearance: alert, cooperative  Lungs: clear to auscultation bilaterally  Heart: S1/S2,RRR  Abdomen: soft, active BS  Extremities: no edema      Labs:   Recent Labs     03/31/23  1330 04/01/23  0524   WBC 14.8* 11.4*   HGB 14.9 11.6*   HCT 46.2 34.6*    201     Recent Labs     03/31/23  1330 04/01/23  0524    142   K 4.2 3.6   CL 99 106   CO2 27 22   BUN 30* 36*   CREATININE 0.51 0.65   CALCIUM 9.3 8.5   PHOS  --  3.7     Recent Labs     03/31/23  1330   AST 23   ALT 11   BILITOT 1.7*   ALKPHOS 95     No results for input(s): INR in the last 72 hours.   Recent Labs     03/31/23  1330 04/01/23  0524   CKTOTAL 233* 141   TROPONINI <0.010  --        Urinalysis:      Lab Results   Component Value Date/Time    NITRU Negative 03/31/2023 01:30 PM    WBCUA 0-2 03/31/2023 01:30 PM    BACTERIA Negative 03/31/2023 01:30 PM    RBCUA 6-10 03/31/2023 01:30 PM BLOODU SMALL 03/31/2023 01:30 PM    SPECGRAV 1.014 03/31/2023 01:30 PM    GLUCOSEU Negative 03/31/2023 01:30 PM    GLUCOSEU NEG 01/03/2012 07:26 AM       Radiology:  CT Head W/O Contrast   Final Result   No acute intracranial abnormality. Mild cerebral atrophy. Chronic ischemic white matter disease. XR CHEST PORTABLE   Final Result   Mild cardiomegaly. There are no findings of failure or pneumonia. .                 Assessment/Plan:    80 y.o. female with PMH of CAD, HTN, PSVT, SSS s/p PPM, COPD, left fracture s/p repair in 2019 who presented with:     Altered mental status, hypothermia, leukocytosis  - after being exposed to cold temperatures at home due to no heating at home since power was out per report,   - initial w/u including CT head, CXR,U/a, troponin was negative  - IVFs were administered, her mental status started to improve as she started to warm up  - back to her baseline today, needs some assistance with ambulation, uses a walker at home per son at the bedside     CAD, HTN, PSVT, SSS s/p PPM  - monitor on telemetry  - continue home meds      Diet: ADULT DIET;  Regular    Code Status: Full Code      Disposition - home today, son declined [facility]        Electronically signed by Erica Mcallister MD on 4/1/2023 at 12:55 PM

## 2023-04-01 NOTE — CARE COORDINATION
Case Management Assessment  Initial Evaluation    Date/Time of Evaluation: 3/31/2023 9:02 PM  Assessment Completed by: Mimi Mata RN    If patient is discharged prior to next notation, then this note serves as note for discharge by case management. Patient Name: Elizabeth Madera                   YOB: 1933  Diagnosis: Hypothermia, initial encounter [T68. XXXA]  Fall, initial encounter [id number]. XXXA]  Altered mental status, unspecified altered mental status type [R41.82]  AMS (altered mental status) [R41.82]                   Date / Time: 3/31/2023  1:10 PM    Patient Admission Status: Observation   Readmission Risk (Low < 19, Mod (19-27), High > 27): No data recorded  Current PCP: Shoshana Pink MD  PCP verified by CM? (P) Yes (her Dr, Dr Mane Limb quit her practice. She has not chosen a pcp yet but per her son they have a list of [facility] pcp's they are reviewing.)    Chart Reviewed: Yes      History Provided by: Patient, Child/Family  Patient Orientation: Alert and Oriented, Person, Place, Situation, Self, Other (see comment) (pt had been confused on arrival but during my assessment was alert to being at a hospital, name, date of birth and date)    Patient Cognition: Short Term Memory Deficit    Hospitalization in the last 30 days (Readmission):  No    If yes, Readmission Assessment in  Navigator will be completed. Advance Directives:      Code Status: Full Code   Patient's Primary Decision Maker is: Legal Next of Kin (pt's son Tammie Speaks)      Discharge Planning:    Patient lives with: (P) Children Type of Home: (P) House  Primary Care Giver: Other (Comment) (she is living with her son who helps her with dressing and fixing meals.  she is able to bathe and feed herself)  Patient Support Systems include: Children   Current Financial resources: (P) Medicare  Current community resources: (P) None  Current services prior to admission: (P) None            Current DME:              Type of Home left the ER before this information was noted. On review of the nurse's notes from the ER, her daughter had called her and found her to be confused then she was found on the floor and the electric was off. Per her and the son, he lives with her. Home situation needs to be investigated, possible aps if indicated. The Plan for Transition of Care is related to the following treatment goals of Hypothermia, initial encounter [T68. XXXA]  Fall, initial encounter [id number]. XXXA]  Altered mental status, unspecified altered mental status type [R41.82]  AMS (altered mental status) [P11.94]    IF APPLICABLE: The Patient and/or patient representative Yaya Villatoro and her family were provided with a choice of provider and agrees with the discharge plan. Freedom of choice list with basic dialogue that supports the patient's individualized plan of care/goals and shares the quality data associated with the providers was provided to:     Patient Representative Name:       The Patient and/or Patient Representative Agree with the Discharge Plan?       Julio Almonte RN  Case Management Department

## 2023-04-03 LAB
EKG ATRIAL RATE: 64 BPM
EKG P AXIS: 73 DEGREES
EKG P-R INTERVAL: 232 MS
EKG Q-T INTERVAL: 534 MS
EKG QRS DURATION: 92 MS
EKG QTC CALCULATION (BAZETT): 550 MS
EKG R AXIS: 71 DEGREES
EKG T AXIS: 48 DEGREES
EKG VENTRICULAR RATE: 64 BPM

## 2023-04-03 PROCEDURE — 93010 ELECTROCARDIOGRAM REPORT: CPT | Performed by: INTERNAL MEDICINE

## 2023-04-17 ENCOUNTER — OFFICE VISIT (OUTPATIENT)
Dept: CARDIOLOGY CLINIC | Age: 88
End: 2023-04-17
Payer: MEDICARE

## 2023-04-17 VITALS
WEIGHT: 137.8 LBS | DIASTOLIC BLOOD PRESSURE: 68 MMHG | BODY MASS INDEX: 22.93 KG/M2 | HEART RATE: 64 BPM | OXYGEN SATURATION: 98 % | SYSTOLIC BLOOD PRESSURE: 130 MMHG

## 2023-04-17 DIAGNOSIS — I48.91 ATRIAL FIBRILLATION, UNSPECIFIED TYPE (HCC): Primary | ICD-10-CM

## 2023-04-17 DIAGNOSIS — I25.10 CORONARY ARTERY DISEASE INVOLVING NATIVE CORONARY ARTERY OF NATIVE HEART WITHOUT ANGINA PECTORIS: ICD-10-CM

## 2023-04-17 PROCEDURE — 99214 OFFICE O/P EST MOD 30 MIN: CPT | Performed by: INTERNAL MEDICINE

## 2023-04-17 PROCEDURE — G8420 CALC BMI NORM PARAMETERS: HCPCS | Performed by: INTERNAL MEDICINE

## 2023-04-17 PROCEDURE — 1090F PRES/ABSN URINE INCON ASSESS: CPT | Performed by: INTERNAL MEDICINE

## 2023-04-17 PROCEDURE — G8427 DOCREV CUR MEDS BY ELIG CLIN: HCPCS | Performed by: INTERNAL MEDICINE

## 2023-04-17 PROCEDURE — 93000 ELECTROCARDIOGRAM COMPLETE: CPT | Performed by: INTERNAL MEDICINE

## 2023-04-17 PROCEDURE — 1036F TOBACCO NON-USER: CPT | Performed by: INTERNAL MEDICINE

## 2023-04-17 PROCEDURE — 1123F ACP DISCUSS/DSCN MKR DOCD: CPT | Performed by: INTERNAL MEDICINE

## 2023-04-17 RX ORDER — SOTALOL HYDROCHLORIDE 80 MG/1
80 TABLET ORAL 2 TIMES DAILY
Qty: 180 TABLET | Refills: 3 | Status: SHIPPED | OUTPATIENT
Start: 2023-04-17

## 2023-04-17 RX ORDER — ASPIRIN 81 MG/1
81 TABLET ORAL DAILY
Qty: 90 TABLET | Refills: 3 | Status: SHIPPED | OUTPATIENT
Start: 2023-04-17

## 2023-04-17 RX ORDER — POTASSIUM CHLORIDE 750 MG/1
TABLET, EXTENDED RELEASE ORAL
COMMUNITY
Start: 2023-02-17

## 2023-04-17 RX ORDER — FLUTICASONE FUROATE AND VILANTEROL 100; 25 UG/1; UG/1
POWDER RESPIRATORY (INHALATION)
COMMUNITY
Start: 2023-02-17

## 2023-04-17 RX ORDER — ATORVASTATIN CALCIUM 20 MG/1
20 TABLET, FILM COATED ORAL DAILY
Qty: 90 TABLET | Refills: 3 | Status: SHIPPED | OUTPATIENT
Start: 2023-04-17

## 2023-04-17 ASSESSMENT — ENCOUNTER SYMPTOMS
BLOOD IN STOOL: 0
GASTROINTESTINAL NEGATIVE: 1
EYES NEGATIVE: 1
WHEEZING: 0
BACK PAIN: 1
STRIDOR: 0
CHEST TIGHTNESS: 0
NAUSEA: 0
SHORTNESS OF BREATH: 0
RESPIRATORY NEGATIVE: 1
COUGH: 0

## 2023-04-17 NOTE — PROGRESS NOTES
Assessment/Plan:    1. Paroxysmal SVT (supraventricular tachycardia) (HCC)   stable QTc - on Sotalol. Stable QTc.      2. Pacemaker   interrogate - reviewed. -interrogate - at [facility] and reached RRT - RBA discussed we will proceed. Labs today. Hold Verapamil to minimize AVN suppressin until generator change. PPM site stalbe. Continue interrogation    3. Essential hypertension   stable - continue meds. Low salt diet  - EKG 12 lead    4. Coronary artery disease involving native coronary artery of native heart without angina pectoris   no angina  - EKG 12 lead    5. AR- need echo - stable     6. LE Edeam- Maxzide - conintue    8. HPL - continue statin. Low fat diet. Labs reviewed with pt. She was ntaking Statin for couple months. Will resume. discussed w [pt. Need f/u CUS - stable     9. MR- Echo stable      10 LE Edema - no DVT. Use compression stockings low salt intake         Counseling:  Heart Healthy Lifestyle, Low Salt Diet, Take Precautions to Prevent Falls and Walk Daily    Return in about 4 months (around 8/17/2023).       Electronically signed by Murtaza Chawla MD on 4/17/2023 at 2:01 PM

## 2023-05-05 ENCOUNTER — HOSPITAL ENCOUNTER (OUTPATIENT)
Dept: CARDIOLOGY | Age: 88
Discharge: HOME | End: 2023-05-05
Payer: MEDICARE

## 2023-05-05 PROCEDURE — 93296 REM INTERROG EVL PM/IDS: CPT

## 2023-05-05 PROCEDURE — 93280 PM DEVICE PROGR EVAL DUAL: CPT

## 2023-05-05 NOTE — TELEPHONE ENCOUNTER
Rx requested:  Requested Prescriptions     Pending Prescriptions Disp Refills    albuterol (PROVENTIL) (2.5 MG/3ML) 0.083% nebulizer solution 120 each        Last Office Visit:   1/3/2023      Next Visit Date:  Future Appointments   Date Time Provider Randall Jauregui   5/17/2023 10:30 AM Kelsi Ko MD [address]   8/17/2023  2:00 PM Endy Soto MD [facility]

## 2023-05-07 RX ORDER — ALBUTEROL SULFATE 2.5 MG/3ML
SOLUTION RESPIRATORY (INHALATION)
Qty: 120 EACH | Refills: 0 | Status: SHIPPED | OUTPATIENT
Start: 2023-05-07

## 2023-05-17 ENCOUNTER — OFFICE VISIT (OUTPATIENT)
Dept: PULMONOLOGY | Age: 88
End: 2023-05-17

## 2023-05-17 VITALS
BODY MASS INDEX: 22.63 KG/M2 | TEMPERATURE: 97.5 F | SYSTOLIC BLOOD PRESSURE: 128 MMHG | OXYGEN SATURATION: 97 % | HEART RATE: 60 BPM | WEIGHT: 136 LBS | DIASTOLIC BLOOD PRESSURE: 62 MMHG

## 2023-05-17 DIAGNOSIS — J90 PLEURAL EFFUSION ON LEFT: ICD-10-CM

## 2023-05-17 DIAGNOSIS — R06.02 SHORTNESS OF BREATH: ICD-10-CM

## 2023-05-17 DIAGNOSIS — J42 CHRONIC BRONCHITIS, UNSPECIFIED CHRONIC BRONCHITIS TYPE (HCC): ICD-10-CM

## 2023-05-17 DIAGNOSIS — J44.9 CHRONIC OBSTRUCTIVE PULMONARY DISEASE, UNSPECIFIED COPD TYPE (HCC): Primary | ICD-10-CM

## 2023-05-17 DIAGNOSIS — R60.0 BILATERAL LEG EDEMA: ICD-10-CM

## 2023-05-17 RX ORDER — GUAIFENESIN 600 MG/1
600 TABLET, EXTENDED RELEASE ORAL 2 TIMES DAILY
Qty: 60 TABLET | Refills: 2 | Status: SHIPPED | OUTPATIENT
Start: 2023-05-17 | End: 2023-06-16

## 2023-05-17 ASSESSMENT — ENCOUNTER SYMPTOMS
VOMITING: 0
SINUS PRESSURE: 0
TROUBLE SWALLOWING: 0
CHEST TIGHTNESS: 0
DIARRHEA: 0
ABDOMINAL PAIN: 0
NAUSEA: 0
EYE DISCHARGE: 0
RHINORRHEA: 0
SHORTNESS OF BREATH: 1
VOICE CHANGE: 0
EYE ITCHING: 0
SORE THROAT: 0
WHEEZING: 1
COUGH: 1

## 2023-05-17 NOTE — PROGRESS NOTES
cardiomegaly. There are no findings of failure or pneumonia or pleural effusion    5. Bilateral leg edema  C/o  both leg edema. On Diuretic therapy      Return in about 3 months (around 8/17/2023) for COPD.       Roni Al MD

## 2023-08-07 ENCOUNTER — HOSPITAL ENCOUNTER (OUTPATIENT)
Dept: CARDIOLOGY | Age: 88
Discharge: HOME | End: 2023-08-07
Payer: MEDICARE

## 2023-08-07 PROCEDURE — 93296 REM INTERROG EVL PM/IDS: CPT

## 2023-08-17 ENCOUNTER — OFFICE VISIT (OUTPATIENT)
Dept: PULMONOLOGY | Age: 88
End: 2023-08-17
Payer: MEDICARE

## 2023-08-17 ENCOUNTER — OFFICE VISIT (OUTPATIENT)
Dept: CARDIOLOGY CLINIC | Age: 88
End: 2023-08-17
Payer: MEDICARE

## 2023-08-17 VITALS
WEIGHT: 120.4 LBS | BODY MASS INDEX: 20.06 KG/M2 | HEIGHT: 65 IN | SYSTOLIC BLOOD PRESSURE: 126 MMHG | DIASTOLIC BLOOD PRESSURE: 52 MMHG | HEART RATE: 60 BPM | OXYGEN SATURATION: 96 %

## 2023-08-17 VITALS
BODY MASS INDEX: 20.3 KG/M2 | SYSTOLIC BLOOD PRESSURE: 130 MMHG | OXYGEN SATURATION: 95 % | HEART RATE: 60 BPM | WEIGHT: 122 LBS | DIASTOLIC BLOOD PRESSURE: 50 MMHG

## 2023-08-17 DIAGNOSIS — L98.9 FACIAL LESION: Primary | ICD-10-CM

## 2023-08-17 DIAGNOSIS — J44.9 CHRONIC OBSTRUCTIVE PULMONARY DISEASE, UNSPECIFIED COPD TYPE (HCC): Primary | ICD-10-CM

## 2023-08-17 DIAGNOSIS — Z76.89 ENCOUNTER TO ESTABLISH CARE: Primary | ICD-10-CM

## 2023-08-17 DIAGNOSIS — I48.91 ATRIAL FIBRILLATION, UNSPECIFIED TYPE (HCC): ICD-10-CM

## 2023-08-17 PROCEDURE — 1090F PRES/ABSN URINE INCON ASSESS: CPT | Performed by: INTERNAL MEDICINE

## 2023-08-17 PROCEDURE — 1036F TOBACCO NON-USER: CPT | Performed by: INTERNAL MEDICINE

## 2023-08-17 PROCEDURE — 99214 OFFICE O/P EST MOD 30 MIN: CPT | Performed by: INTERNAL MEDICINE

## 2023-08-17 PROCEDURE — 1123F ACP DISCUSS/DSCN MKR DOCD: CPT | Performed by: INTERNAL MEDICINE

## 2023-08-17 PROCEDURE — G8420 CALC BMI NORM PARAMETERS: HCPCS | Performed by: INTERNAL MEDICINE

## 2023-08-17 PROCEDURE — G8427 DOCREV CUR MEDS BY ELIG CLIN: HCPCS | Performed by: INTERNAL MEDICINE

## 2023-08-17 PROCEDURE — 93000 ELECTROCARDIOGRAM COMPLETE: CPT | Performed by: INTERNAL MEDICINE

## 2023-08-17 PROCEDURE — 3023F SPIROM DOC REV: CPT | Performed by: INTERNAL MEDICINE

## 2023-08-17 RX ORDER — ALBUTEROL SULFATE 2.5 MG/3ML
SOLUTION RESPIRATORY (INHALATION)
Qty: 360 EACH | Refills: 0 | Status: SHIPPED | OUTPATIENT
Start: 2023-08-17

## 2023-08-17 ASSESSMENT — ENCOUNTER SYMPTOMS
WHEEZING: 1
SORE THROAT: 0
RESPIRATORY NEGATIVE: 1
DIARRHEA: 0
BACK PAIN: 1
VOICE CHANGE: 0
SHORTNESS OF BREATH: 0
RHINORRHEA: 0
NAUSEA: 0
EYE DISCHARGE: 0
EYES NEGATIVE: 1
STRIDOR: 0
BLOOD IN STOOL: 0
GASTROINTESTINAL NEGATIVE: 1
CHEST TIGHTNESS: 0
NAUSEA: 0
TROUBLE SWALLOWING: 0
SHORTNESS OF BREATH: 1
COUGH: 0
CHEST TIGHTNESS: 0
ABDOMINAL PAIN: 0
VOMITING: 0
SINUS PRESSURE: 0
EYE ITCHING: 0
COUGH: 1
WHEEZING: 0

## 2023-08-17 NOTE — PROGRESS NOTES
Subjective:     Silvia Bryant is a 80 y.o. female who complains today of:     Chief Complaint   Patient presents with    Follow-up     3m f/u on COPD, SOB,        HPI  She has swelling rt. Side chick area x 2 weeks and skin lesion looks like possible skin cancer. She is using  nebulizer with albuterol TID. C/o shortness of breath  with exertion. C/o  Wheezing off and on . C/o Cough with yellow  very  thick Sputum. No Chest tightness. No Chest pain with radiation  or pleuritic pain. C/o  both leg edema. No orthopnea. No Fever or chills. No Rhinorrhea and postnasal drip    Allergies:  Codeine and Penicillins  Past Medical History:   Diagnosis Date    Abnormality of gait and mobility due to Left femoral neck fracture S/P Left Hip Hemiarthroplasty. [facility] admit 05/01/19. 5/6/2019    This is an 80year old female with a medical history significant for COPD, SSS S/P PPM, paroxysmal SVT, HTN who was sent to [facility] from [facility] for further evaluation and management of left femoral neck fracture S/P fall. She states she was walking out of her bathroom when she tripped and fell. She denies any loss of consciousness and she was unable to get up. EMS transferred her to [facility] ER.       Arthritis     BILAT    CAD (coronary artery disease)     Closed fracture of head of left femur ([address]) 4/27/2019    Last Assessment & Plan:  S/p left hip arthroplasty Assessment:  Pain well  controlled  PLAN:  Pain control with bowel regimen Ortho  following, going to SNF    COPD (chronic obstructive pulmonary disease) ([address])     Hip joint replacement by other means 5/6/2011    Hypertension     Left displaced femoral neck fracture ([address]) 5/1/2019    Lower leg edema     BILAT     Lung disease     Osteoarthritis     Pacemaker 2009    FOR SYNCOPE    Paroxysmal SVT (supraventricular tachycardia) (HCC)     Paroxysmal SVT (supraventricular tachycardia) (HCC)     Pneumonia     Sinusitis      Past Surgical History:   Procedure

## 2023-08-17 NOTE — PROGRESS NOTES
Subsequent Progress Note  Patient: Caitlyn Meneses  YOB: 1933  MRN: [id number]    Chief Complaint:AR CAD HTN ppm svt recent L hip sx  Chief Complaint   Patient presents with    Follow-up     4 month    Atrial Fibrillation       CV Data:  12/2017 spect negative  12/2017 echo ef 60% Trace AR  9/2018 LAD mid 50% near a small diagonal. Cors all calcified EF 55 EDP 15  4/2019 LTHA   6/2019 CUS mild  PPM [employer] ADDRL1 Adapta  12/2020 Echo EF 60 2+ MR 1-2 AR  RVSP 55   8/21 CUS mild   1/23 Echo EF 55 1+ MR RVSP 51   2/2/2023 PPM generator change     Subjective/HPI: no cp no sob no further falls no bleed. Takes meds     11/22/2019 no cp no osb no falls no bleed. Can't walk well    7/13/2020 no cp no sob no falls   No bleed. No falls. No bleed. Eats well.     11/13/2020 no cp no sob no falls no bleed no palp    3/12/21 no cp no sob no falls no bleed LE edema no better    7/27/21 Tired all the time. No cp no sob no falls no bleed. Takes meds eats well. 11/29/21 doing well no cp no dizzy still bynum no falls no bleed. 4/8/22 rare janette am palps. None during the day no syncope no cp has chronic sob. No bleed no falls    8/17/22 no cp no sob occ tachy last 20 minutes or so. Occurs 1-2 times a month no falls no bleed. Still unsteady gait. No recent falls    12/13/22 doing well no cp no sob no falls no bleed. No major changes. 1/30/23 pt here for f/u. Doing well no cp no sob no falls no bleed. Not dizzy. Her PPM is at [facility]     2/9/23 had PPM change for JORGE. Since then Both leg pain and severe edema R>L. No cp no sob. No falls no bleed    4/17/23 fell on ice no injuries but her daughter tried to help and she feel with FX leg. No cp no sob no  bleed. t akes meds. 8/17/23 has a Right cheek lesion that popped up a week or so ago per pt.  No cp no sob no falls no bleed same LE edema     EKG: AV Paced 60 QTc 440    Lives w daughter    Past Medical History:   Diagnosis Date    Abnormality of gait and mobility

## 2023-08-22 ENCOUNTER — OFFICE VISIT (OUTPATIENT)
Dept: FAMILY MEDICINE CLINIC | Age: 88
End: 2023-08-22
Payer: MEDICARE

## 2023-08-22 VITALS — TEMPERATURE: 97.9 F | HEIGHT: 65 IN | WEIGHT: 120 LBS | BODY MASS INDEX: 19.99 KG/M2

## 2023-08-22 DIAGNOSIS — D49.2 ABNORMAL SKIN GROWTH: ICD-10-CM

## 2023-08-22 DIAGNOSIS — L98.9 CHANGING SKIN LESION: ICD-10-CM

## 2023-08-22 DIAGNOSIS — M79.89 MASS OF SOFT TISSUE OF FACE: Primary | ICD-10-CM

## 2023-08-22 PROCEDURE — G8427 DOCREV CUR MEDS BY ELIG CLIN: HCPCS | Performed by: FAMILY MEDICINE

## 2023-08-22 PROCEDURE — 99214 OFFICE O/P EST MOD 30 MIN: CPT | Performed by: FAMILY MEDICINE

## 2023-08-22 PROCEDURE — 1090F PRES/ABSN URINE INCON ASSESS: CPT | Performed by: FAMILY MEDICINE

## 2023-08-22 PROCEDURE — 1123F ACP DISCUSS/DSCN MKR DOCD: CPT | Performed by: FAMILY MEDICINE

## 2023-08-22 PROCEDURE — G8420 CALC BMI NORM PARAMETERS: HCPCS | Performed by: FAMILY MEDICINE

## 2023-08-22 PROCEDURE — 1036F TOBACCO NON-USER: CPT | Performed by: FAMILY MEDICINE

## 2023-08-22 SDOH — ECONOMIC STABILITY: HOUSING INSECURITY
IN THE LAST 12 MONTHS, WAS THERE A TIME WHEN YOU DID NOT HAVE A STEADY PLACE TO SLEEP OR SLEPT IN A SHELTER (INCLUDING NOW)?: NO

## 2023-08-22 SDOH — ECONOMIC STABILITY: INCOME INSECURITY: HOW HARD IS IT FOR YOU TO PAY FOR THE VERY BASICS LIKE FOOD, HOUSING, MEDICAL CARE, AND HEATING?: NOT HARD AT ALL

## 2023-08-22 SDOH — ECONOMIC STABILITY: FOOD INSECURITY: WITHIN THE PAST 12 MONTHS, YOU WORRIED THAT YOUR FOOD WOULD RUN OUT BEFORE YOU GOT MONEY TO BUY MORE.: NEVER TRUE

## 2023-08-22 SDOH — ECONOMIC STABILITY: FOOD INSECURITY: WITHIN THE PAST 12 MONTHS, THE FOOD YOU BOUGHT JUST DIDN'T LAST AND YOU DIDN'T HAVE MONEY TO GET MORE.: NEVER TRUE

## 2023-08-22 ASSESSMENT — PATIENT HEALTH QUESTIONNAIRE - PHQ9
SUM OF ALL RESPONSES TO PHQ QUESTIONS 1-9: 0
1. LITTLE INTEREST OR PLEASURE IN DOING THINGS: 0
SUM OF ALL RESPONSES TO PHQ QUESTIONS 1-9: 0
2. FEELING DOWN, DEPRESSED OR HOPELESS: 0
SUM OF ALL RESPONSES TO PHQ9 QUESTIONS 1 & 2: 0

## 2023-08-22 ASSESSMENT — ENCOUNTER SYMPTOMS: COLOR CHANGE: 1

## 2023-08-22 NOTE — PATIENT INSTRUCTIONS
Educated patient's son and patient on the likelihood of the cheek lesion being cancer. Need to biopsy the other cheek lesion as well. And the need to investigate the source of the swelling of the right cheek that is so closely associated with the patient lesion that is present. CAT scan ordered to ascertain etiology of facial mass. Procedure scheduled for biopsying of both facial lesions. Spoke with pt informed him he is able to go to see Dr. Yunier Vang, Pt understands

## 2023-08-22 NOTE — PROGRESS NOTES
biopsy the other cheek lesion as well. And the need to investigate the source of the swelling of the right cheek that is so closely associated with the patient lesion that is present. CAT scan ordered to ascertain etiology of facial mass. Procedure scheduled for biopsying of both facial lesions. This note was partially created with the assistance of dictation. This may lead to grammatical or spelling errors. Enoch Butts M.D.

## 2023-08-23 ENCOUNTER — HOSPITAL ENCOUNTER (OUTPATIENT)
Dept: CT IMAGING | Age: 88
Discharge: HOME | End: 2023-08-25
Payer: MEDICARE

## 2023-08-23 DIAGNOSIS — M79.89 MASS OF SOFT TISSUE OF FACE: ICD-10-CM

## 2023-08-23 DIAGNOSIS — D49.2 ABNORMAL SKIN GROWTH: ICD-10-CM

## 2023-08-23 DIAGNOSIS — L98.9 CHANGING SKIN LESION: ICD-10-CM

## 2023-08-23 PROCEDURE — 70486 CT MAXILLOFACIAL W/O DYE: CPT

## 2023-08-23 NOTE — RESULT ENCOUNTER NOTE
Notify the patient the lesion in the cheek is abnormal and does reflect the look that may be cancerous. She needs to proceed with the biopsy of the skin that we have planned and we need to add on a consultation with ear nose and throat doctor with an urgent referral to evaluate the mass.   Create referral and appointment

## 2023-08-29 ENCOUNTER — OFFICE VISIT (OUTPATIENT)
Dept: FAMILY MEDICINE CLINIC | Age: 88
End: 2023-08-29
Payer: MEDICARE

## 2023-08-29 VITALS — WEIGHT: 120 LBS | HEIGHT: 65 IN | TEMPERATURE: 98.7 F | BODY MASS INDEX: 19.99 KG/M2

## 2023-08-29 DIAGNOSIS — D49.2 ABNORMAL SKIN GROWTH: ICD-10-CM

## 2023-08-29 DIAGNOSIS — L98.9 CHANGING SKIN LESION: ICD-10-CM

## 2023-08-29 DIAGNOSIS — T14.8XXA NON-HEALING NON-SURGICAL WOUND: Primary | ICD-10-CM

## 2023-08-29 DIAGNOSIS — M79.89 MASS OF SOFT TISSUE OF FACE: ICD-10-CM

## 2023-08-29 DIAGNOSIS — T14.8XXA NON-HEALING NON-SURGICAL WOUND: ICD-10-CM

## 2023-08-29 DIAGNOSIS — L57.0 ACTINIC KERATOSES: ICD-10-CM

## 2023-08-29 PROBLEM — E46 PROTEIN CALORIE MALNUTRITION (HCC): Status: ACTIVE | Noted: 2023-08-29

## 2023-08-29 PROCEDURE — 11102 TANGNTL BX SKIN SINGLE LES: CPT | Performed by: FAMILY MEDICINE

## 2023-08-29 PROCEDURE — 17000 DESTRUCT PREMALG LESION: CPT | Performed by: FAMILY MEDICINE

## 2023-08-29 PROCEDURE — 99999 PR OFFICE/OUTPT VISIT,PROCEDURE ONLY: CPT | Performed by: FAMILY MEDICINE

## 2023-08-29 RX ORDER — CEPHALEXIN 500 MG/1
500 CAPSULE ORAL 3 TIMES DAILY
Qty: 15 CAPSULE | Refills: 0 | Status: SHIPPED | OUTPATIENT
Start: 2023-08-29 | End: 2023-09-03

## 2023-08-29 NOTE — PROGRESS NOTES
[place]    Abnormal skin growth  -     NM TANGENTIAL BIOPSY SKIN SINGLE LESION  -     Specimen to Pathology Outpatient; Future    Changing skin lesion  -     NM TANGENTIAL BIOPSY SKIN SINGLE LESION  -     Specimen to Pathology Outpatient; Future    Actinic keratoses  -     NM DESTRUCTION PREMALIGNANT LESION 1ST        Return for with specialist.    Patient Instructions   Superficial infection starting in the nonhealing nonsurgical wound. Antibiotics will be started. Referral for an ENT to evaluate right cheek mass. Skin biopsy obtained today via shave/tangential biopsy as to the wound is too large to sample the entire wound. Left cheek lesion had improved and minimized so it was treated with liquid nitrogen today    Incision/ Shave biopsy/ Laceration repair    -Clean surgical area with antibacterial soap and water once daily.    -Keep surgical site moist with vaseline or antibiotic ointment (single- Bacitracin, not triple-Neosporin) and apply a fresh bandage daily until a solid scab forms or if the wound is at risk for trauma or dirt.   -Follow up immediately if any growing redness (minimal redness or pale pink is normal along wound edges) surrounds the surgical site or if dripping drainage occurs at surgical site. Once a solid scab forms no more bandage needed. A wet scab can look yellow. This is not infection, just moisture.  -You may be instructed to soak the wound with Hydrogen Peroxide to loosen scabbing around sutures, this is not to be done more often that every 3 days, should be for 30 seconds-1 min and then rinsed off with water.   -Once the lesion is healed be sure to apply sunscreen to the area to prevent burn of the newer and more delicate skin during the first 6 months of healing.    -If the scar begins to be raised you may massage the area firmly twice a day to help break down scar tissue and help the area become a flat scar.  There is some evidence that Mederma applied to a scar daily for

## 2023-08-29 NOTE — PATIENT INSTRUCTIONS
Superficial infection starting in the nonhealing nonsurgical wound. Antibiotics will be started. Referral for an ENT to evaluate right cheek mass. Skin biopsy obtained today via shave/tangential biopsy as to the wound is too large to sample the entire wound. Left cheek lesion had improved and minimized so it was treated with liquid nitrogen today    Incision/ Shave biopsy/ Laceration repair    -Clean surgical area with antibacterial soap and water once daily.    -Keep surgical site moist with vaseline or antibiotic ointment (single- Bacitracin, not triple-Neosporin) and apply a fresh bandage daily until a solid scab forms or if the wound is at risk for trauma or dirt.   -Follow up immediately if any growing redness (minimal redness or pale pink is normal along wound edges) surrounds the surgical site or if dripping drainage occurs at surgical site. Once a solid scab forms no more bandage needed. A wet scab can look yellow. This is not infection, just moisture.  -You may be instructed to soak the wound with Hydrogen Peroxide to loosen scabbing around sutures, this is not to be done more often that every 3 days, should be for 30 seconds-1 min and then rinsed off with water.   -Once the lesion is healed be sure to apply sunscreen to the area to prevent burn of the newer and more delicate skin during the first 6 months of healing.    -If the scar begins to be raised you may massage the area firmly twice a day to help break down scar tissue and help the area become a flat scar. There is some evidence that Mederma applied to a scar daily for the first few months can help shrink and fade it more quickly then without intervention. Cryotherapy instructions    Post op instructions given. A printed copy provided.     It is best to leave blisters alone if they form for the first 1-3 days to allow the desired damaged tissue (precancer lesion, wart, or whatever lesion is being removed) to separate from healthy

## 2023-09-05 DIAGNOSIS — C44.329 SQUAMOUS CELL CANCER OF SKIN OF RIGHT CHEEK: ICD-10-CM

## 2023-09-05 DIAGNOSIS — C44.92 SCC (SQUAMOUS CELL CARCINOMA): Primary | ICD-10-CM

## 2023-10-02 PROBLEM — C44.320 SQUAMOUS CELL CARCINOMA OF FACE: Status: ACTIVE | Noted: 2023-01-01

## 2023-10-02 NOTE — TELEPHONE ENCOUNTER
Spoke with Epi to confirm appt details for the PET scan tomorrow for Eryn and she will be attending. I also reviewed checking in for the procedure, all questions answered. I did inquire again about dental clearance again, but Epi states they are not going to the dentist and that her teeth are not problematic. I explained rationale again for importance of dental clearance pre-op, and he verbalized understanding. I encouraged him to call with any questions or concerns that may come up tomorrow.

## 2023-10-04 PROBLEM — K21.9 GASTROESOPHAGEAL REFLUX DISEASE WITHOUT ESOPHAGITIS: Status: ACTIVE | Noted: 2023-01-01

## 2023-10-04 PROBLEM — I10 HTN (HYPERTENSION): Status: ACTIVE | Noted: 2023-01-01

## 2023-10-04 PROBLEM — M19.90 ARTHRITIS: Status: ACTIVE | Noted: 2023-01-01

## 2023-10-04 PROBLEM — I49.9 DYSRHYTHMIAS: Status: ACTIVE | Noted: 2023-01-01

## 2023-10-04 PROBLEM — Z95.0 PACEMAKER: Status: ACTIVE | Noted: 2023-01-01

## 2023-10-04 PROBLEM — Z92.89 HISTORY OF BLOOD TRANSFUSION: Status: ACTIVE | Noted: 2023-01-01

## 2023-10-04 NOTE — ANESTHESIA PROCEDURE NOTES
Arterial Line:    Date/Time: 10/4/2023 9:05 AM    Staffing  Performed: resident   Authorized by: Kadi Ramirez MD    Performed by: Janet Brandt MD    An arterial line was placed. Procedure performed using surface landmarks.in the OR for the following indication(s): continuous blood pressure monitoring, blood sampling needed and unable to use non-invasive cuff.    A 20 gauge (size) (length), Angiocath (type) catheter was placed into the Right radial artery, secured by Tegaderm,   Seldinger technique not used.  Events:  patient tolerated procedure well with no complications.

## 2023-10-04 NOTE — OP NOTE
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    Procedure Summary  Anesthesia: General  ASA: III  Anesthesia Staff: Anesthesiologist: Kadi Ramirez MD  Anesthesia Resident: Janet Brandt MD  Estimated Blood Loss: 2 mL  Intra-op Medications: * No intraprocedure medications in log *           Anesthesia Record               Intraprocedure I/O Totals       None           Specimen: No specimens collected     Staff:   Circulator: Brenda Godinez RN  Scrub Person: Enma Gallegos RN; Flavio Diaz RN         Drains and/or Catheters:   Gastrostomy/Enterostomy Percutaneous endoscopic gastrostomy (PEG) 20 Fr. LLQ (Active)       Urethral Catheter Non-latex;Temperature probe 14 Fr. (Active)       Tourniquet Times:         Implants:     Findings: Normal esophagus and stomach    Indications: Eryn Willis is an 89 y.o. 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  Patient requiring supplemental enteral nutrition and endoscopic access.    The patient was seen in the preoperative area. The risks, benefits, complications, treatment  options, non-operative alternatives, expected recovery and outcomes were discussed with the patient. The possibilities of reaction to medication, pulmonary aspiration, injury to surrounding structures, bleeding, recurrent infection, the need for additional procedures, failure to diagnose a condition, and creating a complication requiring transfusion or operation were discussed with the patient. The patient concurred with the proposed plan, giving informed consent.  The site of surgery was properly noted/marked if necessary per policy. The patient has been actively warmed in preoperative area. Preoperative antibiotics have been ordered and given within 1 hours of incision. Venous thrombosis prophylaxis are not indicated.    Procedure Details: Following endotracheal intubation, endoscope was advanced to the cervical esophagus and into the stomach.  Esophagus and stomach were evaluated and were of normal shape and contour without inflammatory or neoplastic process.  Single finger pressure on the anterior abdominal wall was seen in scopic Dariel and at this site the abdominal was prepped and draped.  Safe track technique was performed with simultaneous visualization of the needle in the gastric lumen and the aspirating syringe.  An 8 mm transverse incision was created at this site and Angiocath was Olivas into the gastric lumen.  Guidewire was advanced and the guidewire endoscope and snare were brought out through the mouth.  PEG tube was fixes and brought back to the intra-abdominal wall.  Endoscope returned to the gastric lumen for second innervation.  There is notes of bleeding.  External T-bar was placed at 2.5 cm allowing for a 1 cm laxity.  There is no evidence of tension or bleeding.  Endoscope was withdrawn without further findings.  Complications:  None; patient tolerated the procedure well.    Disposition:  Patient remained in the OR following PEG tube for definitive surgery by ENT service.  Condition: stable          Additional Details:     Attending Attestation: I was present for the entire procedure.    Vasiliy Ramos MD

## 2023-10-04 NOTE — ANESTHESIA POSTPROCEDURE EVALUATION
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    Anesthesia Post Evaluation    Patient location during evaluation: PACU  Patient participation: complete - patient participated  Level of consciousness: sleepy but conscious  Pain score: 0  Pain management: adequate  Airway patency: patent  Cardiovascular status: acceptable and hemodynamically stable  Respiratory status: airway suctioned and nasal cannula  Hydration status: balanced        No notable events documented.

## 2023-10-04 NOTE — ANESTHESIA PROCEDURE NOTES
Airway  Date/Time: 10/4/2023 9:59 AM  Urgency: elective    Airway not difficult    Staffing  Performed: resident   Authorized by: Kadi Ramirez MD    Performed by: Janet Brandt MD  Patient location during procedure: OR    Indications and Patient Condition  Indications for airway management: anesthesia  Spontaneous Ventilation: absent  Sedation level: deep  Preoxygenated: yes  Patient position: sniffing  Mask difficulty assessment: 1 - vent by mask  Planned trial extubation    Final Airway Details  Final airway type: endotracheal airway      Successful airway: ETT  Cuffed: yes   Successful intubation technique: video laryngoscopy  Facilitating devices/methods: intubating stylet  Endotracheal tube insertion site: oral  Blade: Sergio  Blade size: #3  ETT size (mm): 6.0  Cormack-Lehane Classification: grade I - full view of glottis  Placement verified by: chest auscultation, capnometry and palpation of cuff   Measured from: lips  Number of attempts at approach: 1  Number of other approaches attempted: 0

## 2023-10-04 NOTE — ANESTHESIA PROCEDURE NOTES
Airway  Date/Time: 10/4/2023 10:00 AM  Urgency: elective    Airway not difficult    Staffing  Performed: resident   Authorized by: Kadi Ramirez MD    Performed by: Janet Brandt MD  Patient location during procedure: OR    Indications and Patient Condition  Indications for airway management: anesthesia  Spontaneous ventilation: present  Sedation level: deep  Preoxygenated: yes  Patient position: sniffing  Mask difficulty assessment: 1 - vent by mask  Planned trial extubation    Final Airway Details  Final airway type: endotracheal airway      Successful airway: ETT  Cuffed: yes   Facilitating devices/methods: intubating stylet  Endotracheal tube insertion site: oral  Blade size: #3  ETT size (mm): 6.0  Placement verified by: chest auscultation and capnometry   Inital cuff pressure (cm H2O): 20  Cuff volume (mL): 7  Measured from: lips  Number of attempts at approach: 1  Number of other approaches attempted: 0

## 2023-10-04 NOTE — H&P (VIEW-ONLY)
Orders     · PET/CT Head And Neck Initial; Status:Active; Requested for:[id number];   to Determine Optimal Study : Y  are the patient's signs and symptoms? : Right buccal mucosa SCCa, evaluate      bilateral hilar adenopathy and dilated bronchi seen on CT chest from 9/27    Provider Impressions    89 yo woman with a large right buccal cancer eroding through the skin  reviewed biopsy -scca  reviewed CT neck / chest - large buccal mass, some sclerotic changes inmandible, no obvious Edyta. chronic inflammatory lung changes    -I would be performing the ablation which will involve a through and through resection of the buccal/facial mass, neck dissection, possible tracheostomy  -we spoke about the risks including anesthesia issues, nerve weakness, likely oral incompetence that would need reconstruction, various other CN and blood vessel injuries  -PAT today  -will contact pulmonology regarding her CT findings      Chief Complaint    buccal cancer      History of Present [id number] yo woman with a large right buccal cancer. She doesn't smoke. This has been growing for past few month, has been draining. She is not losing weight but has some trouble chewing . was admitted for pneumonia in Feb, has COPD for unclear reasons, has a pulmonologist at [facility]. 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 EXPECTORATE AFTER RINSING, DO NOT SWALLOW   Fluticasone Propionate 50 MCG/ACT  Nasal Suspension USE 2 SPRAYS IN EACH NOSTRIL ONCE DAILY   Hibiclens 4 % External Liquid USE AS DIRECTED.   Verapamil HCl  MG Oral Tablet Extended Release      Physical Exam  nad alert  mitchell eomi NCAT  large right facial mass approximately 5 cm in size, with some drainage superiorly, is through and through int o the buccal mucosa. still mobile, not attached to mandible or maxilla. no palpable LOCO       'Scores and Scales'        Signatures   Electronically signed by : Danny Whitney MD; Sep 28 2023 10:13PM EST (Author)

## 2023-10-04 NOTE — OP NOTE
Operative Report   Eryn Willis  [id number]  2/28/1933    [facility]  10/04/23    Preoperative Diagnosis:   1. Oral cavity cancer  2. Skin cancer    Postoperative Diagnosis:  1. same    Procedure:   Direct laryngoscopy  2.   Wide local excision of skin cancer  3.   Oral cavity resection malignant neoplasm  4.   Right selective neck dissection levels 1A-3    Attending Surgeon:   Danny Whitney MD    Resident Surgeon:   Markell Cano MD    Anesthesia:   GETA     Estimated Blood Loss:   50cc    Complications:  None     Findings:   Large right oral cavity mass, 6x8 cm externally, 5x6 cm intraorally.  Right malar skin cancer excised with 1 cm margins    Operative Indications:   Patient is a 89 year old woman with a large intraoral mass and facial squamous cell carcinoma. After discussion of all the risks, benefits, indications and alternatives to the planned surgery in clinic patient was in agreement with proceeding with operative intervention. These risks were again reviewed in the pre-operative area and informed consent was signed by the patient.    Procedure in Detail:   Patient was seen and evaluated in the pre-operative area. Informed consent was obtained as described above. Patient was then taken to the operative room by the anesthesia team. General anesthesia was induced, and patient was orotracheally intubated without issue. Patient was then turned 90 degrees towards the ENT team. Time out was performed by Dr. Whitney. We began with laryngoscopy. Besides the buccal mass no other suspicious masses or lesions were seen with the Dedo on exam. We looked at the oropharynx/larynx/hypopharynx. Dr Ramos then performed the PEG.    An incision was designed extending from the posterior aspect of the SCM on the right side to the midline in a horizontal neck crease. We connected this with our facial mass resection with 1cm margins. The incision was infiltrated with 1% lidocaine with 1:100,000 epinephrine. Patient was prepped and draped  in the usual sterile fashion.     The facial incision was opened using a 15 blade. Bovie electrocautery was used to excise down to the facial muscles in the sub-SMAS plane. Facial nerve branches to the midface and lower division were encountered, but unfortunately needed to be sacrificed as they were heading into the mass. The parotid duct was also identified as it exited the gland and it was also entering into the tumor and so it was clipped.  We then used the bovie to make our cuts intraorally ensuring a 1cm margin. The specimen was then removed en bloc and oriented for frozen margin analysis. Frozen cutaneous and mucosal margins were negative.     We then proceeded with the neck dissection. The incision was opened with a 15 blade down to the  platysma. Bovie was used to incise the platysma, and subplatysmal flaps were raised superiorly the mandible and inferiorly to just above the clavicle.    First we proceeded with level 1A dissection and used bovie to identify the right anterior belly of the digastric. We then dissected all fibrofatty tissue between the bilateral anterior belly of digastric muscles down to the level of the hyoid and reflected this inferiorly. The inferior border of the submandibular gland was identified. We incised the fascia over the submandibular gland inferiorly and reflected a flap superiorly to protect the marginal mandibular branch of the facial nerve which was identified within this fascial tissue. Dissection started posteriorly around the angle of the mandible. We identified, clipped and divided the facial vein and artery. Careful dissection was then performed to release the submandibular gland and fibrofatty tissue extending from the body of the mandible and between the anterior and posterior belly of the digastric and mylohyoid muscle. The lingual nerve was identified deep to the mylohyoid and was preserved. The submandibular duct was clipped and divided. This completed our level 1  dissection and the tissue was reflected inferiorly.    Next, we unrolled the fibrofatty tissue off the anterior border of the SCM down to the omohyoid muscle. We identified cranial nerve 11 entering the SCM. We turned attention to the posterior belly of the digastric which had been identified previously. The internal jugular vein was identified deep to the posterior belly of the digastric. We skeletonized the 11th nerve and followed it and noted it was coursing anterior to the IJV. We performed blunt dissection between the 11th nerve and IJV to identify the floor of the neck superiorly at level 2. The fibrofatty tissue was incised along the IJV down to the floor and pulled inferiorly both anterior and posterior to CN 11, completing our level 2 dissection. We turned attention more laterally and continued our dissection along the SCM to identify the cervical rootlets down to the level of the omohyoid which marked the floor of our dissection. We incised the fascia over the rootlets and continued our dissection anteriorly until the IJV was clearly visible. We used a mets scissors to carefully dissect the fibrofatty tissue off the IJV. Small vessels were clipped. Dissection continued in this fashion until the common facial vein was identified. We used a combination of blunt dissection and bovie to separate the fibrofatty pack from the common facial vein, omohyoid muscle and strap muscles. The specimen was then removed and submitted for permanent pathology separately as levels 1A-3.     Following this dissection the hypoglossal nerve was identified deep to the posterior belly of the digastric and was preserved. The neck was copiously irrigated with sterile normal saline.       Dr. Whitney was present for the critical portions of the procedure.

## 2023-10-04 NOTE — DISCHARGE INSTRUCTIONS
DEPARTMENT OF OTOLARYNGOLOGY (ENT)  DISCHARGE INSTRUCTIONS    C O N F I D E N T I A L   I N F O R M A T I O N  -------------------------------------------------------------------------------------------------------------------    Eryn Willis  [id number]    The following is a brief overview of your hospitalization. Some of the information contained on this summary may be confidential.  This information should be kept in your records and should be shared with your regular doctor.    Admission Date:10/4/2023  6:04 AM  Discharge Date: No discharge date for patient encounter.    Disposition:  ***    PRINCIPAL DIAGNOSIS (reason after study for this admission): ***    Physicians:   Dr. Singleton/Dr. Whitney    Operations performed while hospitalized:   Right oral cavity resection, right neck dissection 1-3, PEG tube, left ALT free flap    Treatment/wound care:   Keep area(s) clean and dry.   Do not scrub wound(s), pat dry.    Do not submerge wound(s) in standing water until seen in followup (no tub bathing, swimming, or hot tubs).    Please visually inspect your wound(s) at least once daily.  If the wound(s) are in a difficult to see location, please use a mirror or have someone else assist with visual inspection.    If you have sutures that you can see outside of the skin or staples:  Please have staples/sutures removed in our clinic 10-14 days after the date of surgery.  Do not remove the staples/sutures on your own.  Return sooner or call if wound(s) or surrounding area have increased swelling, pain, warmth, redness, or drainage that is thick, yellow and/or green.      -Free Flap Donor Site: left thigh  -Anterolateral Thigh Care: Cleanse anterolateral thigh incision twice daily with baby shampoo or mild soap and water. Apply petroleum jelly/vaseline to incision line twice daily until incision has healed.  ,   Intra-oral Incision Care: To care for your intra-oral incisions, please swish and spit with 15 mLs of peridex solution  3-4 times daily until follow-up. Use swabs to perform oral care.,   PEG Tube Care: Cleanse with 1:1 mixture of hydrogen peroxide and normal saline to remove any crust. Spin bumper daily. If bumper does not spin freely notify MD. Monitor site for signs & symptoms of infection which include redness, warmth, increased tenderness and purulent drainage.,   You have a Davol drain in place that you will need to care for:  -To empty the drain, open cap at the top and tip into cup to empty. Squeeze bulb on top of drain to activate the suction then replace the cap.  -Empty the drain 3 times a day and record its output and bring these numbers to your follow up appointment.  -The drain will be removed when your output is less than 30 mL over 24 hours. Please call [phone] when output is less than 30 mL over 24 hours to schedule appointment for drain removal.  -This drain is sutured into place. Please keep it dry and change the dressing sponge around the drain daily or when it gets soiled.     Pain Control:    Adequate management:   Oxycodone *** can be taken as prescribed as needed for breakthrough pain.    Oxycodone is a narcotic, which can induce constipation.   Please take stool softeners when taking this medication to prevent constipation.    Activity after Discharge:   No heavy lifting, weight bearing as tolerated, no driving until mobility is returned to normal.   Do not submerge wound(s) in standing water for 7 days after surgery (no tub bathing, swimming, or hot tubs).   Do not drive or operate heavy machinery while taking narcotic pain medications as these medications can alter perception, impair judgement, and slow reaction times.    Homebound:  Based on my clinical findings, this patient is homebound due to inability to communicate secondary to a new tracheostomy, multiple facial and *** extremity wounds, and also due to physical deconditioning acquired during hospitalization making independent ambulation  contraindicated until the patient regains strength and endurance.    Patient is unable to drive and high risk for falls due to narcotic use after surgical procedure to reduce pain. This supports a considerable and taxing effort to leave home by way of mobility and pain.    Call provider if (home going patients):   Breathing faster than normal, Breathing harder than normal or having retractions, Fever of 100.4 (38 C) or higher, Temperature is greater than 102 degrees, Chills, Drinking less than normal, Not being able to go 4-6 hours between albuterol treatments, Urinating less than normal, over 1 day, Acting very sleepy and difficult to awaken, Vomiting (throwing up) and not able to eat or drink for 12 hours, 3 or more loose, watery bowel movements in 24 hours (diarrhea), and Any new concerning symptoms    Diet: NPO  Tube Feeds:   ***

## 2023-10-04 NOTE — ANESTHESIA PROCEDURE NOTES
Peripheral IV  Date/Time: 10/4/2023 9:45 AM      Placement  Needle size: 22 G  Laterality: right  Location: wrist  Local anesthetic: none  Site prep: chlorhexidine  Technique: anatomical landmarks  Attempts: 1

## 2023-10-04 NOTE — PROGRESS NOTES
Pharmacy Medication History Review    Eryn Willis is a 89 y.o. female admitted for Squamous cell carcinoma of face. Pharmacy reviewed the patient's [id number] medications and allergies for accuracy.    The list below reflectives the updated PTA list. Please review each medication in order reconciliation for additional clarification and justification.  Facility-Administered Medications Prior to Admission   Medication Dose Route Frequency Provider Last Rate Last Admin    fludeoxyglucose F-18 injection 10.4 millicurie  10.4 millicurie intravenous Once in imaging Dany Jeffrey MD         Medications Prior to Admission   Medication Sig Dispense Refill Last Dose    albuterol 2.5 mg /3 mL (0.083 %) nebulizer solution Take 3 mL (2.5 mg) by nebulization every 4 hours if needed for wheezing.   Unknown    sotalol (Betapace) 80 mg tablet Take 1 tablet (80 mg) by mouth 2 times a day.   10/4/2023    verapamil SR (Calan-SR) 180 mg ER tablet Take 1 tablet (180 mg) by mouth once daily. Do not crush or chew.   10/3/2023        The list below reflectives the updated allergy list. Please review each documented allergy for additional clarification and justification.  Allergies  Reviewed by Janet Brandt MD on 10/4/2023        Severity Reactions Comments    Codeine Not Specified Hives     Penicillins Not Specified Hives           Sources: [facility] note from 9/29, TOO dorado and patient interview (great historian-knew drug, dose and frequency)    Below are additional concerns with the patient's PTA list.  -pt states that she only take the listed medications above  -[facility] note has these other medications as active: breo 100/25 (1 puff daily), aspirin 81 mg  (daily), and atorvastatin 20 mg (daily). Pt denies taking any other prescription medications    Michelle Dela Cruz, PharmD  Transitions of Care Pharmacist  Medication reconciliation complete  Please reach out via [employer] for questions, or if no response call  [phone] or [facility]

## 2023-10-04 NOTE — ANESTHESIA PREPROCEDURE EVALUATION
Patient: Eryn Willis    Procedure Information       Date/Time: 10/04/23 0815    Procedures:       REconstruction with Tissue from arm vs Leg      Skin graft      Direct laryngoscopy, Esophagoscopy, Bronchoscopy      Excision of Right Buccal Cavity Mass (Right)      Right Neck Dissection (Right)      Tracheostomy      Placement of PEG    Location: [facility] OR 03 / [facility] OR    Surgeons: Dany Jeffrey MD; Danny Whitney MD; Vasilyi Ramos MD            Relevant Problems   Cardiovascular   (+) Dysrhythmias   (+) HTN (hypertension)   (+) Pacemaker      GI   (+) Gastroesophageal reflux disease without esophagitis      Other   (+) Arthritis       Clinical information reviewed:   Tobacco  Allergies  Meds  Problems  Med Hx  Surg Hx  OB Status    Fam Hx  Soc Hx        NPO Detail:  NPO/Void Status  Date of Last Liquid: 10/03/23  Time of Last Liquid: 2300  Date of Last Solid: 10/03/23  Time of Last Solid: 1700         Physical Exam    Airway  Mallampati: II  TM distance: >3 FB  Neck ROM: full     Cardiovascular   Rhythm: irregular  Rate: normal     Dental    Pulmonary   Breath sounds clear to auscultation     Abdominal   Abdomen: soft       Other findings: Rt cheek mass   Teeth intact       Anesthesia Plan    ASA 3     general     The patient is not a current smoker.    intravenous induction   Postoperative administration of opioids is intended.  Trial extubation is planned.  Anesthetic plan and risks discussed with patient.  Use of blood products discussed with patient who consented to blood products.    Plan discussed with attending.

## 2023-10-04 NOTE — PROCEDURES
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

## 2023-10-05 NOTE — PROGRESS NOTES
Checked device settings post op.    Settings AAI-DDD lower rate 60bpm, upper rate 100bpm.     No monitored VT or AT/AF.    Please page device nurse in the AM for confirmation

## 2023-10-05 NOTE — SIGNIFICANT EVENT
Subjective:  Patient resting in bed, no concerns.     Objective:  Vitals reviewed in EMR  Gen: NAD, AOx3, resting comfortably in bed  Face/Neck: All incisions c/d/i, neck soft and flat without evidence of hematoma or fluid collection  -Skin paddle soft , pink and warm  -implantable doppler with strong arterial signal   Oral Cavity: All intraoral incisions c/d/i without evidence of dehiscence  -Flap soft and pink with good capillary refill  Extremities: left leg warm with intact movement and sensation; dressing in place with minimal strike through  Abdomen: PEG in place  Drains: All drains in place and holding suction with serosanguinous drainage (stripped)    Assessment/Plan:  Eryn Willis   is a 89 y.o. F who underwent Direct laryngoscopy,  Wide local excision of skin cancer, Oral cavity resection malignant neoplasm, Right selective neck dissection levels 1A-3, Reconstruction with left anterolateral thigh free flap with Sheron Whitney and Rachele on 10/4/23 on 10/4/2023. Flap check within normal limits     -continue Q6hr flap checks

## 2023-10-05 NOTE — PROGRESS NOTES
Occupational Therapy    Evaluation    Patient Name: Eryn Willis  MRN: [id number]  Today's Date: 10/5/2023  Time Calculation  Start Time: 0924  Stop Time: 0952  Time Calculation (min): 28 min    Assessment  OT Assessment: difficulty with ADLs  Plan:  Treatment Interventions: ADL retraining, Functional transfer training  OT Frequency: 2 times per week  OT Discharge Recommendations:  (Pt would benefit from continue therapy post d/c. Pt would benefit from moderate intensity 3-5x per week to increase functional performance of ADLs and functional mobility. However, Pt may progress to low intensity with assist from family)  OT Recommended Transfer Status: Moderate assist, Assist of 1    Subjective   Current Problem:  1. Squamous cell carcinoma of face        2. Pacemaker  Cardiac device check - Surgery    Cardiac device check - Surgery      3. Cardiac arrhythmia, unspecified cardiac arrhythmia type  Cardiac device check - Surgery    Cardiac device check - Surgery      4. Encounter for adjustment and management of automatic implantable cardiac defibrillator  Cardiac device check - Surgery    Cardiac device check - Surgery      5. Malignant neoplasm of cheek mucosa (CMS/HCC)  Surgical Pathology Exam    Surgical Pathology Exam        General:  General  Reason for Referral: Direct laryngoscopy,  Wide local excision of skin cancer, Oral cavity resection malignant neoplasm, Right selective neck dissection levels 1A-3, Reconstruction with left anterolateral thigh free flap with Sheron Whitney and Rachele on 10/4/23. Flap check within normal limits.  Past Medical History Relevant to Rehab: Squamous cell carcinoma of face  Co-Treatment: PT  Co-Treatment Reason: to maximize Pt safety and participation d/t Pt complexity  Prior to Session Communication: Bedside nurse  Patient Position Received: Bed, 3 rail up  General Comment: pleasant and cooperative, agreeable to OT. slightly [place]  Precautions:  Medical Precautions: Fall precautions  Vital  Signs:  Heart Rate: 67  Heart Rate Source: Monitor  SpO2: 94 % (2LNC)  BP: (!) 112/47  BP Location: Left arm  Patient Position: Lying  SpO2: 92% RA  BP: 114/51  Position: sitting EOB    Pain:  Pain Assessment  Pain Assessment: 0-10  Pain Score: 0 - No pain    Objective   Cognition:  Overall Cognitive Status: Within Functional Limits  Orientation Level: Oriented X4  Impulsive: Mildly           Home Living:  Type of Home: House  Lives With: Adult children (son, retired and performs IADLs, +drive)  Home Adaptive Equipment: Walker rolling or standard  Home Layout: One level, Full bath main level  Home Access: Stairs to enter without rails  Entrance Stairs-Number of Steps: 1  Bathroom Shower/Tub: Walk-in shower  Bathroom Toilet: Standard  Bathroom Equipment: Grab bars in shower, Shower chair with back   Prior Function:  Level of [place]: Independent with ADLs and functional transfers  ADL Assistance: Independent  Ambulatory Assistance: Independent  Vocational: Retired  IADL History:  Current License: No  Occupation: Retired  IADL Comments: son performs IADLs and drives Pt  ADL:  Eating Deficit: Setup (anticipated)  Grooming Assistance: Stand by  Grooming Deficit: Setup (anticipated)  Bathing Assistance: Minimal  Bathing Deficit: Setup, Supervision/safety (with AD prn, anticipated)  UE Dressing Assistance: Minimal  UE Dressing Deficit: Setup, Supervision/safety (d/t line management, anticipated)  LE Dressing Assistance: Minimal  LE Dressing Deficit: Setup, Supervision/safety (anticipated. Pt able to bring B LE in 4 figure position with CGA for postural control)  Toileting Assistance with Device:  (total, brooke)  Toileting Deficit:  (min A to BSC anticipated)  Functional Deficit:  (Pt performed functional mobility 1 step with R LE laterally towards HOB min A x2 B arm in arm; Pt unable to lift L UE this date standing)    Bed Mobility/Transfers: Bed Mobility  Bed Mobility: Yes  Bed Mobility 1  Bed Mobility 1: Supine to  sitting  Level of Assistance 1: Moderate assistance, Moderate verbal cues, Minimal tactile cues  Bed Mobility Comments 1: Pt able to advance B LE  Bed Mobility 2  Bed Mobility  2: Sitting to supine  Level of Assistance 2: Minimum assistance, Minimal verbal cues   and Transfers  Transfer: Yes  Transfer 1  Transfer From 1: Bed to  Transfer to 1: Stand  Technique 1: Sit to stand  Transfer Level of Assistance 1: Arm in arm assistance, Minimum assistance, +2  Trials/Comments 1: 1  Transfers 2  Transfer From 2: Stand to  Transfer to 2: Bed  Technique 2: Sit to stand  Transfer Level of Assistance 2: Minimum assistance, Arm in arm assistance, +2  Trials/Comments 2: 1    IADL's:   Current License: No  Occupation: Retired  IADL Comments: son performs IADLs and drives Pt  Vision: Vision - Basic Assessment  Current Vision: No visual deficits  Sensation:  Light Touch: No apparent deficits    Hand Function:  Hand Function  Gross Grasp: Functional  Coordination: Functional  Extremities: RUE   RUE : Exceptions to WFL  RUE AROM (degrees)  RUE AROM Comment: WFL  RUE Strength  RUE Overall Strength: Greater than or equal to 3/5 as evidenced by functional mobility (grossly 4/5) and LUE   LUE: Exceptions to WFL  LUE AROM (degrees)  LUE AROM Comment: WFL  LUE Strength  LUE Overall Strength: Greater than or equal to 3/5 as evidenced by functional mobility (grossly 4/5)    Outcome Measures: [facility] Daily Activity  Putting on and taking off regular lower body clothing: A little  Bathing (including washing, rinsing, drying): A lot  Putting on and taking off regular upper body clothing: A little  Toileting, which includes using toilet, bedpan or urinal: Total  Taking care of personal grooming such as brushing teeth: A little  Eating Meals: A little  Daily Activity - Total Score: 15     and OT Adult Other Outcome Measures  4AT: 4AT-  Education Documentation  Precautions, taught by Alea Bowen OT at 10/5/2023 11:29 AM.  Learner:  Patient  Readiness: Acceptance  Method: Explanation  Response: Verbalizes Understanding    ADL Training, taught by Alea Bowen OT at 10/5/2023 11:29 AM.  Learner: Patient  Readiness: Acceptance  Method: Explanation  Response: Verbalizes Understanding    Goals:   Encounter Problems       Encounter Problems (Active)       ADLs       Patient with complete upper body dressing with set-up level of assistance donning and doffing all UE clothes with no adaptive equipment while sitting (Progressing)       Start:  10/05/23    Expected End:  10/19/23            Patient with complete lower body dressing with set-up and stand by assist level of assistance donning and doffing all LE clothes  with no adaptive equipment while unsupported sitting (Progressing)       Start:  10/05/23    Expected End:  10/19/23            Patient will complete daily grooming tasks  with set-up and stand by assist level of assistance and PRN adaptive equipment while unsupported sitting. (Progressing)       Start:  10/05/23    Expected End:  10/19/23            Patient will complete toileting including hygiene clothing management/hygiene with set-up and contact guard assist level of assistance and DME prn. (Progressing)       Start:  10/05/23    Expected End:  10/19/23               MOBILITY       Patient will perform Functional mobility min Household distances/Community Distances with set-up and contact guard assist level of assistance and least restrictive device in order to improve safety and functional mobility. (Progressing)       Start:  10/05/23    Expected End:  10/19/23               TRANSFERS       Patient will perform bed mobility stand by assist level of assistance in order to improve safety and independence with mobility (Progressing)       Start:  10/05/23    Expected End:  10/19/23            Patient will complete all functional transfer with least restrictive device with set-up and stand by assist level of assistance.  (Progressing)       Start:  10/05/23    Expected End:  10/19/23

## 2023-10-05 NOTE — SIGNIFICANT EVENT
"ENT Flap Check  Eryn Willis   is a 89 y.o. female who on 10/4/2023 underwent Direct laryngoscopy  2.   Wide local excision of skin cancer  3.   Oral cavity resection malignant neoplasm  4.   Right selective neck dissection levels 1A-3  5. Reconstruction with left anterolateral thigh free flap  With Sheron Carpio on 10/4/23    Subjective: no patient concerns. Somnolent but arousable.     Physical Examination\"  Vitals: Temp:  [36 °C (96.8 °F)-36.6 °C (97.9 °F)] 36.3 °C (97.3 °F)  Heart Rate:  [48-64] 64  Resp:  [14-26] 14  BP: (115-155)/(50-70) 124/52  Arterial Line BP 1: (118-143)/(36-48) 138/41  GENERAL: Appears well developed, well nourished. Somnolent but arousable.   RESPIRATION: Breathing comfortably on 2L and weaning, no stridor  Face/Neck: All incisions c/d/i, neck soft and flat without evidence of hematoma or fluid collection  Flap: well perfused, doppler biphasic x2 (I.e., skin paddle and internal), no evidence of dehiscence   Extremities: leg warm with intact movement and sensation  Enteral: PEG in place  Drains: All drains in place and holding suction with serosanguinous drainage (stripped)    Assessment:  Eryn Willis   is a 89 y.o. F who underwent Direct laryngoscopy,  Wide local excision of skin cancer, Oral cavity resection malignant neoplasm, Right selective neck dissection levels 1A-3, Reconstruction with left anterolateral thigh free flap with Sheron Carpio on 10/4/23 on 10/4/2023. Flap check within normal limits.     Plan:  -Continue q4hr flap checks    Steve Bustos, PGY2  Otolaryngology - Head & Neck Surgery  ENT Consult pager: [phone]  Peds pager: [phone]  Adult Head & Neck Phone: [phone]  ENT subspecialty team: [facility] individual resident who wrote today's note  Please page if urgent    I am the night resident. I can only be contacted from 5:30PM and 5AM.    "

## 2023-10-05 NOTE — SIGNIFICANT EVENT
Subjective:  Patient resting in bed, family present at bedside. Denies pain and nausea. Nursing with no concerns    Objective:  Vitals reviewed in EMR  Gen: NAD, AOx3, resting comfortably in bed  Face/Neck: All incisions c/d/i, neck soft and flat without evidence of hematoma or fluid collection  -Skin paddle soft , pink and warm  -External doppler with strong arterial signal   Oral Cavity: All intraoral incisions c/d/i without evidence of dehiscence  -Flap soft and pink with good capillary refill  Extremities: left leg warm with intact movement and sensation; dressing in place with minimal strike through  Abdomen: PEG in place  Drains: All drains in place and holding suction with serosanguinous drainage (stripped)    Assessment/Plan:  Eryn Willis   is a 89 y.o. F who underwent Direct laryngoscopy,  Wide local excision of skin cancer, Oral cavity resection malignant neoplasm, Right selective neck dissection levels 1A-3, Reconstruction with left anterolateral thigh free flap with Sheron Whitney and Rachele on 10/4/23 on 10/4/2023. Flap check within normal limits     -continue Q4hr flap checks    Vesna Louie APRN, CNP  Department of Otolaryngology: Head & Neck Surgery  Personal Pager [phone]  ENT Team  Head and Neck Phone: [phone]

## 2023-10-05 NOTE — CARE PLAN
Problem: Mobility  Goal: STG - Patient will ambulate 25 ft with min A and FWW with no LOB, progress as able and appropriate   Outcome: Progressing  Goal: STG - Patient will ascend and descend 1 stair with mod A and HR   Outcome: Progressing     Problem: Transfers  Goal: STG - Transfer from bed to chair with min A and FWW  Outcome: Progressing  Goal: STG - Patient will perform bed mobility with CGA   Outcome: Progressing     Problem: Transfers  Goal: STG - Patient will transfer sit to and from stand with min A and FWW   Outcome: Progressing

## 2023-10-05 NOTE — PROGRESS NOTES
"Eryn Willis is a 89 y.o. female on day 1 of admission presenting with Squamous cell carcinoma of face. No acute events overnight. Flap checks stable.    Subjective   Patient doing well, pain controlled        Objective     Vitals reviewed in EMR  Gen: NAD, AOx3, resting comfortably in bed  Eyes: EOMI, sclera clear, PERRL  Ears: Normal external ears bilaterally  Nose: No rhinorrhea; anterior nares clear  Oral Cavity: dry, old crusted blood  Head: normocephalic, atraumatic  Face/Neck: All incisions c/d/i, neck soft and flat without evidence of hematoma or fluid collection  well perfused, doppler biphasic x2 (I.e., skin paddle and internal), no evidence of dehiscence   Resp: Non-labored breathing on room air, no stridor  Cards: RRR on Doppler  Gastro: Soft, non-distended,  PEG tube in place  : Howard catheter in place clear yellow urine  MSK: Moves all extremities  Psych: Appropriate mood and affect  Drains: All drains in place and holding suction with serosanguinous drainage (stripped)    Last Recorded Vitals  Blood pressure (!) 107/42, pulse 68, temperature 36.5 °C (97.7 °F), temperature source Temporal, resp. rate 14, height 1.651 m (5' 5\"), weight 54.6 kg (120 lb 5.9 oz), SpO2 93 %.  Intake/Output last 3 Shifts:  I/O last 3 completed shifts:  In: 1732.9 (31.7 mL/kg) [I.V.:1732.9 (31.7 mL/kg)]  Out: 729.5 (13.4 mL/kg) [Urine:488 (0.2 mL/kg/hr); Emesis/NG output:95; Drains:146.5]  Weight: 54.6 kg     Relevant Results  Results for orders placed or performed during the hospital encounter of 10/04/23 (from the past 24 hour(s))   POCT GLUCOSE   Result Value Ref Range    POCT Glucose 93 74 - 99 mg/dL   POCT GLUCOSE   Result Value Ref Range    POCT Glucose 131 (H) 74 - 99 mg/dL   Renal Function Panel   Result Value Ref Range    Glucose 125 (H) 74 - 99 mg/dL    Sodium 140 136 - 145 mmol/L    Potassium 4.0 3.5 - 5.3 mmol/L    Chloride 102 98 - 107 mmol/L    Bicarbonate 26 21 - 32 mmol/L    Anion Gap 16 10 - 20 mmol/L    " Urea Nitrogen 39 (H) 6 - 23 mg/dL    Creatinine 0.87 0.50 - 1.05 mg/dL    eGFR 63 >60 mL/min/1.73m*2    Calcium 8.5 (L) 8.6 - 10.6 mg/dL    Phosphorus 6.2 (H) 2.5 - 4.9 mg/dL    Albumin 2.8 (L) 3.4 - 5.0 g/dL   CBC   Result Value Ref Range    WBC 16.2 (H) 4.4 - 11.3 x10*3/uL    nRBC 0.0 0.0 - 0.0 /100 WBCs    RBC 2.92 (L) 4.00 - 5.20 x10*6/uL    Hemoglobin 9.0 (L) 12.0 - 16.0 g/dL    Hematocrit 28.5 (L) 36.0 - 46.0 %    MCV 98 80 - 100 fL    MCH 30.8 26.0 - 34.0 pg    MCHC 31.6 (L) 32.0 - 36.0 g/dL    RDW 15.9 (H) 11.5 - 14.5 %    Platelets 213 150 - 450 x10*3/uL    MPV 10.2 7.5 - 11.5 fL   Magnesium   Result Value Ref Range    Magnesium 1.74 1.60 - 2.40 mg/dL   POCT GLUCOSE   Result Value Ref Range    POCT Glucose 125 (H) 74 - 99 mg/dL       Scheduled medications  acetaminophen, 1,000 mg, g-tube, q8h DERIK  aspirin, 325 mg, g-tube, Daily  bacitracin, , Topical, TID  chlorhexidine, 15 mL, Mouth/Throat, TID after meals  clindamycin (Cleocin) 300 mg in dextrose 5 % in water (D5W) 50 mL IV, 300 mg, intravenous, q8h  enoxaparin, 40 mg, subcutaneous, q24h  esomeprazole, 40 mg, g-tube, Daily before breakfast  hydrogen peroxide, 1 Application, Topical, TID  oxygen, , inhalation, Continuous - 02/gases  polyethylene glycol, 17 g, g-tube, Daily  sotalol, 80 mg, g-tube, BID  verapamil, 60 mg, g-tube, q8h DERIK  [START ON 10/6/2023] white petrolatum, 1 Application, Topical, TID      Continuous medications  HYDROmorphone,   lactated Ringer's, 50 mL/hr, Last Rate: 50 mL/hr (10/05/23 8356)      PRN medications  PRN medications: albuterol, HYDROmorphone, HYDROmorphone, naloxone, naloxone, ondansetron                   Malnutrition Diagnosis Status: New  Malnutrition Diagnosis: Severe malnutrition related to disease or condition  As Evidenced by: suspected <75% estimated energy requirement for >1 month, significant 14% weight loss x 6 months, severe muscle wasting, & severe fat loss  I agree with the dietitian's malnutrition  diagnosis.      Assessment/Plan   Assessment:  Eryn Willis   is a 89 y.o. F who underwent Direct laryngoscopy,  Wide local excision of skin cancer, Oral cavity resection malignant neoplasm, Right selective neck dissection levels 1A-3, Reconstruction with left anterolateral thigh free flap with Sheron Whitney and Rachele on 10/4/23 on 10/4/2023.     Active Issues:  Right Buccal SCC  Possible Cervical Lymph Node Mets  HTN  COPD  Acute Blood Loss Anemia  Anemia of Chronic Disease    Plan:  -Flap Checks: q4hrs  -Drains: Monitor output  -Analgesia:  Dilaudid PCA, Scheduled Acetaminophen  -FEN: mIVFs, NPO, Tfs trickle; monitor and replete electrolytes as needed  -Pulm: wean oxygen to room air, Incentive Spirometer 10x/hr while awake  -ID: Clindamycin 300mg Q8 complete   -Cardiac: Vitals per ENT free flap protocol then transition to Q4hr vitals; Aspirin 325mg daily, Sotalol 80mg BID, Verapamil 60mg Q8hrs  -Endo: no current interventions  -GI: Bowel regimen, PPI,  and PRN anti-emetic  -: Howard catheter x 48hrs  -Steroids/Special: Incision and oral care per ENT free flap order set  -Embolic PPx: SQH, SCDs while in bed  -Dispo: [facility]. PT/OTordered; Discharge planning for POD 6 home with home care vs skilled nursing facility    All plans discussed with attendings after morning rounds.     I spent 20 minutes in the professional and overall care of this patient.      Vesna Louie APRN, CNP  Department of Otolaryngology: Head & Neck Surgery  Personal Pager [phone]  ENT Team  Head and Neck Phone: [phone]

## 2023-10-05 NOTE — CONSULTS
"Nutrition Assessment Note  Assessment    Assessment:  Reason for Assessment  Reason for Assessment: Tube feeding recommendations    History:     The patient is a 89 year old female presenting with R buccal SCC.    Taken to OR (10/4) now s/p direct laryngoscopy, wide local excision of skin cancer, oral cavity resection malignant neoplasm, R SND levels 1A-3, reconstruction with left anterolateral thigh free flap.    Current diet: Pivot 1.5 @ 10mL/hr. Pt denies any tolerance issues currently.       Visit made, pt resting in bed w/ son & daughter at bedside. Pts family report she typically consumes 2-3 meals/day at home. They note that PO intake has been poor for at least the past month d/t oral mass becoming so large that it covered her lower teeth. The mass was so tender that pt was no longer able to brush her teeth. Pt denies use of any oral nutritional supplements at home PTA. RDN briefly discussed plan for gradual TF advancement. Pt/family deny any questions at this time.     Anthropometrics:  Height: 165.1 cm (5' 5\")  Weight: 54.6 kg (120 lb 5.9 oz)  BMI (Calculated): 20.03  IBW/kg (Dietitian Calculated): 56.8 kg  Percent of IBW: 96 %    Weight Change  Weight History / % Weight Change: 63.6 kg x 6 months ago  Significant Weight Loss: Yes  Interpretation of Weight Loss: >10% in 6 months  Family report pts UBW of 63.6 kg from ~6 months prior. No hx wts in EMR at time of assessment.    No documented BM since admission.      Results for orders placed or performed during the hospital encounter of 10/04/23 (from the past 24 hour(s))   POCT GLUCOSE   Result Value Ref Range    POCT Glucose 93 74 - 99 mg/dL   POCT GLUCOSE   Result Value Ref Range    POCT Glucose 131 (H) 74 - 99 mg/dL   Renal Function Panel   Result Value Ref Range    Glucose 125 (H) 74 - 99 mg/dL    Sodium 140 136 - 145 mmol/L    Potassium 4.0 3.5 - 5.3 mmol/L    Chloride 102 98 - 107 mmol/L    Bicarbonate 26 21 - 32 mmol/L    Anion Gap 16 10 - 20 mmol/L " "   Urea Nitrogen 39 (H) 6 - 23 mg/dL    Creatinine 0.87 0.50 - 1.05 mg/dL    eGFR 63 >60 mL/min/1.73m*2    Calcium 8.5 (L) 8.6 - 10.6 mg/dL    Phosphorus 6.2 (H) 2.5 - 4.9 mg/dL    Albumin 2.8 (L) 3.4 - 5.0 g/dL       Energy Needs:  Calculated Energy Needs Using Equations  Height: 165.1 cm (5' 5\")  Temp: 36.2 °C (97.2 °F)    Estimated Energy Needs  Total Energy Estimated Needs (kCal): 1500 kCal  Method for Estimating Needs: (REE: 974) x 1.5    Estimated Protein Needs  Total Protein Estimated Needs (g): 65-75 g  Method for Estimating Needs: 1.2-1.4 g/kg x ABW    Estimated Fluid Needs  Total Fluid Estimated Needs (mL): 1500 mL  Method for Estimating Needs: 1mL per kcal       Nutrition Focused Physical Findings:  Subcutaneous Fat Loss  Orbital Fat Pads: Defer (swollen given recent OR)  Buccal Fat Pads: Severe (hollow, sunken and narrow face)  Triceps: Severe (negligible fat tissue)    Muscle Wasting  Temporalis: Severe (hollowed scooping depression)  Pectoralis (Clavicular Region): Severe (protruding prominent clavicle)  Deltoid/Trapezius: Severe (squared shoulders, acromion process prominent)  Interosseous: Severe (depressed area between thumb and forefinger)    Edema  Edema: none    Skin  L upper anterior leg incision  R neck incision  R face incision      Physical Findings (Nutrition Deficiency/Toxicity)  Hair: Negative  Eyes: Negative  Mouth: Negative  Nails: Negative  Skin: Positive (Fragile, bruised)    Diagnosis   Diagnosis:  Malnutrition Diagnosis  Patient has Malnutrition Diagnosis: Yes  Diagnosis Status: New  Malnutrition Diagnosis: Severe malnutrition related to disease or condition  As Evidenced by: suspected <75% estimated energy requirement for >1 month, significant 14% weight loss x 6 months, severe muscle wasting, & severe fat loss      Interventions/Recommendations   Interventions/Recommendations:     1. Continue Pivot 1.5 @ 10mL/hr. When ready, advance by 10mL q 8-10hrs to goal rate of 40mL/hr      - " FWF: 195mL 4x/day or per MD      - Monitor RFP + Mg for s/s refeeding; hold TF at rate and replete lytes, if low, prior to advancing      - This regimen provides: 1440 kcals, 90g protein, 1500mL water        --> Given potential for refeeding, recommend 100mg thiamine via PEG x 7 days    2. After pt tolerates continuous goal volume, transition to bolus feeds      - Pivot 1.5 bolus @ 250mL 4x/day      - FWF: 60mL pre/post feed with additional 130mL BID    3. Prior to discharge, transition to standard fiber containing TF      - Isosource 1.5 bolus @ 250mL 4x/day  (4 cans per day)       - FWF: 60mL pre/post feed with additional 130mL BID      - This regimen provides: 1000mL, 1500 kcals, 68g pro, 1504mL free water      Monitoring and Evaluation   Monitoring/Evaluation:  Food and Nutrient Related History   (Refer to flowsheets)

## 2023-10-05 NOTE — SIGNIFICANT EVENT
"ENT Flap Check  Subjective: no patient concerns, more awake.       Physical Examination\"  Vitals: BP (!) 103/41   Pulse 65   Temp 36.3 °C (97.3 °F) (Temporal)   Resp 14   Ht 1.651 m (5' 5\")   Wt 54.6 kg (120 lb 5.9 oz)   LMP  (LMP Unknown) Comment:   SpO2 94%   BMI 20.03 kg/m²   GENERAL: Appears well developed, well nourished  RESPIRATION: Breathing comfortably on room air, no stridor  EYES: EOM Intact  NEURO: Awake and alert  HEAD AND FACE: Incisions well approximated, neck soft and flat without evidence of hematoma or fluid collection  Flap: well perfused, doppler biphasic x2 (I.e., skin paddle and internal), no evidence of dehiscence   Extremities: leg warm, intact movement and sensation   Drains: all drains holding suction, serosanguineous drainage   Enteral: PEG in place     Assessment:  Eryn Willis   is a 89 y.o. F who underwent Direct laryngoscopy,  Wide local excision of skin cancer, Oral cavity resection malignant neoplasm, Right selective neck dissection levels 1A-3, Reconstruction with left anterolateral thigh free flap with Sheron Whitney and Rachele on 10/4/23. Flap check within normal limits.     Plan:  -Flap Checks: q 4 hrs  -Drains: Monitor output  -Analgesia: PCA    Kathy Rodriguez MD   PGY-1  Otolaryngology - Head & Neck Surgery  Head and Neck Phone: [phone]  ------  ENT Consult Pager: [phone]  ENT Peds Pager: [phone]  ENT Subspecialty Team: [place]"

## 2023-10-05 NOTE — OP NOTE
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]     Procedures  Left anterolateral thigh myocutaneous free flap 7 cm x 14 cm  Advancement closure of the left thigh 100 cm²  Complex layered closure of the face and neck measuring 30 cm  Surgeons   Dany Jeffrey MD    Resident/Fellow/Other Assistant:  Cristi Cano MD    Procedure Summary  Anesthesia: General  ASA: III  Anesthesia Staff: Anesthesiologist: Bebeto Venegas MD; Kadi Ramirez MD; Marie Irving MD  Anesthesia Resident: Janet Brandt MD; Lama Mahendra MD; Shawn Godinez DO  Estimated Blood Loss: 75 mL  Intra-op Medications:   Medication Name Total Dose   bacitracin ointment 1 Application   lidocaine-epinephrine (Xylocaine W/EPI) 1 %-1:100,000 injection 10 mL   sodium chloride 0.9 % irrigation solution 1,000 mL   gelatin absorbable (Gelfoam) 100 sponge 1 each   surgical lubricant gel 1 Application   sterile water irrigation solution 2,000 mL   lactated Ringer's infusion 753.33 mL              Anesthesia Record               Intraprocedure I/O Totals          Intake    Phenylephrine Drip 209.53 mL    The total shown is the total volume documented since Anesthesia Start was filed.    lactated Ringer's infusion 863.33 mL    Total Intake 1072.86 mL       Output    Urine 183 mL    Total Output 183 mL       Net    Net Volume 889.86 mL          Specimen:   ID Type Source Tests Collected by Time   1 : RIGHT FACIAL MASS, SHORT STITCH SUPERIOR, LONG STITCH ANTERIOR Tissue SOFT TISSUE MASS RESECTION SURGICAL PATHOLOGY EXAM Danny Whitney MD 10/4/2023 1113   2 : LEVEL 1A NECK LYMPH NODE Tissue LYMPH NODE ENT (SPECIFY SITE) SURGICAL PATHOLOGY EXAM  Danny Whitney MD 10/4/2023 1219   3 : RIGHT NECK LEVEL 1B NECK DISSECTION Tissue NECK DISSECTION RIGHT (SPECIFY LEVELS) SURGICAL PATHOLOGY EXAM Danny Whitney MD 10/4/2023 1240   4 : RIGHT NECK LEVEL 3 NECK DISSECTION Tissue NECK DISSECTION RIGHT (SPECIFY LEVELS) SURGICAL PATHOLOGY EXAM Danny Whitney MD 10/4/2023 1242   5 : RIGHT NECK LEVEL 2A & 2B Tissue NECK DISSECTION RIGHT (SPECIFY LEVELS) SURGICAL PATHOLOGY EXAM Danny Whitney MD 10/4/2023 1254        Staff:   Circulator: Brenda Godinez RN  Relief Circulator: Jagruti Kingsley, KEATON; Flavio Diaz, RN  Relief Scrub: Jagruti Kingsley RN  Scrub Person: Enma Gallegos RN; Flavio Diaz RN         Drains and/or Catheters:   Closed/Suction Drain Left;Anterior Thigh Bulb 10 Fr. (Active)   Dressing Status Clean;Dry 10/05/23 0405   Drainage Appearance Bloody 10/05/23 0405   Status To bulb suction 10/05/23 0405   Output (mL) 2.5 mL 10/05/23 0134       Closed/Suction Drain Left;Anterior Thigh Bulb 10 Fr. (Active)   Dressing Status Clean;Dry 10/05/23 0405   Drainage Appearance Bloody 10/05/23 0405   Status To bulb suction 10/05/23 0405   Output (mL) 15 mL 10/05/23 0134       Closed/Suction Drain Right Neck Bulb 10 Fr. (Active)   Dressing Status Clean;Dry 10/05/23 0405   Drainage Appearance Bloody 10/05/23 0405   Status To bulb suction 10/05/23 0405   Output (mL) 5 mL 10/05/23 0134       Closed/Suction Drain Left Neck Bulb 10 Fr. (Active)   Dressing Status Dry;Clean 10/05/23 0405   Drainage Appearance Bloody 10/05/23 0405   Status To bulb suction 10/05/23 0405   Output (mL) 10 mL 10/05/23 0134       Gastrostomy/Enterostomy Percutaneous endoscopic gastrostomy (PEG) 20 Fr. LLQ (Active)   Surrounding Skin Dry;Intact 10/05/23 0405   Drainage Appearance Bloody 10/05/23 0405   Site Description Leaking at site 10/05/23 0405   Dressing Status Removed 10/05/23 0405   Output (mL) 20 mL 10/04/23 1900       Urethral Catheter Non-latex;Temperature probe 14 Fr. 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  Resection with free flap reconstruction was recommended.  We discussed risks of bleeding, infection, numbness, cranial neuropathies.  We also discussed risks specific to the reconstruction including donor site morbidity, flap failure, wound complications.  Consent was obtained    Procedure Details:    Upon entering the case Dr. Whitney had completed the ablative portion of the procedure.  The patient had a through and through defect of the right external face and internal buccal mucosa.  The intraoral component measured approximately 6 x 8 cm with a similar external defect.  Decision was made to proceed with a left anterolateral thigh free flap      I began by marking out the ASIS and the lateral patella. A line was drawn between these two landmarks to approximate the lateral septum. At the penitentiary point, a 2.5 cm [place] was created. The doppler was used to find 2 distinct perforators just posterior to the line. A 7 cm x 14 cm skin paddle was designed around these perforators    The anterior incision was then made with a 15 blade. The dissection was carried down through the fat to identify the rectus fascia. Subfascially dissection was performed over the rectus femoris until the lateral septum was encountered. The rectus was bluntly elevated away from the lateralis and intermedius to expose the descending branch of the circumflex femoral artery. Then we carefully dissected over the lateralis muscle but could not find any distinct perforators of significant  size. Therefore, the decision was made to harvest a portion of of the lateralis muscle to preserve the small musculocutaneous perforators supplying the skin.     The pedicle was first freed from the underlying intermedius fascia. Then the lateralis was elevated away from the intermedius as well. Inferiorly, the muscle was transected just under the distal aspect of the skin flap. The distal pedicle was identified and clipped. Then approximately half the width of the lateralis was taken to incorporate the blood supply to the skin paddle. Superiorly, the transverse pedicle was found and clipped, and the muscle cut was made just inferior to this. The posterior skin incision was made and the dissection joined with our muscle cuts on the lateralis. At this point, the flap was perfused only by the main pedicle.     The vessels in the neck were inspected and there was an adequate right facial artery and facial vein.  Therefore the main pedicle was clipped and divided.  The leg was copiously irrigated.  The skin was sufficiently undermined to allow for primary closure.  The total surface area measured approximately 100 cm².  This allowed us to advance the surrounding tissue and for primary closure.  Hemostasis was achieved.  Two 10 flat drains were placed in the surgical bed and secured.  The incision was then closed in layers using 3-0 Vicryl for the deep layer and running 5-0 fast for the skin.    Next we turned our attention to the head neck.  We started with inset of the flap on the intraoral component.  This was tacked to the mucosa back by the retromolar trigone and along the gingival buccal sulcus adjacent to the maxilla and mandible.  Once the flap was secured we turned our attention to microvascular anastomosis.    The adventitia over the artery was carefully cleaned off. The vein was  from the artery to allow for some freedom in positioning. Heparin saline was used to flush the artery and there was good  flow through the vein.     At this point, the venous coupling was performed. A 3-5  was used, and each vein was carefully pinned to their respective side. The  was then closed and released. The arteries were brought together using a double V3 clamp. 9-0 nylon sutures were used to perform microvascular anastomosis in an interrupted fashion. After this was complete, the vessel clamps were released. The artery had excellent arterial flow without a leak. A strip test was performed on the vein and it showed good flow.  An implantable Doppler was placed and secured    At this point we started to inset the remainder of the flap into the external defect.  This was done in layers.  A portion of the skin paddle was de-epithelialized and tacked to the zygoma to try to prevent the flap from pulling down.  The remainder of the skin paddle was closed in layers using 3-0 Vicryl for deep dermal layers.  Horizontal mattress sutures were also used anteriorly.  A portion of the neck skin was rotated into position to allow for primary closure of the neck.  3-0 Vicryl was used for deep platysmal and dermal reapproximation.  Finally a running 5-0 fast was used to reapproximate the skin along the incision which measured approximately 30 cm.    This marked the completion of the procedure.  The patient was turned back to anesthesia, extubated without issue, taken to PACU in stable condition    The fellow was involved in the critical parts of the advanced portions of this procedure which include the harvest of the free flap, critical portions working under and using  the operating microscope for the microvascular anastomosis, insetting of the flap including for the mouth reconstruction as there was  no qualified resident of suitable training available for these portions of the procedure.  Resident involvement in other portions of the procedure going on simultaneously include closure of the donor site as well as obtaining skin  graft for closure.      Complications:  None; patient tolerated the procedure well.    Disposition: PACU - hemodynamically stable.  Condition: stable

## 2023-10-05 NOTE — PROGRESS NOTES
Physical Therapy    Physical Therapy Evaluation    Patient Name: Eryn Willis  MRN: [id number]  Today's Date: 10/5/2023   Time Calculation  Start Time: 0924  Stop Time: 0951  Time Calculation (min): 27 min    Assessment/Plan   PT Assessment  PT Assessment Results: Decreased strength, Decreased range of motion, Decreased endurance, Impaired balance, Decreased mobility, Decreased coordination, Decreased safety awareness, Pain  Rehab Prognosis: Good  Evaluation/Treatment Tolerance: Patient limited by fatigue  Medical Staff Made Aware: Yes  End of Session Communication: Bedside nurse  End of Session Patient Position: Bed, 3 rail up, Alarm off, not on at start of session  IP OR SWING BED PT PLAN  Inpatient or Swing Bed: Inpatient  PT Plan  Treatment/Interventions: Bed mobility, Transfer training, Gait training, Balance training, Stair training, Strengthening, Endurance training, Range of motion, Therapeutic exercise, Therapeutic activity, Home exercise program, Positioning, Postural re-education  PT Plan: Skilled PT  PT Frequency: 3 times per week  PT Discharge Recommendations: Moderate intensity level of continued care      Subjective   General Visit Information:  General  Reason for Referral: Direct laryngoscopy,  Wide local excision of skin cancer, Oral cavity resection malignant neoplasm, Right selective neck dissection levels 1A-3, Reconstruction with left anterolateral thigh free flap with Sheron Whitney and Rachele on 10/4/23. Flap check within normal limits.  Past Medical History Relevant to Rehab: Squamous cell carcinoma of face  Co-Treatment: OT  Co-Treatment Reason:  (To maximize safety, participation, and ability to performed increased functional mobility due to pt complexity)  Prior to Session Communication: Bedside nurse  Patient Position Received: Bed, 3 rail up  General Comment: slightly [place]  Home Living:  Home Living  Type of Home: House  Lives With: Adult children  Home Adaptive Equipment: Walker rolling or  standard  Home Layout: One level, Full bath main level  Home Access: Stairs to enter without rails  Entrance Stairs-Number of Steps: 1  Bathroom Shower/Tub: Walk-in shower  Bathroom Toilet: Standard  Bathroom Equipment: Grab bars in shower, Shower chair with back  Prior Level of Function:  Prior Function Per Pt/Caregiver Report  Level of [place]: Independent with ADLs and functional transfers  Receives Help From:  (son)  ADL Assistance: Independent  Homemaking Assistance: Needs assistance  Ambulatory Assistance: Independent  Vocational: Retired  Prior Function Comments: hx of falls  Precautions:  Precautions  Medical Precautions: Fall precautions  Vital Signs:  Vital Signs  Heart Rate: 67  Heart Rate Source: Monitor  SpO2: 94 % (2L O2)  BP: (!) 112/47  BP Location: Left arm  Patient Position: Lying (Sittin/51, 92% on room air, MDs removed O2 during session; supine: 94% RA, 70 bpm)    Objective   Pain:  Pain Assessment  Pain Assessment: 0-10  Pain Score: 0 - No pain  Cognition:  Cognition  Overall Cognitive Status: Within Functional Limits  Orientation Level: Oriented X4  Impulsive: Mildly    General Assessments:        Sensation  Sensation Comment: denies numbness and tingling    Strength  Strength Comments: B/L LE >/= 3+/5 observed via function      Postural Control  Posture Comment: forward head and rounded shoulders    Static Sitting Balance  Static Sitting-Balance Support: Bilateral upper extremity supported  Static Sitting-Level of Assistance: Contact guard  Dynamic Sitting Balance  Dynamic Sitting-Balance Support: Bilateral upper extremity supported  Dynamic Sitting-Comments: CGA       Functional Assessments:       Bed Mobility  Bed Mobility: Yes  Bed Mobility 1  Bed Mobility 1: Supine to sitting  Level of Assistance 1: Moderate assistance, Moderate verbal cues, Minimal tactile cues  Bed Mobility Comments 1: VCs required, requires assistance for upper body and LEs  Bed Mobility 2  Bed Mobility  2:  Sitting to supine  Level of Assistance 2: Minimum assistance, Minimal verbal cues  Bed Mobility Comments 2: VCs required, requires assistance for LEs    Transfers  Transfer: Yes  Transfer 1  Transfer From 1: Bed to  Transfer to 1: Stand  Technique 1: Sit to stand  Transfer Device 1:  (arm in arm assistance)  Transfer Level of Assistance 1: Minimum assistance, +2  Trials/Comments 1: 1  Transfers 2  Transfer From 2: Stand to  Transfer to 2: Bed  Technique 2: Sit to stand  Transfer Device 2:  (arm in arm assistance)  Transfer Level of Assistance 2: Minimum assistance, Arm in arm assistance, +2  Trials/Comments 2: 1    Ambulation/Gait Training  Ambulation/Gait Training Performed: Yes  Ambulation/Gait Training 1  Surface 1: Level tile  Device 1:  (arm in arm assist)  Assistance 1: Moderate assistance, Moderate verbal cues (x2)  Quality of Gait 1: Narrow base of support (L knee buckling requires blocking to prevent)  Comments/Distance (ft) 1: 1 side step       Outcome Measures:  [facility] Basic Mobility  Turning from your back to your side while in a flat bed without using bedrails: A little  Moving from lying on your back to sitting on the side of a flat bed without using bedrails: A lot  Moving to and from bed to chair (including a wheelchair): A lot  Standing up from a chair using your arms (e.g. wheelchair or bedside chair): A lot  To walk in hospital room: A lot  Climbing 3-5 steps with railing: Total  Basic Mobility - Total Score: 12    Encounter Problems       Encounter Problems (Active)       Mobility       STG - Patient will ambulate 25 ft with min A and FWW with no LOB, progress as able and appropriate  (Progressing)       Start:  10/05/23    Expected End:  10/19/23            STG - Patient will ascend and descend 1 stair with mod A and HR  (Progressing)       Start:  10/05/23    Expected End:  10/19/23               Transfers       STG - Transfer from bed to chair with min A and FWW (Progressing)       Start:   10/05/23    Expected End:  10/19/23            STG - Patient will perform bed mobility with CGA  (Progressing)       Start:  10/05/23    Expected End:  10/19/23               Transfers       STG - Patient will transfer sit to and from stand with min A and FWW  (Progressing)       Start:  10/05/23    Expected End:  10/19/23                   Education Documentation  Precautions, taught by Virginia Moreira PT at 10/5/2023 12:10 PM.  Learner: Patient  Readiness: Acceptance  Method: Explanation  Response: Verbalizes Understanding    Mobility Training, taught by Virginia Moreira PT at 10/5/2023 12:10 PM.  Learner: Patient  Readiness: Acceptance  Method: Explanation  Response: Verbalizes Understanding    Education Comments  No comments found.

## 2023-10-05 NOTE — CARE PLAN
Problem: ADLs  Goal: Patient with complete upper body dressing with set-up level of assistance donning and doffing all UE clothes with no adaptive equipment while sitting  Outcome: Progressing  Goal: Patient with complete lower body dressing with set-up and stand by assist level of assistance donning and doffing all LE clothes  with no adaptive equipment while unsupported sitting  Outcome: Progressing  Goal: Patient will complete daily grooming tasks  with set-up and stand by assist level of assistance and PRN adaptive equipment while unsupported sitting.  Outcome: Progressing  Goal: Patient will complete toileting including hygiene clothing management/hygiene with set-up and contact guard assist level of assistance and DME prn.  Outcome: Progressing     Problem: TRANSFERS  Goal: Patient will perform bed mobility stand by assist level of assistance in order to improve safety and independence with mobility  Outcome: Progressing  Goal: Patient will complete all functional transfer with least restrictive device with set-up and stand by assist level of assistance.  Outcome: Progressing     Problem: MOBILITY  Goal: Patient will perform Functional mobility min Household distances/Community Distances with set-up and contact guard assist level of assistance and least restrictive device in order to improve safety and functional mobility.  Outcome: Progressing

## 2023-10-05 NOTE — SIGNIFICANT EVENT
"ENT Flap Check  Subjective: no patient concerns. Somnolent but arousable.     Physical Examination\"  Vitals: Temp:  [36 °C (96.8 °F)-36.6 °C (97.9 °F)] 36.3 °C (97.3 °F)  Heart Rate:  [48-67] 67  Resp:  [14-26] 14  BP: (112-155)/(50-70) 112/53  Arterial Line BP 1: (118-143)/(36-48) 138/41  GENERAL: Appears well developed, well nourished. Somnolent but arousable.   RESPIRATION: Breathing comfortably on room air and weaning, no stridor  Face/Neck: All incisions c/d/i, neck soft and flat without evidence of hematoma or fluid collection  Flap: well perfused, doppler biphasic x2 (I.e., skin paddle and internal), no evidence of dehiscence   Extremities: leg warm with intact movement and sensation  Enteral: PEG in place  Drains: All drains in place and holding suction with serosanguinous drainage (stripped)    Assessment:  Eryn Willis   is a 89 y.o. F who underwent Direct laryngoscopy,  Wide local excision of skin cancer, Oral cavity resection malignant neoplasm, Right selective neck dissection levels 1A-3, Reconstruction with left anterolateral thigh free flap with Sheron Whitney and Rachele on 10/4/23 on 10/4/2023. Flap check within normal limits.     Plan:  -Continue q4hr flap checks    Steve Bustos, PGY2  Otolaryngology - Head & Neck Surgery  ENT Consult pager: [phone]  Peds pager: [phone]  Adult Head & Neck Phone: [phone]  ENT subspecialty team: [facility] individual resident who wrote today's note  Please page if urgent    I am the night resident. I can only be contacted from 5:30PM and 5AM.    "

## 2023-10-06 NOTE — PROGRESS NOTES
"Eryn Willis is a 89 y.o. female on day 2 of admission presenting with Squamous cell carcinoma of face. No acute events overnight. Flap checks stable.    Subjective   Patient resting in room. Tolerating tube feeds and pain being controlled.     Objective   Physical Exam  Vitals reviewed in EMR  Gen: NAD, AOx3, resting comfortably in bed  Eyes: EOMI, sclera clear, PERRL  Ears: Normal external ears bilaterally  Nose: No rhinorrhea; anterior nares clear  Oral Cavity: dry, old crusted blood  Head: normocephalic, atraumatic  Face/Neck: All incisions c/d/i, neck soft and flat without evidence of hematoma or fluid collection  -slightly increased area of fullness directly below skin paddle  -well perfused, doppler biphasic x2 (I.e., skin paddle and internal)  -no evidence of dehiscence   Resp: Non-labored breathing on room air, no stridor  Cards: RRR on Doppler  Gastro: Soft, non-distended,  PEG tube in place  : Howard catheter in place clear yellow urine  MSK: Moves all extremities  Psych: Appropriate mood and affect  Drains: All drains in place and holding suction with serosanguinous drainage (stripped)    Last Recorded Vitals  Blood pressure 108/52, pulse 62, temperature 36.5 °C (97.7 °F), temperature source Tympanic, resp. rate 14, height 1.651 m (5' 5\"), weight 54.6 kg (120 lb 5.9 oz), SpO2 96 %.  Intake/Output last 3 Shifts:  I/O last 3 completed shifts:  In: 3132.5 (57.4 mL/kg) [I.V.:1852.5 (33.9 mL/kg); NG/GT:680; IV Piggyback:600]  Out: 1521 (27.9 mL/kg) [Urine:1235 (0.6 mL/kg/hr); Emesis/NG output:20; Drains:266]  Weight: 54.6 kg     Relevant Results  Results for orders placed or performed during the hospital encounter of 10/04/23 (from the past 24 hour(s))   POCT GLUCOSE   Result Value Ref Range    POCT Glucose 125 (H) 74 - 99 mg/dL   POCT GLUCOSE   Result Value Ref Range    POCT Glucose 113 (H) 74 - 99 mg/dL   POCT GLUCOSE   Result Value Ref Range    POCT Glucose 135 (H) 74 - 99 mg/dL   POCT GLUCOSE   Result " Value Ref Range    POCT Glucose 90 74 - 99 mg/dL   CBC   Result Value Ref Range    WBC 10.7 4.4 - 11.3 x10*3/uL    nRBC 0.0 0.0 - 0.0 /100 WBCs    RBC 3.00 (L) 4.00 - 5.20 x10*6/uL    Hemoglobin 9.0 (L) 12.0 - 16.0 g/dL    Hematocrit 27.6 (L) 36.0 - 46.0 %    MCV 92 80 - 100 fL    MCH 30.0 26.0 - 34.0 pg    MCHC 32.6 32.0 - 36.0 g/dL    RDW 16.1 (H) 11.5 - 14.5 %    Platelets 177 150 - 450 x10*3/uL    MPV 10.9 7.5 - 11.5 fL   Magnesium   Result Value Ref Range    Magnesium 1.86 1.60 - 2.40 mg/dL   Renal Function Panel   Result Value Ref Range    Glucose 115 (H) 74 - 99 mg/dL    Sodium 139 136 - 145 mmol/L    Potassium 3.6 3.5 - 5.3 mmol/L    Chloride 102 98 - 107 mmol/L    Bicarbonate 27 21 - 32 mmol/L    Anion Gap 14 10 - 20 mmol/L    Urea Nitrogen 39 (H) 6 - 23 mg/dL    Creatinine 0.93 0.50 - 1.05 mg/dL    eGFR 59 (L) >60 mL/min/1.73m*2    Calcium 7.9 (L) 8.6 - 10.6 mg/dL    Phosphorus 3.2 2.5 - 4.9 mg/dL    Albumin 2.6 (L) 3.4 - 5.0 g/dL      Scheduled medications  acetaminophen, 1,000 mg, g-tube, q8h DERIK  aspirin, 325 mg, g-tube, Daily  bacitracin, , Topical, TID  chlorhexidine, 15 mL, Mouth/Throat, TID after meals  enoxaparin, 40 mg, subcutaneous, q24h  esomeprazole, 40 mg, g-tube, Daily before breakfast  hydrogen peroxide, 1 Application, Topical, TID  magnesium sulfate, 2 g, intravenous, Once  oxygen, , inhalation, Continuous - 02/gases  polyethylene glycol, 17 g, g-tube, Daily  potassium chloride, 40 mEq, g-tube, Once  sotalol, 80 mg, g-tube, BID  thiamine, 100 mg, g-tube, Daily  verapamil, 60 mg, g-tube, q8h DERIK  white petrolatum, 1 Application, Topical, TID    Continuous medications  lactated Ringer's, 50 mL/hr, Last Rate: 50 mL/hr (10/06/23 0908)      PRN medications  PRN medications: albuterol, naloxone, naloxone, ondansetron, traMADol, traMADol         Malnutrition Diagnosis Status: New  Malnutrition Diagnosis: Severe malnutrition related to disease or condition  As Evidenced by: suspected <75% estimated  energy requirement for >1 month, significant 14% weight loss x 6 months, severe muscle wasting, & severe fat loss  I agree with the dietitian's malnutrition diagnosis.      Assessment/Plan   Assessment:  Eryn Willis   is a 89 y.o. F who underwent Direct laryngoscopy,  Wide local excision of skin cancer, Oral cavity resection malignant neoplasm, Right selective neck dissection levels 1A-3, Reconstruction with left anterolateral thigh free flap with Sheron Whitney and Racheel on 10/4/23 on 10/4/2023.      Active Issues:  Right Buccal SCC  Possible Cervical Lymph Node Mets  HTN  COPD  Acute Blood Loss Anemia  Anemia of Chronic Disease  Hypokalemia  Hypomagnesia  Severe Protein Calorie Malnutrition       Plan:  -Flap Checks: q6hrs  -Drains: Monitor output  -Analgesia:  Dilaudid PCA-discontinued, Scheduled Acetaminophen; PRN Tramadol  -FEN: mIVFs, NPO, advancing tube feeds to goal rate; monitor and replete electrolytes as needed  -Pulm: wean oxygen to room air, Incentive Spirometer 10x/hr while awake  -ID: Clindamycin 300mg Q8 complete   -Cardiac: Vitals per ENT free flap protocol then transition to Q4hr vitals; Aspirin 325mg daily, Sotalol 80mg BID, Verapamil 60mg Q8hrs  -Endo: no current interventions  -GI: Bowel regimen, PPI,  and PRN anti-emetic  -: Howard catheter-discontinued, afternoon void check  -Steroids/Special: Incision and oral care per ENT free flap order set  -Embolic PPx: SQH, SCDs while in bed  -Dispo: [facility]. PT/OTordered; Discharge planning for POD 6 home with home care vs skilled nursing facility. Patient has stated she would like to return home with home care at discharge.     All plans discussed with attendings after morning rounds.      I spent 20 minutes in the professional and overall care of this patient.    Vesna Louie APRN, CNP  Department of Otolaryngology: Head & Neck Surgery  Personal Pager [phone]  ENT Team  Head and Neck Phone: [phone]

## 2023-10-06 NOTE — PROGRESS NOTES
10/06/23 1424   Discharge Planning   Living Arrangements Children   Support Systems Children;Family members;Friends/neighbors   Type of Residence Private residence   Patient expects to be discharged to: home   Does the patient need discharge transport arranged? No     SW met with pt to discuss potential discharge needs. Pt currently live with eldest son, David. Pt's daughter, Kandace, [phone], also currently lives with David. Pt and Kandace will eventually return to pt's home but it will not be until next year, due to repairs needed on the home and property. Pt reports a very supportive and involved [place] of support; pt has 3 sons and 1 daughter who all reside very close. Pt is currently declining HC, reporting that her family is very comfortable and confident in meeting her care needs. Pt states she is independent in her ADLs. Pt reports feeling safe at home and denies recent falls. All of pt's accommodations are on the first floor of David's home. Pt denies SW needs at this time. SW will follow and assist as needed. Keaton PATTERSON, LSW

## 2023-10-06 NOTE — SIGNIFICANT EVENT
Subjective:  Patient resting  with family members present in room. Pain being controlled and denies nausea.     Objective:  Vitals reviewed in EMR  Gen: NAD, AOx3, resting comfortably in chair  Face/Neck: All incisions c/d/i, neck soft without evidence of hematoma or fluid collection  -Slight area of supra-incisional fullness( discussed with attend and no further actions needed at this time)  -Skin paddle soft , pink and warm  -implantable doppler with strong arterial signal   Oral Cavity: All intraoral incisions c/d/i without evidence of dehiscence  -Flap soft and pink with good capillary refill  Extremities: left leg warm with intact movement and sensation; dressing in place with minimal strike through  Abdomen: PEG in place  Drains: All drains in place and holding suction with serosanguinous drainage (stripped)     Assessment/Plan:  Eryn Willis   is a 89 y.o. F who underwent Direct laryngoscopy,  Wide local excision of skin cancer, Oral cavity resection malignant neoplasm, Right selective neck dissection levels 1A-3, Reconstruction with left anterolateral thigh free flap with Sheron Whitney and Rachele on 10/4/23 on 10/4/2023. Flap check within normal limits      -continue Q6hr flap checks  Vesna Louie APRN, CNP  Department of Otolaryngology: Head & Neck Surgery  Personal Pager [phone]  ENT Team  Head and Neck Phone: [phone]

## 2023-10-06 NOTE — CARE PLAN
The patient's goals for the shift include  pain management and increased periods of rest.     The clinical goals for the shift include flap stability, pain management, and safe ambulation    Over the shift, the patient did not make progress toward the following goals. Barriers to progression include . Recommendations to address these barriers include   Problem: Skin  Goal: Participates in plan/prevention/treatment measures  Outcome: Progressing  Flowsheets (Taken 10/6/2023 1309)  Participates in plan/prevention/treatment measures:   Discuss with provider PT/OT consult   Elevate heels   Increase activity/out of bed for meals  Goal: Prevent/manage excess moisture  Outcome: Progressing  Flowsheets (Taken 10/6/2023 1309)  Prevent/manage excess moisture:   Moisturize dry skin   Monitor for/manage infection if present   Follow provider orders for dressing changes  Goal: Prevent/minimize sheer/friction injuries  Outcome: Progressing  Flowsheets (Taken 10/6/2023 1309)  Prevent/minimize sheer/friction injuries:   HOB 30 degrees or less   Use pull sheet   Increase activity/out of bed for meals  Goal: Promote/optimize nutrition  Outcome: Progressing  Flowsheets (Taken 10/6/2023 1309)  Promote/optimize nutrition: Assist with feeding  Note: Pt advancing TF   Goal: Promote skin healing  Outcome: Progressing  Flowsheets (Taken 10/6/2023 1309)  Promote skin healing:   Assess skin/pad under line(s)/device(s)   Protective dressings over bony prominences   Rotate device position/do not position patient on device     Problem: Pain  Goal: My pain/discomfort is manageable  Outcome: Progressing  Flowsheets (Taken 10/6/2023 1309)  Resident's pain/discomfort is manageable:   Administer pain medication prior to activities that may trigger pain   Identify and avoid pain triggers     Problem: Safety  Goal: Patient will be injury free during hospitalization  Outcome: Progressing  Goal: I will remain free of falls  Outcome:  Progressing  Flowsheets (Taken 10/6/2023 1305)  Resident will remain free of falls:   Apply bed/chair alarms as appropriate   Assist with toileting as orderd   Consider transfer to room close to nurses' station   Consult with physical therapy as needed   Assess and monitor medications that may increase fall risk   .

## 2023-10-07 NOTE — PROGRESS NOTES
"Eryn Willis is a 89 y.o. female on day 3 of admission presenting with Squamous cell carcinoma of face. No acute events overnight. Flap checks stable.    Subjective   Patient resting in room. Pain well controlled. +BM. Tolerating TFs well.    Objective   Physical Exam  Vitals reviewed in EMR  Gen: NAD, AOx3, resting comfortably in bed  Eyes: EOMI, sclera clear, PERRL  Ears: Normal external ears bilaterally  Nose: No rhinorrhea; anterior nares clear  Oral Cavity: dry, old crusted blood  Head: normocephalic, atraumatic  Face/Neck: All incisions c/d/i, neck soft and flat without evidence of hematoma or fluid collection  -slightly increased area of fullness directly below skin paddle  -well perfused, doppler biphasic x2 (I.e., skin paddle and internal)  -no evidence of dehiscence   Resp: Non-labored breathing on room air, no stridor  Cards: RRR on Doppler  Gastro: Soft, non-distended,  PEG tube in place  : Howard catheter in place clear yellow urine  MSK: Moves all extremities  Psych: Appropriate mood and affect  Drains: All drains in place and holding suction with serosanguinous drainage (stripped)    Last Recorded Vitals  Blood pressure 156/62, pulse 68, temperature 36.4 °C (97.5 °F), temperature source Temporal, resp. rate 14, height 1.651 m (5' 5\"), weight 54.6 kg (120 lb 5.9 oz), SpO2 94 %.  Intake/Output last 3 Shifts:  I/O last 3 completed shifts:  In: 4018.7 (73.6 mL/kg) [I.V.:2233.7 (40.9 mL/kg); NG/GT:1185; IV Piggyback:600]  Out: 2074.5 (38 mL/kg) [Urine:1775 (0.9 mL/kg/hr); Emesis/NG output:80; Drains:219.5]  Weight: 54.6 kg     Relevant Results  No results found for this or any previous visit (from the past 24 hour(s)).     Scheduled medications  acetaminophen, 1,000 mg, g-tube, q8h DERIK  aspirin, 325 mg, g-tube, Daily  chlorhexidine, 15 mL, Mouth/Throat, TID after meals  enoxaparin, 40 mg, subcutaneous, q24h  esomeprazole, 40 mg, g-tube, Daily before breakfast  hydrogen peroxide, 1 Application, Topical, " TID  oxygen, , inhalation, Continuous - 02/gases  polyethylene glycol, 17 g, g-tube, Daily  sotalol, 80 mg, g-tube, BID  thiamine, 100 mg, g-tube, Daily  verapamil, 60 mg, g-tube, q8h DERIK  white petrolatum, 1 Application, Topical, TID    Continuous medications  lactated Ringer's, 50 mL/hr, Last Rate: 60 mL/hr (10/07/23 0650)      PRN medications  PRN medications: albuterol, naloxone, naloxone, ondansetron, traMADol, traMADol         Malnutrition Diagnosis Status: New  Malnutrition Diagnosis: Severe malnutrition related to disease or condition  As Evidenced by: suspected <75% estimated energy requirement for >1 month, significant 14% weight loss x 6 months, severe muscle wasting, & severe fat loss  I agree with the dietitian's malnutrition diagnosis.      Assessment/Plan   Assessment:  Eryn Willis   is a 89 y.o. F who underwent Direct laryngoscopy,  Wide local excision of skin cancer, Oral cavity resection malignant neoplasm, Right selective neck dissection levels 1A-3, Reconstruction with left anterolateral thigh free flap with Sheron Whitney and Rachele on 10/4/23 on 10/4/2023.      Active Issues:  Right Buccal SCC  Possible Cervical Lymph Node Mets  HTN  COPD  Acute Blood Loss Anemia  Anemia of Chronic Disease  Hypokalemia  Hypomagnesia  Severe Protein Calorie Malnutrition       Plan:  -Flap Checks: q8hrs  -Drains: Monitor output. Removed left anterior thigh drain (drain #3) 10/7.  -Analgesia:  Dilaudid PCA-discontinued, Scheduled Acetaminophen; PRN Tramadol  -FEN: mIVFs, NPO, advancing tube feeds to goal rate as tolerated; monitor and replete electrolytes as needed  -Pulm: wean oxygen to room air, Incentive Spirometer 10x/hr while awake  -ID: Clindamycin 300mg Q8 complete   -Cardiac: Vitals per ENT free flap protocol then transition to Q4hr vitals; Aspirin 325mg daily, Sotalol 80mg BID, Verapamil 60mg Q8hrs  -Endo: no current interventions  -GI: Bowel regimen, PPI,  and PRN anti-emetic  -: Howard  catheter-discontinued, afternoon void check  -Steroids/Special: Incision and oral care per ENT free flap order set  -Embolic PPx: SQH, SCDs while in bed  -Dispo: [facility]. PT/OTordered; Discharge planning for POD 6 home with home care vs skilled nursing facility. Patient has stated she would like to return home with home care at discharge.     All plans discussed with attendings after morning rounds.        Blane Ledbetter MD  Resident, PGY-1  Otolaryngology - Head & Neck Surgery    ENT Consult Pager: [phone]  Head and Neck Phone: [phone]  ENT Peds Pager: [phone]  ENT Subspecialty Team: [facility]

## 2023-10-07 NOTE — SIGNIFICANT EVENT
Subjective:  Patient doing well. Resting in room. Increased skin paddle edema and new intraoral skin paddle bruising abutting where maxillar molars hit flap. Doppler strong.      Objective:  Vitals reviewed in EMR  Gen: NAD, AOx3, resting comfortably in chair  Face/Neck: All incisions c/d/i, neck soft without evidence of hematoma or fluid collection  -Slight area of supra-incisional fullness  -Skin paddle soft , pink and warm  -implantable doppler with strong arterial signal   Oral Cavity: All intraoral incisions c/d/i without evidence of dehiscence  -Flap soft and pink with good capillary refill, interval increased edema of flap, small posterior intraoral skin paddle bruising where maxillary dentition abuts flap  Extremities: left leg warm with intact movement and sensation; dressing in place with minimal strike through  Abdomen: PEG in place  Drains: All drains in place and holding suction with serosanguinous drainage (stripped)     Assessment/Plan:  Eryn Willis   is a 89 y.o. F who underwent Direct laryngoscopy,  Wide local excision of skin cancer, Oral cavity resection malignant neoplasm, Right selective neck dissection levels 1A-3, Reconstruction with left anterolateral thigh free flap with Sheron Whitney and Rachele on 10/4/23 on 10/4/2023. Flap check as documented above.      -continue Q6hr flap checks  - Discussed flap changes with fellow Dr. Mcrae, will continue to monitor     ENT Team  Head and Neck Phone: [phone]

## 2023-10-07 NOTE — PROGRESS NOTES
"Eryn Willis is a 89 y.o. female on day 3 of admission presenting with Squamous cell carcinoma of face. No acute events overnight. Flap checks stable.    Subjective   Patient resting in room. Tolerating tube feeds and pain controlled.     Objective   Physical Exam  Vitals reviewed in EMR  Gen: NAD, AOx3, resting comfortably in bed  Eyes: EOMI, sclera clear, PERRL  Ears: Normal external ears bilaterally  Nose: No rhinorrhea; anterior nares clear  Oral Cavity: dry, old crusted blood  Head: normocephalic, atraumatic  Face/Neck: All incisions c/d/i, neck soft and flat without evidence of hematoma or fluid collection  -slightly increased area of fullness directly below skin paddle, stable from 10/6  -well perfused, doppler biphasic x2 (I.e., skin paddle and internal)  -no evidence of dehiscence   Resp: Non-labored breathing on room air, no stridor  Cards: RRR on Doppler  Gastro: Soft, non-distended,  PEG tube in place  : Howard catheter in place clear yellow urine  MSK: Moves all extremities  Psych: Appropriate mood and affect  Drains: All drains in place and holding suction with serosanguinous drainage (stripped)    Last Recorded Vitals  Blood pressure 156/62, pulse 68, temperature 36.4 °C (97.5 °F), temperature source Temporal, resp. rate 14, height 1.651 m (5' 5\"), weight 54.6 kg (120 lb 5.9 oz), SpO2 94 %.  Intake/Output last 3 Shifts:  I/O last 3 completed shifts:  In: 4018.7 (73.6 mL/kg) [I.V.:2233.7 (40.9 mL/kg); NG/GT:1185; IV Piggyback:600]  Out: 2074.5 (38 mL/kg) [Urine:1775 (0.9 mL/kg/hr); Emesis/NG output:80; Drains:219.5]  Weight: 54.6 kg     Relevant Results  No results found for this or any previous visit (from the past 24 hour(s)).     Scheduled medications  acetaminophen, 1,000 mg, g-tube, q8h DERIK  aspirin, 325 mg, g-tube, Daily  chlorhexidine, 15 mL, Mouth/Throat, TID after meals  enoxaparin, 40 mg, subcutaneous, q24h  esomeprazole, 40 mg, g-tube, Daily before breakfast  hydrogen peroxide, 1 " Application, Topical, TID  oxygen, , inhalation, Continuous - 02/gases  polyethylene glycol, 17 g, g-tube, Daily  sotalol, 80 mg, g-tube, BID  thiamine, 100 mg, g-tube, Daily  verapamil, 60 mg, g-tube, q8h DERIK  white petrolatum, 1 Application, Topical, TID    Continuous medications  lactated Ringer's, 50 mL/hr, Last Rate: 60 mL/hr (10/07/23 0650)      PRN medications  PRN medications: albuterol, naloxone, naloxone, ondansetron, traMADol, traMADol         Malnutrition Diagnosis Status: New  Malnutrition Diagnosis: Severe malnutrition related to disease or condition  As Evidenced by: suspected <75% estimated energy requirement for >1 month, significant 14% weight loss x 6 months, severe muscle wasting, & severe fat loss  I agree with the dietitian's malnutrition diagnosis.      Assessment/Plan   Assessment:  Eryn Willis   is a 89 y.o. F who underwent Direct laryngoscopy,  Wide local excision of skin cancer, Oral cavity resection malignant neoplasm, Right selective neck dissection levels 1A-3, Reconstruction with left anterolateral thigh free flap with Sheron Whitney and Rachele on 10/4/23 on 10/4/2023.      Active Issues:  Right Buccal SCC  Possible Cervical Lymph Node Mets  HTN  COPD  Acute Blood Loss Anemia  Anemia of Chronic Disease  Hypokalemia  Hypomagnesia  Severe Protein Calorie Malnutrition       Plan:  -Flap Checks: q8hrs  -Drains: Monitor output  -Analgesia:  Dilaudid PCA-discontinued, Scheduled Acetaminophen; PRN Tramadol  -FEN: mIVFs, NPO, advancing tube feeds to goal rate; monitor and replete electrolytes as needed  -Pulm: wean oxygen to room air, Incentive Spirometer 10x/hr while awake  -ID: Clindamycin 300mg Q8 complete   -Cardiac: Vitals per ENT free flap protocol then transition to Q4hr vitals; Aspirin 325mg daily, Sotalol 80mg BID, Verapamil 60mg Q8hrs  -Endo: no current interventions  -GI: Bowel regimen, PPI,  and PRN anti-emetic  -: Howard catheter-discontinued, afternoon void check  -Steroids/Special:  Incision and oral care per ENT free flap order set  -Embolic PPx: SQH, SCDs while in bed  -Dispo: [facility]. PT/OTordered; Discharge planning for POD 6 home with home care vs skilled nursing facility. Patient has stated she would like to return home with home care at discharge.     All plans discussed with attendings after morning rounds.      Omaira Keys MD  Dept. of Otolaryngology - Head and Neck Surgery, PGY-4   ENT Consults: [phone]  ENT Overnight (5p-6a), and Weekends: [phone]  ENT Head and Neck Surgery Phone: [phone]  ENT Peds: [phone]  ENT Outpatient scheduling number: [phone]

## 2023-10-07 NOTE — SIGNIFICANT EVENT
Subjective:  Patient doing well. Resting in room.      Objective:  Vitals reviewed in EMR  Gen: NAD, AOx3, resting comfortably in chair  Face/Neck: All incisions c/d/i, neck soft without evidence of hematoma or fluid collection  -Slight area of supra-incisional fullness  -Skin paddle soft , pink and warm  -implantable doppler with strong arterial signal   Oral Cavity: All intraoral incisions c/d/i without evidence of dehiscence  -Flap soft and pink with good capillary refill  Extremities: left leg warm with intact movement and sensation; dressing in place with minimal strike through  Abdomen: PEG in place  Drains: All drains in place and holding suction with serosanguinous drainage (stripped)     Assessment/Plan:  Eryn Willis   is a 89 y.o. F who underwent Direct laryngoscopy,  Wide local excision of skin cancer, Oral cavity resection malignant neoplasm, Right selective neck dissection levels 1A-3, Reconstruction with left anterolateral thigh free flap with Sheron Whitney and Rachele on 10/4/23 on 10/4/2023. Flap check within normal limits      -continue Q6hr flap checks    ENT Team  Head and Neck Phone: [phone]

## 2023-10-07 NOTE — CARE PLAN
The patient's goals for the shift include  increase rest and pain management.   Problem: Skin  Goal: Participates in plan/prevention/treatment measures  Outcome: Progressing  Flowsheets (Taken 10/7/2023 1625)  Participates in plan/prevention/treatment measures: Elevate heels  Goal: Prevent/manage excess moisture  Outcome: Progressing  Flowsheets (Taken 10/7/2023 1625)  Prevent/manage excess moisture: Monitor for/manage infection if present  Goal: Prevent/minimize sheer/friction injuries  Outcome: Progressing  Flowsheets (Taken 10/7/2023 1625)  Prevent/minimize sheer/friction injuries:   HOB 30 degrees or less   Increase activity/out of bed for meals  Goal: Promote/optimize nutrition  Outcome: Progressing  Flowsheets (Taken 10/7/2023 1625)  Promote/optimize nutrition:   Assist with feeding   Monitor/record intake including meals  Goal: Promote skin healing  Outcome: Progressing  Flowsheets (Taken 10/7/2023 1625)  Promote skin healing:   Protective dressings over bony prominences   Rotate device position/do not position patient on device     Problem: Pain  Goal: My pain/discomfort is manageable  Outcome: Progressing     Problem: Safety  Goal: Patient will be injury free during hospitalization  Outcome: Progressing  Goal: I will remain free of falls  Outcome: Progressing       The clinical goals for the shift include patient will maintain stable flap and VS, have increased rest between flap checks, and safely ambulate    Over the shift, the patient did not make progress toward the following goals. Barriers to progression include . Recommendations to address these barriers include .

## 2023-10-08 NOTE — SIGNIFICANT EVENT
ubjective:  Patient doing well. Resting in room with daughter on phone. Unchanged skin paddle swelling, soft with good turgor.      Objective:  Gen: NAD, AOx3, resting comfortably in bed  Face/Neck: All incisions c/d/i, neck soft without evidence of hematoma or fluid collection  -Slight area of supra-incisional fullness  -Skin paddle soft , pink and warm  -implantable doppler with strong arterial signal   Oral Cavity: All intraoral incisions c/d/i without evidence of dehiscence  -Flap soft and pink with good capillary refill, interval increased edema of flap, small posterior intraoral skin paddle bruising where maxillary dentition abuts flap  Extremities: left leg warm with intact movement and sensation; incision c/d/I without evidence of dehiscence or hematoma   Abdomen: PEG in place  Drains: All drains in place and holding suction with serosanguinous drainage (stripped)     Assessment/Plan:  Eryn Willis   is a 89 y.o. F who underwent Direct laryngoscopy,  Wide local excision of skin cancer, Oral cavity resection malignant neoplasm, Right selective neck dissection levels 1A-3, Reconstruction with left anterolateral thigh free flap with Sheron Whitney and Rachele on 10/4/23 on 10/4/2023. Flap check as documented above.      -continue Q12hr flap checks  - Continue to monitor skin paddle swelling and intraoral bruising     ENT Team  Head and Neck Phone: [phone]

## 2023-10-08 NOTE — PROGRESS NOTES
"Eryn Willis is a 89 y.o. female on day 4 of admission presenting with Squamous cell carcinoma of face.     Subjective   No acute events overnight. Flap skin with similar exam as prior. Patient endorsing diarrhea related to feeds. Otherwise, no new concerns at this time.        Objective   Vitals reviewed in EMR  Gen: NAD, AOx3, resting comfortably in bed  Face/Neck: All incisions c/d/i, neck soft without evidence of hematoma or fluid collection  -Slight area of supra-incisional fullness  -Skin paddle soft , pink and warm  -implantable doppler with strong arterial signal   Oral Cavity: All intraoral incisions c/d/i without evidence of dehiscence  -Flap soft and pink with good capillary refill, interval increased edema of flap, small posterior intraoral skin paddle bruising where maxillary dentition abuts flap  Extremities: left leg warm with intact movement and sensation; incision c/d/I without evidence of dehiscence or hematoma   Abdomen: PEG in place  Drains: All drains in place and holding suction with serosanguinous drainage (stripped)    Last Recorded Vitals  Blood pressure 167/60, pulse 62, temperature 36 °C (96.8 °F), temperature source Temporal, resp. rate 16, height 1.651 m (5' 5\"), weight 54.6 kg (120 lb 5.9 oz), SpO2 95 %.  Intake/Output last 3 Shifts:  I/O last 3 completed shifts:  In: 2423.3 (44.4 mL/kg) [I.V.:1853.3 (33.9 mL/kg); NG/GT:320; IV Piggyback:250]  Out: 757 (13.9 mL/kg) [Urine:500 (0.3 mL/kg/hr); Emesis/NG output:80; Drains:177]  Weight: 54.6 kg     Relevant Results  Scheduled medications  acetaminophen, 1,000 mg, g-tube, q8h DERIK  aspirin, 325 mg, g-tube, Daily  chlorhexidine, 15 mL, Mouth/Throat, TID after meals  enoxaparin, 40 mg, subcutaneous, q24h  esomeprazole, 40 mg, g-tube, Daily before breakfast  hydrogen peroxide, 1 Application, Topical, TID  oxygen, , inhalation, Continuous - 02/gases  polyethylene glycol, 17 g, g-tube, Daily  sodium phosphate, 21 mmol, intravenous, Once  sotalol, " 80 mg, g-tube, BID  thiamine, 100 mg, g-tube, Daily  verapamil, 60 mg, g-tube, q8h DERIK  white petrolatum, 1 Application, Topical, TID      Continuous medications     PRN medications  PRN medications: albuterol, naloxone, naloxone, ondansetron, traMADol, traMADol         Assessment/Plan   Eryn Willis   is a 89 y.o. F who underwent Direct laryngoscopy,  Wide local excision of skin cancer, Oral cavity resection malignant neoplasm, Right selective neck dissection levels 1A-3, Reconstruction with left anterolateral thigh free flap with Sheron Whitney and Rachele on 10/4/23. Flap check as documented above.     Active Issues:  Right Buccal SCC  Possible Cervical Lymph Node Mets  HTN  COPD  Acute Blood Loss Anemia  Anemia of Chronic Disease  Hypokalemia  Hypomagnesia  Severe Protein Calorie Malnutrition        Plan:  -Flap Checks: q12hrs  -Drains: Monitor output. Removed left anterior thigh drain (drain #3) 10/7.  -Analgesia:  Dilaudid PCA-discontinued, Scheduled Acetaminophen; PRN Tramadol  -FEN: mIVFs, NPO, bolus Tfs, adding fiber 2/2 diarrhea; monitor and replete electrolytes as needed  -Pulm: wean oxygen to room air, Incentive Spirometer 10x/hr while awake, scheduled albuterol. Desat study ordered   -ID: Clindamycin 300mg Q8 complete   -Cardiac: Vitals per ENT free flap protocol then transition to Q4hr vitals; Aspirin 325mg daily, Sotalol 80mg BID, Verapamil 60mg Q8hrs  -Endo: no current interventions  -GI: Bowel regimen, PPI,  and PRN anti-emetic  -: Howard catheter-discontinued, afternoon void check  -Steroids/Special: Incision and oral care per ENT free flap order set  -Embolic PPx: SQH, SCDs while in bed  -Dispo: [facility]. PT/OTordered; Discharge planning for POD 6 home with home care. Patient has stated she would like to return home with home care at discharge.    Patient seen and discussed with attending on rounds who agrees with plan.     Kathy Rodriguez MD   PGY-1  Otolaryngology - Head & Neck Surgery  Head and Neck  Phone: [phone]  ----  ENT Consult Pager: [phone]  ENT Peds Pager: [phone]  ENT Subspecialty Team: Tere       Patient seen and examined  Flap healthy and viable  Plan as above

## 2023-10-08 NOTE — SIGNIFICANT EVENT
Signed         Subjective:  Patient doing well. Resting in room. Unchanged skin paddle swelling, soft with good turgor.      Objective:  Vitals reviewed in EMR  Gen: NAD, AOx3, resting comfortably in chair  Face/Neck: All incisions c/d/i, neck soft without evidence of hematoma or fluid collection  -Slight area of supra-incisional fullness  -Skin paddle soft , pink and warm  -implantable doppler with strong arterial signal   Oral Cavity: All intraoral incisions c/d/i without evidence of dehiscence  -Flap soft and pink with good capillary refill, interval increased edema of flap, small posterior intraoral skin paddle bruising where maxillary dentition abuts flap  Extremities: left leg warm with intact movement and sensation; dressing in place with minimal strike through  Abdomen: PEG in place  Drains: All drains in place and holding suction with serosanguinous drainage (stripped)     Assessment/Plan:  Eryn Willis   is a 89 y.o. F who underwent Direct laryngoscopy,  Wide local excision of skin cancer, Oral cavity resection malignant neoplasm, Right selective neck dissection levels 1A-3, Reconstruction with left anterolateral thigh free flap with Sheron Whitney and Rachele on 10/4/23 on 10/4/2023. Flap check as documented above.      -continue Q6hr flap checks  - Continue to monitor skin paddle swelling and intraoral bruising     ENT Team  Head and Neck Phone: [phone]

## 2023-10-08 NOTE — CARE PLAN
The patient's goals for the shift include  decrease feeling of fullness with TF and water flushes and safely ambulate to bathroom.     The clinical goals for the shift include increase periods of rest between flap checks, maintain flap and VS stability during shift    Over the shift, the patient did not make progress toward the following goals. Barriers to progression include . Recommendations to address these barriers include .    Problem: Skin  Goal: Participates in plan/prevention/treatment measures  Outcome: Progressing  Flowsheets (Taken 10/8/2023 0812)  Participates in plan/prevention/treatment measures:   Elevate heels   Increase activity/out of bed for meals  Goal: Prevent/manage excess moisture  Outcome: Progressing  Flowsheets (Taken 10/8/2023 0812)  Prevent/manage excess moisture:   Monitor for/manage infection if present   Cleanse incontinence/protect with barrier cream  Goal: Prevent/minimize sheer/friction injuries  Outcome: Progressing  Flowsheets (Taken 10/8/2023 0812)  Prevent/minimize sheer/friction injuries:   HOB 30 degrees or less   Increase activity/out of bed for meals   Use pull sheet  Goal: Promote/optimize nutrition  Outcome: Progressing  Flowsheets (Taken 10/8/2023 0812)  Promote/optimize nutrition:   Assist with feeding   Monitor/record intake including meals  Goal: Promote skin healing  Outcome: Progressing  Flowsheets (Taken 10/8/2023 0812)  Promote skin healing:   Assess skin/pad under line(s)/device(s)   Protective dressings over bony prominences   Rotate device position/do not position patient on device     Problem: Pain  Goal: My pain/discomfort is manageable  Outcome: Progressing  Flowsheets (Taken 10/7/2023 0242 by Junie Macedo RN)  Resident's pain/discomfort is manageable:   Administer pain medication prior to activities that may trigger pain   Offer non-pharmacological pain management interventions   Include resident/family/caregiver in decisions related to pain  management     Problem: Safety  Goal: Patient will be injury free during hospitalization  Outcome: Progressing  Goal: I will remain free of falls  Outcome: Progressing

## 2023-10-09 PROBLEM — C06.0: Status: ACTIVE | Noted: 2023-01-01

## 2023-10-09 NOTE — PROGRESS NOTES
Eryn Willis is a 89 y.o. female on day 5 of admission presenting with Squamous cell carcinoma of face.    Subjective   Her PEG tube was dislodged overnight and was replaced temporarily with a brooke catheter.  On exam, patient is unable to answer questions and is disoriented.  In soft restraints.  KUB with Gastrografin contrast was ordered to determine placement of PEG.       Objective     Physical Exam  Constitutional:       General: She is not in acute distress.     Appearance: She is ill-appearing.   HENT:      Head: Normocephalic and atraumatic.      Nose: Nose normal.      Mouth/Throat:      Mouth: Mucous membranes are dry.      Pharynx: Oropharynx is clear. No oropharyngeal exudate.   Eyes:      General: No scleral icterus.     Extraocular Movements: Extraocular movements intact.      Conjunctiva/sclera: Conjunctivae normal.      Pupils: Pupils are equal, round, and reactive to light.   Cardiovascular:      Rate and Rhythm: Normal rate.      Pulses:           Radial pulses are 2+ on the right side and 2+ on the left side.        Femoral pulses are 2+ on the right side and 2+ on the left side.       Dorsalis pedis pulses are 2+ on the right side and 2+ on the left side.   Pulmonary:      Effort: Pulmonary effort is normal. No respiratory distress.      Breath sounds: Normal breath sounds. No wheezing.   Abdominal:      General: Abdomen is flat. There is no distension.      Palpations: Abdomen is soft. There is no mass.      Tenderness: There is abdominal tenderness. There is no guarding.      Comments: Catheter in place, minimal drainage around tube   Musculoskeletal:         General: Normal range of motion.      Right lower leg: No edema.      Left lower leg: No edema.   Neurological:      General: No focal deficit present.      Mental Status: She is oriented to person, place, and time.      Cranial Nerves: No cranial nerve deficit.   Psychiatric:         Attention and Perception: She is inattentive.          "Mood and Affect: Mood normal.         Speech: She is noncommunicative.         Last Recorded Vitals  Blood pressure 174/84, pulse 77, temperature 35.7 °C (96.2 °F), temperature source Temporal, resp. rate 24, height 1.651 m (5' 5\"), weight 54.6 kg (120 lb 5.9 oz), SpO2 100 %.  Intake/Output last 3 Shifts:  I/O last 3 completed shifts:  In: 680 (12.5 mL/kg) [NG/GT:430; IV Piggyback:250]  Out: 486.5 (8.9 mL/kg) [Urine:300 (0.2 mL/kg/hr); Drains:186.5]  Weight: 54.6 kg     Relevant Results           This patient currently has cardiac telemetry ordered; if you would like to modify or discontinue the telemetry order, click here to go to the orders activity to modify/discontinue the order.     XR abdomen 1 view    Result Date: 10/9/2023  STUDY:  [XR ABDOMEN 1 VIEW];  [10/9/2023 12:36 am] INDICATION:  [Signs/Symptoms:Pulled out PEG, replaced with brooke]. COMPARISON:  [None.] ACCESSION NUMBER(S):  [[id number]] ORDERING CLINICIAN:  [ALCON JONES] FINDINGS: AP radiographs provided x2. Partially visualized dual-chamber left-sided ICD device. Surgical clips overlying the right upper quadrant. . Incompletely characterized catheter projecting over the left lower quadrant as annotated on the radiograph. [Nonobstructive bowel gas pattern.] Limited evaluation of pneumoperitoneum on portable supine imaging, however no gross evidence of free air is noted.  [Visualized lung bases demonstrates blunt bilateral costophrenic angles suggestive of small pleural effusion possible associated atelectasis.]  [Osseous structures demonstrate no acute bony changes.] IMPRESSION:  [No acute abdominal radiographic findings status post PEG. Per given history Brooke catheter is not definitely visualized overlying the gastric region.] Possible small bilateral pleural effusions. Additional abdominal radiographic findings as above          Malnutrition Diagnosis Status: New  Malnutrition Diagnosis: Severe malnutrition related to disease or " condition  As Evidenced by: suspected <75% estimated energy requirement for >1 month, significant 14% weight loss x 6 months, severe muscle wasting, & severe fat loss  I agree with the dietitian's malnutrition diagnosis.      Assessment/Plan   Ms. Willis 89-year-old status post PEG placement now complicated by PEG dislodgment in context of new onset altered mental status.  Temporarily replaced with Howard catheter.  KUB was ordered to confirm placement.    -KUB shows tube in correct position  -Okay to use catheter for meds  -plan to replace catheter on Thursday with Dr. Wyman  -NPO and hold tube feeds Wednesday at midnight    Plan discussed w Dr. Wyman and Dr. Ramos.    Lydia Mariee MD  Vascular Surgery Resident, PGY-1  [phone]           Lydia Mariee MD

## 2023-10-09 NOTE — HOSPITAL COURSE
89 yr old female with SCC of the Face s/p triple endoscopy, PEG placement, excision of right buccal lesion, right neck dissection levels I-III, and reconstruction with a Left ALT free flap on 10/04/2023 with Dr. Whitney and Dr. Jeffrey. Please see operative report for full details. Patient tolerated the procedure well and recovered briefly in PACU before being transitioned to regular nursing floor. Post-op course was complicated by post op delirium, and patient pulled out leg drain and PEG, PEG replaced by IR 10/11. On 10/14, she developed hypercarbic respiratory failure thought secondary to COPD exacerbation requiring upgrade to SICU and intubation. Patient ultimately extubated 10/17 and was ready for transfer to floor, however delay in bed availability. Patient initially reached bolus 10/8, however, due to episode requiring ICU admission, feeds were interrupted, reached TF goal 10/19, bolus 10/20.  Patient failed void trial 10/18 so brooke was replaced, which was in place upon discharge with outpatient follow up arranged. IV medication transitioned to PEG as diet advanced. The drains were monitored and once the output was less than 30ml in a 24hr time frame they were removed accordingly. On the day of discharge, the pt was tolerating a diet, pain was controlled on PO pain medication, and they were ambulating and voiding spontaneously. They were discharged home with home PT in stable condition with instructions to follow up as outpatient.  *** home oxygen?

## 2023-10-09 NOTE — CONSULTS
"Referring Provider: Rachele  Reason for Consult: AMS/agitation    HISTORY OF PRESENT ILLNESS:  Eryn Willis is a 89 y.o. female with no past psychiatric history and a past medical history of hypertension, COPD, CAD, SSS s/p pacemaker, paroxysmal SVT, right buccal SCC, malnutrition who was admitted to [facility] on 10/4/2023 for elective oral cavity malignant neoplasm resection with reconstruction with left anterolateral thigh free flap and right selective neck dissection and PEG placement on 10/4/2023. Psychiatry was consulted on 10/9/2023 for delirium and concern for injury to self.    On chart review and discussion with primary team, patient was admitted for elective surgery on 10/4/2023.  She was AO x3, calm and in good mood and to Saturday 10/7 evening, when she was noted to be slightly confused and agitated but still oriented x3.  In the evening of 10/8, her mentation acutely worsened to the point that she was agitated, trying to get out of bed, pulling drains and lines (she pulled out her new PEG tube that was placed this admission), not redirectable.  She had an EKG on 9/28 which showed a QTc of 494 ms. She received 1 dose of Zyprexa 5 mg IM at around midnight 10/8.  Per primary team, the Zyprexa did not seem to have helped.    She has never been tried on melatonin and it appears that over the weekend she did not sleep much over night.    On interview, patient is laying in bed with a sitter at bedside and bilateral mittens.  She is alert and oriented to herself and Monday, October 2023.  She thinks she is in [place] and does not know the exact date.  She recalls her reason for admission is for resection of malignant oral cancer.  She told writer that she can see her adult children in the room and reported that \"David, Kandace, and Katie\" are all behind this writer.  It appears that \"David\" is talking to her.  She denies having any pain and refused to answer additional questions at this time.  She was trying to " "get out of bed and required constant redirection.    Inpatient work up:  Currently with normal creatinine and BUN, no hypoglycemic episodes.  CBC with mild leukocytosis, peaked at 15.4 on 10/18 and came down to 11.7 on 10/9.  Acute blood loss anemia (hemoglobin na  dir 9.0 and improved to 13.0 on 10/9).    UA 10/9: Trace leukocyte esterase but otherwise negative for UTI.  Chest x-ray without signs of pneumonia.  No LFT or ammonia in the system.    CT head obtained at [facility] on 3/2023 indication was altered mental status/concern for stroke, per report, no acute abnormalities that has mild cerebral atrophy with chronic ischemic white matter disease.    Collateral information from daughter Kandace:  Writer attempted to call patient's daughter Kandace Willis at [phone] 3 times this morning but was unable to reach her at this time. Per son Epi, he has been taking care of Kandace due to her broken hip, so Epi is the primary person to contact    Collateral information from son Epi over phone ([phone]):   Has been living with son for the past 10 months; before that was living with daughter Kandace  Can cook her own meals  Is independent with ADLs and IADLs  Ambulates with a walker lately due to \"feeling weak\" but can walk without a walker; she does fall    PSYCHIATRIC REVIEW OF SYSTEMS  Depression:  unable to assess  Anxiety:  unable to assess  Мария: negative  Psychosis: auditory hallucinations:son talking to her and visual hallucinations  Delirium: confusion, disorientation, fluctuating or reduced consciousness, lethargy and fatigue, speech or language disturbance, sleep-wake cycle disturbance, increased or decreased psychomotor activity, difficulty directing, focusing, sustaining or shifting attention , and hallucinations and delusions   Trauma: negative    PSYCHIATRIC HISTORY  Prior diagnoses: none  Prior hospitalizations: none  History of suicide attempts: none  History of self-harm: none  History of " "trauma/abuse/loss: none  History of violence: none    Current psychiatrist: none  Current mental health agency: none  Current : none  Current outpatient treatment: none  Guardian or payee: none    Current psychiatric medications: none  Past psychiatric medications: none  Past psychiatric treatments: none    Family psychiatric history: none    SUBSTANCE USE HISTORY   She reports that she has never smoked. She has never used smokeless tobacco. She reports that she does not drink alcohol and does not use drugs.    Tobacco: Never  Alcohol: Never     - History of severe withdrawal: none     - Last use: n/a  Cannabis: Never  Other substances: Never (per son).  Prior substance use disorder treatment: n/a    SOCIAL HISTORY  Social History     Socioeconomic History    Marital status:      Spouse name: None    Number of children: None    Years of education: None    Highest education level: None   Occupational History    None   Tobacco Use    Smoking status: Never    Smokeless tobacco: Never   Substance and Sexual Activity    Alcohol use: Never    Drug use: Never    Sexual activity: Not Currently   Other Topics Concern    None   Social History Narrative    None     Social Determinants of Health     Financial Resource Strain: Not on file   Food Insecurity: Not on file   Transportation Needs: Not on file   Physical Activity: Not on file   Stress: Not on file   Social Connections: Not on file   Intimate Partner Violence: Not on file   Housing Stability: Not on file      Current living situation: lived with daughter Kandace (Kandace has her own health problems) and has been living with son Epi for the past 10 months  Current employment/source of income: unknown  Current stressors: Hospital admission for cancer resection    Born and raised: [place]  Family: multiple children  Childhood: \"David, Kandace, and Thom and Damaso\"  Education: High school  History of learning difficulty: unable to obtain due to patient's mentation " "  Marital status: ,  passed away 36 years ago  Children: 4  Social support: strong support from children  Legal history: no   history: no  Access to weapons: no    PAST MEDICAL HISTORY  Past Medical History:   Diagnosis Date    Cancer (CMS/HCC)     Hypertension     Irregular heart beat     Pacemaker         Additional Past Medical History: Osteoarthritis  Prior Head trauma/TBI/LOC/seizure history: None  Ob/Gyn history: No significant history per chart review  LMP/contraception: n/a    PAST SURGICAL HISTORY  Past Surgical History:   Procedure Laterality Date    TONSILLECTOMY          Additional Past Surgical History: Left femoral neck fracture status post left hip hemiarthroplasty 2019    FAMILY HISTORY  No family history on file.     ALLERGIES  Codeine and Penicillins    OARRS REVIEW  OARRS checked: 10/9/2023  OARRS comments: Overdose risk score 000    OBJECTIVE    VITALS      10/8/2023     2:27 PM 10/8/2023     5:00 PM 10/8/2023     8:34 PM 10/9/2023    12:38 AM 10/9/2023     1:25 AM 10/9/2023     1:56 AM 10/9/2023     5:02 AM   Vitals   Systolic 187 166 185 185 175 156 174   Diastolic 64 63 70 55 60 55 84   Heart Rate 60 59 60 60 60 62 77   Temp 36.6 °C (97.9 °F) 36.2 °C (97.2 °F) 36.7 °C (98.1 °F) 36.9 °C (98.4 °F)   35.7 °C (96.2 °F)   Resp 16 18 18 16   24        MENTAL STATUS EXAM  Appearance: Elderly  woman, frail, laying in hospital bed with mittens in bilateral hands and multiple drains around her neck.  Attitude: Intermittently calm and cooperative; irritable and agitated  Behavior: Mild agitation, trying to get out of bed and remove her mittens as we speak  Motor Activity: No motor retardation; no EPS observed  Speech: Regular rate and rhythm, normal volume, slurred speech (very was only able to understand simple phrases such as [place]), speaks spontaneously  Mood: \"Okay\", \"will be better if I can get out of these (pointing to her mittens)  Affect: Congruent  Thought " Process: Decreased attention, somewhat tangential  Thought Content: Focused on trying to get out of bed and get out of restraints with writer's help  Thought Perception: Appears to have auditory and visual hallucinations of her children in her room talking to her; does not appear to have command hallucinations.  Cognition:.  Attention and concentration  Insight: She is aware of her malignancy which required surgery but is not aware of other health situation at this time  Judgement: Poor at this time      MEDICAL REVIEW OF SYSTEMS  Unable to obtain due to current mentation.    HOME MEDICATIONS  Medication Documentation Review Audit       Reviewed by Jante Brandt MD (Resident) on 10/04/23 at 0822      Medication Order Taking? Sig Documenting Provider Last Dose Status   albuterol 2.5 mg /3 mL (0.083 %) nebulizer solution [id number] No Take 3 mL (2.5 mg) by nebulization every 4 hours if needed for wheezing. Historical Provider, MD Unknown Active   fludeoxyglucose F-18 injection 10.4 millicurie [id number]   Dany Jeffrey MD  Active   sotalol (Betapace) 80 mg tablet [id number] No Take 1 tablet (80 mg) by mouth 2 times a day. Historical Provider, MD 10/4/2023 Active   verapamil SR (Calan-SR) 180 mg ER tablet [id number] No Take 1 tablet (180 mg) by mouth once daily. Do not crush or chew. Historical Provider, MD 10/3/2023 Active                     CURRENT MEDICATIONS  Scheduled medications  acetaminophen, 1,000 mg, g-tube, q8h DERIK  albuterol, 2.5 mg, nebulization, q4h  aspirin, 325 mg, g-tube, Daily  chlorhexidine, 15 mL, Mouth/Throat, TID after meals  enoxaparin, 40 mg, subcutaneous, q24h  esomeprazole, 40 mg, g-tube, Daily before breakfast  hydrogen peroxide, 1 Application, Topical, TID  iohexol, 50 mL, oral, Once in imaging  oxygen, , inhalation, Continuous - 02/gases  polyethylene glycol, 17 g, g-tube, Daily  sodium phosphate, 15 mmol, intravenous, Once  sotalol, 80 mg, g-tube, BID  thiamine, 100 mg, g-tube,  Daily  verapamil, 60 mg, g-tube, q8h DERIK  white petrolatum, 1 Application, Topical, TID        Continuous medications       PRN medications  PRN medications: naloxone, naloxone, OLANZapine, ondansetron, traMADol, traMADol     LABS  Results for orders placed or performed during the hospital encounter of 10/04/23 (from the past 24 hour(s))   Urinalysis with Reflex Microscopic   Result Value Ref Range    Color, Urine Straw Straw, Yellow    Appearance, Urine Clear Clear    Specific Gravity, Urine 1.010 1.005 - 1.035    pH, Urine 7.0 5.0, 5.5, 6.0, 6.5, 7.0, 7.5, 8.0    Protein, Urine NEGATIVE NEGATIVE mg/dL    Glucose, Urine NEGATIVE NEGATIVE mg/dL    Blood, Urine NEGATIVE NEGATIVE    Ketones, Urine NEGATIVE NEGATIVE mg/dL    Bilirubin, Urine NEGATIVE NEGATIVE    Urobilinogen, Urine <2.0 <2.0 mg/dL    Nitrite, Urine NEGATIVE NEGATIVE    Leukocyte Esterase, Urine TRACE (A) NEGATIVE   Urinalysis Microscopic Only   Result Value Ref Range    WBC, Urine NONE 1-5, NONE /HPF    RBC, Urine 1-2 NONE, 1-2, 3-5 /HPF   CBC   Result Value Ref Range    WBC 11.7 (H) 4.4 - 11.3 x10*3/uL    nRBC 0.0 0.0 - 0.0 /100 WBCs    RBC 4.30 4.00 - 5.20 x10*6/uL    Hemoglobin 13.0 12.0 - 16.0 g/dL    Hematocrit 41.3 36.0 - 46.0 %    MCV 96 80 - 100 fL    MCH 30.2 26.0 - 34.0 pg    MCHC 31.5 (L) 32.0 - 36.0 g/dL    RDW 16.5 (H) 11.5 - 14.5 %    Platelets 286 150 - 450 x10*3/uL    MPV 10.5 7.5 - 11.5 fL   Magnesium   Result Value Ref Range    Magnesium 2.07 1.60 - 2.40 mg/dL   Renal Function Panel   Result Value Ref Range    Glucose 105 (H) 74 - 99 mg/dL    Sodium 142 136 - 145 mmol/L    Potassium 4.4 3.5 - 5.3 mmol/L    Chloride 105 98 - 107 mmol/L    Bicarbonate 27 21 - 32 mmol/L    Anion Gap 14 10 - 20 mmol/L    Urea Nitrogen 19 6 - 23 mg/dL    Creatinine 0.42 (L) 0.50 - 1.05 mg/dL    eGFR >90 >60 mL/min/1.73m*2    Calcium 8.5 (L) 8.6 - 10.6 mg/dL    Phosphorus 2.5 2.5 - 4.9 mg/dL    Albumin 2.8 (L) 3.4 - 5.0 g/dL        IMAGING  XR abdomen 1  view    Result Date: 10/9/2023  STUDY:  [XR ABDOMEN 1 VIEW];  [10/9/2023 12:36 am] INDICATION:  [Signs/Symptoms:Pulled out PEG, replaced with brooke]. COMPARISON:  [None.] ACCESSION NUMBER(S):  [[id number]] ORDERING CLINICIAN:  [ALCON JONES] FINDINGS: AP radiographs provided x2. Partially visualized dual-chamber left-sided ICD device. Surgical clips overlying the right upper quadrant. . Incompletely characterized catheter projecting over the left lower quadrant as annotated on the radiograph. [Nonobstructive bowel gas pattern.] Limited evaluation of pneumoperitoneum on portable supine imaging, however no gross evidence of free air is noted.  [Visualized lung bases demonstrates blunt bilateral costophrenic angles suggestive of small pleural effusion possible associated atelectasis.]  [Osseous structures demonstrate no acute bony changes.] IMPRESSION:  [No acute abdominal radiographic findings status post PEG. Per given history Brooke catheter is not definitely visualized overlying the gastric region.] Possible small bilateral pleural effusions. Additional abdominal radiographic findings as above       PSYCHIATRIC RISK ASSESSMENT  Violence Risk Factors:  none  Acute Risk of Harm to Others is Considered: Low  Suicide Risk Factors: none  Protective Factors: sense of responsibility towards family, social support/connectedness, and positive family relationships  Acute Risk of Harm to Self is Considered: Low    ASSESSMENT AND PLAN  kaykay Willis is a 89 y.o. female with no past psychiatric history and a past medical history of hypertension, COPD, CAD, SSS s/p pacemaker, paroxysmal SVT, right buccal SCC, malnutrition who was admitted to [facility] on 10/4/2023 for elective oral cavity malignant neoplasm resection with reconstruction with left anterolateral thigh free flap and right selective neck dissection which was completed on 10/4/2023. Also s/p PEG placement this admission. Hospital course was complicated by gradual  "worsening of mentation and agitation starting from a 10/7.    Psychiatry was consulted on 10/9/2023 for delirium and concern for injury to self.    On initial assessment 10/9, patient was laying in bed with bilateral mittens and a sitter at bedside; she was alert and oriented to self, Monday, October 2023, but not to location.  She was trying to get out of bed and required frequent reorientation. She appears to have auditory and visual hallucinations of her children talking to her but no command hallucinations.  She denies SI, HI, or pain.  Per primary team, her behavior is slightly better this morning, but she sundowns.  She was given 1 dose of olanzapine IM 5 mg at midnight on 10/8; primary team did not think that it helped patient.  Patient has not been sleeping well in the last few days; primary team has not tried melatonin yet. Patient received another dose of IM zyprexa 5mg at noon and slept for 1 hour afterward; she then became restless again afterward. Olanzapine 5mg IM might have been too high of a dose for her and causing akathisia; could trial lower dose    tele (10/8/2023): QTc of 440 ms  EKG (9/28/2023): QTc of 494 ms    IMPRESSION  #delirium, multifactorial (old age, baseline brain atrophy, prolonged hospital stay, recent large operation, poor sleep/disturbed circadian rhythm etc)      RECOMMENDATIONS    Safety:  - Patient does not currently meet criteria for inpatient psychiatric admission.   - Patient lacks the capacity to leave AMA at this time and thus cannot leave AMA. Call CODE VIOLET if patient attempts to leave AMA.  - To evaluate decision-making capacity, recommend use of the Capacity Evaluation Tool. Search “ IP Capacity Evaluation under SmartText\" unless the patient has a legal guardian, in which case all decisions per the legal guardian.  - Patient does not require a 1:1 sitter from a psychiatric perspective at this time.  - Defer to primary team decision for 1:1 sitter for safety " precautions.  - As with all hospitalized patients, would recommend delirium precautions, as below.    Work-up:  - please obtain liver function test and ammonia, depending on liver function, we may consider valproic acid for agitation not redirectable if 2nd generation antipsychotics are not helpful/contraindicated  - please obtain an updated EKG for QTC    Medications:  -START melatonin 3mg via PEG tube at 1800 daily  -START quetiapine 25mg via PEG tube qHS  -INCREASE thiamine to 500mg IV q8H for 3 days before decreasing to thiamine 100mg daily via PEG tube  -For agitation and not redirectable, first line: quetiapine 12.5 mg via PEG tube q8H PRN; if staff cannot access PEG tube, 2nd line: olanzapine 2.5mg IM q8H PRN  - If no improvement, please page psychiatry for additional rec    - If patient continues to have akathisia/restlessness, we may consider low dose benadryl to be given together with PRN olanzapine. Will hold off for now.    Ancillary Services:  - music therapy consult when patient is medically appropriate  - not interested in , pet or art therapy per son    Follow-up:  -No need to follow-up with a psychiatrist at this time  - Patient's daughter Kandace is living with patient's son Epi for the past 10 months due to hip fracture, and Epi has also been the primary person taking care of patient and Kandace during this time. Kandace cannot reach a phone easily due to limited mobility from hip fracture. Please reach out to Epi for updates and questions: Epi Lazaro ([phone]). Contact info updated in Epic by psychiatry.    - Discussed recommendations with primary team.  - Psychiatry will continue to follow.    Thank you for allowing us to participate in the care of this patient. Please page [phone] with any questions or concerns.    Patient is discussed with Dr. Fowler, who agrees with above plan.    Medication Consent  Medication Consent: n/a; consult service    Aide Sanabria MD

## 2023-10-09 NOTE — PROGRESS NOTES
"Eryn Willis is a 89 y.o. female on day 5 of admission presenting with Squamous cell carcinoma of face.     Subjective   Overnight, became acutely disoriented, agitated and a threat to herself / staff as she was punching the bed, attempting to get out of bed, hitting nurses, and ultimately pulled out her PEG tube as well as left leg JENNIE drain. Howard inserted in PEG site. She received Olanzapine 5mg without significant change. Also appears to be intermittently experiencing visual hallucinations. Psych consulted. Infectious work up of UA/CXR negative, afebrile, RFP grossly normal.        Objective   Vitals reviewed in EMR  Gen: Agitated, Oriented only to year, restless  Face/Neck: All incisions c/d/i, neck soft without evidence of hematoma or fluid collection  -Slight area of supra-incisional fullness  -Skin paddle soft , pink and warm  -implantable doppler with strong arterial signal   Oral Cavity: All intraoral incisions c/d/i without evidence of dehiscence  -Flap soft and pink with good capillary refill, interval increased edema of flap, small posterior intraoral skin paddle bruising where maxillary dentition abuts flap  Extremities: left leg warm with intact movement and sensation; incision c/d/I without evidence of dehiscence or hematoma   Abdomen: PEG in place  Drains: All drains in place and holding suction with serosanguinous drainage (stripped)    Last Recorded Vitals  Blood pressure 174/84, pulse 77, temperature 35.7 °C (96.2 °F), temperature source Temporal, resp. rate 24, height 1.651 m (5' 5\"), weight 54.6 kg (120 lb 5.9 oz), SpO2 100 %.  Intake/Output last 3 Shifts:  I/O last 3 completed shifts:  In: 680 (12.5 mL/kg) [NG/GT:430; IV Piggyback:250]  Out: 486.5 (8.9 mL/kg) [Urine:300 (0.2 mL/kg/hr); Drains:186.5]  Weight: 54.6 kg     Relevant Results  Scheduled medications  acetaminophen, 1,000 mg, g-tube, q8h DERIK  albuterol, 2.5 mg, nebulization, q4h  aspirin, 325 mg, g-tube, Daily  chlorhexidine, 15 mL, " Mouth/Throat, TID after meals  enoxaparin, 40 mg, subcutaneous, q24h  esomeprazole, 40 mg, g-tube, Daily before breakfast  hydrogen peroxide, 1 Application, Topical, TID  iohexol, 50 mL, oral, Once in imaging  oxygen, , inhalation, Continuous - 02/gases  polyethylene glycol, 17 g, g-tube, Daily  sodium phosphate, 15 mmol, intravenous, Once  sotalol, 80 mg, g-tube, BID  thiamine, 100 mg, g-tube, Daily  verapamil, 60 mg, g-tube, q8h DERIK  white petrolatum, 1 Application, Topical, TID      Continuous medications     PRN medications  PRN medications: naloxone, naloxone, OLANZapine, ondansetron, traMADol, traMADol         Assessment/Plan   Eryn Willis   is a 89 y.o. F who underwent Direct laryngoscopy,  Wide local excision of skin cancer, Oral cavity resection malignant neoplasm, Right selective neck dissection levels 1A-3, Reconstruction with left anterolateral thigh free flap with Sheron Whitney and Rachele on 10/4/23. Flap check as documented above. Patient now with acute delirium / agitation, CXR and UA neg.     Active Issues:  Right Buccal SCC  Possible Cervical Lymph Node Mets  HTN  COPD  Acute Blood Loss Anemia  Anemia of Chronic Disease  Hypokalemia  Hypomagnesia  Severe Protein Calorie Malnutrition        Plan:  -Flap Checks: q24hrs  -Neuro: psych consulted for acute agitation, appreciate psych recs   -Drains: Monitor output. Removed left anterior thigh drain (drain #3) 10/7, self-discontinued remaining left thigh  -Analgesia:  Dilaudid PCA-discontinued, Scheduled Acetaminophen; PRN Tramadol  -FEN: mIVFs, NPO, bolus Tfs, adding fiber 2/2 diarrhea; monitor and replete electrolytes as needed  -Pulm: wean oxygen to room air, Incentive Spirometer 10x/hr while awake, scheduled albuterol. Desat study ordered   -ID: Clindamycin 300mg Q8 complete   -Cardiac: Vitals per ENT free flap protocol then transition to Q4hr vitals; Aspirin 325mg daily, Sotalol 80mg BID, Verapamil 60mg Q8hrs  -Endo: no current interventions  -GI: Bowel  regimen, PPI,  and PRN anti-emetic  -: voiding  -Steroids/Special: Incision and oral care per ENT free flap order set  -Embolic PPx: SQH, SCDs while in bed  -Dispo: [facility]. PT/OTordered; Discharge planning for POD 6 home with home care. Patient has stated she would like to return home with home care at discharge.    Patient seen and discussed with attending on rounds who agrees with plan.     Kathy Rodriguez MD   PGY-1  Otolaryngology - Head & Neck Surgery  Head and Neck Phone: [phone]  ----  ENT Consult Pager: [phone]  ENT Peds Pager: [phone]  ENT Subspecialty Team: [place]

## 2023-10-09 NOTE — SIGNIFICANT EVENT
Patient noted to continue to be a threat/injury to herself and has been punching the bed, continuing to try to get out of bed despite her inability to walk without assistance.  Patient is not alert or oriented.  Psych consulted for recommendations regarding sundowning.  EKG rhythm strip printed and anticoagulated to have QT of 0.44.  Psych cleared patient for another one-time dose of olanzapine 5.  Patient currently has mittens on for restraints and is actively trying to get out of them with multiple successful attempts.  She is also biting her mittens and screaming at the top of her lungs.  Multiple attempts made by MD and nursing team to reorient to situation, place, and person.

## 2023-10-09 NOTE — SIGNIFICANT EVENT
MD notified regarding patient's agitation, trended out of bed, and refusal of taking her blood pressure medication.  Numerous attempts by nursing and MD to explain the patient that in the setting of her hypertension with systolics in the 180s, it is not safe for her to continue at this high blood pressure.  Patient continues to be agitated, with unintelligible speech (it is noted that she has a flap to her mouth and she was previously speaking in sentences that made sense).  Patient is actively trying to get of the bed on examination.  Decision was made to try to avoid restraints overnight. Chart reviewed and patient is on several Qtc prolonging medications, therefore haldol is not preferred at this time. Zyprexa found to have minimal QTC prolonging effects and is ordered as one time dose. Will not participate in EKG at this time.         Update: Patient with continued increasing agitation before dose of Zyprexa was able to be given and patient pulled out her PEG tube after claiming that she is full.  She then regretted the decision.  Surg endowas immediately engaged because Dr. Ramos was the one to place the PEG.  A Howard was immediately inserted to the site to prevent premature closure and 5 cc of saline were instilled into the Howard balloon.  Surgeon Endo recommendations included contrasted KUB with Omnipaque.  Surgery team at bedside with reassuring abdominal exam and hemostatic PEG site.  They noted appropriate aspiration of stomach contents with no further immediate recommendations at this time.

## 2023-10-09 NOTE — SIGNIFICANT EVENT
I was notified by the patient's primary team that she had removed her PEG tube placed by Dr. Ramos 4 days ago. The primary team had already inserted a brooke catheter into the tract and ordered a KUB. They also noted oozing bleeding from the PEG site and requested I examine the patient.     I recommended administering contrast immediately prior to the KUB to confirm placement in the stomach. I examined the patient at bedside. Abdomen was soft, nontender, nondistended. There was a small amount of blood on the dressing over the PEG site, but was hemostatic on my exam. I was able to aspirate stomach contents through the brooke.    For unknown reasons, the contrast did not appear in the KUB despite being administered. However, the brooke appeared to be within the stomach. Aspiration of stomach contents from the brooke was also reassuring.     At this time, no further need for surgical intervention. Will s/o. Please contact via the team pager for any questions or concerns.    Maria Guadalupe Colmenares MD-[id number]  [facility]  Pager [phone]

## 2023-10-09 NOTE — PROGRESS NOTES
Physical Therapy                 Therapy Communication Note    Patient Name: Eryn Willis  MRN: [id number]  Today's Date: 10/9/2023     Discipline: Physical Therapy    Missed Visit Reason: Missed Visit Reason: Patient sleeping    Missed Time: Attempt    Comment:

## 2023-10-10 NOTE — PROGRESS NOTES
Physical Therapy                 Therapy Communication Note    Patient Name: Eryn Willis  MRN: [id number]  Today's Date: 10/10/2023     Discipline: Physical Therapy    Missed Visit Reason: Missed Visit Reason: Other (Comment) (RN reports pt continues to be significantly confused/agitated and in restraints, though is sleeping soundsly at this time.)    Missed Time: Attempt 916    Comment:

## 2023-10-10 NOTE — PROGRESS NOTES
"Eryn Willis is a 89 y.o. female on day 6 of admission presenting with Squamous cell carcinoma of face. No acute events overnight. Flap checks stable.    Subjective   Patient was still confused this morning on rounds. Patient was able to be re-oriented for a short period of time.       Objective   Physical Exam  Vitals reviewed in EMR  Gen: NAD, AOx3, resting comfortably in bed  Eyes: EOMI, sclera clear, PERRL  Ears: Normal external ears bilaterally  Nose: No rhinorrhea; anterior nares clear  Oral Cavity: dry, old crusted blood  Head: normocephalic, atraumatic  Face/Neck: All incisions c/d/i, neck soft and flat without evidence of hematoma or fluid collection  -slightly increased area of fullness directly below skin paddle  -well perfused, doppler biphasic x2 (I.e., skin paddle and internal)  -no evidence of dehiscence   Resp: Non-labored breathing on room air, no stridor  Cards: RRR on Doppler  Gastro: Soft, non-distended,  Howard  tube in place  : Heidi wick in place clear yellow urine  MSK: Moves all extremities  Psych: Appropriate mood and affect  Drains: All drains in place and holding suction with serosanguinous drainage (stripped)    Last Recorded Vitals  Blood pressure 167/57, pulse 87, temperature 36.4 °C (97.5 °F), resp. rate 18, height 1.651 m (5' 5\"), weight 54.6 kg (120 lb 5.9 oz), SpO2 99 %.  Intake/Output last 3 Shifts:  I/O last 3 completed shifts:  In: 0 (0 mL/kg)   Out: 825 (15.1 mL/kg) [Urine:750 (0.4 mL/kg/hr); Drains:75]  Weight: 54.6 kg     Relevant Results  Results for orders placed or performed during the hospital encounter of 10/04/23 (from the past 24 hour(s))   Ammonia   Result Value Ref Range    Ammonia 35 16 - 53 umol/L   CBC   Result Value Ref Range    WBC 12.7 (H) 4.4 - 11.3 x10*3/uL    nRBC 0.0 0.0 - 0.0 /100 WBCs    RBC 3.62 (L) 4.00 - 5.20 x10*6/uL    Hemoglobin 10.9 (L) 12.0 - 16.0 g/dL    Hematocrit 33.6 (L) 36.0 - 46.0 %    MCV 93 80 - 100 fL    MCH 30.1 26.0 - 34.0 pg    MCHC " 32.4 32.0 - 36.0 g/dL    RDW 17.0 (H) 11.5 - 14.5 %    Platelets 286 150 - 450 x10*3/uL    MPV 9.9 7.5 - 11.5 fL   Magnesium   Result Value Ref Range    Magnesium 1.99 1.60 - 2.40 mg/dL   Renal Function Panel   Result Value Ref Range    Glucose 92 74 - 99 mg/dL    Sodium 142 136 - 145 mmol/L    Potassium 3.7 3.5 - 5.3 mmol/L    Chloride 105 98 - 107 mmol/L    Bicarbonate 29 21 - 32 mmol/L    Anion Gap 12 10 - 20 mmol/L    Urea Nitrogen 22 6 - 23 mg/dL    Creatinine 0.71 0.50 - 1.05 mg/dL    eGFR 81 >60 mL/min/1.73m*2    Calcium 8.5 (L) 8.6 - 10.6 mg/dL    Phosphorus 3.6 2.5 - 4.9 mg/dL    Albumin 2.8 (L) 3.4 - 5.0 g/dL        Scheduled medications  acetaminophen, 1,000 mg, g-tube, q8h DERIK  aspirin, 325 mg, g-tube, Daily  chlorhexidine, 15 mL, Mouth/Throat, TID after meals  enoxaparin, 40 mg, subcutaneous, q24h  esomeprazole, 40 mg, g-tube, Daily before breakfast  hydrogen peroxide, 1 Application, Topical, TID  iohexol, 50 mL, oral, Once in imaging  melatonin, 3 mg, g-tube, Daily  oxygen, , inhalation, Continuous - 02/gases  polyethylene glycol, 17 g, g-tube, Daily  [Held by provider] QUEtiapine, 25 mg, g-tube, Nightly  sotalol, 80 mg, g-tube, BID  thiamine, 500 mg, intravenous, q8h  [START ON 10/11/2023] thiamine, 500 mg, intravenous, Daily  valproic acid, 125 mg, g-tube, BID  verapamil, 60 mg, g-tube, q8h [facility]  white petrolatum, 1 Application, Topical, TID      Continuous medications     PRN medications  PRN medications: albuterol, LORazepam, naloxone, naloxone, [Held by provider] OLANZapine, [Held by provider] QUEtiapine, traMADol, traMADol         Malnutrition Diagnosis Status: New  Malnutrition Diagnosis: Severe malnutrition related to disease or condition  As Evidenced by: suspected <75% estimated energy requirement for >1 month, significant 14% weight loss x 6 months, severe muscle wasting, & severe fat loss  I agree with the dietitian's malnutrition diagnosis.      Assessment/Plan   Assessment:  Eryn Willis    is a 89 y.o. F who underwent Direct laryngoscopy,  Wide local excision of skin cancer, Oral cavity resection malignant neoplasm, Right selective neck dissection levels 1A-3, Reconstruction with left anterolateral thigh free flap with Sheron Whitney and Rachele on 10/4/23 on 10/4/2023.      Active Issues:  Right Buccal SCC  Possible Cervical Lymph Node Mets  HTN  COPD  Acute Blood Loss Anemia  Anemia of Chronic Disease  Hypokalemia  Hypomagnesia  Severe Protein Calorie Malnutrition  Mixed Delirium        Plan:  -Flap Checks: q24hrs  -Drains: Monitor output; remove all remaining drains  -Analgesia:  Dilaudid PCA-discontinued, Scheduled Acetaminophen; PRN Tramadol  -FEN: mIVFs, NPO, advancing tube feeds to goal rate; monitor and replete electrolytes as needed  -Pulm: wean oxygen to room air, Incentive Spirometer 10x/hr while awake  -ID: Clindamycin 300mg Q8 complete   -Cardiac: Vitals per ENT free flap protocol then transition to Q4hr vitals; Aspirin 325mg daily, Sotalol 80mg BID, Verapamil 60mg Q8hrs; Remove doppler  -Endo: no current interventions  -GI: Bowel regimen, PPI,  and PRN anti-emetic  -: Howard catheter-discontinued, afternoon void check  -Steroids/Special: Incision and oral care per ENT free flap order set  -Embolic PPx: SQH, SCDs while in bed  -Dispo: [facility]. PT/OTordered; Discharge planning for POD 6 home with home care vs skilled nursing facility. Patient has stated she would like to return home with home care at discharge.     All plans discussed with attendings after morning rounds.      I spent 20 minutes in the professional and overall care of this patient.      Vesna Louie APRN, CNP  Department of Otolaryngology: Head & Neck Surgery  Personal Pager [phone]  ENT Team  Head and Neck Phone: [phone]

## 2023-10-10 NOTE — PROGRESS NOTES
"SUBJECTIVE  On interview at bedside, patient is alert and oriented to person, place, time and circumstance. Patient recounted circumstances that led to her PEG tube being pulled out but denied doing it herself intentionally. Her children are at bedside who said that she is much more like her typical self today.     ==========  Additional information:  No PRNs for agitation overnight. Patient behaviorally in control without 1:1 sitter.    OBJECTIVE    VITALS      10/9/2023     5:02 AM 10/9/2023     9:46 AM 10/9/2023     1:50 PM 10/9/2023     6:34 PM 10/9/2023     9:27 PM 10/10/2023     5:42 AM 10/10/2023     8:49 AM   Vitals   Systolic 174 155 154 172 148 154 167   Diastolic 84 51 89 55 69 57 57   Heart Rate 77 86 71 87 80 78 87   Temp 35.7 °C (96.2 °F) 36.5 °C (97.7 °F) 35.9 °C (96.6 °F) 36.4 °C (97.5 °F) 36.6 °C (97.9 °F) 36.4 °C (97.5 °F) 36.4 °C (97.5 °F)   Resp 24 20 20 20 18 18 18      MENTAL STATUS EXAM  Appearance: Elderly  woman, frail, laying in hospital bed with mittens in bilateral hands.  Attitude: Calm, cooperative, intermittently irritable when asked certain questions   Behavior: Appropriate eye contact, often looking around when speaking to family.   Motor Activity: No PMR/PMA; no EPS observed  Speech: Regular rate and rhythm, normal volume, mumbled speech (very was only able to understand simple phrases such as [place]), speaks spontaneously  Mood: \"Okay\"  Affect: Mood congruent  Thought Process: Intermittently linear and logical.   Thought Content: Does not endorse suicidal or homicidal ideation. No delusions elicited.   Thought Perception: Does not endorse auditory or visual hallucinations. Does not appear to be responding to hallucinatory stimuli.  Cognition:.  Alert and oriented x4. Fair attention/concentration.   Insight: Limited, aware of circumstances leading to hospitalization   Judgement: Poor at this time    CURRENT MEDICATIONS  Scheduled medications  acetaminophen, 1,000 mg, " g-tube, q8h DERIK  aspirin, 325 mg, g-tube, Daily  chlorhexidine, 15 mL, Mouth/Throat, TID after meals  enoxaparin, 40 mg, subcutaneous, q24h  esomeprazole, 40 mg, g-tube, Daily before breakfast  hydrogen peroxide, 1 Application, Topical, TID  iohexol, 50 mL, oral, Once in imaging  melatonin, 3 mg, g-tube, Daily  oxygen, , inhalation, Continuous - 02/gases  polyethylene glycol, 17 g, g-tube, Daily  potassium chloride, 40 mEq, g-tube, Once  [Held by provider] QUEtiapine, 25 mg, g-tube, Nightly  sotalol, 80 mg, g-tube, BID  thiamine, 500 mg, intravenous, q8h  [START ON 10/11/2023] thiamine, 500 mg, intravenous, Daily  valproic acid, 125 mg, g-tube, BID  verapamil, 60 mg, g-tube, q8h DERIK  white petrolatum, 1 Application, Topical, TID      Continuous medications       PRN medications  PRN medications: albuterol, LORazepam, naloxone, naloxone, [Held by provider] OLANZapine, [Held by provider] QUEtiapine, traMADol, traMADol     LABS  Results for orders placed or performed during the hospital encounter of 10/04/23 (from the past 24 hour(s))   Ammonia   Result Value Ref Range    Ammonia 35 16 - 53 umol/L   CBC   Result Value Ref Range    WBC 12.7 (H) 4.4 - 11.3 x10*3/uL    nRBC 0.0 0.0 - 0.0 /100 WBCs    RBC 3.62 (L) 4.00 - 5.20 x10*6/uL    Hemoglobin 10.9 (L) 12.0 - 16.0 g/dL    Hematocrit 33.6 (L) 36.0 - 46.0 %    MCV 93 80 - 100 fL    MCH 30.1 26.0 - 34.0 pg    MCHC 32.4 32.0 - 36.0 g/dL    RDW 17.0 (H) 11.5 - 14.5 %    Platelets 286 150 - 450 x10*3/uL    MPV 9.9 7.5 - 11.5 fL   Magnesium   Result Value Ref Range    Magnesium 1.99 1.60 - 2.40 mg/dL   Renal Function Panel   Result Value Ref Range    Glucose 92 74 - 99 mg/dL    Sodium 142 136 - 145 mmol/L    Potassium 3.7 3.5 - 5.3 mmol/L    Chloride 105 98 - 107 mmol/L    Bicarbonate 29 21 - 32 mmol/L    Anion Gap 12 10 - 20 mmol/L    Urea Nitrogen 22 6 - 23 mg/dL    Creatinine 0.71 0.50 - 1.05 mg/dL    eGFR 81 >60 mL/min/1.73m*2    Calcium 8.5 (L) 8.6 - 10.6 mg/dL     Phosphorus 3.6 2.5 - 4.9 mg/dL    Albumin 2.8 (L) 3.4 - 5.0 g/dL        IMAGING  XR chest 1 view    Result Date: 10/9/2023  Interpreted By:  Oj Egan  and Jose Kennedy STUDY: XR CHEST 1 VIEW; XR ABDOMEN 1 VIEW;  10/9/2023 8:22 am; 10/9/2023 8:28 am   INDICATION: Signs/Symptoms:infectious work up; Signs/Symptoms:concern for peg tube dislodgement Right buccal mucosa SCCa   COMPARISON: CT chest with contrast 09/27/2023, abdominal radiograph 10/09/2023 0001   ACCESSION NUMBER(S): [id number]; [id number]   ORDERING CLINICIAN: PEGGY GIFFORD; HARRISON MERINO   FINDINGS: AP radiograph of the chest in the abdomen were provided.   Left chest pacemaker device in place with leads overlying the right atrium and right ventricle. Surgical clips overlying the right lateral neck.   CARDIOMEDIASTINAL SILHOUETTE: Cardiomediastinal silhouette is normal in size and configuration.   LUNGS: Bilateral emphysema and multifocal parenchymal scarring in particular within the lung apices bilaterally greater within the right upper lobe apical segment. Multiple nodular densities are demonstrated bilaterally better seen on CT examination of the chest 09/27/2023.   Prominent coarse interstitial markings. No focal consolidation. No pneumothorax. Blunting of the bilateral costophrenic angles. Slight elevation of the left hemidiaphragm.   ABDOMEN: Cholecystectomy clips located in the right upper quadrant. There is oral contrast noted within the stomach. Peg tube overlies the left hemiabdomen with tip terminating over the gastric body. Nonobstructive bowel-gas pattern. No radiographic evidence of pneumoperitoneum, however limited evaluation on supine imaging.   BONES: No acute osseous changes.       1.  Prominent coarse interstitial markings consistent with chronic lung changes. No focal consolidation. 2. Small bilateral pleural effusions. 3. Peg tube is located over the left hemiabdomen with tip overlying the gastric body. 4. Nonobstructive  bowel-gas pattern.   I personally reviewed the images/study and I agree with the findings as stated. This study was interpreted at [facility], [place], [place].   MACRO: None   Signed by: Oj Egan 10/9/2023 1:21 PM Dictation workstation:   [id number]    XR abdomen 1 view    Result Date: 10/9/2023  Interpreted By:  Oj Egan,  and Jose Kennedy STUDY: XR CHEST 1 VIEW; XR ABDOMEN 1 VIEW;  10/9/2023 8:22 am; 10/9/2023 8:28 am   INDICATION: Signs/Symptoms:infectious work up; Signs/Symptoms:concern for peg tube dislodgement Right buccal mucosa SCCa   COMPARISON: CT chest with contrast 09/27/2023, abdominal radiograph 10/09/2023 0001   ACCESSION NUMBER(S): [id number]; [id number]   ORDERING CLINICIAN: PEGGY GIFFORD; HARRISON MERINO   FINDINGS: AP radiograph of the chest in the abdomen were provided.   Left chest pacemaker device in place with leads overlying the right atrium and right ventricle. Surgical clips overlying the right lateral neck.   CARDIOMEDIASTINAL SILHOUETTE: Cardiomediastinal silhouette is normal in size and configuration.   LUNGS: Bilateral emphysema and multifocal parenchymal scarring in particular within the lung apices bilaterally greater within the right upper lobe apical segment. Multiple nodular densities are demonstrated bilaterally better seen on CT examination of the chest 09/27/2023.   Prominent coarse interstitial markings. No focal consolidation. No pneumothorax. Blunting of the bilateral costophrenic angles. Slight elevation of the left hemidiaphragm.   ABDOMEN: Cholecystectomy clips located in the right upper quadrant. There is oral contrast noted within the stomach. Peg tube overlies the left hemiabdomen with tip terminating over the gastric body. Nonobstructive bowel-gas pattern. No radiographic evidence of pneumoperitoneum, however limited evaluation on supine imaging.   BONES: No acute osseous changes.       1.  Prominent coarse  interstitial markings consistent with chronic lung changes. No focal consolidation. 2. Small bilateral pleural effusions. 3. Peg tube is located over the left hemiabdomen with tip overlying the gastric body. 4. Nonobstructive bowel-gas pattern.   I personally reviewed the images/study and I agree with the findings as stated. This study was interpreted at [place], [place].   MACRO: None   Signed by: Oj Egan 10/9/2023 1:21 PM Dictation workstation:   [id number]    XR abdomen 1 view    Result Date: 10/9/2023  Interpreted By:  Oj Egan and Dervishi Mario STUDY: XR ABDOMEN 1 VIEW;  10/9/2023 12:36 am   INDICATION: Signs/Symptoms:Pulled out PEG, replaced with brooke.   COMPARISON: None.   ACCESSION NUMBER(S): [id number]   ORDERING CLINICIAN: ALCON JONES   FINDINGS: AP radiographs provided x2.   Partially visualized dual-chamber left-sided ICD device. Surgical clips overlying the right upper quadrant. . Incompletely characterized catheter projecting over the left lower quadrant as annotated on the radiograph. Nonobstructive bowel gas pattern. Limited evaluation of pneumoperitoneum on portable supine imaging, however no gross evidence of free air is noted.   Visualized lung bases demonstrates blunt bilateral costophrenic angles suggestive of small pleural effusion possible associated atelectasis.   Osseous structures demonstrate no acute bony changes.       1.  No acute abdominal radiographic findings status post PEG. Per given history Brooke catheter is not definitely visualized overlying the gastric region. 2. Possible small bilateral pleural effusions. 3. Additional abdominal radiographic findings as above.   I personally reviewed the images/study and I agree with the findings as stated. This study was interpreted at [place], [place].   MACRO: None   Signed by: Oj Egan 10/9/2023 1:08 AM Dictation  workstation:   [id number]       PSYCHIATRIC RISK ASSESSMENT  Acute Risk of Harm to Others is Considered: Low  Acute Risk of Harm to Self is Considered: Low    ASSESSMENT AND PLAN  kaykay Willis is a 89 y.o. female with no past psychiatric history and a past medical history of hypertension, COPD, CAD, SSS s/p pacemaker, paroxysmal SVT, right buccal SCC, malnutrition who was admitted to 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 Also s/p PEG placement this admission. Hospital course was complicated by gradual worsening of mentation and agitation starting from a 10/7.     Psychiatry was consulted on 10/9/2023 for delirium and concern for injury to self.     On initial assessment 10/9, patient was laying in bed with bilateral mittens and a sitter at bedside; she was alert and oriented to self, Monday, October 2023, but not to location.  She was trying to get out of bed and required frequent reorientation. She appears to have auditory and visual hallucinations of her children talking to her but no command hallucinations.  She denies SI, HI, or pain.  Per primary team, her behavior is slightly better this morning, but she sundowns.  She was given 1 dose of olanzapine IM 5 mg at midnight on 10/8; primary team did not think that it helped patient.  Patient has not been sleeping well in the last few days; primary team has not tried melatonin yet. Patient received another dose of IM zyprexa 5mg at noon and slept for 1 hour afterward; she then became restless again afterward. Olanzapine 5mg IM might have been too high of a dose for her and causing akathisia; could trial lower dose     Tele (10/8/2023): QTc of 440 ms  EKG (9/28/2023): QTc of 494 ms  EKG (10/9/2023): HR 78, QTc of 515 ms    UPDATES  10/10: Patient A&Ox4 this morning, no PRNs for agitation overnight.  "Patient scheduled for PEG tube replacement tomorrow, will undergo sedation, which may contribute to acute worsening of mental status. Will monitor closely, no changes to current plan.      IMPRESSION  -Delirium, multifactorial (old age, baseline brain atrophy, prolonged hospital stay, recent large operation, poor sleep/disturbed circadian rhythm etc)     RECOMMENDATIONS  - Patient does not currently meet criteria for inpatient psychiatric admission.   - Patient lacks the capacity to leave AMA at this time and thus cannot leave AMA. Call [facility]IOLET if patient attempts to leave AMA.  - To evaluate decision-making capacity, recommend use of the Capacity Evaluation Tool. Search “ IP Capacity Evaluation under SmartText\" unless the patient has a legal guardian, in which case all decisions per the legal guardian.  - Defer to primary team decision for 1:1 sitter for safety precautions.  - As with all hospitalized patients, would recommend delirium precautions, as below.     Work-up:  :: Ammonia 35 WNL, LFTs wnl    - Please obtain an updated EKG for QTc     Medications:  - Continue melatonin 3 mg via PEG tube at 1800 daily  - Continue valproic acid 125 mg liquid via PEG tube 2 times daily for agitation  - Continue thiamine 500 mg IV every 8 hours for 3 days before decreasing to thiamine 100mg daily via PEG tube  - For severe agitation and not redirectable: lorazepam 0.25 mg per PEG/IV BID PRN agitation, can repeat x1 if limited response in 30-60 minutes, monitor for paradoxical response and respiratory depression  - If no improvement, please page psychiatry for additional recommendations     - If patient continues to have akathisia/restlessness, we may consider low dose benadryl to be given together with PRN olanzapine. Will hold off for now.     Ancillary Services:  - music therapy consult when patient is medically appropriate  - not interested in , pet or art therapy per son     Follow-up:  -No need to follow-up " with a psychiatrist at this time  - Patient's daughter Kandace is living with patient's son Epi for the past 10 months due to hip fracture, and Epi has also been the primary person taking care of patient and Kandace during this time. Kandace cannot reach a phone easily due to limited mobility from hip fracture. Please reach out to Epi for updates and questions: Epi Willis ([phone]). Contact info updated in Epic by psychiatry.     ==========  - Discussed recommendations with primary team.  - Psychiatry will continue to follow.     Thank you for allowing us to participate in the care of this patient. Please page [phone] with any questions or concerns.     Patient is discussed with Dr. Fowler, who agrees with above plan.      Medication Consent  Medication Consent: n/a; consult service    Rudi Thomas MD   Adult Psychiatry, PGY-3      DELIRIUM GUIDELINES:  1) Non-Pharmacological Measures: Please ensure blinds are open during day to allow sufficient daylight to enter room; maintain dark/quiet room at night with minimal interruptions; minimize daytime naps and maintain sleep/wake cycle as able; minimize excessive stimulation; if patient wears glasses or hearing aids, patient should have access to them as sensory deprivation can cause/worsen delirium; minimize room/staff changes; encourage interaction with familiar objects and frequent orientation.    2) Pharmacological Measures: Minimize use of benzodiazepines, anticholinergic medications, and opiates, as these can all exacerbate delirium. Ensure adequate treatment of pain while minimizing use of opioids to the extent possible.    3) Minimize use of restraints to situations where necessary to keep patient and staff safe and to prevent patient from removing lines, tubes, medical devices, dressings, etc.  Continue to regularly re-evaluate continued need for any ordered restraints.

## 2023-10-10 NOTE — CARE PLAN
The patient's goals for the shift include      The clinical goals for the shift include patient will remain safe with all medical devices remaining intact while nursing staff continues to attempt to reorient patient    Over the shift, the patient did not make progress toward the following goals. Barriers to progression include disorientation times 3 at times.. Recommendations to address these barriers include decrease distractions at night and allow adequate sleep.

## 2023-10-10 NOTE — PROGRESS NOTES
Met with the pt at the bedside to reassess home going needs. Pt is more lucid and oriented. Pt does not remember being confused over the weekend. Pt states her son and daughter will help with peg tube care. I reviewed PT's eval with pt and asked her if she would be willing to go to SNF for therapy. Again she stated that her children will help her and she will not go to SNF.  I asked her is she is now open to HC services and she stated she would still like to think about it. Pt states she has a walker at home if needed. Care Coordinator asked pt if I could call her son to update him on discharge plans. Pt gave me permission to call her son. Son stated he has been learning peg tube care and does not believe they will need a nurse at home. He feels comfortable with her care. I also informed him that PT has recommended Moderate therapy. He stated they would see how she does the next few days before discharge and decide before she is discharged. Care Transitions will continue to follow for home going needs.

## 2023-10-10 NOTE — PROGRESS NOTES
Occupational Therapy                 Therapy Communication Note    Patient Name: Eryn Willis  MRN: [id number]  Today's Date: 10/10/2023     Discipline: Occupational Therapy    Missed Visit Reason:  Spoke with PTA this date re: pt's status. Per PTA -- RN reports pt continues to be significantly confused/agitated and in restraints, requested therapies defer this date.     Missed Time: Attempt 09:45    Comment:

## 2023-10-11 NOTE — SIGNIFICANT EVENT
Sign-Off Note    Eryn Willis is a 89F on day 7 of admission with PMHx of SCC of the face with recent PEG dislodgement in setting of AMS that was temporarily replaced with a brooke catheter. We were originally consulted for replacement but due to increased risk of endoscopic placement to her facial surgical site, IR was consulted by the primary team for placement. She is now s/p G tube replacement with IR earlier today. The patient tolerated the procedure well without incident or complication and is in stable condition. Limited postprocedure CT confirmed proper G tube placement. No additional surgical intervention is needed.    [facility] service will sign off at this time. Please reach out with any questions or concerns.      Marielle Chinchilla, MS4  [facility]  [phone]    D/w Dr. Mariee and Dr. Wyman.

## 2023-10-11 NOTE — CARE PLAN
The patient's goals for the shift include      The clinical goals for the shift include pt will remain safe while in mitts and will not pull out feeding tube during shift    Over the shift, the patient did not make progress toward the following goals. Barriers to progression include . Recommendations to address these barriers include .      Problem: Skin  Goal: Participates in plan/prevention/treatment measures  10/11/2023 1731 by Mariah Ott RN  Outcome: Progressing  10/11/2023 1729 by Mariah Ott RN  Outcome: Progressing  Goal: Prevent/manage excess moisture  10/11/2023 1731 by Mariah Ott RN  Outcome: Progressing  10/11/2023 1729 by Mariah Ott RN  Outcome: Progressing  Goal: Prevent/minimize sheer/friction injuries  10/11/2023 1731 by Mariah Ott RN  Outcome: Progressing  10/11/2023 1729 by Mariah Ott RN  Outcome: Progressing  Goal: Promote/optimize nutrition  10/11/2023 1731 by Mariah Ott RN  Outcome: Progressing  10/11/2023 1729 by Mariah Ott RN  Outcome: Progressing  Goal: Promote skin healing  10/11/2023 1731 by Mariah Ott RN  Outcome: Progressing  10/11/2023 1729 by Mariah Ott RN  Outcome: Progressing     Problem: Pain  Goal: My pain/discomfort is manageable  10/11/2023 1731 by Mariah Ott RN  Outcome: Progressing  10/11/2023 1729 by Mariah Ott RN  Outcome: Progressing     Problem: Safety  Goal: Patient will be injury free during hospitalization  10/11/2023 1731 by Mariah Ott RN  Outcome: Progressing  10/11/2023 1729 by Mariah Ott RN  Outcome: Progressing  Goal: I will remain free of falls  10/11/2023 1731 by Mariah Ott RN  Outcome: Progressing  10/11/2023 1729 by Mariah Ott RN  Outcome: Progressing     Problem: Physical Restraint  Goal: I will require minimum level of restraint  10/11/2023 1731 by Mariah Ott RN  Outcome: Progressing  10/11/2023 1729 by Mariah Ott RN  Outcome:  Progressing     Problem: Pain - Adult  Goal: Verbalizes/displays adequate comfort level or baseline comfort level  10/11/2023 1731 by Mariah Ott RN  Outcome: Progressing  10/11/2023 1729 by Mariah Ott RN  Outcome: Progressing     Problem: Safety - Adult  Goal: Free from fall injury  10/11/2023 1731 by Mariah Ott RN  Outcome: Progressing  10/11/2023 1729 by Mariah Ott RN  Outcome: Progressing     Problem: Discharge Planning  Goal: Discharge to home or other facility with appropriate resources  10/11/2023 1731 by Mariah Ott RN  Outcome: Progressing  10/11/2023 1729 by Mariah Ott RN  Outcome: Progressing     Problem: Chronic Conditions and Co-morbidities  Goal: Patient's chronic conditions and co-morbidity symptoms are monitored and maintained or improved  10/11/2023 1731 by Mariah Ott RN  Outcome: Progressing  10/11/2023 1729 by Mariah Ott RN  Outcome: Progressing

## 2023-10-11 NOTE — PROGRESS NOTES
Occupational Therapy                 Therapy Communication Note    Patient Name: Eyrn Willis  MRN: [id number]  Today's Date: 10/11/2023     Discipline: Occupational Therapy    Missed Visit Reason:  Pt currently off floor, reportedly at IR. Will attempt to follow up with pt as schedule permits.     Missed Time: Attempt 12:42    Comment:

## 2023-10-11 NOTE — PROGRESS NOTES
Physical Therapy                 Therapy Communication Note    Patient Name: Eryn Willis  MRN: [id number]  Today's Date: 10/11/2023   1215  Discipline: Physical Therapy    Missed Visit Reason: Missed Visit Reason:  (Transport present to take patient to IR. Will reattempt as schedules permit.)    Missed Time: Attempt

## 2023-10-11 NOTE — CARE PLAN
The patient's goals for the shift include      The clinical goals for the shift include patient will get sleep to promote healing and mental cognition    Over the shift, the patient did not make progress toward the following goals. Barriers to progression include confusion and disorientation to place, time and situation. Recommendations to address these barriers include limit distractions at night to allow adequate sleep.

## 2023-10-11 NOTE — PRE-PROCEDURE NOTE
Interventional Radiology Preprocedure Note    Indication for procedure: G tube replacement    Relevant review of systems: NA    Relevant Labs:   Lab Results   Component Value Date    CREATININE 0.71 10/10/2023    EGFR 81 10/10/2023    INR 1.1 09/28/2023    PROTIME 12.6 09/28/2023       Planned Sedation/Anesthesia: Moderate    Airway assessment:  unable to assess    Directed physical examination:    AMS - not directable  R cheek postsurgical changes  RRR  Breathing comfortably on room air  Soft abdomen    Mallampati:  unable to assess    ASA Score: ASA 2 - Patient with mild systemic disease with no functional limitations    Benefits, risks and alternatives of procedure and planned sedation have been discussed with the patient and/or their representative. All questions answered and they agree to proceed.

## 2023-10-11 NOTE — SIGNIFICANT EVENT
RAPID RESPONSE RN:       10/11/23 1350   Onset Documentation   Rapid Response Initiated By Radar auto page   Location/Room [facility]   Pager Time 1350   Arrival Time 1357   Event End Time 1357   Primary Reason for Call Radar auto page     RADAR auto page received at 13:50.  Rapid Response RN Note    Rapid response RN at bedside for RADAR score 6 due to the following VS: T 36.9 °Celsius; HR 97 ; RR 21; /66; SPO2 95%.   Pt in IR dept at time of page.  Bedside RN notified.  Encouraged to page rapid response team for any concerns for deterioration on return to floor.

## 2023-10-11 NOTE — PROGRESS NOTES
"Eryn Willis is a 89 y.o. female on day 7 of admission presenting with Squamous cell carcinoma of face. No acute events overnight. Flap checks stable.    Subjective   Patient overnight was very irritated and agitated. Patient did not sleep. Patient to have PEG tube replaced by Interventional radiation.    Objective   Physical Exam  Vitals reviewed in EMR  Gen: NAD, AOx3, resting comfortably in bed  Eyes: EOMI, sclera clear, PERRL  Ears: Normal external ears bilaterally  Nose: No rhinorrhea; anterior nares clear  Oral Cavity: dry, old crusted blood  Head: normocephalic, atraumatic  Face/Neck: All incisions c/d/i, neck soft and flat without evidence of hematoma or fluid collection  -slightly increased area of fullness directly below skin paddle  -well perfused, doppler biphasic x2 (I.e., skin paddle and internal)  -no evidence of dehiscence   Resp: Non-labored breathing on room air, no stridor  Cards: No clubbing/cyanosis/edema of hands  Gastro: Soft, non-distended,  Howard  tube in place  : Voiding clear yellow urine  MSK: Moves all extremities  Psych: Appropriate mood and affect  Drains: All drains removed     Last Recorded Vitals  Blood pressure 176/51, pulse 96, temperature 36.4 °C (97.5 °F), temperature source Temporal, resp. rate 16, height 1.651 m (5' 5\"), weight 54.6 kg (120 lb 5.9 oz), SpO2 95 %.  Intake/Output last 3 Shifts:  I/O last 3 completed shifts:  In: 620 (11.4 mL/kg) [P.O.:620]  Out: 357.5 (6.5 mL/kg) [Urine:350 (0.2 mL/kg/hr); Drains:7.5]  Weight: 54.6 kg     Relevant Results  No results found for this or any previous visit (from the past 24 hour(s)).     Scheduled medications  acetaminophen, 1,000 mg, g-tube, q8h DERIK  aspirin, 325 mg, g-tube, Daily  chlorhexidine, 15 mL, Mouth/Throat, TID after meals  enoxaparin, 40 mg, subcutaneous, q24h  esomeprazole, 40 mg, g-tube, Daily before breakfast  hydrogen peroxide, 1 Application, Topical, TID  HYDROmorphone, 0.2 mg, intravenous, Once  iohexol, 50 mL, " oral, Once in imaging  melatonin, 3 mg, g-tube, Daily  oxygen, , inhalation, Continuous - 02/gases  polyethylene glycol, 17 g, g-tube, Daily  [Held by provider] QUEtiapine, 25 mg, g-tube, Nightly  sotalol, 80 mg, g-tube, BID  thiamine, 500 mg, intravenous, Daily  valproic acid, 125 mg, g-tube, BID  verapamil, 60 mg, g-tube, q8h DERIK  white petrolatum, 1 Application, Topical, TID      Continuous medications  lactated Ringer's, 50 mL/hr      PRN medications  PRN medications: albuterol, LORazepam, naloxone, naloxone, [Held by provider] OLANZapine, [Held by provider] QUEtiapine, traMADol, traMADol       This patient currently has cardiac telemetry ordered; if you would like to modify or discontinue the telemetry order, click here to go to the orders activity to modify/discontinue the order.        Malnutrition Diagnosis Status: New  Malnutrition Diagnosis: Severe malnutrition related to disease or condition  As Evidenced by: suspected <75% estimated energy requirement for >1 month, significant 14% weight loss x 6 months, severe muscle wasting, & severe fat loss  I agree with the dietitian's malnutrition diagnosis.      Assessment/Plan   Assessment:  Eryn Willis   is a 89 y.o. F who underwent Direct laryngoscopy,  Wide local excision of skin cancer, Oral cavity resection malignant neoplasm, Right selective neck dissection levels 1A-3, Reconstruction with left anterolateral thigh free flap with Sheron Whitney and Rachele on 10/4/23 on 10/4/2023.      Active Issues:  Right Buccal SCC  Possible Cervical Lymph Node Mets  HTN  COPD  Acute Blood Loss Anemia  Anemia of Chronic Disease  Hypokalemia  Hypomagnesia  Severe Protein Calorie Malnutrition  Mixed Delirium  Urinary retention        Plan:  -Flap Checks: q24hrs  -Drains:N/A  -Analgesia:  Dilaudid PCA-discontinued, Scheduled Acetaminophen; PRN Tramadol restarted  -FEN: mIVFs, NPO, Holding tube feeds for procedure; monitor and replete electrolytes as needed  -Pulm: wean oxygen to  room air, Incentive Spirometer 10x/hr while awake  -ID: Clindamycin 300mg Q8 ->complete   -Cardiac: Vitals per ENT free flap protocol then transition to Q4hr vitals; Aspirin 325mg daily, Sotalol 80mg BID, Verapamil 60mg Q8hrs  -Endo: no current interventions  -GI: Bowel regimen, PPI,  and PRN anti-emetic; IR for G-tube replacement  -: Howard catheter-discontinued, afternoon void check  -Steroids/Special: Incision and oral care per ENT free flap order set  -Embolic PPx: SQH, SCDs while in bed  -Dispo: [facility]. PT/OTordered; Discharge planning for POD 6 home with home care vs skilled nursing facility. Patient has stated she would like to return home with home care at discharge.     All plans discussed with attendings after morning rounds.      I spent 20 minutes in the professional and overall care of this patient.       I spent 20 minutes in the professional and overall care of this patient.      Vesna Louie APRN, CNP  Department of Otolaryngology: Head & Neck Surgery  Personal Pager [phone]  ENT Team  Head and Neck Phone: [phone]

## 2023-10-11 NOTE — CARE PLAN
The patient's goals for the shift include      The clinical goals for the shift include patient will get sleep to promote healing and mental cognition    Over the shift, the patient did not make progress toward the following goals. Barriers to progression include ***. Recommendations to address these barriers include ***.

## 2023-10-11 NOTE — POST-PROCEDURE NOTE
Interventional Radiology Brief Postprocedure Note    Attending: Dr. Shepherd    Assistant: Dr. Walker    Diagnosis: G tube replacement    Description of procedure: G tube replacement     Anesthesia:  None    Complications: None    Estimated Blood Loss: minimal    Medications  As of 10/11/23 1359      bacitracin ointment Total dose:  Cannot be calculated* Dosing weight:  54.6   *Administration does not have dose documented     Date/Time Rate/Dose/Volume Action       10/04/23  2140  Given     10/05/23  0850  Given      1543  Given      2107  Given     10/06/23  0907  Given      1500  Given               ampicillin-sulbactam (Unasyn) in sodium chloride 0.9 % 100 mL 3 g (g) Total dose:  0 g*   *Administration not included in total     Date/Time Rate/Dose/Volume Action       10/04/23  0700 *3 g - 200 mL/hr (over 30 min) Missed      1300 *3 g - 200 mL/hr (over 30 min) Missed      1900 *3 g - 200 mL/hr (over 30 min) Missed               lactated Ringer's infusion (mL/hr) Total volume:  4,257.51 mL* Dosing weight:  54.6   *From user-documented volume     Date/Time Rate/Dose/Volume Action       10/04/23  1700 50 mL/hr New Bag      2030 *10 mL/kg/hr - 546 mL/hr Missed     10/05/23  0355 50 mL/hr - 545.83 mL Rate Verify      0405 50 mL/hr Rate Verify      0612 50 mL/hr - 114.17 mL Rate Verify      1035 50 mL/hr - 219.17 mL Rate Verify      1236 50 mL/hr - 100.83 mL Rate Verify      1400 70 mL       1556 50 mL/hr - 100 mL Rate Verify      1751 95.83 mL       2343 295 mL      10/06/23  0244 50 mL/hr New Bag      0557 50 mL/hr - 311.67 mL Rate Verify      0750 50 mL/hr Rate Verify      0908 50 mL/hr New Bag      0946 80 mL/hr Rate Change      1002 53 mL       1428 354.67 mL       1616 80 mL/hr - 144 mL Rate Verify      1700 70 mL/hr Rate Change      2127 70 mL/hr - 370.17 mL Rate Verify      2340 70 mL/hr - 155.17 mL Rate Verify     10/07/23  0224 70 mL/hr - 190 mL Rate Verify      0648 60 mL/hr New Bag      0650 60 mL/hr - 310 mL  Rate Verify      1638 60 mL/hr - 588 mL Rate Verify      1800 60 mL/hr - 240 mL Rate Verify      2129  Stopped      2200 0 mL      10/11/23  0015 *50 mL/hr Missed               sotalol (Betapace) tablet 80 mg (mg) Total dose:  960 mg* Dosing weight:  54.6   *Administration not included in total     Date/Time Rate/Dose/Volume Action       10/04/23  2100 *80 mg Missed     10/05/23  0849 80 mg Given      2107 80 mg Given     10/06/23  0904 80 mg Given      2131 80 mg Given     10/07/23  0857 80 mg Given      2218 80 mg Given     10/08/23  1005 80 mg Given      2034 80 mg Given     10/09/23  0948 80 mg Given      2129 80 mg Given     10/10/23  0850 80 mg Given      2020 80 mg Given     10/11/23  0900 *80 mg Missed               verapamil (Calan) tablet 60 mg (mg) Total dose:  660 mg* Dosing weight:  54.6   *Administration not included in total     Date/Time Rate/Dose/Volume Action       10/04/23  2200 *60 mg Missed     10/05/23  0600 *60 mg Missed      1721 60 mg Given      2200 *60 mg Missed     10/06/23  0600 *60 mg Missed      1400 *60 mg Missed      2200 60 mg Given     10/07/23  0618 60 mg Given      1342 60 mg Given      2218 60 mg Given     10/08/23  0628 60 mg Given      1434 60 mg Given      2200 *60 mg Missed     10/09/23  Canceled Entry      1350 60 mg Given      2129 60 mg Given     10/10/23  0850 60 mg Given      1749 60 mg Given     10/11/23  0100 *60 mg Missed      0900 *60 mg Missed               oxygen (O2) therapy Total dose:  Cannot be calculated* Dosing weight:  54.6   *Administration does not have dose documented     Date/Time Rate/Dose/Volume Action       10/04/23  2030  Start     10/05/23  0800  Start      0900  Stopped     10/06/23  0800  Start     10/07/23  0800  Start     10/08/23  0800  Start      1007  Rate Change     10/09/23  0952  Stopped     10/10/23  0921  Stopped               acetaminophen (Tylenol) oral liquid 1,000 mg (mg) Total dose:  15,000 mg* Dosing weight:  54.6    *Administration not included in total     Date/Time Rate/Dose/Volume Action       10/04/23  2200 *1,000 mg Missed     10/05/23  0527 1,000 mg Given      1426 1,000 mg Given      2107 1,000 mg Given     10/06/23  0608 1,000 mg Given      1329 1,000 mg Given      2130 1,000 mg Given     10/07/23  0618 1,000 mg Given      1329 1,000 mg Given      2218 1,000 mg Given     10/08/23  0627 1,000 mg Given      1434 1,000 mg Given      2200 *1,000 mg Missed     10/09/23  0600 *1,000 mg Missed      0949 1,000 mg Given      1834 1,000 mg Given      2200 *1,000 mg Missed     10/10/23  0850 1,000 mg Given      1749 1,000 mg Given     10/11/23  0200 *1,000 mg Missed      1000 *1,000 mg Missed               white petrolatum (Aquaphor) 41 % ointment 1 Application (Application) Total dose:  14 Application* Dosing weight:  54.6   *Administration not included in total     Date/Time Rate/Dose/Volume Action       10/06/23  0900 1 Application Given      1500 1 Application Given      2100 1 Application Given     10/07/23  0900 1 Application Given      1500 1 Application Given      2100 1 Application Given     10/08/23  0900 1 Application Given      1500 1 Application Given      2100 1 Application Given     10/09/23  0900 1 Application Given      1400 1 Application Given      2100 *1 Application Missed     10/10/23  0900 1 Application Given      1207 1 Application Given      2100 1 Application Given     10/11/23  0900 *1 Application Missed               hydrogen peroxide 3 % external solution 1 Application (Application) Total dose:  18 Application* Dosing weight:  54.6   *Administration not included in total     Date/Time Rate/Dose/Volume Action       10/04/23  2100 1 Application Given     10/05/23  0900 1 Application Given      1500 1 Application Given      2100 1 Application Given     10/06/23  0900 1 Application Given      1500 1 Application Given      2100 1 Application Given     10/07/23  0900 1 Application Given      1500 1  Application Given      2100 1 Application Given     10/08/23  0900 1 Application Given      1500 1 Application Given      2100 1 Application Given     10/09/23  0900 1 Application Given      1400 1 Application Given      2100 *1 Application Missed     10/10/23  0851 1 Application Given      1206 1 Application Given      2100 1 Application Given     10/11/23  0900 *1 Application Missed               esomeprazole (NexIUM) suspension 40 mg (mg) Total dose:  240 mg* Dosing weight:  54.6   *Administration not included in total     Date/Time Rate/Dose/Volume Action       10/05/23  0854 40 mg Given     10/06/23  0608 40 mg Given     10/07/23  0618 40 mg Given     10/08/23  0627 40 mg Given     10/09/23  0949 40 mg Given     10/10/23  0851 40 mg Given     10/11/23  0700 *40 mg Missed               aspirin chewable tablet 325 mg (mg) Total dose:  1,625 mg* Dosing weight:  54.6   *Administration not included in total     Date/Time Rate/Dose/Volume Action       10/04/23  2030 *325 mg Missed     10/05/23  0849 325 mg Given     10/06/23  0904 325 mg Given     10/07/23  0858 325 mg Given     10/08/23  1005 325 mg Given     10/09/23  0900 325 mg Given     10/10/23  0900 *325 mg Missed     10/11/23  0900 *325 mg Missed               chlorhexidine (Peridex) 0.12 % solution 15 mL (mL) Total volume:  270 mL* Dosing weight:  54.6   *Administration not included in total     Date/Time Rate/Dose/Volume Action       10/05/23  0849 15 mL Given      1426 15 mL Given      1722 15 mL Given     10/06/23  0903 15 mL Given      1329 15 mL Given      1741 15 mL Given     10/07/23  0856 15 mL Given      1329 15 mL Given      1729 15 mL Given     10/08/23  1004 15 mL Given      1433 15 mL Given      1828 15 mL Given     10/09/23  0950 15 mL Given      1350 15 mL Given      1834 15 mL Given     10/10/23  0850 15 mL Given      1208 15 mL Given      1749 15 mL Given     10/11/23  0900 *15 mL Missed      1300 *15 mL Missed               polyethylene  glycol (Glycolax, Miralax) packet 17 g (g) Total dose:  34 g* Dosing weight:  54.6   *Administration not included in total     Date/Time Rate/Dose/Volume Action       10/04/23  2030 *17 g Missed     10/05/23  0849 17 g Given     10/06/23  0904 17 g Given     10/07/23  0900 *17 g Missed     10/08/23  0900 *17 g Missed     10/09/23  0900 *17 g Missed     10/10/23  0900 *17 g Missed     10/11/23  0900 *17 g Missed               enoxaparin (Lovenox) syringe 40 mg (mg) Total dose:  280 mg Dosing weight:  54.6      Date/Time Rate/Dose/Volume Action       10/04/23  2140 40 mg Given     10/05/23  2106 40 mg Given     10/06/23  2130 40 mg Given     10/07/23  2219 40 mg Given     10/08/23  2031 40 mg Given     10/09/23  2129 40 mg Given     10/10/23  2020 40 mg Given               clindamycin in D5W (Cleocin) IVPB 300 mg (mg) Total dose:  0 mg* Dosing weight:  54.6   *Administration not included in total     Date/Time Rate/Dose/Volume Action       10/04/23  1700 *300 mg - 100 mL/hr (over 30 min) Missed     10/05/23  0100 *300 mg - 100 mL/hr (over 30 min) Missed               hydromorphone PCA 0.5 mg/mL in NS opioid naive over 70 or at risk Total dose:  Cannot be calculated* Dosing weight:  54.6   *Administration does not have dose documented     Date/Time Rate/Dose/Volume Action       10/04/23  1758  New Syringe/Cartridge     10/06/23  0823  Stopped               clindamycin in NS (Cleoncin) IVPB 300 mg (mL/hr) Total volume:  Not documented* Dosing weight:  54.6   *Total volume has not been documented. View each administration to see the amount administered.     Date/Time Rate/Dose/Volume Action       10/05/23  0238 300 mg - 100 mL/hr (over 30 min) New Bag      0308  (over 30 min) Stopped               clindamycin in NS (Cleoncin) IVPB 300 mg (mL/hr) Total dose:  600 mg* Dosing weight:  54.6   *From user-documented volume     Date/Time Rate/Dose/Volume Action       10/05/23  1542 300 mg - 100 mL/hr (over 30 min) New Bag       1612  (over 30 min) Stopped      1751 50 mL       2313 300 mg - 100 mL/hr (over 30 min) New Bag      2343 50 mL Stopped               clindamycin (Cleocin) 300 mg in dextrose 5 % in water (D5W) 50 mL IV (mg) Total dose:  0 mg* Dosing weight:  54.6   *Administration not included in total     Date/Time Rate/Dose/Volume Action       10/05/23  1030 *300 mg (over 30 min) Missed               lactated Ringer's bolus 500 mL (mL/hr) Total volume:  500 mL* Dosing weight:  54.6   *From user-documented volume     Date/Time Rate/Dose/Volume Action       10/06/23  0242 500 mL - 500 mL/hr (over 60 min) New Bag      0342 500 mL Stopped               clindamycin in D5W (Cleocin) IVPB 600 mg (mL/hr) Total dose:  600 mg* Dosing weight:  54.6   *From user-documented volume     Date/Time Rate/Dose/Volume Action       10/06/23  0818 600 mg - 100 mL/hr (over 30 min) New Bag      0848 50 mL Stopped               thiamine (Vitamin B-1) tablet 100 mg (mg) Total dose:  400 mg Dosing weight:  54.6      Date/Time Rate/Dose/Volume Action       10/06/23  0904 100 mg Given     10/07/23  0856 100 mg Given     10/08/23  1006 100 mg Given     10/09/23  0900 100 mg Given               traMADol (Ultram) tablet 50 mg (mg) Total dose:  100 mg* Dosing weight:  54.6   *Administration not included in total     Date/Time Rate/Dose/Volume Action       10/09/23  2129 50 mg Given     10/10/23  0244 *50 mg Missed      0306 50 mg Given               potassium chloride (Klor-Con) packet 40 mEq (mEq) Total dose:  80 mEq Dosing weight:  54.6      Date/Time Rate/Dose/Volume Action       10/06/23  1119 40 mEq Given     10/10/23  1207 40 mEq Given               magnesium sulfate IV 2 g (mL/hr) Total dose:  2 g* Dosing weight:  54.6   *From user-documented volume     Date/Time Rate/Dose/Volume Action       10/06/23  1118 2 g - 25 mL/hr (over 120 min) New Bag      1318  (over 120 min) Stopped               potassium phosphates 15 mmol in dextrose 5 % in water (D5W) 250  mL IV (mL/hr) Total dose:  14.71 mmol* Dosing weight:  54.6   *From user-documented volume     Date/Time Rate/Dose/Volume Action       10/07/23  1329 15 mmol - 63.8 mL/hr (over 240 min) - 250 mL [vol] New Bag      1729 0 mL [vol] Stopped               sodium phosphates 21 mmol in sodium chloride 0.9% 250 mL IV (mL/hr) Total dose:  21 mmol* Dosing weight:  54.6   *From user-documented volume     Date/Time Rate/Dose/Volume Action       10/08/23  1151 21 mmol - 41.7 mL/hr (over 360 min) - 250 mL [vol] New Bag      1751 0 mL [vol] Stopped               albuterol 2.5 mg /3 mL (0.083 %) nebulizer solution 2.5 mg (mg) Total dose:  5 mg* Dosing weight:  54.6   *Administration not included in total     Date/Time Rate/Dose/Volume Action       10/08/23  1045 *2.5 mg Missed      1434 2.5 mg Given      1828 2.5 mg Given      2245 *2.5 mg Missed     10/09/23  0245 *2.5 mg Missed      0645 *2.5 mg Missed      1045 *2.5 mg Missed               OLANZapine (ZyPREXA) injection 5 mg (mg) Total dose:  10 mg Dosing weight:  54.6      Date/Time Rate/Dose/Volume Action       10/08/23  2354 5 mg Given     10/09/23  1208 5 mg Given               OLANZapine (ZyPREXA) injection 2.5 mg (mg) Total dose:  Cannot be calculated* Dosing weight:  54.6   *Administration dose not documented     Date/Time Rate/Dose/Volume Action       10/09/23  1734 *Not included in total Held by provider               sterile water injection  - Omnicell Override Pull Total dose:  Cannot be calculated*   *Administration does not have dose documented     Date/Time Rate/Dose/Volume Action       10/09/23  0000  New Bag               hydrALAZINE (Apresoline) injection 5 mg (mg) Total dose:  10 mg Dosing weight:  54.6      Date/Time Rate/Dose/Volume Action       10/09/23  0122 5 mg Given      0514 5 mg Given               sodium phosphates 15 mmol in sodium chloride 0.9% 250 mL IV (mL/hr) Total volume:  Not documented* Dosing weight:  54.6   *Total volume has not been  documented. View each administration to see the amount administered.     Date/Time Rate/Dose/Volume Action       10/09/23  1353 15 mmol - 62.5 mL/hr (over 240 min) New Bag      1753  (over 240 min) Stopped               thiamine (Vitamin B1) 500 mg in dextrose 5 % in water (D5W) 100 mL IV (mg) Total dose:  0 mg* Dosing weight:  54.6   *Administration not included in total     Date/Time Rate/Dose/Volume Action       10/11/23  0900 *500 mg - 200 mL/hr (over 30 min) Missed               thiamine (Vitamin B1) 500 mg in dextrose 5 % in water (D5W) 100 mL IV (mL/hr) Total volume:  Not documented* Dosing weight:  54.6   *Total volume has not been documented. View each administration to see the amount administered.     Date/Time Rate/Dose/Volume Action       10/09/23  1245 *500 mg - 200 mL/hr (over 30 min) Missed      2129 500 mg - 200 mL/hr (over 30 min) New Bag      2159  (over 30 min) Stopped     10/10/23  0517 500 mg - 200 mL/hr (over 30 min) New Bag      0547  (over 30 min) Stopped               melatonin tablet 3 mg (mg) Total dose:  6 mg Dosing weight:  54.6      Date/Time Rate/Dose/Volume Action       10/09/23  1834 3 mg Given     10/10/23  1749 3 mg Given               QUEtiapine (SEROquel) tablet 25 mg (mg) Total dose:  Cannot be calculated* Dosing weight:  54.6   *Administration dose not documented     Date/Time Rate/Dose/Volume Action       10/09/23  1732 *Not included in total Held by provider      2100 *25 mg Missed     10/10/23  2100 *Not included in total Automatically Held               QUEtiapine (SEROquel) tablet 12.5 mg (mg) Total dose:  Cannot be calculated* Dosing weight:  54.6   *Administration dose not documented     Date/Time Rate/Dose/Volume Action       10/09/23  1734 *Not included in total Held by provider               valproic acid (Depakene) oral liquid 125 mg (mg) Total dose:  375 mg* Dosing weight:  54.6   *Administration not included in total     Date/Time Rate/Dose/Volume Action        10/09/23  1834 125 mg Given     10/10/23  0850 125 mg Given      2019 125 mg Given     10/11/23  0900 *125 mg Missed               sodium chloride 0.9% infusion (mL/hr) Total volume:  Not documented* Dosing weight:  54.6   *Total volume has not been documented. View each administration to see the amount administered.     Date/Time Rate/Dose/Volume Action       10/10/23  0430 *75 mL/hr Missed               sodium chloride 0.9 % bolus 500 mL (mL/hr) Total volume:  Not documented* Dosing weight:  54.6   *Total volume has not been documented. View each administration to see the amount administered.     Date/Time Rate/Dose/Volume Action       10/10/23  0445 500 mL - 500 mL/hr (over 60 min) New Bag      0545  (over 60 min) Stopped               aspirin 325 mg tablet  - Omnicell Override Pull (mg) Total dose:  325 mg      Date/Time Rate/Dose/Volume Action       10/10/23  0851 325 mg Given               HYDROmorphone (Dilaudid) injection 0.2 mg (mg) Total dose:  0 mg* Dosing weight:  54.6   *Administration not included in total     Date/Time Rate/Dose/Volume Action       10/11/23  0127 *0.2 mg Missed                   A wire was threaded into the existing brooke catheter occupying the prior gastrostomy tube tract and subsequently a catheter was used to inject contrast to confirm placement in the stomach under fluoroscopy. A new 22f Gtube was then placed along the existing tract, again with contrast injection to confirm termination in the stomach. Limited postprocedure CT confirmed proper placement.    No specimens collected      See detailed result report with images in PACS.    The patient tolerated the procedure well without incident or complication and is in stable condition.

## 2023-10-12 NOTE — PROGRESS NOTES
"Eryn Willis is a 89 y.o. female on day 8 of admission presenting with Squamous cell carcinoma of face. No acute events overnight. Flap checks stable.    Subjective   Patient had a better night. She was able to get sleep. On morning rounds she was not as agitated as she has been in the past. She even worked with phlebotomy for the morning lab draw. She was able to have her PEG tube replaced yesterday by Interventional Radiology.     Objective   Physical Exam  Vitals reviewed in EMR  Gen: NAD, AOx2-3, resting comfortably in bed  Eyes: EOMI, sclera clear, PERRL  Ears: Normal external ears bilaterally  Nose: No rhinorrhea; anterior nares clear  Oral Cavity: dry, old crusted blood  Head: normocephalic, atraumatic  Face/Neck: All incisions c/d/i, neck soft and flat without evidence of hematoma or fluid collection  -slightly increased area of fullness directly below skin paddle  -no evidence of dehiscence   Resp: Non-labored breathing on room air, no stridor  Cards: No clubbing/cyanosis/edema of hands  Gastro: Soft, non-distended, 22fr PEG tube in place  : Voiding clear yellow urine  MSK: Moves all extremities; Kerlix around left thigh  Psych: Appropriate mood and affect  Drains: All drains removed    Last Recorded Vitals  Blood pressure (!) 150/39, pulse 63, temperature 36.2 °C (97.1 °F), temperature source Temporal, resp. rate 18, height 1.651 m (5' 5\"), weight 60.8 kg (134 lb 0.6 oz), SpO2 97 %.  Intake/Output last 3 Shifts:  I/O last 3 completed shifts:  In: 300 (4.9 mL/kg) [NG/GT:300]  Out: 340 (5.6 mL/kg) [Urine:250 (0.1 mL/kg/hr); Emesis/NG output:90]  Weight: 60.8 kg     Relevant Results  Results for orders placed or performed during the hospital encounter of 10/04/23 (from the past 24 hour(s))   Hepatic function panel   Result Value Ref Range    Albumin 2.7 (L) 3.4 - 5.0 g/dL    Bilirubin, Total 0.4 0.0 - 1.2 mg/dL    Bilirubin, Direct 0.1 0.0 - 0.3 mg/dL    Alkaline Phosphatase 63 33 - 136 U/L    ALT 8 7 - 45 " U/L    AST 13 9 - 39 U/L    Total Protein 5.8 (L) 6.4 - 8.2 g/dL   CBC   Result Value Ref Range    WBC 10.3 4.4 - 11.3 x10*3/uL    nRBC 0.0 0.0 - 0.0 /100 WBCs    RBC 3.39 (L) 4.00 - 5.20 x10*6/uL    Hemoglobin 10.3 (L) 12.0 - 16.0 g/dL    Hematocrit 33.4 (L) 36.0 - 46.0 %    MCV 99 80 - 100 fL    MCH 30.4 26.0 - 34.0 pg    MCHC 30.8 (L) 32.0 - 36.0 g/dL    RDW 18.1 (H) 11.5 - 14.5 %    Platelets 252 150 - 450 x10*3/uL    MPV 10.2 7.5 - 11.5 fL   Magnesium   Result Value Ref Range    Magnesium 2.17 1.60 - 2.40 mg/dL   Phosphorus   Result Value Ref Range    Phosphorus 2.9 2.5 - 4.9 mg/dL   Basic Metabolic Panel   Result Value Ref Range    Glucose 104 (H) 74 - 99 mg/dL    Sodium 143 136 - 145 mmol/L    Potassium 3.9 3.5 - 5.3 mmol/L    Chloride 105 98 - 107 mmol/L    Bicarbonate 30 21 - 32 mmol/L    Anion Gap 12 10 - 20 mmol/L    Urea Nitrogen 24 (H) 6 - 23 mg/dL    Creatinine 0.57 0.50 - 1.05 mg/dL    eGFR 86 >60 mL/min/1.73m*2    Calcium 8.5 (L) 8.6 - 10.6 mg/dL     Scheduled medications  acetaminophen, 1,000 mg, g-tube, q8h DERIK  aspirin, 325 mg, g-tube, Daily  chlorhexidine, 15 mL, Mouth/Throat, TID after meals  enoxaparin, 40 mg, subcutaneous, q24h  esomeprazole, 40 mg, g-tube, Daily before breakfast  hydrogen peroxide, 1 Application, Topical, TID  iohexol, 50 mL, oral, Once in imaging  melatonin, 5 mg, g-tube, Daily  oxygen, , inhalation, Continuous - 02/gases  [Held by provider] polyethylene glycol, 17 g, g-tube, Daily  [Held by provider] QUEtiapine, 25 mg, g-tube, Nightly  sotalol, 80 mg, g-tube, BID  [START ON 10/14/2023] thiamine, 100 mg, g-tube, Daily  thiamine, 500 mg, intravenous, Daily  valproic acid, 125 mg, g-tube, BID  verapamil, 60 mg, g-tube, q8h DERIK  white petrolatum, 1 Application, Topical, TID    Continuous medications: None     PRN medications  PRN medications: albuterol, LORazepam, LORazepam, naloxone, naloxone, [Held by provider] OLANZapine, [Held by provider] QUEtiapine, traMADol, traMADol        Malnutrition Diagnosis Status: New  Malnutrition Diagnosis: Severe malnutrition related to disease or condition  As Evidenced by: suspected <75% estimated energy requirement for >1 month, significant 14% weight loss x 6 months, severe muscle wasting, & severe fat loss  I agree with the dietitian's malnutrition diagnosis.    Assessment/Plan   Eryn Willis   is a 89 y.o. F who underwent Direct laryngoscopy,  Wide local excision of skin cancer, Oral cavity resection malignant neoplasm, Right selective neck dissection levels 1A-3, Reconstruction with left anterolateral thigh free flap with Sheron Whitney and Rachele on 10/4/23 on 10/4/2023.      Active Issues:  Right Buccal SCC  Possible Cervical Lymph Node Mets  HTN  COPD  Acute Blood Loss Anemia  Anemia of Chronic Disease  Hypokalemia  Hypomagnesia  Severe Protein Calorie Malnutrition  Mixed Delirium  Urinary retention        Plan:  -Flap Checks: q24hrs  -Drains:N/A  -Analgesia:  Dilaudid PCA-discontinued, Scheduled Acetaminophen; PRN Tramadol restarted  -FEN: mIVFs-discontinued, NPO, home bolus Tube Feeds; monitor and replete electrolytes as needed  -Pulm: wean oxygen to room air, Incentive Spirometer 10x/hr while awake  -ID: Clindamycin 300mg Q8 ->complete   -Cardiac: Vitals per ENT free flap protocol then transition to Q4hr vitals; Aspirin 325mg daily, Sotalol 80mg BID, Verapamil 60mg Q8hrs  -Endo: no current interventions  -GI: Bowel regimen held 2/2 diarrhea, PPI, and PRN anti-emetic  -: Howard catheter-discontinued,voiding  -Steroids/Special: Incision and oral care per ENT free flap order set  -Embolic PPx: subcutaneous Lovenox, SCDs while in bed  -Dispo: [facility]. PT/OTordered; Patient has stated she would like to return home with home care at discharge. Discharge pending improvement of mental status.      All plans discussed with attendings after morning rounds.        I spent 15 minutes in the professional and overall care of this patient.      Vesna MCINTYRE  Liban GONZALEZ, CNP  Department of Otolaryngology: Head & Neck Surgery  Personal Pager [phone]  ENT Team  Head and Neck Phone: [phone]

## 2023-10-12 NOTE — CARE PLAN
Problem: Skin  Goal: Participates in plan/prevention/treatment measures  10/12/2023 0210 by Codie Hobbs RN  Flowsheets (Taken 10/8/2023 0812 by Taryn Gay RN)  Participates in plan/prevention/treatment measures:   Elevate heels   Increase activity/out of bed for meals  10/12/2023 0210 by Codie Hobbs RN  Outcome: Progressing  10/12/2023 0058 by Codie Hobbs RN  Flowsheets (Taken 10/8/2023 0812 by Taryn Gay RN)  Participates in plan/prevention/treatment measures:   Elevate heels   Increase activity/out of bed for meals  10/12/2023 0058 by Codie Hobbs RN  Flowsheets (Taken 10/8/2023 0812 by Taryn Gay RN)  Participates in plan/prevention/treatment measures:   Elevate heels   Increase activity/out of bed for meals  10/12/2023 0057 by Codie Hobbs RN  Outcome: Progressing     Problem: Skin  Goal: Prevent/manage excess moisture  10/12/2023 0210 by Codie Hobbs RN  Flowsheets (Taken 10/8/2023 0812 by Taryn Gay RN)  Prevent/manage excess moisture:   Monitor for/manage infection if present   Cleanse incontinence/protect with barrier cream  10/12/2023 0210 by Codie Hobbs RN  Outcome: Progressing  10/12/2023 0058 by Codie Hobbs RN  Flowsheets (Taken 10/8/2023 0812 by Taryn Gay RN)  Prevent/manage excess moisture:   Monitor for/manage infection if present   Cleanse incontinence/protect with barrier cream  10/12/2023 0058 by Codie Hobbs RN  Flowsheets (Taken 10/8/2023 0812 by Taryn Gay RN)  Prevent/manage excess moisture:   Monitor for/manage infection if present   Cleanse incontinence/protect with barrier cream  10/12/2023 0057 by Codie Hobbs RN  Outcome: Progressing     Problem: Skin  Goal: Prevent/minimize sheer/friction injuries  10/12/2023 0210 by Codie Hobbs RN  Flowsheets (Taken 10/8/2023 0812 by Taryn Gay RN)  Prevent/minimize sheer/friction injuries:   HOB 30 degrees  or less   Increase activity/out of bed for meals   Use pull sheet  10/12/2023 0210 by Codie Hobbs RN  Outcome: Progressing  10/12/2023 0058 by Codie Hobbs RN  Flowsheets (Taken 10/8/2023 0812 by Taryn Gay RN)  Prevent/minimize sheer/friction injuries:   HOB 30 degrees or less   Increase activity/out of bed for meals   Use pull sheet  10/12/2023 0058 by Codie Hobbs RN  Flowsheets (Taken 10/8/2023 0812 by Taryn Gay RN)  Prevent/minimize sheer/friction injuries:   HOB 30 degrees or less   Increase activity/out of bed for meals   Use pull sheet  10/12/2023 0057 by Codie Hobbs RN  Outcome: Progressing     Problem: Skin  Goal: Promote/optimize nutrition  10/12/2023 0210 by Codie Hobbs RN  Flowsheets (Taken 10/8/2023 0812 by Taryn Gay RN)  Promote/optimize nutrition:   Assist with feeding   Monitor/record intake including meals  10/12/2023 0210 by Codie Hobbs RN  Outcome: Progressing  10/12/2023 0058 by Codie Hobbs RN  Flowsheets (Taken 10/8/2023 0812 by Taryn Gay RN)  Promote/optimize nutrition:   Assist with feeding   Monitor/record intake including meals  10/12/2023 0058 by Codie Hobbs RN  Flowsheets (Taken 10/8/2023 0812 by Taryn Gay RN)  Promote/optimize nutrition:   Assist with feeding   Monitor/record intake including meals  10/12/2023 0057 by Codie Hobbs RN  Outcome: Progressing     Problem: Skin  Goal: Promote skin healing  10/12/2023 0210 by Codie Hobbs RN  Flowsheets (Taken 10/8/2023 0812 by Taryn Gay RN)  Promote skin healing:   Assess skin/pad under line(s)/device(s)   Protective dressings over bony prominences   Rotate device position/do not position patient on device  10/12/2023 0210 by Codie Hobbs RN  Outcome: Progressing  10/12/2023 0058 by Codie Hobbs RN  Flowsheets (Taken 10/8/2023 0812 by Taryn Gay RN)  Promote skin healing:   Assess skin/pad  under line(s)/device(s)   Protective dressings over bony prominences   Rotate device position/do not position patient on device  10/12/2023 0058 by Codie Hbobs RN  Flowsheets (Taken 10/8/2023 0812 by Taryn Gay RN)  Promote skin healing:   Assess skin/pad under line(s)/device(s)   Protective dressings over bony prominences   Rotate device position/do not position patient on device  10/12/2023 0057 by Codie Hobbs RN  Outcome: Progressing   The patient's goals for the shift include Prevention Moisture associated skin breakdown     The clinical goals for the shift include pt will not pull out ant lines or tubes    Over the shift, the patient did not make progress toward the following goals. Barriers to progression include pt's confusion. Recommendations to address these barriers include keep reinforcing and education.

## 2023-10-12 NOTE — PROGRESS NOTES
Physical Therapy    Physical Therapy Treatment    Patient Name: Eryn Willis  MRN: [id number]  Today's Date: 10/12/2023  Time Calculation  Start Time: 1419  Stop Time: 1501  Time Calculation (min): 42 min       Assessment/Plan   PT Assessment  PT Assessment Results: Decreased strength, Decreased endurance, Impaired balance, Decreased mobility  Rehab Prognosis: Good  Evaluation/Treatment Tolerance: Patient limited by fatigue  Medical Staff Made Aware: Yes  End of Session Communication: Bedside nurse  End of Session Patient Position: Bed, 3 rail up, Alarm off, caregiver present (RN tending to pt.)  PT Plan  Inpatient/Swing Bed or Outpatient: Inpatient  PT Plan  Treatment/Interventions: Bed mobility, Transfer training, Gait training, Balance training, Strengthening  PT Plan: Skilled PT  PT Frequency: 3 times per week  PT Discharge Recommendations: Moderate intensity level of continued care      General Visit Information:   PT  Visit  PT Received On: 10/12/23  Response to Previous Treatment:  (Pt has not been seen in several days due to delerium and time off the unit.)  General  Missed Visit: Yes  Missed Visit Reason: Other (Comment) (RN reports pt continues to be significantly confused/agitated and in restraints, though is sleeping soundsly at this time.)  Prior to Session Communication: Bedside nurse  Patient Position Received: Bed, 3 rail up  General Comment: Pt is pleasant and agreeable, alert and follows >75% commands accurately. Upon standing, pt significantly incontinent of bowel and bladder, requires extended time to address hygiene needs. RN present and assisting with hygiene and mobility.    Subjective   Precautions:  Precautions  Medical Precautions: Fall precautions  Vital Signs:       Objective   Pain:  Pain Assessment  Pain Assessment: 0-10  Pain Score: 0 - No pain  Cognition:  Cognition  Overall Cognitive Status: Within Functional Limits    Activity Tolerance:     Treatments:  Therapeutic  Exercise  Therapeutic Exercise Performed: Yes  Therapeutic Exercise Activity 1: Seated LAQs 2x10.LE    Balance/Neuromuscular Re-Education  Balance/Neuromuscular Re-Education Activity Performed: Yes  Balance/Neuromuscular Re-Education Activity 1: Unsupported sitting EOB ~10min with SBA, no LOB  Balance/Neuromuscular Re-Education Activity 2: Static supported stand at 2WW 2x~2min with CGA/Gloria    Bed Mobility  Bed Mobility: Yes  Bed Mobility 1  Bed Mobility 1: Rolling right, Rolling left  Level of Assistance 1: Close supervision  Bed Mobility 2  Bed Mobility  2: Supine to sitting  Level of Assistance 2: Minimum assistance  Bed Mobility 3  Bed Mobility 3: Sitting to supine  Level of Assistance 3: Contact guard    Ambulation/Gait Training  Ambulation/Gait Training Performed: Yes  Ambulation/Gait Training 1  Surface 1: Level tile  Device 1: Rolling walker  Assistance 1: Minimum assistance  Comments/Distance (ft) 1: 3 side steps  Transfers  Transfer: Yes  Transfer 1  Transfer From 1: Bed to, Sit to, Stand to  Transfer to 1: Sit  Technique 1: Sit to stand, Stand to sit  Transfer Device 1: Walker  Transfer Level of Assistance 1: Minimum assistance  Trials/Comments 1: 3    Outcome Measures:     [facility] Basic Mobility  Turning from your back to your side while in a flat bed without using bedrails: A little  Moving from lying on your back to sitting on the side of a flat bed without using bedrails: A little  Moving to and from bed to chair (including a wheelchair): A little  Standing up from a chair using your arms (e.g. wheelchair or bedside chair): A little  To walk in hospital room: A little  Climbing 3-5 steps with railing: Total  Basic Mobility - Total Score: 16    Education Documentation  Precautions, taught by Nakita Brown PTA at 10/12/2023  3:28 PM.  Learner: Patient  Readiness: Acceptance  Method: Explanation  Response: Verbalizes Understanding, Demonstrated Understanding, Needs Reinforcement    Mobility  Training, taught by Nakita Brown PTA at 10/12/2023  3:28 PM.  Learner: Patient  Readiness: Acceptance  Method: Explanation  Response: Verbalizes Understanding, Demonstrated Understanding, Needs Reinforcement    Education Comments  No comments found.        OP EDUCATION:  Education  Individual(s) Educated: Patient  Education Provided: Body Mechanics    Encounter Problems       Encounter Problems (Active)       Mobility       STG - Patient will ambulate 25 ft with min A and FWW with no LOB, progress as able and appropriate  (Progressing)       Start:  10/05/23    Expected End:  10/19/23            STG - Patient will ascend and descend 1 stair with mod A and HR  (Progressing)       Start:  10/05/23    Expected End:  10/19/23               Pain - Adult          Transfers       STG - Transfer from bed to chair with min A and FWW (Progressing)       Start:  10/05/23    Expected End:  10/19/23            STG - Patient will perform bed mobility with CGA  (Progressing)       Start:  10/05/23    Expected End:  10/19/23               Transfers       STG - Patient will transfer sit to and from stand with min A and FWW  (Progressing)       Start:  10/05/23    Expected End:  10/19/23

## 2023-10-12 NOTE — PROGRESS NOTES
"SUBJECTIVE  On interview at bedside, patient is alert and oriented to person, place, time and circumstance. She is able to hold full conversations. No further concerns for agitation or delirium.   Patient reporting poor sleep.    ==========  Additional information:  No PRNs for agitation overnight. Patient behaviorally in control without 1:1 sitter.    OBJECTIVE    VITALS      10/11/2023     5:21 PM 10/11/2023     9:22 PM 10/12/2023     1:22 AM 10/12/2023     5:05 AM 10/12/2023     6:00 AM 10/12/2023     8:35 AM 10/12/2023     1:57 PM   Vitals   Systolic  133 150 123  150 145   Diastolic  34 86 56  39 61   Heart Rate 82  79 81  63 61   Temp  36.2 °C (97.2 °F) 36.5 °C (97.7 °F) 36.3 °C (97.3 °F)  36.2 °C (97.1 °F) 37.1 °C (98.8 °F)   Resp 22 18 20 20  18 22   Weight (lb)     134.04     BMI     22.31 kg/m2     BSA (m2)     1.67 m2        MENTAL STATUS EXAM  Appearance: Elderly  woman, frail, laying in hospital bed with mittens in bilateral hands.  Attitude: Calm, cooperative, intermittently irritable when asked certain questions   Behavior: Appropriate eye contact, often looking around when speaking to family.   Motor Activity: No PMR/PMA; no EPS observed  Speech: Regular rate and rhythm, normal volume, mumbled speech (very was only able to understand simple phrases such as [place]), speaks spontaneously  Mood: \"Okay\"  Affect: Mood congruent  Thought Process: Intermittently linear and logical.   Thought Content: Does not endorse suicidal or homicidal ideation. No delusions elicited.   Thought Perception: Does not endorse auditory or visual hallucinations. Does not appear to be responding to hallucinatory stimuli.  Cognition:.  Alert and oriented x4. Fair attention/concentration.   Insight: Limited, aware of circumstances leading to hospitalization   Judgement: Poor at this time    CURRENT MEDICATIONS  Scheduled medications  acetaminophen, 1,000 mg, g-tube, q8h DERIK  aspirin, 325 mg, g-tube, " Daily  chlorhexidine, 15 mL, Mouth/Throat, TID after meals  enoxaparin, 40 mg, subcutaneous, q24h  esomeprazole, 40 mg, g-tube, Daily before breakfast  hydrogen peroxide, 1 Application, Topical, TID  iohexol, 50 mL, oral, Once in imaging  melatonin, 5 mg, g-tube, Daily  oxygen, , inhalation, Continuous - 02/gases  [Held by provider] polyethylene glycol, 17 g, g-tube, Daily  potassium, sodium phosphates, 1 packet, g-tube, q6h  [Held by provider] QUEtiapine, 25 mg, g-tube, Nightly  sotalol, 80 mg, g-tube, BID  [START ON 10/14/2023] thiamine, 100 mg, g-tube, Daily  valproic acid, 125 mg, g-tube, BID  verapamil, 60 mg, g-tube, q8h DERIK  white petrolatum, 1 Application, Topical, TID      Continuous medications       PRN medications  PRN medications: albuterol, LORazepam, LORazepam, naloxone, naloxone, [Held by provider] OLANZapine, [Held by provider] QUEtiapine, traMADol, traMADol     LABS  Results for orders placed or performed during the hospital encounter of 10/04/23 (from the past 24 hour(s))   Ammonia   Result Value Ref Range    Ammonia 51 16 - 53 umol/L   Hepatic function panel   Result Value Ref Range    Albumin 2.7 (L) 3.4 - 5.0 g/dL    Bilirubin, Total 0.4 0.0 - 1.2 mg/dL    Bilirubin, Direct 0.1 0.0 - 0.3 mg/dL    Alkaline Phosphatase 63 33 - 136 U/L    ALT 8 7 - 45 U/L    AST 13 9 - 39 U/L    Total Protein 5.8 (L) 6.4 - 8.2 g/dL   CBC   Result Value Ref Range    WBC 10.3 4.4 - 11.3 x10*3/uL    nRBC 0.0 0.0 - 0.0 /100 WBCs    RBC 3.39 (L) 4.00 - 5.20 x10*6/uL    Hemoglobin 10.3 (L) 12.0 - 16.0 g/dL    Hematocrit 33.4 (L) 36.0 - 46.0 %    MCV 99 80 - 100 fL    MCH 30.4 26.0 - 34.0 pg    MCHC 30.8 (L) 32.0 - 36.0 g/dL    RDW 18.1 (H) 11.5 - 14.5 %    Platelets 252 150 - 450 x10*3/uL    MPV 10.2 7.5 - 11.5 fL   Magnesium   Result Value Ref Range    Magnesium 2.17 1.60 - 2.40 mg/dL   Phosphorus   Result Value Ref Range    Phosphorus 2.9 2.5 - 4.9 mg/dL   Basic Metabolic Panel   Result Value Ref Range    Glucose 104  (H) 74 - 99 mg/dL    Sodium 143 136 - 145 mmol/L    Potassium 3.9 3.5 - 5.3 mmol/L    Chloride 105 98 - 107 mmol/L    Bicarbonate 30 21 - 32 mmol/L    Anion Gap 12 10 - 20 mmol/L    Urea Nitrogen 24 (H) 6 - 23 mg/dL    Creatinine 0.57 0.50 - 1.05 mg/dL    eGFR 86 >60 mL/min/1.73m*2    Calcium 8.5 (L) 8.6 - 10.6 mg/dL        IMAGING  XR chest 1 view    Result Date: 10/9/2023  Interpreted By:  Oj Egan  and Jose Kennedy STUDY: XR CHEST 1 VIEW; XR ABDOMEN 1 VIEW;  10/9/2023 8:22 am; 10/9/2023 8:28 am   INDICATION: Signs/Symptoms:infectious work up; Signs/Symptoms:concern for peg tube dislodgement Right buccal mucosa SCCa   COMPARISON: CT chest with contrast 09/27/2023, abdominal radiograph 10/09/2023 0001   ACCESSION NUMBER(S): [id number]; [id number]   ORDERING CLINICIAN: PEGGY GIFFORD; HARRISON MERINO   FINDINGS: AP radiograph of the chest in the abdomen were provided.   Left chest pacemaker device in place with leads overlying the right atrium and right ventricle. Surgical clips overlying the right lateral neck.   CARDIOMEDIASTINAL SILHOUETTE: Cardiomediastinal silhouette is normal in size and configuration.   LUNGS: Bilateral emphysema and multifocal parenchymal scarring in particular within the lung apices bilaterally greater within the right upper lobe apical segment. Multiple nodular densities are demonstrated bilaterally better seen on CT examination of the chest 09/27/2023.   Prominent coarse interstitial markings. No focal consolidation. No pneumothorax. Blunting of the bilateral costophrenic angles. Slight elevation of the left hemidiaphragm.   ABDOMEN: Cholecystectomy clips located in the right upper quadrant. There is oral contrast noted within the stomach. Peg tube overlies the left hemiabdomen with tip terminating over the gastric body. Nonobstructive bowel-gas pattern. No radiographic evidence of pneumoperitoneum, however limited evaluation on supine imaging.   BONES: No acute osseous changes.        1.  Prominent coarse interstitial markings consistent with chronic lung changes. No focal consolidation. 2. Small bilateral pleural effusions. 3. Peg tube is located over the left hemiabdomen with tip overlying the gastric body. 4. Nonobstructive bowel-gas pattern.   I personally reviewed the images/study and I agree with the findings as stated. This study was interpreted at [facility], [place], [place].   MACRO: None   Signed by: Oj Egan 10/9/2023 1:21 PM Dictation workstation:   [id number]    XR abdomen 1 view    Result Date: 10/9/2023  Interpreted By:  Oj Egan,  and Jose Kennedy STUDY: XR CHEST 1 VIEW; XR ABDOMEN 1 VIEW;  10/9/2023 8:22 am; 10/9/2023 8:28 am   INDICATION: Signs/Symptoms:infectious work up; Signs/Symptoms:concern for peg tube dislodgement Right buccal mucosa SCCa   COMPARISON: CT chest with contrast 09/27/2023, abdominal radiograph 10/09/2023 0001   ACCESSION NUMBER(S): [id number]; [id number]   ORDERING CLINICIAN: PEGGY GIFFORD; HARRISON MERINO   FINDINGS: AP radiograph of the chest in the abdomen were provided.   Left chest pacemaker device in place with leads overlying the right atrium and right ventricle. Surgical clips overlying the right lateral neck.   CARDIOMEDIASTINAL SILHOUETTE: Cardiomediastinal silhouette is normal in size and configuration.   LUNGS: Bilateral emphysema and multifocal parenchymal scarring in particular within the lung apices bilaterally greater within the right upper lobe apical segment. Multiple nodular densities are demonstrated bilaterally better seen on CT examination of the chest 09/27/2023.   Prominent coarse interstitial markings. No focal consolidation. No pneumothorax. Blunting of the bilateral costophrenic angles. Slight elevation of the left hemidiaphragm.   ABDOMEN: Cholecystectomy clips located in the right upper quadrant. There is oral contrast noted within the stomach. Peg tube overlies the left  hemiabdomen with tip terminating over the gastric body. Nonobstructive bowel-gas pattern. No radiographic evidence of pneumoperitoneum, however limited evaluation on supine imaging.   BONES: No acute osseous changes.       1.  Prominent coarse interstitial markings consistent with chronic lung changes. No focal consolidation. 2. Small bilateral pleural effusions. 3. Peg tube is located over the left hemiabdomen with tip overlying the gastric body. 4. Nonobstructive bowel-gas pattern.   I personally reviewed the images/study and I agree with the findings as stated. This study was interpreted at [place], [place].   MACRO: None   Signed by: Oj Egan 10/9/2023 1:21 PM Dictation workstation:   [id number]    XR abdomen 1 view    Result Date: 10/9/2023  Interpreted By:  Oj Egan and Dervishi Mario STUDY: XR ABDOMEN 1 VIEW;  10/9/2023 12:36 am   INDICATION: Signs/Symptoms:Pulled out PEG, replaced with brooke.   COMPARISON: None.   ACCESSION NUMBER(S): [id number]   ORDERING CLINICIAN: ALCON JONES   FINDINGS: AP radiographs provided x2.   Partially visualized dual-chamber left-sided ICD device. Surgical clips overlying the right upper quadrant. . Incompletely characterized catheter projecting over the left lower quadrant as annotated on the radiograph. Nonobstructive bowel gas pattern. Limited evaluation of pneumoperitoneum on portable supine imaging, however no gross evidence of free air is noted.   Visualized lung bases demonstrates blunt bilateral costophrenic angles suggestive of small pleural effusion possible associated atelectasis.   Osseous structures demonstrate no acute bony changes.       1.  No acute abdominal radiographic findings status post PEG. Per given history Brooke catheter is not definitely visualized overlying the gastric region. 2. Possible small bilateral pleural effusions. 3. Additional abdominal radiographic findings as above.   I  personally reviewed the images/study and I agree with the findings as stated. This study was interpreted at [facility], [place], [place].   MACRO: None   Signed by: Oj Egan 10/9/2023 1:08 AM Dictation workstation:   [id number]       PSYCHIATRIC RISK ASSESSMENT  Acute Risk of Harm to Others is Considered: Low  Acute Risk of Harm to Self is Considered: Low    ASSESSMENT AND PLAN  Eryn Willis is a 89 y.o. 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 Also s/p PEG placement this admission. Hospital course was complicated by gradual worsening of mentation and agitation starting from a 10/7.     Psychiatry was consulted on 10/9/2023 for delirium and concern for injury to self.     On initial assessment 10/9, patient was laying in bed with bilateral mittens and a sitter at bedside; she was alert and oriented to self, Monday, October 2023, but not to location.  She was trying to get out of bed and required frequent reorientation. She appears to have had auditory and visual hallucinations of her children talking to her but no command hallucinations.  She denies SI, HI, or pain.  Per primary team, her behavior is slightly better this morning, but she sundowns.  She was given 1 dose of olanzapine IM 5 mg at midnight on 10/8; primary team did not think that it helped patient.  Patient has not been sleeping well in the last few days.      Tele (10/8/2023): QTc of 440 ms  EKG (9/28/2023): QTc of 494 ms  EKG (10/9/2023): HR 78, QTc of 515 ms  EKG (10/12/2023) Qtc of 474    UPDATES  Since 10/10 patient no longer agitated or delirious and is A&Ox4, no PRNs  "for agitation in the past 48h. She has been on VPA 125mg BID but no antipsychotic meds given prolonged QTc      IMPRESSION  -Delirium, multifactorial (old age, baseline brain atrophy, prolonged hospital stay, recent large operation, poor sleep/disturbed circadian rhythm etc)     RECOMMENDATIONS  - Patient does not currently meet criteria for inpatient psychiatric admission.   - To evaluate decision-making capacity, recommend use of the Capacity Evaluation Tool. Search “ IP Capacity Evaluation under SmartText\" unless the patient has a legal guardian, in which case all decisions per the legal guardian.  - Defer to primary team decision for 1:1 sitter for safety precautions.  - As with all hospitalized patients, would recommend delirium precautions, as below.     Work-up:  :: Ammonia 35 WNL, LFTs wnl       Medications:  - Increased melatonin 5 mg via PEG tube at 1800 daily  - Discontinue Valproic acid as pt no longer agitated  - Can give Quetiapine 12.5mg through PEG tube PRN, if needing antipsychotics, please obtain EKG daily  - Continue thiamine 100mg daily  - If no improvement, please page psychiatry for additional recommendations     - If patient continues to have akathisia/restlessness, we may consider low dose benadryl to be given together with PRN olanzapine. Will hold off for now.     Ancillary Services:  - music therapy consult when patient is medically appropriate  - not interested in , pet or art therapy per son     Follow-up:  -No need to follow-up with a psychiatrist at this time  - Patient's daughter Kandace is living with patient's son Epi for the past 10 months due to hip fracture, and Epi has also been the primary person taking care of patient and Kandace during this time. Kandace cannot reach a phone easily due to limited mobility from hip fracture. Please reach out to Epi for updates and questions: Epi Willis ([phone]). Contact info updated in Epic by psychiatry.     ==========  - Discussed " recommendations with primary team.  - Psychiatry will continue to follow.     Thank you for allowing us to participate in the care of this patient. Please page [phone] with any questions or concerns.     Patient is discussed with Dr. Fowler, who agrees with above plan.      Medication Consent  Medication Consent: n/a; consult service    Terry Duran MD         DELIRIUM GUIDELINES:  1) Non-Pharmacological Measures: Please ensure blinds are open during day to allow sufficient daylight to enter room; maintain dark/quiet room at night with minimal interruptions; minimize daytime naps and maintain sleep/wake cycle as able; minimize excessive stimulation; if patient wears glasses or hearing aids, patient should have access to them as sensory deprivation can cause/worsen delirium; minimize room/staff changes; encourage interaction with familiar objects and frequent orientation.    2) Pharmacological Measures: Minimize use of benzodiazepines, anticholinergic medications, and opiates, as these can all exacerbate delirium. Ensure adequate treatment of pain while minimizing use of opioids to the extent possible.    3) Minimize use of restraints to situations where necessary to keep patient and staff safe and to prevent patient from removing lines, tubes, medical devices, dressings, etc.  Continue to regularly re-evaluate continued need for any ordered restraints.

## 2023-10-13 NOTE — CARE PLAN
The patient's goals for the shift include  pt not pulling out lines    The clinical goals for the shift include pt will not require sitter or restraints    Over the shift, the patient did not make progress toward the following goals. Barriers to progression include pt not A&O x3. Recommendations to address these barriers include pt not being alert and oriented.

## 2023-10-13 NOTE — PROGRESS NOTES
"Nutrition Follow-up:      Patient is a 89 y.o. female with R buccal SCC, who is now s/p direct laryngoscopy, wide local excision of skin cancer, oral cavity resection malignant neoplasm, R SND levels 1A-3, reconstruction with left anterolateral thigh free flap on (10/4).    Post-op course c/b delirium & agitation. Pt dislodged PEG tube necessitating replacement (10/11). Also noted to require sitter & mittens as restraints intermittently this admission. Psych following. Electrophysiology now consulted (10/13) due to QTc in setting of sotalol use and antipsychotics.     Nutrition History:  Follow up visit made. Spoke w/ pts bedside nurse to determine TF tolerance given pts reported AMS. Bedside RN reports pt intermittently refusing feeds & that pts sons have noted pt c/o discomfort following boluses. Pt reportedly with multiple watery stools. Team now holding bowel regimen. Updated recommendations provided below.     Anthropometrics:  Height: 165.1 cm (5' 5\")  Weight: 59.1 kg (130 lb 4.7 oz)  BMI (Calculated): 21.68  IBW/kg (Dietitian Calculated): 56.8 kg  Percent of IBW: 96 %    Weight Change %:  Weight History / % Weight Change:   [10/13/23] 59.1 kg (current wt)  [10/04/23] 54.6 kg (admit wt)    --> 7.6% gain since admission 9 days ago likely d/t fluid changes.     Nutrition Focused Physical Exam Findings:  defer:  see RDN assessment (10/5/23)    Edema:  Edema: none     Skin  L upper leg donor site  R neck incision  R face incision     Objective Data:  Nutrition Significant Labs:  Results for orders placed or performed during the hospital encounter of 10/04/23 (from the past 24 hour(s))   Renal function panel   Result Value Ref Range    Glucose 107 (H) 74 - 99 mg/dL    Sodium 142 136 - 145 mmol/L    Potassium 4.2 3.5 - 5.3 mmol/L    Chloride 105 98 - 107 mmol/L    Bicarbonate 28 21 - 32 mmol/L    Anion Gap 13 10 - 20 mmol/L    Urea Nitrogen 25 (H) 6 - 23 mg/dL    Creatinine 0.50 0.50 - 1.05 mg/dL    eGFR 89 >60 " mL/min/1.73m*2    Calcium 8.3 (L) 8.6 - 10.6 mg/dL    Phosphorus 3.5 2.5 - 4.9 mg/dL    Albumin 2.9 (L) 3.4 - 5.0 g/dL       Nutrition Specific Mediations:  Reviewed by RDN at time of assessment.    I/O:   (10/13) x3 BM @ assessment - watery  (10/12) x4 BM - watery      Dietary Orders (From admission, onward)       Start     Ordered    10/11/23 1457  Enteral feeding with NPO Isosource 1.5; GT (gastric tube); 250 (4 cans per day); 4x per day; 60 (pre/post)  Diet effective now        Comments: 60 ml FWF pre/post feeds, additional 130 ml BID   Question Answer Comment   Tube feeding formula: Isosource 1.5    Feeding route: GT (gastric tube)    Tube feeding bolus (mL): 250 4 cans per day   Tube feeding bolus frequency: 4x per day    Tube feeding flush (mL): 60 pre/post       10/11/23 1537    10/08/23 1042  Oral nutritional supplements  Until discontinued        Question Answer Comment   Deliver with All meals    Select supplement: Nutrasource Fiber        10/08/23 1044                Estimated Needs:   Total Energy Estimated Needs (kCal): 1500 kCal   Method for Estimating Needs: (REE: 974) x 1.5    Total Protein Estimated Needs (g): 65-75 g   Method for Estimating Needs: 1.2-1.4 g/kg x ABW    Total Fluid Estimated Needs (mL): 1500 mL   Method for Estimating Needs: 1mL/kcal or per team      Nutrition Diagnosis:  Malnutrition Diagnosis  Patient has Malnutrition Diagnosis: Yes  Diagnosis Status: Ongoing  Malnutrition Diagnosis: Severe malnutrition related to chronic disease or condition  As Evidenced by: suspected <75% estimated energy requirement for >1 month, significant 14% weight loss x 6 months, severe muscle wasting, & severe fat loss      Nutrition Interventions and Recommendations:        Nutrition Prescription:      Individualized Nutrition Prescription Provided for :         1.) Continue to trial current TF regimen now that bowel regimen has been held.           - Isosource 1.5 bolus @ 250mL 4x/day  (4 cans per  day)            - FWF: 60mL pre/post feed with additional 130mL BID           - This regimen provides: 1000mL, 1500 kcals, 68g pro, 1504mL free water         2.) If bloating & diarrhea persist over next 24hrs, recommend the following:            - First, check for viral etiology such as C diff              - Second, trail fiber-free tube feeding formula               --> Vital 1.5 bolus @ 250mL 4x/day (~4.5 cans per day)                       - FWF: 60mL pre/post feed with additional 130mL BID                       - Provides: 1000mL, 1500 kcals, 68g protein, & 1504mL water        Nutrition Recommendation Details:   Food and/or Nutrient Delivery Interventions  Interventions: Enteral intake  Enteral Intake: Other (Comment)  Goal: Tolerate 4 bolus feeds daily    Coordination of Nutrition Care by a Nutrition Professional  Collaboration and Referral of Nutrition Care: Collaboration by nutrition professional with other providers    Monitoring/Evaluation:   Food/Nutrient Related History Monitoring  Monitoring and Evaluation Plan: Enteral and parenteral nutrition intake  Enteral and Parenteral Nutrition Intake: Enteral nutrition intake  Criteria: Meeting >75% EENs with TF    Biochemical Data, Medical Tests and Procedures  Monitoring and Evaluation Plan: Electrolyte/renal panel, Glucose/endocrine profile  Electrolyte and Renal Panel: Sodium, Potassium, Phosphorus, Magnesium  Criteria: Keep lytes WNL  Glucose/Endocrine Profile: Other (Comment)  Criteria: Maintain BG 80-180mg/dL         Time Spent/Follow-up Reminder:   Follow Up  Time Spent (min): 60 minutes  Last Date of Nutrition Visit: 10/13/23  Nutrition Follow-Up Needed?: Dietitian to reassess per policy

## 2023-10-13 NOTE — CARE PLAN
Problem: Skin  Goal: Decreased wound size/increased tissue granulation at next dressing change  Outcome: Progressing  Flowsheets (Taken 10/13/2023 1855)  Decreased wound size/increased tissue granulation at next dressing change:   Promote sleep for wound healing   Protective dressings over bony prominences  Goal: Participates in plan/prevention/treatment measures  Outcome: Progressing  Flowsheets (Taken 10/13/2023 1855)  Participates in plan/prevention/treatment measures:   Elevate heels   Discuss with provider PT/OT consult  Goal: Prevent/manage excess moisture  Outcome: Progressing  Flowsheets (Taken 10/13/2023 1855)  Prevent/manage excess moisture:   Cleanse incontinence/protect with barrier cream   Monitor for/manage infection if present  Goal: Prevent/minimize sheer/friction injuries  Outcome: Progressing  Flowsheets (Taken 10/13/2023 1855)  Prevent/minimize sheer/friction injuries:   Use pull sheet   Turn/reposition every 2 hours/use positioning/transfer devices  Goal: Promote/optimize nutrition  Outcome: Progressing  Flowsheets (Taken 10/13/2023 1855)  Promote/optimize nutrition:   Monitor/record intake including meals   Assist with feeding  Goal: Promote skin healing  Outcome: Progressing  Flowsheets (Taken 10/13/2023 1855)  Promote skin healing:   Assess skin/pad under line(s)/device(s)   Turn/reposition every 2 hours/use positioning/transfer devices

## 2023-10-13 NOTE — PROGRESS NOTES
"SUBJECTIVE  This morning patient was confused when she woke up, saw the sitter and became agitated and wanted to get out of bed. When the sitter told her she is not allowed to she got agitated and started puling on tele leads and screaming for help. After a few minutes she calmed down and went back to sleep without needing any PRNs.     When I saw her she was drowsy but arousable, and oriented x3.    ==========  Additional information:  No PRNs for agitation overnight.     OBJECTIVE    VITALS      10/12/2023     5:10 PM 10/12/2023     9:27 PM 10/13/2023     1:23 AM 10/13/2023     5:29 AM 10/13/2023     5:39 AM 10/13/2023     5:44 AM 10/13/2023     9:03 AM   Vitals   Systolic 155 147 136 177 193 157 132   Diastolic 52 51 52 81 84 64 55   Heart Rate 79 60 60 64   86   Temp 37.1 °C (98.8 °F) 36.5 °C (97.7 °F) 36 °C (96.8 °F) 35.5 °C (95.9 °F) 35.7 °C (96.3 °F)  36.5 °C (97.7 °F)   Resp 20 20 20 15   20   Weight (lb)      130.4    BMI      21.7 kg/m2    BSA (m2)      1.65 m2       MENTAL STATUS EXAM  Appearance: Elderly  woman, frail, laying in hospital bed with mittens in bilateral hands.  Attitude: Calm, cooperative, intermittently irritable when asked certain questions   Behavior: Appropriate eye contact, often looking around when speaking to family.   Motor Activity: No PMR/PMA; no EPS observed  Speech: Regular rate and rhythm, normal volume, mumbled speech (very was only able to understand simple phrases such as [place]), speaks spontaneously  Mood: \"Okay\"  Affect: Mood congruent  Thought Process: Intermittently linear and logical.   Thought Content: Does not endorse suicidal or homicidal ideation. No delusions elicited.   Thought Perception: Does not endorse auditory or visual hallucinations. Does not appear to be responding to hallucinatory stimuli.  Cognition:.  Alert and oriented x4. Fair attention/concentration.   Insight: Limited, aware of circumstances leading to hospitalization   Judgement: Poor " at this time    CURRENT MEDICATIONS  Scheduled medications  acetaminophen, 1,000 mg, g-tube, q8h DERIK  aspirin, 325 mg, g-tube, Daily  chlorhexidine, 15 mL, Mouth/Throat, TID after meals  enoxaparin, 40 mg, subcutaneous, q24h  esomeprazole, 40 mg, g-tube, Daily before breakfast  hydrogen peroxide, 1 Application, Topical, TID  iohexol, 50 mL, oral, Once in imaging  melatonin, 5 mg, g-tube, Daily  oxygen, , inhalation, Continuous - 02/gases  [Held by provider] polyethylene glycol, 17 g, g-tube, Daily  [Held by provider] QUEtiapine, 25 mg, g-tube, Nightly  sotalol, 80 mg, g-tube, BID  [START ON 10/14/2023] thiamine, 100 mg, g-tube, Daily  verapamil, 60 mg, g-tube, q8h DERIK  white petrolatum, 1 Application, Topical, TID      Continuous medications       PRN medications  PRN medications: albuterol, LORazepam, LORazepam, naloxone, naloxone, [Held by provider] OLANZapine, [Held by provider] QUEtiapine, traMADol, traMADol     LABS  Results for orders placed or performed during the hospital encounter of 10/04/23 (from the past 24 hour(s))   Renal function panel   Result Value Ref Range    Glucose 107 (H) 74 - 99 mg/dL    Sodium 142 136 - 145 mmol/L    Potassium 4.2 3.5 - 5.3 mmol/L    Chloride 105 98 - 107 mmol/L    Bicarbonate 28 21 - 32 mmol/L    Anion Gap 13 10 - 20 mmol/L    Urea Nitrogen 25 (H) 6 - 23 mg/dL    Creatinine 0.50 0.50 - 1.05 mg/dL    eGFR 89 >60 mL/min/1.73m*2    Calcium 8.3 (L) 8.6 - 10.6 mg/dL    Phosphorus 3.5 2.5 - 4.9 mg/dL    Albumin 2.9 (L) 3.4 - 5.0 g/dL   CBC   Result Value Ref Range    WBC 15.9 (H) 4.4 - 11.3 x10*3/uL    nRBC 0.0 0.0 - 0.0 /100 WBCs    RBC 3.61 (L) 4.00 - 5.20 x10*6/uL    Hemoglobin 10.9 (L) 12.0 - 16.0 g/dL    Hematocrit 34.6 (L) 36.0 - 46.0 %    MCV 96 80 - 100 fL    MCH 30.2 26.0 - 34.0 pg    MCHC 31.5 (L) 32.0 - 36.0 g/dL    RDW 18.1 (H) 11.5 - 14.5 %    Platelets 303 150 - 450 x10*3/uL    MPV 10.4 7.5 - 11.5 fL   Magnesium   Result Value Ref Range    Magnesium 2.27 1.60 - 2.40  mg/dL        IMAGING  XR chest 1 view    Result Date: 10/9/2023  Interpreted By:  Oj Egan  and Jose Kennedy STUDY: XR CHEST 1 VIEW; XR ABDOMEN 1 VIEW;  10/9/2023 8:22 am; 10/9/2023 8:28 am   INDICATION: Signs/Symptoms:infectious work up; Signs/Symptoms:concern for peg tube dislodgement Right buccal mucosa SCCa   COMPARISON: CT chest with contrast 09/27/2023, abdominal radiograph 10/09/2023 0001   ACCESSION NUMBER(S): [id number]; [id number]   ORDERING CLINICIAN: PEGGY GIFFORD; HARRISON MERINO   FINDINGS: AP radiograph of the chest in the abdomen were provided.   Left chest pacemaker device in place with leads overlying the right atrium and right ventricle. Surgical clips overlying the right lateral neck.   CARDIOMEDIASTINAL SILHOUETTE: Cardiomediastinal silhouette is normal in size and configuration.   LUNGS: Bilateral emphysema and multifocal parenchymal scarring in particular within the lung apices bilaterally greater within the right upper lobe apical segment. Multiple nodular densities are demonstrated bilaterally better seen on CT examination of the chest 09/27/2023.   Prominent coarse interstitial markings. No focal consolidation. No pneumothorax. Blunting of the bilateral costophrenic angles. Slight elevation of the left hemidiaphragm.   ABDOMEN: Cholecystectomy clips located in the right upper quadrant. There is oral contrast noted within the stomach. Peg tube overlies the left hemiabdomen with tip terminating over the gastric body. Nonobstructive bowel-gas pattern. No radiographic evidence of pneumoperitoneum, however limited evaluation on supine imaging.   BONES: No acute osseous changes.       1.  Prominent coarse interstitial markings consistent with chronic lung changes. No focal consolidation. 2. Small bilateral pleural effusions. 3. Peg tube is located over the left hemiabdomen with tip overlying the gastric body. 4. Nonobstructive bowel-gas pattern.   I personally reviewed the images/study  and I agree with the findings as stated. This study was interpreted at [facility], [place], [place].   MACRO: None   Signed by: Oj Egan 10/9/2023 1:21 PM Dictation workstation:   [id number]    XR abdomen 1 view    Result Date: 10/9/2023  Interpreted By:  Oj Egan  and Jose Kennedy STUDY: XR CHEST 1 VIEW; XR ABDOMEN 1 VIEW;  10/9/2023 8:22 am; 10/9/2023 8:28 am   INDICATION: Signs/Symptoms:infectious work up; Signs/Symptoms:concern for peg tube dislodgement Right buccal mucosa SCCa   COMPARISON: CT chest with contrast 09/27/2023, abdominal radiograph 10/09/2023 0001   ACCESSION NUMBER(S): [id number]; [id number]   ORDERING CLINICIAN: PEGGY GIFFORD; HARRISON MERINO   FINDINGS: AP radiograph of the chest in the abdomen were provided.   Left chest pacemaker device in place with leads overlying the right atrium and right ventricle. Surgical clips overlying the right lateral neck.   CARDIOMEDIASTINAL SILHOUETTE: Cardiomediastinal silhouette is normal in size and configuration.   LUNGS: Bilateral emphysema and multifocal parenchymal scarring in particular within the lung apices bilaterally greater within the right upper lobe apical segment. Multiple nodular densities are demonstrated bilaterally better seen on CT examination of the chest 09/27/2023.   Prominent coarse interstitial markings. No focal consolidation. No pneumothorax. Blunting of the bilateral costophrenic angles. Slight elevation of the left hemidiaphragm.   ABDOMEN: Cholecystectomy clips located in the right upper quadrant. There is oral contrast noted within the stomach. Peg tube overlies the left hemiabdomen with tip terminating over the gastric body. Nonobstructive bowel-gas pattern. No radiographic evidence of pneumoperitoneum, however limited evaluation on supine imaging.   BONES: No acute osseous changes.       1.  Prominent coarse interstitial markings consistent with chronic lung changes. No focal  consolidation. 2. Small bilateral pleural effusions. 3. Peg tube is located over the left hemiabdomen with tip overlying the gastric body. 4. Nonobstructive bowel-gas pattern.   I personally reviewed the images/study and I agree with the findings as stated. This study was interpreted at [place], [place].   MACRO: None   Signed by: Oj Egan 10/9/2023 1:21 PM Dictation workstation:   [id number]    XR abdomen 1 view    Result Date: 10/9/2023  Interpreted By:  Oj Egan and Dervishi Mario STUDY: XR ABDOMEN 1 VIEW;  10/9/2023 12:36 am   INDICATION: Signs/Symptoms:Pulled out PEG, replaced with brooke.   COMPARISON: None.   ACCESSION NUMBER(S): [id number]   ORDERING CLINICIAN: ALCON JONES   FINDINGS: AP radiographs provided x2.   Partially visualized dual-chamber left-sided ICD device. Surgical clips overlying the right upper quadrant. . Incompletely characterized catheter projecting over the left lower quadrant as annotated on the radiograph. Nonobstructive bowel gas pattern. Limited evaluation of pneumoperitoneum on portable supine imaging, however no gross evidence of free air is noted.   Visualized lung bases demonstrates blunt bilateral costophrenic angles suggestive of small pleural effusion possible associated atelectasis.   Osseous structures demonstrate no acute bony changes.       1.  No acute abdominal radiographic findings status post PEG. Per given history Brooke catheter is not definitely visualized overlying the gastric region. 2. Possible small bilateral pleural effusions. 3. Additional abdominal radiographic findings as above.   I personally reviewed the images/study and I agree with the findings as stated. This study was interpreted at [place], [place].   MACRO: None   Signed by: Oj Egan 10/9/2023 1:08 AM Dictation workstation:   [id number]       PSYCHIATRIC RISK ASSESSMENT  Acute Risk of  Harm to Others is Considered: Low  Acute Risk of Harm to Self is Considered: Low    ASSESSMENT AND PLAN  Eryn Willis is a 89 y.o. female with no past psychiatric history and a past medical history of hypertension, COPD, CAD, SSS s/p pacemaker, paroxysmal SVT, right buccal SCC, malnutrition who was admitted to [facility] on 10/4/2023 for elective oral cavity malignant neoplasm resection with reconstruction with left anterolateral thigh free flap and right selective neck dissection which was completed on 10/4/2023. Also s/p PEG placement this admission. Hospital course was complicated by gradual worsening of mentation and agitation starting from a 10/7.     Psychiatry was consulted on 10/9/2023 for delirium and concern for injury to self.     On initial assessment 10/9, patient was laying in bed with bilateral mittens and a sitter at bedside; she was alert and oriented to self, Monday, October 2023, but not to location.  She was trying to get out of bed and required frequent reorientation. She appears to have had auditory and visual hallucinations of her children talking to her but no command hallucinations.  She denies SI, HI, or pain.  Per primary team, her behavior is slightly better this morning, but she sundowns.  She was given 1 dose of olanzapine IM 5 mg at midnight on 10/8; primary team did not think that it helped patient.  Patient has not been sleeping well in the last few days.      Tele (10/8/2023): QTc of 440 ms  EKG (9/28/2023): QTc of 494 ms  EKG (10/9/2023): HR 78, QTc of 515 ms  EKG (10/12/2023) Qtc of 474    UPDATES  Since 10/10 pshe had been back to [facility] and not requiring any PRN. VPA was stopped on 10/12. On 10/13 morning she had 1 episode of confusion and agitation that resolved with no intervention     IMPRESSION  -Delirium, multifactorial (old age, baseline brain atrophy, prolonged hospital stay, recent large operation, poor sleep/disturbed circadian rhythm etc)  - 10/13 episode occurred after  "stopping VPA, will consider restarting another medication if this problem persists     RECOMMENDATIONS  - Patient does not currently meet criteria for inpatient psychiatric admission.   - To evaluate decision-making capacity, recommend use of the Capacity Evaluation Tool. Search “ IP Capacity Evaluation under SmartText\" unless the patient has a legal guardian, in which case all decisions per the legal guardian.  - Defer to primary team decision for 1:1 sitter for safety precautions.  - As with all hospitalized patients, would recommend delirium precautions, as below.     Work-up:  :: Ammonia 35 WNL, LFTs wnl       Medications:  - Increased melatonin 5 mg via PEG tube at 1800 daily  - Discontinued Valproic acid on 10/12  - Can give Quetiapine 12.5mg Q6h through PEG tube PRN, if needing antipsychotics, please obtain EKG daily  - Continue thiamine 100mg daily  - If no improvement, please page psychiatry for additional recommendations     - If patient continues to have akathisia/restlessness, we may consider low dose benadryl to be given together with PRN olanzapine. Will hold off for now.     Ancillary Services:  - music therapy consult when patient is medically appropriate  - not interested in , pet or art therapy per son     Follow-up:  -No need to follow-up with a psychiatrist at this time  - Patient's daughter Kandace is living with patient's son Epi for the past 10 months due to hip fracture, and Epi has also been the primary person taking care of patient and Kandace during this time. Kandace cannot reach a phone easily due to limited mobility from hip fracture. Please reach out to Epi for updates and questions: Epi Willis ([phone]). Contact info updated in Epic by psychiatry.     ==========  - Discussed recommendations with primary team.  - Psychiatry will continue to follow.     Thank you for allowing us to participate in the care of this patient. Please page [phone] with any questions or concerns.   "   Patient is discussed with Dr. Fowler, who agrees with above plan.      Medication Consent  Medication Consent: n/a; consult service    Terry Duran MD         DELIRIUM GUIDELINES:  1) Non-Pharmacological Measures: Please ensure blinds are open during day to allow sufficient daylight to enter room; maintain dark/quiet room at night with minimal interruptions; minimize daytime naps and maintain sleep/wake cycle as able; minimize excessive stimulation; if patient wears glasses or hearing aids, patient should have access to them as sensory deprivation can cause/worsen delirium; minimize room/staff changes; encourage interaction with familiar objects and frequent orientation.    2) Pharmacological Measures: Minimize use of benzodiazepines, anticholinergic medications, and opiates, as these can all exacerbate delirium. Ensure adequate treatment of pain while minimizing use of opioids to the extent possible.    3) Minimize use of restraints to situations where necessary to keep patient and staff safe and to prevent patient from removing lines, tubes, medical devices, dressings, etc.  Continue to regularly re-evaluate continued need for any ordered restraints.

## 2023-10-13 NOTE — PROGRESS NOTES
"Eryn Willis is a 89 y.o. female on day 9 of admission presenting with Squamous cell carcinoma of face. No acute events overnight. Flap checks stable.    Subjective   Patient had a better night. She was able to get sleep. On morning rounds she was not as agitated as she has been in the past. This morning she had an episode of hypertension with systolic pressure in the 190s. She was treated with hydralazine; the resulting BP was more along her baseline of 157/64. The patient during her stay has had Qtc elongation. An EKG was done this morning and it should a change from baseline. After speaking with the Medicine team it was suggested EP be consulted due to her hx of arrhythmia and being on a Sotalol.     Objective   Physical Exam  Vitals reviewed in EMR  Gen: NAD, AOx2-3, resting comfortably in bed  Eyes: EOMI, sclera clear, PERRL  Ears: Normal external ears bilaterally  Nose: No rhinorrhea; anterior nares clear  Oral Cavity: dry, old crusted blood  Head: normocephalic, atraumatic  Face/Neck: All incisions c/d/i, neck soft and flat without evidence of hematoma or fluid collection  -slightly increased area of fullness directly below skin paddle, stable  -no evidence of dehiscence   Resp: Non-labored breathing on room air, no stridor  Cards: No clubbing/cyanosis/edema of hands  Gastro: Soft, non-distended, 22fr PEG tube in place  : Voiding clear yellow urine  MSK: Moves all extremities; Kerlix around left thigh  Psych: Appropriate mood and affect    Last Recorded Vitals  Blood pressure 157/64, pulse 64, temperature 35.7 °C (96.3 °F), resp. rate 15, height 1.651 m (5' 5\"), weight 59.1 kg (130 lb 6.4 oz), SpO2 (!) 88 %.  Intake/Output last 3 Shifts:  I/O last 3 completed shifts:  In: 1350 (22.8 mL/kg) [NG/GT:1350]  Out: 301 (5.1 mL/kg) [Urine:151 (0.1 mL/kg/hr); Emesis/NG output:150]  Weight: 59.1 kg     Relevant Results  No results found for this or any previous visit (from the past 24 hour(s)).    Scheduled " medications  acetaminophen, 1,000 mg, g-tube, q8h DERIK  aspirin, 325 mg, g-tube, Daily  chlorhexidine, 15 mL, Mouth/Throat, TID after meals  enoxaparin, 40 mg, subcutaneous, q24h  esomeprazole, 40 mg, g-tube, Daily before breakfast  hydrogen peroxide, 1 Application, Topical, TID  iohexol, 50 mL, oral, Once in imaging  melatonin, 5 mg, g-tube, Daily  oxygen, , inhalation, Continuous - 02/gases  [Held by provider] polyethylene glycol, 17 g, g-tube, Daily  [Held by provider] QUEtiapine, 25 mg, g-tube, Nightly  sotalol, 80 mg, g-tube, BID  [START ON 10/14/2023] thiamine, 100 mg, g-tube, Daily  verapamil, 60 mg, g-tube, q8h DERIK  white petrolatum, 1 Application, Topical, TID    Continuous medications   None  PRN medications  PRN medications: albuterol, LORazepam, LORazepam, naloxone, naloxone, [Held by provider] OLANZapine, [Held by provider] QUEtiapine, traMADol, traMADol       Malnutrition Diagnosis Status: New  Malnutrition Diagnosis: Severe malnutrition related to disease or condition  As Evidenced by: suspected <75% estimated energy requirement for >1 month, significant 14% weight loss x 6 months, severe muscle wasting, & severe fat loss  I agree with the dietitian's malnutrition diagnosis.    Assessment/Plan   Eryn Willis   is a 89 y.o. F who underwent Direct laryngoscopy,  Wide local excision of skin cancer, Oral cavity resection malignant neoplasm, Right selective neck dissection levels 1A-3, Reconstruction with left anterolateral thigh free flap with Sheron Whitney and Rachele on 10/4/23 on 10/4/2023.      Active Issues:  Right Buccal SCC  Possible Cervical Lymph Node Mets  HTN  COPD  Acute Blood Loss Anemia  Anemia of Chronic Disease  Hypokalemia  Hypomagnesia  Severe Protein Calorie Malnutrition  Mixed Delirium  Urinary retention     Plan:  -Flap Checks: q24hrs  -Drains:N/A  -Analgesia:  Dilaudid PCA-discontinued, Scheduled Acetaminophen; PRN Tramadol restarted  -FEN: mIVFs-discontinued, NPO, home bolus Tube Feeds;  monitor and replete electrolytes as needed  -Pulm: wean oxygen to room air, Incentive Spirometer 10x/hr while awake; PRN COPD inhalers   -ID: Clindamycin 300mg Q8 ->complete   -Cardiac: Vitals per ENT free flap protocol then transition to Q4hr vitals; Aspirin 325mg daily, Sotalol 80mg BID, Verapamil 60mg Q8hrs; Electrophysiology consulted follow up recs  -Endo: no current interventions  -GI: Bowel regimen held 2/2 diarrhea, PPI, and PRN anti-emetic  -: Howard catheter-discontinued,voiding  -Steroids/Special: Incision and oral care per ENT free flap order set  -Embolic PPx: subcutaneous Lovenox, SCDs while in bed  -Dispo: [facility]. PT/OTordered; Patient has stated she would like to return home with home care at discharge. Discharge pending improvement of mental status.      All plans discussed with attendings/fellow after morning rounds.        I spent 20 minutes in the professional and overall care of this patient.      Vesna Louie APRN, CNP  Department of Otolaryngology: Head & Neck Surgery  Personal Pager [phone]  ENT Team  Head and Neck Phone: [phone]

## 2023-10-13 NOTE — CONSULTS
Inpatient consult to Electrophysiology  Consult performed by: Dc Draper MD  Consult ordered by: Dany Jeffrey MD        History Of Present Illness:      Patient is a 89-year-old female with history hypertension, COPD, sick sinus syndrome post pacemaker, paroxysmal A-fib on sotalol.  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  Her course has been complicated by some delirium and agitation.  She has PEG dose placed as well.    EP is consulted for QTc in setting of sotalol use and antipsychotics.    Patient has a dual-chamber pacemaker with indications of sinus syndrome.  Her physician that follows her device is Dr. Pruitt. She had a gen exchange in 2/2/2023 at [facility].  2017 had Normal EF on echo with nuclear stress likely negative (had some breast attenuation)  Reportedly she is on sotalol for history of paroxysmal atrial fibrillation.  Her most recent EKG in the chart shows from 10/9 a prolonged QTc of 515 (which I actually do think is a bit longer 550) compared to computer read.  Creatinine is 0.57, potassium 3.9, magnesium 2.17.  She does have a leukocytosis 15.9.    She has gotten her sotalol 80 twice daily while in the hospital.  She has gotten Seroquel in the evening as well with olanzapine previously for agitation.  Her QT has normalized again this AM to 480 on the 7:54 ECG. He had one prior that has a bit of artifact.    At bedside patient has a sitter, she is not conversant with me but denies any discomfort.        Last Recorded Vitals:  Vitals:    10/13/23 0123 10/13/23 0529 10/13/23 0539 10/13/23 0544   BP: 136/52 177/81 (!) 193/84 157/64   BP Location: Left arm Left arm     Patient Position: Lying Lying     Pulse: 60 64     Resp: 20 15     Temp: 36 °C (96.8 °F) 35.5 °C (95.9 °F) 35.7 °C (96.3 °F)    TempSrc: Temporal Temporal     SpO2: 92% (!) 88%     Weight:    59.1 kg (130 lb 6.4 oz)   Height:           Last Labs:  CBC -  "10/13/2023:  6:47 AM  15.9 10.9 303    34.6      CMP - 10/13/2023:  6:47 AM  8.3 5.8 13 --- 0.4   3.5 2.9 8 63      PTT - 9/28/2023: 11:27 AM  1.1   12.6 35     No results found for: \"TROPHS\", \"BNP\", \"HGBA1C\", \"LDLCALC\", \"VLDL\"   Last I/O:  I/O last 3 completed shifts:  In: 1350 (22.8 mL/kg) [NG/GT:1350]  Out: 301 (5.1 mL/kg) [Urine:151 (0.1 mL/kg/hr); Emesis/NG output:150]  Weight: 59.1 kg       Past Medical History:  She has a past medical history of Cancer (CMS/HCC), Hypertension, Irregular heart beat, and Pacemaker.    Past Surgical History:  She has a past surgical history that includes Tonsillectomy.      Social History:  She reports that she has never smoked. She has never used smokeless tobacco. She reports that she does not drink alcohol and does not use drugs.    Family History:  No family history on file.     Allergies:  Codeine and Penicillins    Inpatient Medications:  Scheduled medications   Medication Dose Route Frequency    acetaminophen  1,000 mg g-tube q8h DERIK    aspirin  325 mg g-tube Daily    chlorhexidine  15 mL Mouth/Throat TID after meals    enoxaparin  40 mg subcutaneous q24h    esomeprazole  40 mg g-tube Daily before breakfast    hydrogen peroxide  1 Application Topical TID    iohexol  50 mL oral Once in imaging    melatonin  5 mg g-tube Daily    oxygen   inhalation Continuous - 02/gases    [Held by provider] polyethylene glycol  17 g g-tube Daily    [Held by provider] QUEtiapine  25 mg g-tube Nightly    sotalol  80 mg g-tube BID    [START ON 10/14/2023] thiamine  100 mg g-tube Daily    verapamil  60 mg g-tube q8h DERIK    white petrolatum  1 Application Topical TID     PRN medications   Medication    albuterol    LORazepam    LORazepam    naloxone    naloxone    [Held by provider] OLANZapine    [Held by provider] QUEtiapine    traMADol    traMADol     Continuous Medications   Medication Dose Last Rate     Outpatient Medications:  Current Outpatient Medications   Medication Instructions    " acetaminophen 1,000 mg, g-tube, Every 8 hours scheduled    albuterol 2.5 mg, nebulization, Every 4 hours PRN    aspirin 325 mg, g-tube, Daily    chlorhexidine (Peridex) 0.12 % solution Swish and spit 15 mL in the mouth or throat 3 times a day after meals.    melatonin 3 mg, g-tube, Nightly PRN    sotalol (BETAPACE) 80 mg, g-tube, 2 times daily    traMADol (ULTRAM) 25 mg, oral, Every 8 hours PRN    verapamil (CALAN) 60 mg, g-tube, Every 8 hours scheduled    verapamil SR (CALAN-SR) 180 mg, oral, Daily, Do not crush or chew.    white petrolatum 41 % ointment ointment 1 Application, Topical, 2 times daily       Physical Exam:  GEN: NAD  HEENT: ATNC,  anicteric, no JVD  CV: RRR, no r/g/m  Pulm: CTAB  Abdomen: NTND  Ext: warm, no LE edema noted  Neuro: A+Ox3  Psych: appropriate       Assessment/Plan   89-year-old female with history hypertension, COPD, sick sinus syndrome post DC PM, paroxysmal A-fib on sotalol.  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  Her course has been complicated by some delirium and agitation.  She has PEG dose placed as well.     #SSS s/p PM  #pAF on sotalol  #antipsychotic use    Recommendations  -Qtc today on 10/13 (unfortunately not uploaded into chart from [facility]) is 480 and appears stable  -okay to continue sotalol 80 BID for now, but would stop if there are significant electrolyte shifts, renal dysfunction as it is not absolutely essential and she has had not had significant afib burden on review of her recent interrogations  -Please keep K>4, Mg>2.5  -if more antipsychotics are required would continue getting daily ECGs to monitor Qtc; can reach out to EP anytime to review  -follow up with her outpt cardiologist    We will sign off at this time.    Thank you for interesting consult.  Patient was staffed with Dr. Back    EP Consult Pager: [phone] (weekday 7AM-6PM and weekend 7AM-2PM) and other: [phone]  EP Device Nurse  Pager: [phone] (weekday 7AM-4PM)       Dc Draper MD

## 2023-10-14 NOTE — NURSING NOTE
The night nurse bladder scanned and completed and I/o cath. I re scanned this am because she is still not voiding and there is 123 ml on scan. Notified Dr Gonzales, Dr Rodriguez and Dr Jeffrey. Orders entered for labs, specimens collected.

## 2023-10-14 NOTE — NURSING NOTE
Attempted to encourage and instruct pt on incentive spirometer use, pt is unable to follow commands. Completed I/O cath, no urine return. Administered Albuterol breathing tx. Bilateral lung sound diminished. MD ordered respiratory therapy. Kaela Del Angel RRT notified.

## 2023-10-14 NOTE — PROGRESS NOTES
"Eryn Willis is a 89 y.o. female on day 10 of admission presenting with Squamous cell carcinoma of face. No acute events overnight. Flap checks stable.    Subjective   Patient did well overnight with only some mild agitation thought to be due to urine retention as bladder scan showed ~1L, for which she was straight cath'd, agitation improved.     Objective   Physical Exam  Vitals reviewed in EMR  Gen: NAD, AOx2-3, resting comfortably in bed  Eyes: EOMI, sclera clear  Ears: Normal external ears bilaterally  Nose: No rhinorrhea; anterior nares clear  Oral Cavity: dry, old crusted blood  Head: normocephalic, atraumatic  Face/Neck: All incisions c/d/i, neck soft and flat without evidence of hematoma or fluid collection  -slightly increased area of fullness directly below skin paddle, stable  -no evidence of dehiscence   Resp: Non-labored breathing on room air, no stridor  Cards: No clubbing/cyanosis/edema of hands  Gastro: Soft, non-distended, 22fr PEG tube in place  : Voiding clear yellow urine  MSK: Moves all extremities; Kerlix around left thigh  Psych: Appropriate mood and affect    Last Recorded Vitals  Blood pressure 173/61, pulse 61, temperature 36.5 °C (97.7 °F), temperature source Temporal, resp. rate 18, height 1.651 m (5' 5\"), weight 59.1 kg (130 lb 4.7 oz), SpO2 95 %.  Intake/Output last 3 Shifts:  I/O last 3 completed shifts:  In: 1390 (23.5 mL/kg) [NG/GT:1390]  Out: 61 (1 mL/kg) [Urine:1 (0 mL/kg/hr); Emesis/NG output:60]  Weight: 59.1 kg     Relevant Results  No results found for this or any previous visit (from the past 24 hour(s)).    Scheduled medications  acetaminophen, 1,000 mg, g-tube, q8h DERIK  aspirin, 325 mg, g-tube, Daily  chlorhexidine, 15 mL, Mouth/Throat, TID after meals  enoxaparin, 40 mg, subcutaneous, q24h  esomeprazole, 40 mg, g-tube, Daily before breakfast  hydrogen peroxide, 1 Application, Topical, TID  iohexol, 50 mL, oral, Once in imaging  melatonin, 5 mg, g-tube, Daily  oxygen, , " inhalation, Continuous - 02/gases  [Held by provider] polyethylene glycol, 17 g, g-tube, Daily  [Held by provider] QUEtiapine, 25 mg, g-tube, Nightly  sotalol, 80 mg, g-tube, BID  terazosin, 1 mg, oral, Nightly  thiamine, 100 mg, g-tube, Daily  verapamil, 60 mg, g-tube, q8h DERIK  white petrolatum, 1 Application, Topical, TID    Continuous medications   None  PRN medications  PRN medications: albuterol, LORazepam, LORazepam, naloxone, naloxone, [Held by provider] OLANZapine, [Held by provider] QUEtiapine, traMADol, traMADol        Malnutrition Diagnosis Status: Ongoing  Malnutrition Diagnosis: Severe malnutrition related to chronic disease or condition  As Evidenced by: suspected <75% estimated energy requirement for >1 month, significant 14% weight loss x 6 months, severe muscle wasting, & severe fat loss  I agree with the dietitian's malnutrition diagnosis.    Assessment/Plan   Eryn Willis   is a 89 y.o. F who underwent Direct laryngoscopy,  Wide local excision of skin cancer, Oral cavity resection malignant neoplasm, Right selective neck dissection levels 1A-3, Reconstruction with left anterolateral thigh free flap with Sheron Whitney and Rachele on 10/4/23 on 10/4/2023.      Active Issues:  Right Buccal SCC  Possible Cervical Lymph Node Mets  HTN  COPD  Acute Blood Loss Anemia  Anemia of Chronic Disease  Hypokalemia  Hypomagnesia  Severe Protein Calorie Malnutrition  Mixed Delirium  Urinary retention     Plan:  -Flap Checks: q24hrs  -Drains:N/A  -Analgesia:  Dilaudid PCA-discontinued, Scheduled Acetaminophen; PRN Tramadol restarted  -FEN: mIVFs-discontinued, NPO, home bolus Tube Feeds; monitor and replete electrolytes as needed  -Pulm: wean oxygen to room air, Incentive Spirometer 10x/hr while awake; PRN COPD inhalers   -ID: Clindamycin 300mg Q8 ->complete   -Cardiac: Vitals per ENT free flap protocol then transition to Q4hr vitals; Aspirin 325mg daily, Sotalol 80mg BID, Verapamil 60mg Q8hrs; Electrophysiology  consulted follow up recs  -Endo: no current interventions  -GI: Bowel regimen held 2/2 diarrhea, PPI, and PRN anti-emetic  -: straight cath'd x1 overnight, obtain UA and start Hytrin if normal   -Steroids/Special: Incision and oral care per ENT free flap order set  -Embolic PPx: subcutaneous Lovenox, SCDs while in bed  -Dispo: [facility]. PT/OTordered; Patient has stated she would like to return home with home care at discharge. Discharge pending improvement of mental status.   Will discuss with family our recommendation for home PT given deconditioning after surgery.    Patient seen and discussed with attending Dr. Peralta who agrees with plan.     Kathy Rodriguez MD   PGY-1  Otolaryngology - Head & Neck Surgery  Head and Neck Phone: [phone]    ENT Consult Pager: [phone]  ENT Peds Pager: [phone]  ENT Subspecialty Team: [place]

## 2023-10-14 NOTE — CARE PLAN
The patient's goals for the shift include      The clinical goals for the shift include pt will not reqquire restraints throughout shift    Over the shift, the patient did not make progress toward the following goals. Barriers to progression include urinary retention. Recommendations to address these barriers include brooke catheter.    Pt c/o bladder pain and unable to urinate, bladder scan shows 1100ml, intermittent cather done, 1100 urine return.     Incontinent pads changed, pt clean and dry, incision care completed, mouth care completed, pt suctioned, thick yellow and brown secretions noted.

## 2023-10-14 NOTE — SIGNIFICANT EVENT
Rapid Response Note    Radar auto-page received for a score of 6 with the following vital signs: 35.2, 60, 16, 168/65, 81% on 4L NC.  Patient is post-op day 10 following facial reconstruction for squamous cell carcinoma of the face.  When I arrived to the bedside she was anxious and restless, with a labored breathing pattern.  Her oxygen delivery was changed to a venti mask which increased her oxygen saturations to 95-96%.  Spoke with ENT resident regarding patient.  Obtained ABG with the result of 7.20, 83, 106, lactate 0.6.  Decision made to transfer to SICU.  Due to lack of TSICU beds decision was made to board patient in the CICU bed 6.  Rapid Response team assisted with transfer of patient to the CICU safely.

## 2023-10-14 NOTE — CARE PLAN
The patient's goals for the shift include      The clinical goals for the shift include pt will not need sitter    Over the shift, the patient did not make progress toward the following goals. Barriers to progression include . Recommendations to address these barriers include   Problem: Skin  Goal: Participates in plan/prevention/treatment measures  Outcome: Progressing  Goal: Prevent/manage excess moisture  Outcome: Progressing  Goal: Prevent/minimize sheer/friction injuries  Outcome: Progressing  Goal: Promote/optimize nutrition  Outcome: Progressing  Goal: Promote skin healing  Outcome: Progressing   .

## 2023-10-14 NOTE — CARE PLAN
Problem: Skin  Goal: Participates in plan/prevention/treatment measures  10/14/2023 1150 by Shital Perez RN  Outcome: Progressing  10/14/2023 1149 by Shital Perez RN  Outcome: Progressing  Goal: Prevent/manage excess moisture  10/14/2023 1150 by Shital Perez RN  Outcome: Progressing  10/14/2023 1149 by Shital Perez RN  Outcome: Progressing  Goal: Prevent/minimize sheer/friction injuries  10/14/2023 1150 by Shital Perez RN  Outcome: Progressing  10/14/2023 1149 by Shital Perez RN  Outcome: Progressing  Goal: Promote/optimize nutrition  10/14/2023 1150 by Shital Perez RN  Outcome: Progressing  10/14/2023 1149 by Shital Perez RN  Outcome: Progressing  Goal: Promote skin healing  10/14/2023 1150 by Shital Perez RN  Outcome: Progressing  10/14/2023 1149 by Shital Perez RN  Outcome: Progressing   The patient's goals for the shift include      The clinical goals for the shift include pt will not need sitter    Over the shift, the patient did not make progress toward the following goals. Barriers to progression include . Recommendations to address these barriers include .

## 2023-10-14 NOTE — NURSING NOTE
Re scanned bladder 107ml. Has not voided. O2 78% on room air. Applied O2 at 2 L via NC, O2 up to 87%. Increased to 4 L NC. Notified Dr Elba MERINO to enter orders.

## 2023-10-14 NOTE — PROGRESS NOTES
"Referring Provider: Dany Jeffrey MD    Eryn Willis is a 89 y.o. 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 She is mumbling and not able to answer questions.    Per nursing, patient was catheterized overnight producing 1100 mL of urine. She has yet to void on her own. She has not been agitated but has been confused and mumbling this morning. No PRN psychiatric medications given in the last 24 hours.    On medical ROS, unable to assess due to patient's mental status     Psychiatric ROS  Delirium: change in speech and disorientation    Objective   Last Recorded Vitals  /61 (BP Location: Left arm, Patient Position: Lying) Comment: RN NOTIFIED  Pulse 61   Temp 36.5 °C (97.7 °F) (Temporal)   Resp 18   Ht 1.651 m (5' 5\")   Wt 59.1 kg (130 lb 4.7 oz)   LMP  (LMP Unknown) Comment:   SpO2 95%   BMI 21.68 kg/m²       Mental Status Exam  Appearance: Elderly  woman, frail, laying in hospital bed; face notable for a surgical flap on R  Attitude: Calm, uncooperative due to mentation  Behavior: Poor eye contact  Motor Activity: No PMR/PMA; no EPS observed  Speech: Low volume, spontaneous, mumbling, incoherent  Mood: Unable to assess  Affect: Flat  Thought Process: Disorganized  Thought Content: Unable to assess  Thought Perception: Unable to assess  Cognition:.  A/Ox0  Insight: Limited  Judgement: Poor at this time    Medications  Scheduled medications  acetaminophen, 1,000 mg, g-tube, q8h DERIK  aspirin, 325 mg, g-tube, Daily  chlorhexidine, 15 mL, Mouth/Throat, TID after meals  enoxaparin, 40 mg, subcutaneous, q24h  esomeprazole, 40 mg, g-tube, Daily before breakfast  hydrogen peroxide, 1 Application, Topical, TID  iohexol, 50 mL, oral, Once in imaging  melatonin, 5 mg, g-tube, Daily  oxygen, , inhalation, Continuous - 02/gases  [Held by provider] " polyethylene glycol, 17 g, g-tube, Daily  [Held by provider] QUEtiapine, 25 mg, g-tube, Nightly  sotalol, 80 mg, g-tube, BID  terazosin, 1 mg, oral, Nightly  thiamine, 100 mg, g-tube, Daily  verapamil, 60 mg, g-tube, q8h DERIK  white petrolatum, 1 Application, Topical, TID      Continuous medications     PRN medications  PRN medications: albuterol, LORazepam, LORazepam, naloxone, naloxone, [Held by provider] OLANZapine, [Held by provider] QUEtiapine, traMADol, traMADol      Relevant Results  Results for orders placed or performed during the hospital encounter of 10/04/23 (from the past 24 hour(s))   CBC   Result Value Ref Range    WBC 13.7 (H) 4.4 - 11.3 x10*3/uL    nRBC 0.0 0.0 - 0.0 /100 WBCs    RBC 3.40 (L) 4.00 - 5.20 x10*6/uL    Hemoglobin 10.3 (L) 12.0 - 16.0 g/dL    Hematocrit 32.2 (L) 36.0 - 46.0 %    MCV 95 80 - 100 fL    MCH 30.3 26.0 - 34.0 pg    MCHC 32.0 32.0 - 36.0 g/dL    RDW 18.0 (H) 11.5 - 14.5 %    Platelets 307 150 - 450 x10*3/uL    MPV 10.5 7.5 - 11.5 fL   Comprehensive metabolic panel   Result Value Ref Range    Glucose 175 (H) 74 - 99 mg/dL    Sodium 137 136 - 145 mmol/L    Potassium 4.6 3.5 - 5.3 mmol/L    Chloride 100 98 - 107 mmol/L    Bicarbonate 32 21 - 32 mmol/L    Anion Gap 10 10 - 20 mmol/L    Urea Nitrogen 37 (H) 6 - 23 mg/dL    Creatinine 0.57 0.50 - 1.05 mg/dL    eGFR 86 >60 mL/min/1.73m*2    Calcium 8.6 8.6 - 10.6 mg/dL    Albumin 2.9 (L) 3.4 - 5.0 g/dL    Alkaline Phosphatase 70 33 - 136 U/L    Total Protein 5.9 (L) 6.4 - 8.2 g/dL    AST 12 9 - 39 U/L    Bilirubin, Total 0.3 0.0 - 1.2 mg/dL    ALT 9 7 - 45 U/L         Psychiatric Risk Assessment  Acute Risk of Harm to Others is Considered: low   Acute Risk of Harm to Self is Considered: low      Assessment  Eryn Willis is a 89 y.o. female with no past psychiatric history and a past medical history of hypertension, COPD, CAD, SSS s/p pacemaker, paroxysmal SVT, right buccal SCC, malnutrition who was admitted to [facility] on 10/4/2023  "for elective oral cavity malignant neoplasm resection with reconstruction with left anterolateral thigh free flap and right selective neck dissection which was completed on 10/4/2023. Also s/p PEG placement this admission. Hospital course was complicated by gradual worsening of mentation and agitation starting from a 10/7.     Psychiatry was consulted on 10/9/2023 for delirium and concern for injury to self. Patient's Qtc has fluctuated between 440 ms and 515 ms.    On 10/14, patient is less coherent and more confused. She is not agitated but seems more delirious, possibly due to an underlying infection (elevated WBC, urinary retention could indicate UTI).        Diagnosis:  -Delirium, multifactorial (old age, baseline brain atrophy, prolonged hospital stay, recent large operation, poor sleep/disturbed circadian rhythm etc)     Recommendations:  - Patient does not currently meet criteria for inpatient psychiatric admission.   - To evaluate decision-making capacity, recommend use of the Capacity Evaluation Tool. Search “ IP Capacity Evaluation under SmartText\" unless the patient has a legal guardian, in which case all decisions per the legal guardian.  - Defer to primary team decision for 1:1 sitter for safety precautions.  - As with all hospitalized patients, would recommend delirium precautions, as below.     Work-up:  -CBC, CMP, UA to evaluate for infection        Medications:  - Continue melatonin 5 mg via PEG tube at 1800 daily  - Discontinued Valproic acid on 10/12  - Can give Quetiapine 12.5mg Q6h through PEG tube PRN, if needing antipsychotics, please obtain EKG daily if antipsychotic administered  - Continue thiamine 100mg daily    Follow-up:  -No need to follow-up with a psychiatrist at this time  - Patient's daughter Kandace is living with patient's son Epi for the past 10 months due to hip fracture, and Epi has also been the primary person taking care of patient and Kandace during this time. Kandace cannot reach " a phone easily due to limited mobility from hip fracture. Please reach out to Epi for updates and questions: Epi Willis ([phone]). Contact info updated in Epic by psychiatry.     ==========  - Discussed recommendations with primary team.  - Psychiatry will continue to follow.     Thank you for allowing us to participate in the care of this patient. Please page [phone] with any questions or concerns.       Jagruti Gonzales MD

## 2023-10-15 NOTE — CARE PLAN
The patient's goals for the shift include be free from restraints    The clinical goals for the shift include remove restraints    Over the shift, the patient did not make progress toward the following goals. Barriers to progression include sedation. Recommendations to address these barriers include frequent re-orienting

## 2023-10-15 NOTE — CONSULTS
Vancomycin Dosing by Pharmacy- Cessation of Therapy    Consult to pharmacy for vancomycin dosing has been discontinued by the prescriber, pharmacy will sign off at this time.    Please call pharmacy if there are further questions or re-enter a consult if vancomycin is resumed.     Lesley Chaparro, PharmD

## 2023-10-15 NOTE — PROGRESS NOTES
"Eryn Willis is a 89 y.o. female on day 11 of admission presenting with Squamous cell carcinoma of face. No acute events overnight. Flap checks stable.    Subjective   Overnight pt had increased confusion/lethargy with desat to 80% with acute hypercapnic respiratory failure on ABG requiring intubation with subsequent transfer to SICU team. Low urine output noted; given 500cc NS bolus. SICU treating as COPD exacerbation with HAP, started on vanc/zosyn. WBC downtrending from 12.1 to 10 today.    Objective   Physical Exam  Vitals reviewed in EMR  Gen: Intubated, sedated  Eyes: Sclera clear  Ears: Normal external ears bilaterally  Nose: No rhinorrhea; anterior nares clear  Oral Cavity: dry, old crusted blood; ETT secured with tube krause superior to skin paddle  Head: normocephalic, atraumatic  Face/Neck: All incisions c/d/i, neck soft and flat without evidence of hematoma or fluid collection  -slightly increased area of fullness directly below skin paddle, stable  -no evidence of dehiscence   Resp: Symmetric chest rise b/l; intubated  Cards: No clubbing/cyanosis/edema of hands  Gastro: Soft, non-distended, 22fr PEG tube in place  : Minimal output on straight cath  MSK: Kerlix around left thigh  Psych: Intubated and sedated    Last Recorded Vitals  Blood pressure (!) 106/40, pulse 60, temperature 36.2 °C (97.2 °F), temperature source Temporal, resp. rate 16, height 1.651 m (5' 5\"), weight 63 kg (138 lb 14.2 oz), SpO2 91 %.  Intake/Output last 3 Shifts:  I/O last 3 completed shifts:  In: 3438.7 (54.6 mL/kg) [I.V.:323.7 (5.1 mL/kg); NG/GT:1665; IV Piggyback:1450]  Out: 765 (12.1 mL/kg) [Urine:765 (0.3 mL/kg/hr)]  Weight: 63 kg     Relevant Results  Results for orders placed or performed during the hospital encounter of 10/04/23 (from the past 24 hour(s))   CBC   Result Value Ref Range    WBC 13.7 (H) 4.4 - 11.3 x10*3/uL    nRBC 0.0 0.0 - 0.0 /100 WBCs    RBC 3.40 (L) 4.00 - 5.20 x10*6/uL    Hemoglobin 10.3 (L) 12.0 - " 16.0 g/dL    Hematocrit 32.2 (L) 36.0 - 46.0 %    MCV 95 80 - 100 fL    MCH 30.3 26.0 - 34.0 pg    MCHC 32.0 32.0 - 36.0 g/dL    RDW 18.0 (H) 11.5 - 14.5 %    Platelets 307 150 - 450 x10*3/uL    MPV 10.5 7.5 - 11.5 fL   Comprehensive metabolic panel   Result Value Ref Range    Glucose 175 (H) 74 - 99 mg/dL    Sodium 137 136 - 145 mmol/L    Potassium 4.6 3.5 - 5.3 mmol/L    Chloride 100 98 - 107 mmol/L    Bicarbonate 32 21 - 32 mmol/L    Anion Gap 10 10 - 20 mmol/L    Urea Nitrogen 37 (H) 6 - 23 mg/dL    Creatinine 0.57 0.50 - 1.05 mg/dL    eGFR 86 >60 mL/min/1.73m*2    Calcium 8.6 8.6 - 10.6 mg/dL    Albumin 2.9 (L) 3.4 - 5.0 g/dL    Alkaline Phosphatase 70 33 - 136 U/L    Total Protein 5.9 (L) 6.4 - 8.2 g/dL    AST 12 9 - 39 U/L    Bilirubin, Total 0.3 0.0 - 1.2 mg/dL    ALT 9 7 - 45 U/L   Blood Gas Arterial Full Panel Unsolicited   Result Value Ref Range    POCT pH, Arterial 7.20 (LL) 7.38 - 7.42 pH    POCT pCO2, Arterial 83 (HH) 38 - 42 mm Hg    POCT pO2, Arterial 106 (H) 85 - 95 mm Hg    POCT SO2, Arterial 96 94 - 100 %    POCT Oxy Hemoglobin, Arterial 95.9 94.0 - 98.0 %    POCT Hematocrit Calculated, Arterial 32.0 (L) 36.0 - 46.0 %    POCT Sodium, Arterial 135 (L) 136 - 145 mmol/L    POCT Potassium, Arterial 5.0 3.5 - 5.3 mmol/L    POCT Chloride, Arterial 102 98 - 107 mmol/L    POCT Ionized Calcium, Arterial 1.29 1.10 - 1.33 mmol/L    POCT Glucose, Arterial 137 (H) 74 - 99 mg/dL    POCT Lactate, Arterial 0.6 0.4 - 2.0 mmol/L    POCT Base Excess, Arterial 2.6 -2.0 - 3.0 mmol/L    POCT HCO3 Calculated, Arterial 32.4 (H) 22.0 - 26.0 mmol/L    POCT Hemoglobin, Arterial 10.5 (L) 12.0 - 16.0 g/dL    POCT Anion Gap, Arterial 6 (L) 10 - 25 mmo/L    Patient Temperature 37.0 degrees Celsius   Renal Function Panel   Result Value Ref Range    Glucose 105 (H) 74 - 99 mg/dL    Sodium 139 136 - 145 mmol/L    Potassium 4.3 3.5 - 5.3 mmol/L    Chloride 104 98 - 107 mmol/L    Bicarbonate 30 21 - 32 mmol/L    Anion Gap 9 (L) 10 -  20 mmol/L    Urea Nitrogen 32 (H) 6 - 23 mg/dL    Creatinine 0.51 0.50 - 1.05 mg/dL    eGFR 89 >60 mL/min/1.73m*2    Calcium 7.5 (L) 8.6 - 10.6 mg/dL    Phosphorus 4.1 2.5 - 4.9 mg/dL    Albumin 2.6 (L) 3.4 - 5.0 g/dL   Magnesium   Result Value Ref Range    Magnesium 1.95 1.60 - 2.40 mg/dL   CBC and Auto Differential   Result Value Ref Range    WBC 12.1 (H) 4.4 - 11.3 x10*3/uL    nRBC 0.0 0.0 - 0.0 /100 WBCs    RBC 3.04 (L) 4.00 - 5.20 x10*6/uL    Hemoglobin 9.3 (L) 12.0 - 16.0 g/dL    Hematocrit 29.7 (L) 36.0 - 46.0 %    MCV 98 80 - 100 fL    MCH 30.6 26.0 - 34.0 pg    MCHC 31.3 (L) 32.0 - 36.0 g/dL    RDW 18.1 (H) 11.5 - 14.5 %    Platelets 269 150 - 450 x10*3/uL    MPV 10.2 7.5 - 11.5 fL    Neutrophils % 81.2 40.0 - 80.0 %    Immature Granulocytes %, Automated 0.8 0.0 - 0.9 %    Lymphocytes % 11.0 13.0 - 44.0 %    Monocytes % 6.8 2.0 - 10.0 %    Eosinophils % 0.0 0.0 - 6.0 %    Basophils % 0.2 0.0 - 2.0 %    Neutrophils Absolute 9.83 (H) 1.60 - 5.50 x10*3/uL    Immature Granulocytes Absolute, Automated 0.10 0.00 - 0.50 x10*3/uL    Lymphocytes Absolute 1.33 0.80 - 3.00 x10*3/uL    Monocytes Absolute 0.83 (H) 0.05 - 0.80 x10*3/uL    Eosinophils Absolute 0.00 0.00 - 0.40 x10*3/uL    Basophils Absolute 0.03 0.00 - 0.10 x10*3/uL   Lactate   Result Value Ref Range    Lactate 1.0 0.4 - 2.0 mmol/L   Coagulation Screen   Result Value Ref Range    Protime 10.9 9.8 - 12.8 seconds    INR 1.0 0.9 - 1.1    aPTT 31 27 - 38 seconds   Blood Gas Venous Full Panel   Result Value Ref Range    POCT pH, Venous 7.18 (LL) 7.33 - 7.43 pH    POCT pCO2, Venous 92 (HH) 41 - 51 mm Hg    POCT pO2, Venous 25 (L) 35 - 45 mm Hg    POCT SO2, Venous 24 (L) 45 - 75 %    POCT Oxy Hemoglobin, Venous 23.8 (L) 45.0 - 75.0 %    POCT Hematocrit Calculated, Venous 29.0 (L) 36.0 - 46.0 %    POCT Sodium, Venous 136 136 - 145 mmol/L    POCT Potassium, Venous 4.9 3.5 - 5.3 mmol/L    POCT Chloride, Venous 101 98 - 107 mmol/L    POCT Ionized Calicum, Venous  1.24 1.10 - 1.33 mmol/L    POCT Glucose, Venous 115 (H) 74 - 99 mg/dL    POCT Lactate, Venous 0.8 0.4 - 2.0 mmol/L    POCT Base Excess, Venous 4.1 (H) -2.0 - 3.0 mmol/L    POCT HCO3 Calculated, Venous 34.3 (H) 22.0 - 26.0 mmol/L    POCT Hemoglobin, Venous 9.7 (L) 12.0 - 16.0 g/dL    POCT Anion Gap, Venous 6.0 (L) 10.0 - 25.0 mmol/L    Patient Temperature 37.0 degrees Celsius    FiO2 50 %   Blood Gas Arterial Full Panel   Result Value Ref Range    POCT pH, Arterial 7.35 (L) 7.38 - 7.42 pH    POCT pCO2, Arterial 56 (H) 38 - 42 mm Hg    POCT pO2, Arterial 194 (H) 85 - 95 mm Hg    POCT SO2, Arterial 100 94 - 100 %    POCT Oxy Hemoglobin, Arterial 97.5 94.0 - 98.0 %    POCT Hematocrit Calculated, Arterial 29.0 (L) 36.0 - 46.0 %    POCT Sodium, Arterial 133 (L) 136 - 145 mmol/L    POCT Potassium, Arterial 4.7 3.5 - 5.3 mmol/L    POCT Chloride, Arterial 104 98 - 107 mmol/L    POCT Ionized Calcium, Arterial 1.18 1.10 - 1.33 mmol/L    POCT Glucose, Arterial 106 (H) 74 - 99 mg/dL    POCT Lactate, Arterial 1.0 0.4 - 2.0 mmol/L    POCT Base Excess, Arterial 4.4 (H) -2.0 - 3.0 mmol/L    POCT HCO3 Calculated, Arterial 30.9 (H) 22.0 - 26.0 mmol/L    POCT Hemoglobin, Arterial 9.5 (L) 12.0 - 16.0 g/dL    POCT Anion Gap, Arterial 3 (L) 10 - 25 mmo/L    Patient Temperature 37.0 degrees Celsius    FiO2 50 %   Blood Gas Arterial Full Panel   Result Value Ref Range    POCT pH, Arterial 7.56 (H) 7.38 - 7.42 pH    POCT pCO2, Arterial 34 (L) 38 - 42 mm Hg    POCT pO2, Arterial 175 (H) 85 - 95 mm Hg    POCT SO2, Arterial 100 94 - 100 %    POCT Oxy Hemoglobin, Arterial 97.9 94.0 - 98.0 %    POCT Hematocrit Calculated, Arterial 28.0 (L) 36.0 - 46.0 %    POCT Sodium, Arterial 133 (L) 136 - 145 mmol/L    POCT Potassium, Arterial 4.1 3.5 - 5.3 mmol/L    POCT Chloride, Arterial 104 98 - 107 mmol/L    POCT Ionized Calcium, Arterial 1.17 1.10 - 1.33 mmol/L    POCT Glucose, Arterial 110 (H) 74 - 99 mg/dL    POCT Lactate, Arterial 0.8 0.4 - 2.0 mmol/L     POCT Base Excess, Arterial 7.8 (H) -2.0 - 3.0 mmol/L    POCT HCO3 Calculated, Arterial 30.4 (H) 22.0 - 26.0 mmol/L    POCT Hemoglobin, Arterial 9.4 (L) 12.0 - 16.0 g/dL    POCT Anion Gap, Arterial 3 (L) 10 - 25 mmo/L    Patient Temperature 37.0 degrees Celsius    FiO2 50 %    Ventilator Rate 22 bpm    Tidal Volume 350 mL    Peep CHM2O 8.0 cm H2O   MRSA Surveillance for Vancomycin De-escalation, PCR    Specimen: Anterior Nares; Swab   Result Value Ref Range    MRSA PCR Not Detected Not Detected   Comprehensive metabolic panel   Result Value Ref Range    Glucose 97 74 - 99 mg/dL    Sodium 139 136 - 145 mmol/L    Potassium 3.6 3.5 - 5.3 mmol/L    Chloride 106 98 - 107 mmol/L    Bicarbonate 26 21 - 32 mmol/L    Anion Gap 11 10 - 20 mmol/L    Urea Nitrogen 32 (H) 6 - 23 mg/dL    Creatinine 0.47 (L) 0.50 - 1.05 mg/dL    eGFR >90 >60 mL/min/1.73m*2    Calcium 6.9 (L) 8.6 - 10.6 mg/dL    Albumin 2.0 (L) 3.4 - 5.0 g/dL    Alkaline Phosphatase 45 33 - 136 U/L    Total Protein 3.9 (L) 6.4 - 8.2 g/dL    AST 9 9 - 39 U/L    Bilirubin, Total 0.3 0.0 - 1.2 mg/dL    ALT 7 7 - 45 U/L   Calcium, Ionized   Result Value Ref Range    POCT Calcium, Ionized 1.04 (L) 1.1 - 1.33 mmol/L   Phosphorus   Result Value Ref Range    Phosphorus 2.3 (L) 2.5 - 4.9 mg/dL   Magnesium   Result Value Ref Range    Magnesium 1.72 1.60 - 2.40 mg/dL   CBC and Auto Differential   Result Value Ref Range    WBC 10.0 4.4 - 11.3 x10*3/uL    nRBC 0.0 0.0 - 0.0 /100 WBCs    RBC 2.46 (L) 4.00 - 5.20 x10*6/uL    Hemoglobin 7.7 (L) 12.0 - 16.0 g/dL    Hematocrit 24.2 (L) 36.0 - 46.0 %    MCV 98 80 - 100 fL    MCH 31.3 26.0 - 34.0 pg    MCHC 31.8 (L) 32.0 - 36.0 g/dL    RDW 18.2 (H) 11.5 - 14.5 %    Platelets 200 150 - 450 x10*3/uL    MPV 9.8 7.5 - 11.5 fL    Neutrophils % 69.2 40.0 - 80.0 %    Immature Granulocytes %, Automated 0.7 0.0 - 0.9 %    Lymphocytes % 22.2 13.0 - 44.0 %    Monocytes % 7.2 2.0 - 10.0 %    Eosinophils % 0.6 0.0 - 6.0 %    Basophils % 0.1 0.0  - 2.0 %    Neutrophils Absolute 6.94 (H) 1.60 - 5.50 x10*3/uL    Immature Granulocytes Absolute, Automated 0.07 0.00 - 0.50 x10*3/uL    Lymphocytes Absolute 2.23 0.80 - 3.00 x10*3/uL    Monocytes Absolute 0.72 0.05 - 0.80 x10*3/uL    Eosinophils Absolute 0.06 0.00 - 0.40 x10*3/uL    Basophils Absolute 0.01 0.00 - 0.10 x10*3/uL   Blood Gas Arterial Full Panel   Result Value Ref Range    POCT pH, Arterial 7.51 (H) 7.38 - 7.42 pH    POCT pCO2, Arterial 37 (L) 38 - 42 mm Hg    POCT pO2, Arterial 152 (H) 85 - 95 mm Hg    POCT SO2, Arterial 100 94 - 100 %    POCT Oxy Hemoglobin, Arterial 97.1 94.0 - 98.0 %    POCT Hematocrit Calculated, Arterial 29.0 (L) 36.0 - 46.0 %    POCT Sodium, Arterial 135 (L) 136 - 145 mmol/L    POCT Potassium, Arterial 4.2 3.5 - 5.3 mmol/L    POCT Chloride, Arterial 104 98 - 107 mmol/L    POCT Ionized Calcium, Arterial 1.18 1.10 - 1.33 mmol/L    POCT Glucose, Arterial 96 74 - 99 mg/dL    POCT Lactate, Arterial 0.7 0.4 - 2.0 mmol/L    POCT Base Excess, Arterial 6.1 (H) -2.0 - 3.0 mmol/L    POCT HCO3 Calculated, Arterial 29.5 (H) 22.0 - 26.0 mmol/L    POCT Hemoglobin, Arterial 9.6 (L) 12.0 - 16.0 g/dL    POCT Anion Gap, Arterial 6 (L) 10 - 25 mmo/L    Patient Temperature 37.0 degrees Celsius    FiO2 40 %       Scheduled medications  aspirin, 325 mg, g-tube, Daily  [MAR Hold] chlorhexidine, 15 mL, Mouth/Throat, TID after meals  doxycycline, 100 mg, intravenous, q12h  enoxaparin, 40 mg, subcutaneous, Daily  esomeprazole, 40 mg, g-tube, Daily before breakfast  [MAR Hold] hydrogen peroxide, 1 Application, Topical, TID  insulin lispro, 0-5 Units, subcutaneous, q4h  [MAR Hold] iohexol, 50 mL, oral, Once in imaging  ipratropium-albuteroL, 3 mL, nebulization, q6h  melatonin, 5 mg, g-tube, Daily  methylPREDNISolone sodium succinate (PF), 125 mg, intravenous, q24h  [MAR Hold] oxygen, , inhalation, Continuous - 02/gases  phenylephrine HCl in 0.9% NaCl, , ,   piperacillin-tazobactam, 3.375 g, intravenous,  q6h  [Held by provider] polyethylene glycol, 17 g, g-tube, Daily  potassium phosphate, 15 mmol, intravenous, Once  [Held by provider] QUEtiapine, 25 mg, g-tube, Nightly  sodium chloride, 3 mL, nebulization, q6h DERIK  sotalol, 80 mg, g-tube, BID  terazosin, 1 mg, oral, Nightly  thiamine, 100 mg, g-tube, Daily  vancomycin, 750 mg, intravenous, q12h  verapamil, 60 mg, g-tube, q8h DERIK  white petrolatum, 1 Application, Topical, TID    Continuous medications  lactated Ringer's, 50 mL/hr, Last Rate: 50 mL/hr (10/15/23 0500)  propofol, 5-50 mcg/kg/min, Last Rate: 25 mcg/kg/min (10/15/23 0600)    None  PRN medications  PRN medications: acetaminophen, albuterol, calcium gluconate, calcium gluconate, dextrose 10 % in water (D10W), dextrose, glucagon, [MAR Hold] LORazepam, [MAR Hold] LORazepam, magnesium sulfate, magnesium sulfate, [MAR Hold] naloxone, [MAR Hold] naloxone, [Held by provider] OLANZapine, oxygen, phenylephrine HCl in 0.9% NaCl, potassium chloride CR **OR** potassium chloride, potassium chloride CR **OR** potassium chloride, [Held by provider] QUEtiapine, [MAR Hold] traMADol, [MAR Hold] traMADol        Malnutrition Diagnosis Status: Ongoing  Malnutrition Diagnosis: Severe malnutrition related to chronic disease or condition  As Evidenced by: suspected <75% estimated energy requirement for >1 month, significant 14% weight loss x 6 months, severe muscle wasting, & severe fat loss  I agree with the dietitian's malnutrition diagnosis.    Assessment/Plan   Eryn Willis   is a 89 y.o. F who underwent Direct laryngoscopy,  Wide local excision of skin cancer, Oral cavity resection malignant neoplasm, Right selective neck dissection levels 1A-3, Reconstruction with left anterolateral thigh free flap with Sheron Whitney and Rachele on 10/4/23 on 10/4/2023.      Active Issues:  Right Buccal SCC  Possible Cervical Lymph Node Mets  HTN  COPD  Acute Blood Loss Anemia  Anemia of Chronic Disease  Hypokalemia  Hypomagnesia  Severe  Protein Calorie Malnutrition  Mixed Delirium  Urinary retention     Plan:  -Flap Checks: q24hrs  -Drains:N/A  -Analgesia:  Intubated/sedated on propofol; Scheduled Acetaminophen  -FEN: mIVF restarted at 50 per SICU while holding TF; NPO; monitor and replete electrolytes as needed  -Pulm: SICU following for COPD exacerbation with HCAP; Doxycycline bid 10/14-19 for COPD exacerbation; plan to wean vent as tolerated; PRN COPD inhalers; IS q1h post-extubation  -ID: Day 1 vanc/zosyn for HCAP coverage per SICU  -Cardiac: Vitals per ENT free flap protocol then transition to Q4hr vitals; Aspirin 325mg daily, Sotalol 80mg BID, Verapamil 60mg Q8hrs; No current interventions per electrophysiology recs  -Endo: no current interventions  -GI: Bowel regimen held 2/2 diarrhea, PPI, and PRN anti-emetic  -: Howard placed; strict I/O  -Steroids/Special: Incision and oral care per ENT free flap order set  -Embolic PPx: subcutaneous Lovenox, SCDs while in bed  -Dispo: [facility]. PT/OTordered; Patient has stated she would like to return home with home care at discharge. Discharge pending improvement in clinical condition.  Will discuss with family our recommendation for home PT given deconditioning after surgery.    Patient seen and discussed with attending Dr. Lewis who agrees with plan.     Ryan Guerrero DO  PGY-3  Otolaryngology - Head & Neck Surgery  Head and Neck Phone: [phone]    ENT Consult Pager: [phone]  ENT Peds Pager: [phone]  ENT Subspecialty Team: [place]

## 2023-10-15 NOTE — PROCEDURES
"Arterial Puncture/Cannulation    Date/Time: 10/14/2023 11:23 PM    Performed by: Rachel Woodard MD  Authorized by: Rachel Woodard MD  Consent: Verbal consent obtained. Written consent not obtained.  Risks and benefits: risks, benefits and alternatives were discussed  Consent given by: son.  Relevant documents: relevant documents present and verified  Test results: test results available and properly labeled  Site marked: the operative site was not marked  Patient identity confirmed: arm band, hospital-assigned identification number and provided demographic data  Time out: Immediately prior to procedure a \"time out\" was called to verify the correct patient, procedure, equipment, support staff and site/side marked as required.  Preparation: Patient was prepped and draped in the usual sterile fashion.  Local anesthesia used: yes  Anesthesia: local infiltration    Anesthesia:  Local anesthesia used: yes  Local Anesthetic: lidocaine 1% without epinephrine  Anesthetic total: 3 mL    Sedation:  Patient sedated: no    Patient tolerance: patient tolerated the procedure well with no immediate complications  Comments: Left radial artery prepped and draped in standard sterile fashion. Under ultrasound guidance 20g steel needle placed into artery, wire advanced through. Steel needle withdrawn, 20g angiocath placed over wire. Wire withdrawn. Waveform with appropriate pulsatility. Patient tolerated the procedure well with no apparent complications.               "

## 2023-10-15 NOTE — PROGRESS NOTES
"Eryn Willis is a 89 y.o. female on day 11 of admission presenting with Squamous cell carcinoma of face.    Subjective   Patient remains intubated and sedated on morning rounds. Minimal arousability. Patient received A-line and was intubated overnight. Most recent ABG is improved from admission to SICU and will continue to trend ABGs.        Objective     Physical Exam  Constitutional:       Comments: Sedated  intubated   HENT:      Head: Normocephalic and atraumatic.      Comments: Flap to R cheek and buccal mucosa. Appears c/d/I with normal color  Cardiovascular:      Rate and Rhythm: Normal rate.      Comments: Has pacemaker AAI-DDD @ 60. Current HR is 60  Pulmonary:      Comments: Increased secretions  Abdominal:      General: Abdomen is flat.      Palpations: Abdomen is soft.   Skin:     General: Skin is warm and dry.   Neurological:      Comments: Sedated and not very arousable         Last Recorded Vitals  Blood pressure (!) 134/37, pulse 60, temperature 36.3 °C (97.3 °F), resp. rate 14, height 1.651 m (5' 5\"), weight 63 kg (138 lb 14.2 oz), SpO2 100 %.  Intake/Output last 3 Shifts:  I/O last 3 completed shifts:  In: 3438.7 (54.6 mL/kg) [I.V.:323.7 (5.1 mL/kg); NG/GT:1665; IV Piggyback:1450]  Out: 765 (12.1 mL/kg) [Urine:765 (0.3 mL/kg/hr)]  Weight: 63 kg     Relevant Results  Scheduled medications  aspirin, 325 mg, g-tube, Daily  chlorhexidine, 15 mL, Mouth/Throat, TID  doxycycline, 100 mg, intravenous, q12h  enoxaparin, 40 mg, subcutaneous, Daily  esomeprazole, 40 mg, g-tube, Daily before breakfast  [MAR Hold] hydrogen peroxide, 1 Application, Topical, TID  insulin lispro, 0-5 Units, subcutaneous, q4h  ipratropium-albuteroL, 3 mL, nebulization, q6h  melatonin, 5 mg, g-tube, Daily  piperacillin-tazobactam, 3.375 g, intravenous, q6h  [Held by provider] polyethylene glycol, 17 g, g-tube, Daily  potassium phosphate (monobasic), 500 mg, oral, 4x daily  potassium phosphate, 15 mmol, intravenous, Once  [START ON " 10/16/2023] predniSONE, 40 mg, g-tube, Daily  [Held by provider] QUEtiapine, 25 mg, g-tube, Nightly  sodium chloride, 3 mL, nebulization, q6h DERIK  sotalol, 80 mg, g-tube, BID  terazosin, 1 mg, oral, Nightly  thiamine, 100 mg, g-tube, Daily  verapamil, 60 mg, g-tube, q8h DERIK  white petrolatum, 1 Application, Topical, TID      Continuous medications  lactated Ringer's, 50 mL/hr, Last Rate: 50 mL/hr (10/15/23 0500)  propofol, 5-50 mcg/kg/min, Last Rate: 10 mcg/kg/min (10/15/23 0905)      PRN medications  PRN medications: acetaminophen, albuterol, calcium gluconate, calcium gluconate, dextrose 10 % in water (D10W), dextrose, glucagon, magnesium sulfate, magnesium sulfate, oxygen, potassium chloride CR **OR** potassium chloride, potassium chloride CR **OR** potassium chloride    Results for orders placed or performed during the hospital encounter of 10/04/23 (from the past 24 hour(s))   Blood Gas Arterial Full Panel Unsolicited   Result Value Ref Range    POCT pH, Arterial 7.20 (LL) 7.38 - 7.42 pH    POCT pCO2, Arterial 83 (HH) 38 - 42 mm Hg    POCT pO2, Arterial 106 (H) 85 - 95 mm Hg    POCT SO2, Arterial 96 94 - 100 %    POCT Oxy Hemoglobin, Arterial 95.9 94.0 - 98.0 %    POCT Hematocrit Calculated, Arterial 32.0 (L) 36.0 - 46.0 %    POCT Sodium, Arterial 135 (L) 136 - 145 mmol/L    POCT Potassium, Arterial 5.0 3.5 - 5.3 mmol/L    POCT Chloride, Arterial 102 98 - 107 mmol/L    POCT Ionized Calcium, Arterial 1.29 1.10 - 1.33 mmol/L    POCT Glucose, Arterial 137 (H) 74 - 99 mg/dL    POCT Lactate, Arterial 0.6 0.4 - 2.0 mmol/L    POCT Base Excess, Arterial 2.6 -2.0 - 3.0 mmol/L    POCT HCO3 Calculated, Arterial 32.4 (H) 22.0 - 26.0 mmol/L    POCT Hemoglobin, Arterial 10.5 (L) 12.0 - 16.0 g/dL    POCT Anion Gap, Arterial 6 (L) 10 - 25 mmo/L    Patient Temperature 37.0 degrees Celsius   Renal Function Panel   Result Value Ref Range    Glucose 105 (H) 74 - 99 mg/dL    Sodium 139 136 - 145 mmol/L    Potassium 4.3 3.5 - 5.3  mmol/L    Chloride 104 98 - 107 mmol/L    Bicarbonate 30 21 - 32 mmol/L    Anion Gap 9 (L) 10 - 20 mmol/L    Urea Nitrogen 32 (H) 6 - 23 mg/dL    Creatinine 0.51 0.50 - 1.05 mg/dL    eGFR 89 >60 mL/min/1.73m*2    Calcium 7.5 (L) 8.6 - 10.6 mg/dL    Phosphorus 4.1 2.5 - 4.9 mg/dL    Albumin 2.6 (L) 3.4 - 5.0 g/dL   Magnesium   Result Value Ref Range    Magnesium 1.95 1.60 - 2.40 mg/dL   CBC and Auto Differential   Result Value Ref Range    WBC 12.1 (H) 4.4 - 11.3 x10*3/uL    nRBC 0.0 0.0 - 0.0 /100 WBCs    RBC 3.04 (L) 4.00 - 5.20 x10*6/uL    Hemoglobin 9.3 (L) 12.0 - 16.0 g/dL    Hematocrit 29.7 (L) 36.0 - 46.0 %    MCV 98 80 - 100 fL    MCH 30.6 26.0 - 34.0 pg    MCHC 31.3 (L) 32.0 - 36.0 g/dL    RDW 18.1 (H) 11.5 - 14.5 %    Platelets 269 150 - 450 x10*3/uL    MPV 10.2 7.5 - 11.5 fL    Neutrophils % 81.2 40.0 - 80.0 %    Immature Granulocytes %, Automated 0.8 0.0 - 0.9 %    Lymphocytes % 11.0 13.0 - 44.0 %    Monocytes % 6.8 2.0 - 10.0 %    Eosinophils % 0.0 0.0 - 6.0 %    Basophils % 0.2 0.0 - 2.0 %    Neutrophils Absolute 9.83 (H) 1.60 - 5.50 x10*3/uL    Immature Granulocytes Absolute, Automated 0.10 0.00 - 0.50 x10*3/uL    Lymphocytes Absolute 1.33 0.80 - 3.00 x10*3/uL    Monocytes Absolute 0.83 (H) 0.05 - 0.80 x10*3/uL    Eosinophils Absolute 0.00 0.00 - 0.40 x10*3/uL    Basophils Absolute 0.03 0.00 - 0.10 x10*3/uL   Lactate   Result Value Ref Range    Lactate 1.0 0.4 - 2.0 mmol/L   Coagulation Screen   Result Value Ref Range    Protime 10.9 9.8 - 12.8 seconds    INR 1.0 0.9 - 1.1    aPTT 31 27 - 38 seconds   Blood Gas Venous Full Panel   Result Value Ref Range    POCT pH, Venous 7.18 (LL) 7.33 - 7.43 pH    POCT pCO2, Venous 92 (HH) 41 - 51 mm Hg    POCT pO2, Venous 25 (L) 35 - 45 mm Hg    POCT SO2, Venous 24 (L) 45 - 75 %    POCT Oxy Hemoglobin, Venous 23.8 (L) 45.0 - 75.0 %    POCT Hematocrit Calculated, Venous 29.0 (L) 36.0 - 46.0 %    POCT Sodium, Venous 136 136 - 145 mmol/L    POCT Potassium, Venous 4.9  3.5 - 5.3 mmol/L    POCT Chloride, Venous 101 98 - 107 mmol/L    POCT Ionized Calicum, Venous 1.24 1.10 - 1.33 mmol/L    POCT Glucose, Venous 115 (H) 74 - 99 mg/dL    POCT Lactate, Venous 0.8 0.4 - 2.0 mmol/L    POCT Base Excess, Venous 4.1 (H) -2.0 - 3.0 mmol/L    POCT HCO3 Calculated, Venous 34.3 (H) 22.0 - 26.0 mmol/L    POCT Hemoglobin, Venous 9.7 (L) 12.0 - 16.0 g/dL    POCT Anion Gap, Venous 6.0 (L) 10.0 - 25.0 mmol/L    Patient Temperature 37.0 degrees Celsius    FiO2 50 %   Blood Gas Arterial Full Panel   Result Value Ref Range    POCT pH, Arterial 7.35 (L) 7.38 - 7.42 pH    POCT pCO2, Arterial 56 (H) 38 - 42 mm Hg    POCT pO2, Arterial 194 (H) 85 - 95 mm Hg    POCT SO2, Arterial 100 94 - 100 %    POCT Oxy Hemoglobin, Arterial 97.5 94.0 - 98.0 %    POCT Hematocrit Calculated, Arterial 29.0 (L) 36.0 - 46.0 %    POCT Sodium, Arterial 133 (L) 136 - 145 mmol/L    POCT Potassium, Arterial 4.7 3.5 - 5.3 mmol/L    POCT Chloride, Arterial 104 98 - 107 mmol/L    POCT Ionized Calcium, Arterial 1.18 1.10 - 1.33 mmol/L    POCT Glucose, Arterial 106 (H) 74 - 99 mg/dL    POCT Lactate, Arterial 1.0 0.4 - 2.0 mmol/L    POCT Base Excess, Arterial 4.4 (H) -2.0 - 3.0 mmol/L    POCT HCO3 Calculated, Arterial 30.9 (H) 22.0 - 26.0 mmol/L    POCT Hemoglobin, Arterial 9.5 (L) 12.0 - 16.0 g/dL    POCT Anion Gap, Arterial 3 (L) 10 - 25 mmo/L    Patient Temperature 37.0 degrees Celsius    FiO2 50 %   Blood Gas Arterial Full Panel   Result Value Ref Range    POCT pH, Arterial 7.56 (H) 7.38 - 7.42 pH    POCT pCO2, Arterial 34 (L) 38 - 42 mm Hg    POCT pO2, Arterial 175 (H) 85 - 95 mm Hg    POCT SO2, Arterial 100 94 - 100 %    POCT Oxy Hemoglobin, Arterial 97.9 94.0 - 98.0 %    POCT Hematocrit Calculated, Arterial 28.0 (L) 36.0 - 46.0 %    POCT Sodium, Arterial 133 (L) 136 - 145 mmol/L    POCT Potassium, Arterial 4.1 3.5 - 5.3 mmol/L    POCT Chloride, Arterial 104 98 - 107 mmol/L    POCT Ionized Calcium, Arterial 1.17 1.10 - 1.33 mmol/L     POCT Glucose, Arterial 110 (H) 74 - 99 mg/dL    POCT Lactate, Arterial 0.8 0.4 - 2.0 mmol/L    POCT Base Excess, Arterial 7.8 (H) -2.0 - 3.0 mmol/L    POCT HCO3 Calculated, Arterial 30.4 (H) 22.0 - 26.0 mmol/L    POCT Hemoglobin, Arterial 9.4 (L) 12.0 - 16.0 g/dL    POCT Anion Gap, Arterial 3 (L) 10 - 25 mmo/L    Patient Temperature 37.0 degrees Celsius    FiO2 50 %    Ventilator Rate 22 bpm    Tidal Volume 350 mL    Peep CHM2O 8.0 cm H2O   MRSA Surveillance for Vancomycin De-escalation, PCR    Specimen: Anterior Nares; Swab   Result Value Ref Range    MRSA PCR Not Detected Not Detected   Comprehensive metabolic panel   Result Value Ref Range    Glucose 97 74 - 99 mg/dL    Sodium 139 136 - 145 mmol/L    Potassium 3.6 3.5 - 5.3 mmol/L    Chloride 106 98 - 107 mmol/L    Bicarbonate 26 21 - 32 mmol/L    Anion Gap 11 10 - 20 mmol/L    Urea Nitrogen 32 (H) 6 - 23 mg/dL    Creatinine 0.47 (L) 0.50 - 1.05 mg/dL    eGFR >90 >60 mL/min/1.73m*2    Calcium 6.9 (L) 8.6 - 10.6 mg/dL    Albumin 2.0 (L) 3.4 - 5.0 g/dL    Alkaline Phosphatase 45 33 - 136 U/L    Total Protein 3.9 (L) 6.4 - 8.2 g/dL    AST 9 9 - 39 U/L    Bilirubin, Total 0.3 0.0 - 1.2 mg/dL    ALT 7 7 - 45 U/L   Calcium, Ionized   Result Value Ref Range    POCT Calcium, Ionized 1.04 (L) 1.1 - 1.33 mmol/L   Phosphorus   Result Value Ref Range    Phosphorus 2.3 (L) 2.5 - 4.9 mg/dL   Magnesium   Result Value Ref Range    Magnesium 1.72 1.60 - 2.40 mg/dL   CBC and Auto Differential   Result Value Ref Range    WBC 10.0 4.4 - 11.3 x10*3/uL    nRBC 0.0 0.0 - 0.0 /100 WBCs    RBC 2.46 (L) 4.00 - 5.20 x10*6/uL    Hemoglobin 7.7 (L) 12.0 - 16.0 g/dL    Hematocrit 24.2 (L) 36.0 - 46.0 %    MCV 98 80 - 100 fL    MCH 31.3 26.0 - 34.0 pg    MCHC 31.8 (L) 32.0 - 36.0 g/dL    RDW 18.2 (H) 11.5 - 14.5 %    Platelets 200 150 - 450 x10*3/uL    MPV 9.8 7.5 - 11.5 fL    Neutrophils % 69.2 40.0 - 80.0 %    Immature Granulocytes %, Automated 0.7 0.0 - 0.9 %    Lymphocytes % 22.2 13.0 -  44.0 %    Monocytes % 7.2 2.0 - 10.0 %    Eosinophils % 0.6 0.0 - 6.0 %    Basophils % 0.1 0.0 - 2.0 %    Neutrophils Absolute 6.94 (H) 1.60 - 5.50 x10*3/uL    Immature Granulocytes Absolute, Automated 0.07 0.00 - 0.50 x10*3/uL    Lymphocytes Absolute 2.23 0.80 - 3.00 x10*3/uL    Monocytes Absolute 0.72 0.05 - 0.80 x10*3/uL    Eosinophils Absolute 0.06 0.00 - 0.40 x10*3/uL    Basophils Absolute 0.01 0.00 - 0.10 x10*3/uL   Blood Gas Arterial Full Panel   Result Value Ref Range    POCT pH, Arterial 7.51 (H) 7.38 - 7.42 pH    POCT pCO2, Arterial 37 (L) 38 - 42 mm Hg    POCT pO2, Arterial 152 (H) 85 - 95 mm Hg    POCT SO2, Arterial 100 94 - 100 %    POCT Oxy Hemoglobin, Arterial 97.1 94.0 - 98.0 %    POCT Hematocrit Calculated, Arterial 29.0 (L) 36.0 - 46.0 %    POCT Sodium, Arterial 135 (L) 136 - 145 mmol/L    POCT Potassium, Arterial 4.2 3.5 - 5.3 mmol/L    POCT Chloride, Arterial 104 98 - 107 mmol/L    POCT Ionized Calcium, Arterial 1.18 1.10 - 1.33 mmol/L    POCT Glucose, Arterial 96 74 - 99 mg/dL    POCT Lactate, Arterial 0.7 0.4 - 2.0 mmol/L    POCT Base Excess, Arterial 6.1 (H) -2.0 - 3.0 mmol/L    POCT HCO3 Calculated, Arterial 29.5 (H) 22.0 - 26.0 mmol/L    POCT Hemoglobin, Arterial 9.6 (L) 12.0 - 16.0 g/dL    POCT Anion Gap, Arterial 6 (L) 10 - 25 mmo/L    Patient Temperature 37.0 degrees Celsius    FiO2 40 %               Vent Mode: Volume control/assist control  FiO2 (%):  [40 %-80 %] 40 %  S RR:  [12-22] 12  S VT:  [350 mL] 350 mL  PEEP/CPAP (cm H2O):  [8 cm H20] 8 cm H20  MAP (cm H2O):  [12-17] 12        Assessment/Plan   Principal Problem:    Squamous cell carcinoma of face  Active Problems:    Pacemaker    Gastroesophageal reflux disease without esophagitis    Arthritis    HTN (hypertension)    Dysrhythmias    Malignant neoplasm of cheek mucosa (CMS/HCC)    Eryn Willis is a 89 y.o. 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 Post-operative course complicated by delirium and urinary retention. 10/14 patient experienced worsening mental status changes, and respiratory decline. ABG demonstrated profound acute hypercarbic respiratory failure 7.20/83/26. CXR with questionable RML infiltrate. Patient was transferred to SICU for further management.         Neuro: Hospital course complicated by delerium. Psych following. A&O x 0 on presentation to SICU likely multi-factorial in the setting of post-operative delirium and hypercarbia.  - Per psych recommendations continue thiamine  - Continue Melatonin 5mg at bedtime after extubation  - If ongoing delirium issues can consider Quetiapine 12.5mg per PEG q6h prn  - Holding narcotics and benzos given AMS  - Currently intubated and sedated on Propofol - weaned to 15 from 25 on morning rounds.   - Not reversed after intubation  - Patient arrived to SICU with sitter, not required while intubated and sedated. Revisit when clinically appropriate   - PT/OT as appropriate      Cardiovascular: Hx HTN, Dysrhythmia s/p PPM Pacer interrogated by cardiology this admission AAI-DDD @ 60  - HDS, never requiring pressors  - Continuous ABP monitoring   - MAP > 65  - Continue home Sotalol and Verapamil   - Continue ASA 325mg qd     Pulm: COPD of unclear etiology. Follows with Pulmonology outpatient. Acute on chronic hypercarbic respiratory failure. Arrived to SICU on BiPAP 10/5 50%, concern about increasing BiPAP settings with new buccal flap. Decision made to intubate due to continued respiratory acidosis.  - Mechanically ventilated, improvement of pH. Most recent AB.51/37/152  - Bicarb 30, likely chronic CO2 retention will re-check ABG Q6 to monitor for over-correction of acidosis  - CXR with questionable RML infiltrate, treating for HCAP  - COPD exacerbation treatment: Prednisone  40 and doxycyline for 5 days  - Thick secretions noted. Follow up sputum cx  - Continue home Duonebs  - Wean vent as tolerated - may trial SPN-CPAP today if patient able  - IS q1h post-extubation      GI: Hx GERD. S/p PEG  - Restart Bolus tube feeds  - Continue Esomeprazole per PEG  - okay for meds through PEG     : Low urine output overnight, retention requiring intermittent straight cath this hospitalization   - 1L LR bolus for low UOP overnight  - Howard in place with 700+ UOP  - Strict I/Os  - ICU electrolyte repletion   - replete K and phos with Kphos through PEG     Heme: acute blood loss anemia/anemia of chronic disease  - Continue to monitor H/H. Hgb 9.3 -> 7.7 (likely dilutional) -> continue to trend  - Continue Lovenox 40  -Will resend T& S     Endo: no DM or thyroid disease  - COPD exacerbation steroids. Prednisone 40 qD (10/14-10/19)  - Accuchecks  - Mild SSI     ID: Concern for HCAP.  - Completed savanah-operative Clinda  - WBC downtrending 12.1 from 15, continue to monitor  - discontinued vanc given MRSA negative  - Zosyn (10/14-10/21)  - COPD exacerbation: Doxycycline (10/14-10/19)  - Sputum CX sent 10/15  - UA sent 10/14, follow up reflex culture     ENT: Right buccal squamous cell carcinoma  s/p wide local excision and left anterolateral thigh free flap reconstruction on 10/4/23  - Flap care per ENT  - Patient electively glidescoped to avoid trauma to flap  - ETT secured to left  - continue peridex swabs 3x daily     Prophylaxis:  SCDs  Lovenox  Esomeprazole     NOK: james Chowdary [phone]. Discussed transfer to ICU, intubation and ongoing care 10/14. Confirmation of code status at this time. Patient is FULL CODE     Lines:  L radial arterial line (10/14-  PIV x2  Howard (10/14-     Restraints: required medical device at this time secondary to altered mental status, line pulling and current intubation.     Dispo: SICU care, discussed with attending Dr. Ywa Cortez MD

## 2023-10-15 NOTE — PROCEDURES
Intubation    Date/Time: 10/14/2023 11:33 PM    Performed by: Rachel Woodard MD  Authorized by: Rachel Woodard MD    Consent:     Consent obtained:  Verbal (per son)    Consent given by:  Healthcare agent    Risks, benefits, and alternatives were discussed: yes      Alternatives discussed:  No treatment and observation  Universal protocol:     Procedure explained and questions answered to patient or proxy's satisfaction: yes      Relevant documents present and verified: yes      Test results available: yes      Imaging studies available: yes      Required blood products, implants, devices, and special equipment available: yes      Site/side marked: no      Immediately prior to procedure, a time out was called: yes      Patient identity confirmed:  Hospital-assigned identification number and arm band  Pre-procedure details:     Indications: respiratory failure      Patient status:  Altered mental status    Look externally: facial trauma      Mouth opening - incisor distance:  3 or more finger widths    Hyoid-mental distance: 3 or more finger widths      Hyoid-thyroid distance: 2 or more finger widths      Mallampati score:  I    Obstruction comment:  Free flap    Neck mobility: normal      Pharmacologic strategy: RSI      Induction agents:  Propofol    Paralytics:  Rocuronium  Procedure details:     Preoxygenation:  BiLevel    CPR in progress: no      Number of attempts:  1  Successful intubation attempt details:     Intubation method:  Oral    Intubation technique: video assisted      Laryngoscope blade:  Mac 3    Bougie used: no      Grade view: I      Tube size (mm):  7.0    Tube type:  Cuffed    Tube visualized through cords: yes    Placement assessment:     ETT at teeth/gumline (cm):  22    Tube secured with:  ETT krause    Breath sounds:  Equal    Placement verification: chest rise, colorimetric ETCO2 and CXR verification      Chest x-ray findings: pending.  Post-procedure details:      Procedure completion:  Tolerated

## 2023-10-15 NOTE — PROGRESS NOTES
"Vancomycin Dosing by Pharmacy- INITIAL    Eryn Willis is a 89 y.o. year old female who Pharmacy has been consulted for vancomycin dosing for pneumonia. Based on the patient's indication and renal status this patient will be dosed based on a goal AUC of 500-600.     Renal function is currently stable.    Visit Vitals  /65 (BP Location: Left arm, Patient Position: Lying)   Pulse 60   Temp 35.9 °C (96.6 °F) (Temporal)   Resp 18        Lab Results   Component Value Date    CREATININE 0.57 10/14/2023    CREATININE 0.50 10/13/2023    CREATININE 0.57 10/12/2023    CREATININE 0.71 10/10/2023        Patient weight is No results found for: \"PTWEIGHT\"    No results found for: \"CULTURE\"     I/O last 3 completed shifts:  In: 1935 (32.7 mL/kg) [NG/GT:1935]  Out: 0 (0 mL/kg)   Weight: 59.1 kg   [unfilled]    Lab Results   Component Value Date    PATIENTTEMP 37.0 10/14/2023          Assessment/Plan     Patient will not be given a loading dose.  Will initiate vancomycin maintenance,  750 mg every 12 hours.    This dosing regimen is predicted by [employer] to result in the following pharmacokinetic parameters:  Regimen: 750 mg IV every 12 hours.  Start time: 20:03 on 10/14/2023  Exposure target: AUC24 (range)400-600 mg/L.hr   AUC24,ss: 539 mg/L.hr  Probability of AUC24 > 400: 82 %  Ctrough,ss: 17.6 mg/L  Probability of Ctrough,ss > 20: 37 %  Probability of nephrotoxicity (Lodise CHARLIE 2009): 13 %  Follow-up level will be ordered on 10/16 at AM unless clinically indicated sooner.  Will continue to monitor renal function daily while on vancomycin and order serum creatinine at least every 48 hours if not already ordered.  Follow for continued vancomycin needs, clinical response, and signs/symptoms of toxicity.       Lesley Chaparro, PharmD       "

## 2023-10-15 NOTE — H&P
History Of Present Illness  Eryn Willis is a 89 y.o. female with a past medical history significant for COPD, dysrhythmia s/p PPM AAI-DDD @ 60, HTN, GERD, arthritis, anemia and right buccal squamous cell carcinoma  s/p wide local excision and left anterolateral thigh free flap reconstruction on 10/4/23. Post-operative course complicated by delirium and urinary retention. 10/14 patient experienced worsening mental status changes, and respiratory decline. ABG demonstrated profound acute hypercarbic respiratory failure 7.20/83/26. CXR with questionable RML infiltrate. At this time patient was transferred to SICU for further management.     Past Medical History:  COPD  Right buccal squamous cell carcinoma  Dysrhytmia s/p PPM  HTN  Anemia  Arthritis  GERD    Past Surgical History:  PEG  Tonsillectomy   Wide local excision of right buccal excision, s/p left anterolateral thigh free flap    Social History:  Never smoker, denies EtOH and drug use     Home Medications:  Albuterol-Ipatropium 90mcg/inh prn for wheezing  Verapamil 180mg capsule daily by mouth   Sotalol 80mg tablet bid by mouth     Allergies:  Codeine  Penicillins        Past Medical History  Past Medical History:   Diagnosis Date    Cancer (CMS/HCC)     Hypertension     Irregular heart beat     Pacemaker        Surgical History  Past Surgical History:   Procedure Laterality Date    TONSILLECTOMY          Social History  She reports that she has never smoked. She has never used smokeless tobacco. She reports that she does not drink alcohol and does not use drugs.    Family History  No family history on file.     Allergies  Codeine and Penicillins    Review of Systems   Constitutional:  Negative for chills and fever.   HENT:  Positive for facial swelling.    Respiratory:  Positive for shortness of breath.    Gastrointestinal:  Negative for abdominal distention and abdominal pain.   Genitourinary:  Positive for difficulty urinating.   Psychiatric/Behavioral:   "Positive for agitation.         Physical Exam  Constitutional:       Comments: AMS, not oriented x3   HENT:      Nose: Nose normal.      Mouth/Throat:      Mouth: Mucous membranes are moist.      Comments: Right buccal flap  Cardiovascular:      Rate and Rhythm: Normal rate and regular rhythm.      Comments: Paced @ 60  Pulmonary:      Comments: BiPAP in places, diminished breath sounds at bases, clear to auscultation   Abdominal:      Palpations: Abdomen is soft.   Musculoskeletal:      Cervical back: Normal range of motion and neck supple.   Skin:     General: Skin is warm.   Neurological:      Mental Status: She is disoriented.        Last Recorded Vitals  Blood pressure 141/58, pulse 60, temperature 35.9 °C (96.6 °F), temperature source Temporal, resp. rate 22, height 1.651 m (5' 5\"), weight 59.1 kg (130 lb 4.7 oz), SpO2 100 %.    Relevant Results             Assessment/Plan   Principal Problem:    Squamous cell carcinoma of face  Active Problems:    Pacemaker    Gastroesophageal reflux disease without esophagitis    Arthritis    HTN (hypertension)    Dysrhythmias    Malignant neoplasm of cheek mucosa (CMS/HCC)    Eryn Willis is a 89 y.o. female with a past medical history significant for COPD, dysrhythmia s/p PPM AAI-DDD @ 60, HTN, GERD, arthritis, anemia and right buccal squamous cell carcinoma  s/p wide local excision and left anterolateral thigh free flap reconstruction on 10/4/23. Post-operative course complicated by delirium and urinary retention. 10/14 patient experienced worsening mental status changes, and respiratory decline. ABG demonstrated profound acute hypercarbic respiratory failure 7.20/83/26. CXR with questionable RML infiltrate. At this time patient was transferred to SICU for further management.      Neuro: Hospital course complicated by delerium. Psych following. A&O x 0 on presentation to SICU likely multi-factorial in the setting of post-operative delirium and hypercarbia.  - Per psych " recommendations continue thiamine  - Continue Melatonin 5mg at bedtime after extubation  - If ongoing delirium issues can consider Quetiapine 12.5mg per PEG q6h prn  - Holding narcotics and benzos given AMS  - Currently intubated and sedated on Propofol   - Not reversed after intubation  - Patient arrived to SICU with sitter, not required while intubated and sedated. Revisit when clinically appropriate   - PT/OT as appropriate     Cardiovascular: Hx HTN, Dysrhythmia s/p PPM Pacer interrogated by cardiology this admission AAI-DDD @ 60  - HDS, never requiring pressors  - Continuous ABP monitoring   - MAP > 65  - Continue home Sotalol and Verapamil   - Continue ASA 325mg qd    Pulm: COPD of unclear etiology. Follows with Pulmonology outpatient. Acute on chronic hypercarbic respiratory failure. Arrived to SICU on BiPAP 10/5 50%, concern about increasing BiPAP settings with new buccal flap. Decision made to intubate due to continued respiratory acidosis.  - Mechanically ventilated, improvement of pH. Most recent AB.35/56/194  - Bicarb 30, likely chronic CO2 retention will re-check ABG to monitor for over-correction of acidosis  - CXR with questionable RML infiltrate, treating for HCAP  - COPD exacerbation treatment: Methylpred x3 days, Doxycycline   - Thick secretions noted. Will send sputum cx  - Continue home Duonebs  - Wean vent as tolerated   - IS q1h post-extubation     GI: Hx GERD. S/p PEG  - Was receiving bolus TFs prior to respiratory decompensation. Will remain NPO tonight. Reassess re-starting TF in the am  - Continue Esomeprazole per PEG    : Low urine output, retention requiring intermittent straight cath this hospitalization   -mIVF @ 50/hr while holding Tfs  - 1L LR bolus for low UOP   - Will place Howard  - Strict I/Os  - ICU electrolyte repletion     Heme: acute blood loss anemia/anemia of chronic disease  - Continue to monitor H/H. Hgb 9.3  - Continue Lovenox  -Will resend T& S    Endo: no DM or  thyroid disease  - COPD exacerbation steroids. Methylprednisolone 125mg every day (10/14-10/17)  - Accuchecks  - Mild SSI    ID: Concern for HCAP.  - Completed savanah-operative Clinda  - WBC downtrending 12.1 from 15, continue to monitor  - Vanco (10/14-  - Zosyn (10/14-  - COPD exacerbation: Doxycycline (10/14-10/19)  - Sputum CX sent 10/15  - UA sent 10/14, follow up reflex culture    ENT: Right buccal squamous cell carcinoma  s/p wide local excision and left anterolateral thigh free flap reconstruction on 10/4/23  - Flap care per ENT  - Patient electively glidescoped to avoid trauma to flap  - ETT secured to left    Prophylaxis:  SCDs  Lovenox  Esomeprazole    NOK: james Chowdary [phone]. Discussed transfer to ICU, intubation and ongoing care 10/14. Confirmation of code status at this time. Patient is FULL CODE    Lines:  L radial arterial line (10/14-  PIV x2  Howard (10/14-    Restraints: required medical device at this time secondary to altered mental status, line pulling and current intubation.    Dispo: SICU care, discussed with attending Dr. Franc Woodard MD

## 2023-10-15 NOTE — SIGNIFICANT EVENT
At 1800 MD notified of patient desat to 80%, upon arrival patient saturating 97% on 4L NC. No noisy breathing. Patient was A&Ox2 and appeared confused and lethargic which was different from her baseline. CXR showing questionable R middle lobe opacification. WBC 13 down from 15. She had low urine output of 100cc over 12 hours and was given 500cc fluid bolus without return of urine on straight cath when attempting to collect for UA.  MD auscultated lungs and noted decreased respiratory effort with suspect low TV. RT engaged for scheduled breathing treatments. Patient was maintaining her sats but became increasingly lethargic and confused. ABG drawn showing pH 7.2, CO2 83, bicarb 32. At this time rapid response team engaged ICU for transfer. Patient was accepted by SICU (boarding in CICU). Discussed with son regarding his wishes for his mother- full code, proceed with all necessary interventions.     Care plan discussed with Dr. Jeffrey. Upon arrival to CICU BiPap initiated at 10/5 settings with initial improvement in respiratory status.     Called again at 2100 regarding new ABG showing CO2 92, pH 7.18 with plan to intubate. Son called to update regarding care plan.     ENT  Ph: [phone]

## 2023-10-16 NOTE — CARE PLAN
The patient's goals for the shift include      The clinical goals for the shift include increase urination    Over the shift, the patient did not make progress toward the following goals. Barriers to progression include ***. Recommendations to address these barriers include ***.

## 2023-10-16 NOTE — PROGRESS NOTES
"Eryn Willis is a 89 y.o. female on day 12 of admission presenting with Squamous cell carcinoma of face.    Subjective   Patient remains intubated and is very delirious upon morning rounds. Claims that other providers want to \"kill\" her. Was not following commands when RT was attempting to get weaning parameters. Patient was on SPN-CPAP 8/8 from midnight and was switched to 5/5 around 10 AM. Unable to get accurate weaning parameters due to significant AMS and patient not following commands and believing RT was going to harm her.   Other overnight events were significant for patient pulling out her brooke while the balloon was inflated. Patient received a bolus of 500 LR overnight for soft pressures.        Objective     Physical Exam  Constitutional:       Comments: Intubated   HENT:      Head: Normocephalic and atraumatic.      Comments: Flap to R cheek and buccal mucosa. Appears c/d/I with normal color  Cardiovascular:      Rate and Rhythm: Normal rate.      Comments: Has pacemaker AAI-DDD @ 60. Current HR is 60  Pulmonary:      Comments: Increased secretions  Abdominal:      General: Abdomen is flat.      Palpations: Abdomen is soft.   Skin:     General: Skin is warm and dry.   Neurological:      Comments: Moving all extremities   Psychiatric:      Comments: Erratic and delirious         Last Recorded Vitals  Blood pressure (!) 93/46, pulse 66, temperature 35.9 °C (96.6 °F), resp. rate 17, height 1.651 m (5' 5\"), weight 63.2 kg (139 lb 5.3 oz), SpO2 98 %.  Intake/Output last 3 Shifts:  I/O last 3 completed shifts:  In: 2453.5 (38.8 mL/kg) [I.V.:903.5 (14.3 mL/kg); NG/GT:100; IV Piggyback:1450]  Out: 2520 (39.9 mL/kg) [Urine:2520 (1.1 mL/kg/hr)]  Weight: 63.2 kg     Relevant Results  Scheduled medications  aspirin, 325 mg, g-tube, Daily  chlorhexidine, 15 mL, Mouth/Throat, TID  doxycycline, 100 mg, intravenous, q12h  esomeprazole, 40 mg, g-tube, Daily before breakfast  [MAR Hold] hydrogen peroxide, 1 Application, " Topical, TID  insulin lispro, 0-5 Units, subcutaneous, q4h  ipratropium-albuteroL, 3 mL, nebulization, q6h  melatonin, 5 mg, g-tube, Daily  piperacillin-tazobactam, 3.375 g, intravenous, q6h  [Held by provider] polyethylene glycol, 17 g, g-tube, Daily  potassium phosphate, 15 mmol, intravenous, Once  predniSONE, 40 mg, g-tube, Daily  [Held by provider] QUEtiapine, 25 mg, g-tube, Nightly  sodium chloride, 3 mL, nebulization, q6h DERIK  sotalol, 80 mg, g-tube, BID  terazosin, 1 mg, oral, Nightly  thiamine, 100 mg, g-tube, Daily  verapamil, 60 mg, g-tube, q8h DERIK  white petrolatum, 1 Application, Topical, TID      Continuous medications  lactated Ringer's, 50 mL/hr, Last Rate: 50 mL/hr (10/15/23 0500)  propofol, 5-50 mcg/kg/min, Last Rate: 5 mcg/kg/min (10/16/23 0245)      PRN medications  PRN medications: acetaminophen, albuterol, calcium gluconate, calcium gluconate, dextrose 10 % in water (D10W), dextrose, glucagon, magnesium sulfate, magnesium sulfate, oxygen, potassium chloride CR **OR** potassium chloride, potassium chloride CR **OR** potassium chloride    Results for orders placed or performed during the hospital encounter of 10/04/23 (from the past 24 hour(s))   POCT GLUCOSE   Result Value Ref Range    POCT Glucose 85 74 - 99 mg/dL   Blood Gas Arterial Full Panel   Result Value Ref Range    POCT pH, Arterial 7.44 (H) 7.38 - 7.42 pH    POCT pCO2, Arterial 42 38 - 42 mm Hg    POCT pO2, Arterial 95 85 - 95 mm Hg    POCT SO2, Arterial 99 94 - 100 %    POCT Oxy Hemoglobin, Arterial 96.1 94.0 - 98.0 %    POCT Hematocrit Calculated, Arterial 32.0 (L) 36.0 - 46.0 %    POCT Sodium, Arterial 134 (L) 136 - 145 mmol/L    POCT Potassium, Arterial 6.0 (H) 3.5 - 5.3 mmol/L    POCT Chloride, Arterial 104 98 - 107 mmol/L    POCT Ionized Calcium, Arterial 1.24 1.10 - 1.33 mmol/L    POCT Glucose, Arterial 129 (H) 74 - 99 mg/dL    POCT Lactate, Arterial 1.4 0.4 - 2.0 mmol/L    POCT Base Excess, Arterial 3.9 (H) -2.0 - 3.0 mmol/L     POCT HCO3 Calculated, Arterial 28.5 (H) 22.0 - 26.0 mmol/L    POCT Hemoglobin, Arterial 10.7 (L) 12.0 - 16.0 g/dL    POCT Anion Gap, Arterial 8 (L) 10 - 25 mmo/L    Patient Temperature 37.0 degrees Celsius    FiO2 30 %   POCT GLUCOSE   Result Value Ref Range    POCT Glucose 109 (H) 74 - 99 mg/dL   POCT GLUCOSE   Result Value Ref Range    POCT Glucose 110 (H) 74 - 99 mg/dL   POCT GLUCOSE   Result Value Ref Range    POCT Glucose 132 (H) 74 - 99 mg/dL   Blood Gas Arterial Full Panel Unsolicited   Result Value Ref Range    POCT pH, Arterial 7.45 (H) 7.38 - 7.42 pH    POCT pCO2, Arterial 47 (H) 38 - 42 mm Hg    POCT pO2, Arterial 124 (H) 85 - 95 mm Hg    POCT SO2, Arterial 99 94 - 100 %    POCT Oxy Hemoglobin, Arterial 97.2 94.0 - 98.0 %    POCT Hematocrit Calculated, Arterial 28.0 (L) 36.0 - 46.0 %    POCT Sodium, Arterial 135 (L) 136 - 145 mmol/L    POCT Potassium, Arterial 5.1 3.5 - 5.3 mmol/L    POCT Chloride, Arterial 104 98 - 107 mmol/L    POCT Ionized Calcium, Arterial 1.24 1.10 - 1.33 mmol/L    POCT Glucose, Arterial 131 (H) 74 - 99 mg/dL    POCT Lactate, Arterial 0.9 0.4 - 2.0 mmol/L    POCT Base Excess, Arterial 7.8 (H) -2.0 - 3.0 mmol/L    POCT HCO3 Calculated, Arterial 32.7 (H) 22.0 - 26.0 mmol/L    POCT Hemoglobin, Arterial 9.3 (L) 12.0 - 16.0 g/dL    POCT Anion Gap, Arterial 3 (L) 10 - 25 mmo/L    Patient Temperature 37.0 degrees Celsius   Vancomycin   Result Value Ref Range    Vancomycin 5.5 5.0 - 20.0 ug/mL   POCT GLUCOSE   Result Value Ref Range    POCT Glucose 110 (H) 74 - 99 mg/dL               Vent Mode: Pressure support  FiO2 (%):  [30 %-40 %] 30 %  S RR:  [12] 12  S VT:  [350 mL] 350 mL  PEEP/CPAP (cm H2O):  [8 cm H20] 8 cm H20  LA SUP:  [10 cm H20] 10 cm H20  MAP (cm H2O):  [11-12] 11        Assessment/Plan   Principal Problem:    Squamous cell carcinoma of face  Active Problems:    Pacemaker    Gastroesophageal reflux disease without esophagitis    Arthritis    HTN (hypertension)    Dysrhythmias     Malignant neoplasm of cheek mucosa (CMS/HCC)    Eryn Willis is a 89 y.o. female with a past medical history significant for COPD, dysrhythmia s/p PPM AAI-DDD @ 60, HTN, GERD, arthritis, anemia and right buccal squamous cell carcinoma  s/p wide local excision and left anterolateral thigh free flap reconstruction on 10/4/23. Post-operative course complicated by delirium and urinary retention. 10/14 patient experienced worsening mental status changes, and respiratory decline. ABG demonstrated profound acute hypercarbic respiratory failure 7.20/83/26. CXR with questionable RML infiltrate. Patient was transferred to SICU for further management.        Neuro: Hospital course complicated by delerium. Psych following. A&O x 0 on presentation to SICU likely multi-factorial in the setting of post-operative delirium and hypercarbia.  - Per psych recommendations continue thiamine  - Continue Melatonin 5mg at bedtime after extubation  - If ongoing delirium issues can consider Quetiapine 12.5mg per PEG q6h prn  - Holding narcotics and benzos given AMS  - Currently intubated and mildly sedated on Propofol - discontinued propofol and patient currently on precidex. Patient is very delirious and has waxing and waning mental status. Patient was following commands earlier but stopped following commands and believed other members of the care team wanted to harm her.   - Not reversed after intubation  - Patient arrived to SICU with sitter, not required while intubated and sedated. Revisit when clinically appropriate   - PT/OT as appropriate      Cardiovascular: Hx HTN, Dysrhythmia s/p PPM Pacer interrogated by cardiology this admission AAI-DDD @ 60  - had soft pressures intermittently requiring 500 of LR  - Continuous ABP monitoring   - MAP > 65  - Continue home Sotalol and Verapamil   - Continue ASA 325mg qd     Pulm: COPD of unclear etiology. Follows with Pulmonology outpatient. Acute on chronic hypercarbic respiratory failure.  Arrived to SICU on BiPAP 10/5 50%, concern about increasing BiPAP settings with new buccal flap. Decision made to intubate due to continued respiratory acidosis.  - Mechanically ventilated, improvement of pH. Most recent AB.51/37/152  - Bicarb 30, likely chronic CO2 retention will re-check ABG Q6 to monitor for over-correction of acidosis  - CXR with questionable RML infiltrate, treating for HCAP  - COPD exacerbation treatment: Prednisone 40 and doxycyline for 5 days  - Thick secretions noted. Sputum culture showed salivary contamination  - initial weaning parameters on  demonstrated RSBI 83 and NIF -23.4. Patient was transitioned to  but unable to get repeat weaning parameters due to significantly altered mental status.   - Continue home Duonebs  - Wean vent as tolerated - may trial SPN-CPAP today if patient able  - IS q1h post-extubation      GI: Hx GERD. S/p PEG  - Bolus tube feeds  - Continue Esomeprazole per PEG  - okay for meds through PEG     : Low urine output overnight, retention requiring intermittent straight cath this hospitalization   - 1L LR bolus for low UOP overnight  - Howard in place with 700+ UOP  - Strict I/Os  - K elevated at 5.2. Will monitor  - ICU electrolyte repletion   - replete K and phos with Kphos through PEG     Heme: acute blood loss anemia/anemia of chronic disease  - Continue to monitor H/H. Hgb 9.1  - Continue Lovenox 40  -Will resend T& S     Endo: no DM or thyroid disease  - COPD exacerbation steroids. Prednisone 40 qD (10/14-10/19)  - Accuchecks  - Mild SSI     ID: Concern for HCAP.  - Completed savanah-operative Clinda  - WBC downtrending 12.1 from 15, continue to monitor  - discontinued vanc given MRSA negative  - Zosyn (10/14-10/21)  - COPD exacerbation: Doxycycline (10/14-10/19)  - Sputum CX sent 10/15  - UA sent 10/14, follow up reflex culture     ENT: Right buccal squamous cell carcinoma  s/p wide local excision and left anterolateral thigh free flap reconstruction  on 10/4/23  - Flap care per ENT  - Patient electively glidescoped to avoid trauma to flap  - ETT secured to left  - continue peridex swabs 3x daily  - wound care to surgical sites     Prophylaxis:  SCDs  Lovenox  Esomeprazole     NOK: james Chowdary [phone]. Discussed transfer to ICU, intubation and ongoing care 10/14. Confirmation of code status at this time. Patient is FULL CODE     Lines:  L radial arterial line (10/14-  PIV x2  Howard was pulled out by patient overnight     Restraints: required medical device at this time secondary to altered mental status, line pulling and current intubation.     Dispo: SICU care, discussed with attending Dr. Rolly Cortez MD

## 2023-10-16 NOTE — PROGRESS NOTES
"Eryn Willis is a 89 y.o. female on day 12 of admission presenting with Squamous cell carcinoma of face. No acute events overnight. Flap checks stable.    Subjective   Since patient was transferred to the SICU, patient has shown improvement. Her WBCs decreased, she has been on CPAP since last night.     Objective   Physical Exam  Vitals reviewed in EMR  Gen: Intubated, sedated  Eyes: Sclera clear  Ears: Normal external ears bilaterally  Nose: No rhinorrhea; anterior nares clear  Oral Cavity: dry, old crusted blood; ETT secured with tube krause superior to skin paddle  Head: normocephalic, atraumatic  Face/Neck: All incisions c/d/i, neck soft and flat without evidence of hematoma or fluid collection  -slightly increased area of fullness directly below skin paddle, stable  -no evidence of dehiscence   Resp: Symmetric chest rise b/l; intubated  Cards: No clubbing/cyanosis/edema of hands  Gastro: Soft, non-distended, 22fr PEG tube in place  : Voiding  MSK: Left thigh donor flap site well approximated, incision dry   Psych: Intubated, arouses to stimuli     Last Recorded Vitals  Blood pressure (!) 93/46, pulse 66, temperature 35.9 °C (96.6 °F), resp. rate 17, height 1.651 m (5' 5\"), weight 63.2 kg (139 lb 5.3 oz), SpO2 98 %.  Intake/Output last 3 Shifts:  I/O last 3 completed shifts:  In: 2453.5 (38.8 mL/kg) [I.V.:903.5 (14.3 mL/kg); NG/GT:100; IV Piggyback:1450]  Out: 2520 (39.9 mL/kg) [Urine:2520 (1.1 mL/kg/hr)]  Weight: 63.2 kg     Relevant Results  Results for orders placed or performed during the hospital encounter of 10/04/23 (from the past 24 hour(s))   POCT GLUCOSE   Result Value Ref Range    POCT Glucose 85 74 - 99 mg/dL   Blood Gas Arterial Full Panel   Result Value Ref Range    POCT pH, Arterial 7.44 (H) 7.38 - 7.42 pH    POCT pCO2, Arterial 42 38 - 42 mm Hg    POCT pO2, Arterial 95 85 - 95 mm Hg    POCT SO2, Arterial 99 94 - 100 %    POCT Oxy Hemoglobin, Arterial 96.1 94.0 - 98.0 %    POCT Hematocrit " Calculated, Arterial 32.0 (L) 36.0 - 46.0 %    POCT Sodium, Arterial 134 (L) 136 - 145 mmol/L    POCT Potassium, Arterial 6.0 (H) 3.5 - 5.3 mmol/L    POCT Chloride, Arterial 104 98 - 107 mmol/L    POCT Ionized Calcium, Arterial 1.24 1.10 - 1.33 mmol/L    POCT Glucose, Arterial 129 (H) 74 - 99 mg/dL    POCT Lactate, Arterial 1.4 0.4 - 2.0 mmol/L    POCT Base Excess, Arterial 3.9 (H) -2.0 - 3.0 mmol/L    POCT HCO3 Calculated, Arterial 28.5 (H) 22.0 - 26.0 mmol/L    POCT Hemoglobin, Arterial 10.7 (L) 12.0 - 16.0 g/dL    POCT Anion Gap, Arterial 8 (L) 10 - 25 mmo/L    Patient Temperature 37.0 degrees Celsius    FiO2 30 %   POCT GLUCOSE   Result Value Ref Range    POCT Glucose 109 (H) 74 - 99 mg/dL   POCT GLUCOSE   Result Value Ref Range    POCT Glucose 110 (H) 74 - 99 mg/dL   POCT GLUCOSE   Result Value Ref Range    POCT Glucose 132 (H) 74 - 99 mg/dL   Blood Gas Arterial Full Panel Unsolicited   Result Value Ref Range    POCT pH, Arterial 7.45 (H) 7.38 - 7.42 pH    POCT pCO2, Arterial 47 (H) 38 - 42 mm Hg    POCT pO2, Arterial 124 (H) 85 - 95 mm Hg    POCT SO2, Arterial 99 94 - 100 %    POCT Oxy Hemoglobin, Arterial 97.2 94.0 - 98.0 %    POCT Hematocrit Calculated, Arterial 28.0 (L) 36.0 - 46.0 %    POCT Sodium, Arterial 135 (L) 136 - 145 mmol/L    POCT Potassium, Arterial 5.1 3.5 - 5.3 mmol/L    POCT Chloride, Arterial 104 98 - 107 mmol/L    POCT Ionized Calcium, Arterial 1.24 1.10 - 1.33 mmol/L    POCT Glucose, Arterial 131 (H) 74 - 99 mg/dL    POCT Lactate, Arterial 0.9 0.4 - 2.0 mmol/L    POCT Base Excess, Arterial 7.8 (H) -2.0 - 3.0 mmol/L    POCT HCO3 Calculated, Arterial 32.7 (H) 22.0 - 26.0 mmol/L    POCT Hemoglobin, Arterial 9.3 (L) 12.0 - 16.0 g/dL    POCT Anion Gap, Arterial 3 (L) 10 - 25 mmo/L    Patient Temperature 37.0 degrees Celsius   Vancomycin   Result Value Ref Range    Vancomycin 5.5 5.0 - 20.0 ug/mL   POCT GLUCOSE   Result Value Ref Range    POCT Glucose 110 (H) 74 - 99 mg/dL     Scheduled  medications  aspirin, 325 mg, g-tube, Daily  chlorhexidine, 15 mL, Mouth/Throat, TID  doxycycline, 100 mg, intravenous, q12h  esomeprazole, 40 mg, g-tube, Daily before breakfast  [MAR Hold] hydrogen peroxide, 1 Application, Topical, TID  insulin lispro, 0-5 Units, subcutaneous, q4h  ipratropium-albuteroL, 3 mL, nebulization, q6h  melatonin, 5 mg, g-tube, Daily  piperacillin-tazobactam, 3.375 g, intravenous, q6h  [Held by provider] polyethylene glycol, 17 g, g-tube, Daily  potassium phosphate, 15 mmol, intravenous, Once  predniSONE, 40 mg, g-tube, Daily  [Held by provider] QUEtiapine, 25 mg, g-tube, Nightly  sodium chloride, 3 mL, nebulization, q6h DERIK  sotalol, 80 mg, g-tube, BID  terazosin, 1 mg, oral, Nightly  thiamine, 100 mg, g-tube, Daily  verapamil, 60 mg, g-tube, q8h DERIK  white petrolatum, 1 Application, Topical, TID    Continuous medications  lactated Ringer's, 50 mL/hr, Last Rate: 50 mL/hr (10/15/23 0500)  propofol, 5-50 mcg/kg/min, Last Rate: 5 mcg/kg/min (10/16/23 0245)    PRN medications  PRN medications: acetaminophen, albuterol, calcium gluconate, calcium gluconate, dextrose 10 % in water (D10W), dextrose, glucagon, magnesium sulfate, magnesium sulfate, oxygen, potassium chloride CR **OR** potassium chloride, potassium chloride CR **OR** potassium chloride     Assessment/Plan   Eryn Willis   is a 89 y.o. F who underwent Direct laryngoscopy,  Wide local excision of skin cancer, Oral cavity resection malignant neoplasm, Right selective neck dissection levels 1A-3, Reconstruction with left anterolateral thigh free flap with Sheron Whitney and Rachele on 10/4/23 on 10/4/2023.      Active Issues:  Right Buccal SCC  Possible Cervical Lymph Node Mets  HTN  COPD  Acute Blood Loss Anemia  Anemia of Chronic Disease  Hypokalemia  Hypomagnesia  Severe Protein Calorie Malnutrition  Mixed Delirium  Urinary retention  COPD exacerbation  Hypercarbia  Hospital Acquired Pneumonia    Plan:  -Flap Checks:  q24hrs  -Drains:N/A  -Analgesia:  Intubated/sedated on propofol; Scheduled Acetaminophen  -FEN: mIVF restarted at 50 per SICU while holding TF; NPO; monitor and replete electrolytes as needed  -Pulm: SICU following for COPD exacerbation with HCAP; Doxycycline bid 10/14-19 for COPD exacerbation; plan to wean vent as tolerated; PRN COPD inhalers; IS q1h post-extubation  -ID: Day 1 vanc/zosyn for HCAP coverage per SICU  -Cardiac: Vitals per ENT free flap protocol then transition to Q4hr vitals; Aspirin 325mg daily, Sotalol 80mg BID, Verapamil 60mg Q8hrs; No current interventions per electrophysiology recs  -Endo: no current interventions  -GI: Bowel regimen held 2/2 diarrhea, PPI, and PRN anti-emetic  -: Howard removed by patient  -Steroids/Special: Steroid taper for COPD exacerbation, Incision and oral care per ENT free flap order set  -Embolic PPx: subcutaneous Lovenox, SCDs while in bed  -Dispo: [facility]. PT/OTordered; Patient has stated she would like to return home with home care at discharge. Discharge pending improvement in clinical condition.  Will discuss with family our recommendation for home PT given deconditioning after surgery.    All plans discussed with attending after morning rounds.     I spent 15 minutes in the professional and overall care of this patient.    Vesna Louie APRLIONEL, CNP  Department of Otolaryngology: Head & Neck Surgery  Personal Pager [phone]  ENT Team  Head and Neck Phone: [phone]

## 2023-10-16 NOTE — CARE PLAN
Problem: Skin  Goal: Participates in plan/prevention/treatment measures  Outcome: Progressing  Goal: Prevent/manage excess moisture  Outcome: Progressing  Goal: Prevent/minimize sheer/friction injuries  Outcome: Progressing  Goal: Promote skin healing  Outcome: Progressing     Problem: Safety - Medical Restraint  Goal: Free from restraint(s) (Restraint for Interference with Medical Device)  Outcome: Progressing   The patient's goals for the shift include      The clinical goals for the shift include increase urination    Over the shift, the patient did not make progress toward the following goals. Barriers to progression include pulling at lines and tube.. Recommendations to address these barriers include education and minimize stimulation in environment.

## 2023-10-17 NOTE — PROGRESS NOTES
Physical Therapy    Physical Therapy Re-Evaluation    Patient Name: Eryn Willis  MRN: [id number]  Today's Date: 10/17/2023   Time Calculation  Start Time: 1406  Stop Time: 1432  Time Calculation (min): 26 min    Assessment/Plan   PT Assessment  PT Assessment Results: Decreased range of motion, Decreased endurance, Impaired balance, Decreased mobility, Decreased coordination, Decreased cognition, Impaired tone  Rehab Prognosis: Good  Evaluation/Treatment Tolerance:  (Limited by cognition)  Medical Staff Made Aware: Yes  End of Session Communication: Bedside nurse (PA)  End of Session Patient Position: Bed, 3 rail up, Alarm on  IP OR SWING BED PT PLAN  Inpatient or Swing Bed: Inpatient  PT Plan  Treatment/Interventions: Bed mobility, Transfer training, Gait training, Stair training, Balance training, Neuromuscular re-education, Strengthening, Endurance training, Range of motion, Therapeutic exercise, Therapeutic activity, Positioning, Postural re-education, Home exercise program  PT Plan: Skilled PT  PT Frequency: 3 times per week  PT Discharge Recommendations: Moderate intensity level of continued care  PT Recommended Transfer Status: Total assist  PT - OK to Discharge: Yes      Subjective   General Visit Information:  General  Reason for Referral: Re-eval s/p ICU admission with decreased mental status and respiratory failure requiring intubation. Initially admitted s/p Direct laryngoscopy,  Wide local excision of skin cancer, Oral cavity resection malignant neoplasm, Right selective neck dissection levels 1A-3, Reconstruction with left anterolateral thigh free flap with Sheron Whitney and Rachele on 10/4/23. Flap check within normal limits.  Past Medical History Relevant to Rehab: Squamous cell carcinoma of face  Co-Treatment: OT  Co-Treatment Reason: AMPAC </= 10  Prior to Session Communication: Bedside nurse  Patient Position Received: Bed, 3 rail up  General Comment: Pt highly confused, minimally conversive. Increased  "tone in x4 extremities due to active contraction. Pt extubated this AM, currently on 4L NC. Pt with IV, evan, external catheter  Home Living:  Home Living  Type of Home: House  Lives With: Adult children  Home Adaptive Equipment: Walker rolling or standard  Home Layout: One level, Full bath main level  Home Access: Stairs to enter without rails  Entrance Stairs-Number of Steps: 1  Bathroom Shower/Tub: Walk-in shower  Bathroom Toilet: Standard  Bathroom Equipment: Grab bars in shower, Shower chair with back  Prior Level of Function:  Prior Function Per Pt/Caregiver Report  Level of [place]: Independent with ADLs and functional transfers  Receives Help From:  (son)  ADL Assistance: Independent  Homemaking Assistance: Needs assistance  Ambulatory Assistance: Independent  Vocational: Retired  Prior Function Comments: hx of falls  Precautions:  Precautions  Medical Precautions: Fall precautions  Precautions Comment: SPO2 >88%  Vital Signs:  Vital Signs  Heart Rate: 60 (post tx: 62)  Resp: 20 (post tx: 22)  SpO2: 97 % (post tx: 99)  BP: (!) 134/41 (post tx: 147/48)    Objective   Pain:  (Pt unable to report or rate pain)     Cognition:  Cognition  Overall Cognitive Status: Impaired  Orientation Level:  (Pt unable to state name, reported \"[facility]\" for place, and stated \"October 24\" for time.)  Following Commands:  (Unable to follow commands due to confusion/decreased particpiation)    General Assessments:    Activity Tolerance  Early Mobility/Exercise Safety Screen: Proceed with mobilization - No exclusion criteria met    Sensation  Sensation Comment: Pt unable to answer questions for sensory assessment    Strength  Strength Comments: Pt unable to follow commands for formal muscle testing; pt appearing WFL as she was able to sustain bilat knee extension x8 mins at EOB and agains maximal resistance.    Static Sitting Balance  Static Sitting-Balance Support: Bilateral upper extremity supported  Static " Sitting-Level of Assistance: Dependent  Static Sitting-Comment/Number of Minutes: Pt unable to actively assist with balance likey related to cognitive status. Sat up at EOB x8 mins dependently  Dynamic Sitting Balance  Dynamic Sitting-Comments: Unable    Functional Assessments:  Bed Mobility 1  Bed Mobility 1: Supine to sitting, Sitting to supine  Level of Assistance 1: Dependent  Bed Mobility Comments 1: x2 assist with draw sheet  Bed Mobility 2  Bed Mobility  2: Rolling right, Rolling left  Level of Assistance 2: Dependent  Bed Mobility Comments 2: using draw sheet    Transfers  Transfer: No  Extremity/Trunk Assessments:     Outcome Measures:  [facility] Basic Mobility  Turning from your back to your side while in a flat bed without using bedrails: Total  Moving from lying on your back to sitting on the side of a flat bed without using bedrails: Total  Moving to and from bed to chair (including a wheelchair): Total  Standing up from a chair using your arms (e.g. wheelchair or bedside chair): Total  To walk in hospital room: Total  Climbing 3-5 steps with railing: Total  Basic Mobility - Total Score: 6    FSS-ICU  Ambulation: Unable to attempt due to weakness  Rolling: Total assistance (performs 25% or requires another person)  Sitting: Total assistance (performs 25% or requires another person)  Transfer Sit-to-Stand: Total assistance (performs 25% or requires another person)  Transfer Supine-to-Sit: Total assistance (performs 25% or requires another person)  Total Score: 4      ICU Mobility Screen  Early Mobility/Exercise Safety Screen: Proceed with mobilization - No exclusion criteria met    Encounter Problems       Encounter Problems (Active)       Mobility       STG - Patient will ambulate 25 ft with min A and FWW with no LOB, progress as able and appropriate  (Not met)       Start:  10/05/23    Expected End:  10/19/23    Resolved:  10/17/23    Updated to: STG - Patient will ambulate 10 ft with Mod A and FWW with no  LOB, progress as able and appropriate    Update reason: re-eval         STG - Patient will ascend and descend 1 stair with mod A and HR  (Not met)       Start:  10/05/23    Expected End:  10/19/23    Resolved:  10/17/23    Updated to: Pt will ascend/descend 1 step with Mod A x1 and LRAD for home re-entry    Update reason: re-eval         STG - Patient will ambulate 10 ft with Mod A and FWW with no LOB, progress as able and appropriate       Start:  10/17/23    Expected End:  11/07/23                Pt will ascend/descend 1 step with Mod A x1 and LRAD for home re-entry       Start:  10/17/23    Expected End:  11/07/23                   Pain - Adult          Transfers       STG - Transfer from bed to chair with min A and FWW (Not met)       Start:  10/05/23    Expected End:  10/19/23    Resolved:  10/17/23    Updated to: STG - Transfer from bed to chair with mod A and FWW    Update reason: re-eval         STG - Patient will perform bed mobility with CGA  (Not met)       Start:  10/05/23    Expected End:  10/19/23    Resolved:  10/17/23    Updated to: STG - Patient will perform bed mobility with Mod A x1    Update reason: re-eval         STG - Transfer from bed to chair with mod A and FWW       Start:  10/17/23    Expected End:  11/07/23                STG - Patient will perform bed mobility with Mod A x1       Start:  10/17/23    Expected End:  11/07/23                   Transfers       STG - Patient will transfer sit to and from stand with min A and FWW  (Not met)       Start:  10/05/23    Expected End:  10/19/23    Resolved:  10/17/23    Updated to: STG - Patient will transfer sit to and from stand with Mod A and FWW    Update reason: re-eval         STG - Patient will transfer sit to and from stand with Mod A and FWW       Start:  10/17/23    Expected End:  11/07/23                       Education Documentation  Precautions, taught by Dilma Vallejo, PT at 10/17/2023  3:22 PM.  Learner: Patient  Readiness:  Nonacceptance  Method: Explanation, Demonstration  Response: Refused Teaching    Mobility Training, taught by Dilma Vallejo PT at 10/17/2023  3:22 PM.  Learner: Patient  Readiness: Nonacceptance  Method: Explanation, Demonstration  Response: Refused Teaching    Education Comments  No comments found.

## 2023-10-17 NOTE — PROGRESS NOTES
"Eryn Willis is a 89 y.o. female on day 13 of admission presenting with Squamous cell carcinoma of face.    Subjective   Patient tolerating CPAP well this am. Intermittently following commands.       Objective     Physical Exam  Constitutional:       General: She is not in acute distress.     Comments: Elderly female, intubated   HENT:      Head: Normocephalic and atraumatic.      Comments: Flap to R cheek and buccal mucosa. Appears c/d/I with normal color     Mouth/Throat:      Comments: ETT in place  Eyes:      Pupils: Pupils are equal, round, and reactive to light.   Cardiovascular:      Rate and Rhythm: Normal rate.      Pulses: Normal pulses.      Heart sounds: Normal heart sounds.      Comments: Pacemaker AAI-DDD, paced @60pbm  Pulmonary:      Effort: Pulmonary effort is normal. No respiratory distress.      Breath sounds: Normal breath sounds.      Comments: Mechanically ventilated via ETT. Thorax symmetric.   Abdominal:      General: Abdomen is flat. Bowel sounds are normal. There is no distension.      Palpations: Abdomen is soft.      Comments: LUQ PEG tube in place   Genitourinary:     Comments: External catheter in place  Skin:     General: Skin is warm and dry.      Comments: Left ALT flap harvest, [place], sutures intact.   Neurological:      Comments: Intubated, intermittently following commands.   Psychiatric:      Comments: Calm and cooperative       Last Recorded Vitals  Blood pressure (!) 93/46, pulse 60, temperature 36.1 °C (97 °F), temperature source Temporal, resp. rate 21, height 1.651 m (5' 5\"), weight 63.2 kg (139 lb 5.3 oz), SpO2 99 %.  Intake/Output last 3 Shifts:  I/O last 3 completed shifts:  In: 2410.2 (38.1 mL/kg) [I.V.:1960.2 (31 mL/kg); NG/GT:100; IV Piggyback:350]  Out: 1215 (19.2 mL/kg) [Urine:1215 (0.5 mL/kg/hr)]  Weight: 63.2 kg     Relevant Results  Scheduled medications  aspirin, 325 mg, g-tube, Daily  chlorhexidine, 15 mL, Mouth/Throat, TID  doxycycline, 100 mg, intravenous, " q12h  enoxaparin, 40 mg, subcutaneous, q24h  esomeprazole, 40 mg, g-tube, Daily before breakfast  insulin lispro, 0-5 Units, subcutaneous, q4h  melatonin, 5 mg, g-tube, Daily  piperacillin-tazobactam, 3.375 g, intravenous, q6h  [Held by provider] polyethylene glycol, 17 g, g-tube, Daily  predniSONE, 40 mg, g-tube, Daily  [Held by provider] QUEtiapine, 25 mg, g-tube, Nightly  [START ON 10/18/2023] sotalol, 80 mg, g-tube, Daily  thiamine, 100 mg, g-tube, Daily  verapamil, 40 mg, g-tube, q8h DERIK  white petrolatum, 1 Application, Topical, TID      Continuous medications       PRN medications  PRN medications: acetaminophen, albuterol, calcium gluconate, calcium gluconate, dextrose 10 % in water (D10W), dextrose, glucagon, magnesium sulfate, magnesium sulfate, oxygen, [DISCONTINUED] potassium chloride CR **OR** potassium chloride, [DISCONTINUED] potassium chloride CR **OR** potassium chloride    Results for orders placed or performed during the hospital encounter of 10/04/23 (from the past 24 hour(s))   POCT GLUCOSE   Result Value Ref Range    POCT Glucose 116 (H) 74 - 99 mg/dL   POCT GLUCOSE   Result Value Ref Range    POCT Glucose 120 (H) 74 - 99 mg/dL   POCT GLUCOSE   Result Value Ref Range    POCT Glucose 114 (H) 74 - 99 mg/dL   Calcium, Ionized   Result Value Ref Range    POCT Calcium, Ionized 1.15 1.1 - 1.33 mmol/L   CBC   Result Value Ref Range    WBC 10.6 4.4 - 11.3 x10*3/uL    nRBC 0.0 0.0 - 0.0 /100 WBCs    RBC 3.36 (L) 4.00 - 5.20 x10*6/uL    Hemoglobin 10.4 (L) 12.0 - 16.0 g/dL    Hematocrit 31.9 (L) 36.0 - 46.0 %    MCV 95 80 - 100 fL    MCH 31.0 26.0 - 34.0 pg    MCHC 32.6 32.0 - 36.0 g/dL    RDW 18.6 (H) 11.5 - 14.5 %    Platelets 260 150 - 450 x10*3/uL    MPV 10.0 7.5 - 11.5 fL   Coagulation Screen   Result Value Ref Range    Protime 12.0 9.8 - 12.8 seconds    INR 1.1 0.9 - 1.1    aPTT 30 27 - 38 seconds   Renal Function Panel   Result Value Ref Range    Glucose 140 (H) 74 - 99 mg/dL    Sodium 140 136 -  145 mmol/L    Potassium 4.4 3.5 - 5.3 mmol/L    Chloride 104 98 - 107 mmol/L    Bicarbonate 28 21 - 32 mmol/L    Anion Gap 12 10 - 20 mmol/L    Urea Nitrogen 34 (H) 6 - 23 mg/dL    Creatinine 0.76 0.50 - 1.05 mg/dL    eGFR 75 >60 mL/min/1.73m*2    Calcium 8.3 (L) 8.6 - 10.6 mg/dL    Phosphorus 4.4 2.5 - 4.9 mg/dL    Albumin 2.4 (L) 3.4 - 5.0 g/dL   Magnesium   Result Value Ref Range    Magnesium 2.22 1.60 - 2.40 mg/dL   POCT GLUCOSE   Result Value Ref Range    POCT Glucose 108 (H) 74 - 99 mg/dL   POCT GLUCOSE   Result Value Ref Range    POCT Glucose 101 (H) 74 - 99 mg/dL   POCT GLUCOSE   Result Value Ref Range    POCT Glucose 115 (H) 74 - 99 mg/dL   Blood Gas Arterial Full Panel Unsolicited   Result Value Ref Range    POCT pH, Arterial 7.45 (H) 7.38 - 7.42 pH    POCT pCO2, Arterial 44 (H) 38 - 42 mm Hg    POCT pO2, Arterial 110 (H) 85 - 95 mm Hg    POCT SO2, Arterial 99 94 - 100 %    POCT Oxy Hemoglobin, Arterial 97.1 94.0 - 98.0 %    POCT Hematocrit Calculated, Arterial 41.0 36.0 - 46.0 %    POCT Sodium, Arterial 135 (L) 136 - 145 mmol/L    POCT Potassium, Arterial 4.3 3.5 - 5.3 mmol/L    POCT Chloride, Arterial 104 98 - 107 mmol/L    POCT Ionized Calcium, Arterial 1.18 1.10 - 1.33 mmol/L    POCT Glucose, Arterial 117 (H) 74 - 99 mg/dL    POCT Lactate, Arterial 1.9 0.4 - 2.0 mmol/L    POCT Base Excess, Arterial 5.8 (H) -2.0 - 3.0 mmol/L    POCT HCO3 Calculated, Arterial 30.6 (H) 22.0 - 26.0 mmol/L    POCT Hemoglobin, Arterial 13.8 12.0 - 16.0 g/dL    POCT Anion Gap, Arterial 5 (L) 10 - 25 mmo/L    Patient Temperature 37.0 degrees Celsius        This patient currently has cardiac telemetry ordered; if you would like to modify or discontinue the telemetry order, click here to go to the orders activity to modify/discontinue the order.    Vent Mode: Pressure support  FiO2 (%):  [30 %] 30 %  S RR:  [12] 12  S VT:  [245 mL-350 mL] 245 mL  PEEP/CPAP (cm H2O):  [5 cm H20-8 cm H20] 5 cm H20  GA SUP:  [8 cm H20-12 cm H20] 8  cm H20  MAP (cm H2O):  [7-8.4] 8      Assessment/Plan   Principal Problem:    Squamous cell carcinoma of face  Active Problems:    Pacemaker    Gastroesophageal reflux disease without esophagitis    Arthritis    HTN (hypertension)    Dysrhythmias    Malignant neoplasm of cheek mucosa (CMS/HCC)    Eryn Willis is a 89 y.o. 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 Post-operative course complicated by delirium and urinary retention. 10/14 patient experienced worsening mental status changes, and respiratory decline. ABG demonstrated profound acute hypercarbic respiratory failure 7.20/83/26. CXR with questionable RML infiltrate. Patient was transferred to SICU for further management. Given c/f flap injury with NIPPV mask, patient was intubated 10/14.     Plan:  Neuro: Hospital course complicated by delirium. A&Ox0 on presentation to SICU, likely multi-factorial in the setting of post-operative delirium and hypercarbia. Intermittently following commands while intubated.  - ongoing neuro and pain assessments  - PRN Tylenol for pain control  - Psych following, appreciate recs  - Continue Melatonin and Thiamine  - Sleep/wake cycle hygiene, CAM-ICU qShift, Avoid CNS depressants as able  - No indication for sitter at this time  - PT/OT as appropriate  - Discontinue restraints with extubation     Cardiovascular: Hx HTN, SSS s/p PPM. Pacer settings: AAI-DDD @60. Marginal hypotension during rounds.  - Continuous EKG/ABP monitoring   - MAP goal >65  - Decrease Sotalol to 80mg q24h  - Decrease Verapamil to 40mg q8h  - Continue ASA 325mg qd     Pulm: Hx of COPD of unclear etiology. Follows with Pulmonology outpatient. Acute on chronic hypercarbic respiratory failure. Arrived to SICU on BiPAP 10/5 50%, concern about increasing BiPAP  settings with new buccal flap. Decision made to intubate due to continued respiratory acidosis. CXR with questionable RML infiltrate, treating for HCAP. Tolerating CPAP 5/5 this am.  - WTE -> extubated after rounds  - Maintain SpO2 >88%  - COPD exacerbation treatment: Prednisone 40mg and doxycyline for 5 days  - Discontinue scheduled 3% saline nebs and duonebs  - PRN albuterol  - encourage IS q1h  - CXR daily  - ABG prn -> obtain 1hr post-extubation ABG    ENT: Right buccal squamous cell carcinoma  s/p wide local excision and left anterolateral thigh free flap reconstruction on 10/4/23.  - Flap care per ENT  - continue peridex swabs 3x daily  - wound care to surgical sites     GI: Hx GERD, s/p PEG.  - NPO  - Tube feeds on hold for extubation, okay for meds  - Resume tube feeds this afternoon  - Continue PPI per PEG  - holding bowel regimen given recent diarrhea     : Acute urinary retention during hospitalization. Howard placed 10/15, removed by patient same day, Net positive 1.1L for past 24hrs.  - RFP daily and PRN  - discontinue mIVFs  - Strict I/Os via external catheter  - Replete electrolytes to maintain K>4, Phos>2.5, iCal>1.1, Mag>2.  - Discontinue terazosin. Please do not resume as terazosin should not be opened and given via PEG. Monitor UOP. If there is ongoing retention, may start patient on Silodosin 4mg daily.      Heme: Acute blood loss anemia/anemia of chronic disease.  - CBC daily and PRN  - Coags PRN  - SCDs for DVT ppx and resume subcutaneous Lovenox  - ongoing monitoring for s/s bleeding  - Maintain active T&S -> update today     Endo: No hx of DM or thyroid disease. Adequate glycemic control.  - BG q4h and SSI per ICU protocol     ID: Afebrile. Leukocytosis resolved. Concern for HCAP. Completed savanah-operative Clinda. UA 10/9 with trace LE. MRSA negative, vancomycin discontinued. Sputum Cx 10/15 with salivary contamination.  - Trend temp q4h, WBC daily  - Zosyn (10/14-10/21)  - COPD exacerbation:  Doxycycline (10/14-10/19)  - ongoing monitoring for s/s infection     Prophylaxis:  SCDs  Lovenox  Esomeprazole     Lines:  L radial arterial line (10/14-)  PIV x2    NOK: james Chowdary [phone]. Discussed transfer to ICU, intubation and ongoing care 10/14. Confirmation of code status at this time. Patient is FULL CODE.     Dispo: Continue ICU care. Consider transferring to floor this afternoon if patient remains stable.     I have discussed the case with the resident/advanced practice provider. I have personally performed a history, physical exam, and my own medical decision making. I have reviewed the note and agree with the findings and plan with the following exceptions as identified below. I have personally provided 39 minutes of critical care time exclusive of time spent on separately billable procedures. Time includes review of laboratory data, radiology results, discussion with consultants, family and monitoring for potential decompensation. Interventions were performed as documented above.      Family/Surrogate updated with plan of care.  Code status addressed/up to date.       Dayton Lofton MD

## 2023-10-17 NOTE — PROGRESS NOTES
Occupational Therapy    Re Evaluation    Patient Name: Eryn Willis  MRN: [id number]  Today's Date: 10/17/2023  Time Calculation  Start Time: 1407  Stop Time: 1433  Time Calculation (min): 26 min        Assessment:  OT Assessment: Pt is a 89 year old female who demonstrates decreased strength, activity tolerance, balance, and cognition, which impedes occupational performance.  Prognosis: Good  End of Session Communication: Bedside nurse (PA, NP)  End of Session Patient Position: Bed, 3 rail up, Alarm on  OT Assessment Results: Decreased ADL status, Decreased upper extremity range of motion, Decreased upper extremity strength, Decreased cognition, Decreased endurance, Decreased fine motor control, Decreased functional mobility, Decreased gross motor control, Decreased IADLs, Non-functional right upper extremity, Non-functional left upper extremity, Decreased trunk control for functional activities  Prognosis: Good  Plan:  Treatment Interventions: ADL retraining, Functional transfer training, UE strengthening/ROM, Endurance training, Cognitive reorientation, Patient/family training, Equipment evaluation/education, Neuromuscular reeducation, Fine motor coordination activities, Compensatory technique education  OT Frequency: 3 times per week  OT Discharge Recommendations: Moderate intensity level of continued care  OT - OK to Discharge: Yes  Treatment Interventions: ADL retraining, Functional transfer training, UE strengthening/ROM, Endurance training, Cognitive reorientation, Patient/family training, Equipment evaluation/education, Neuromuscular reeducation, Fine motor coordination activities, Compensatory technique education    Subjective   Current Problem:  1. Squamous cell carcinoma of face        2. Pacemaker  Cardiac device check - Surgery    Cardiac device check - Surgery      3. Cardiac arrhythmia, unspecified cardiac arrhythmia type  Cardiac device check - Surgery    Cardiac device check - Surgery      4.  Encounter for adjustment and management of automatic implantable cardiac defibrillator  Cardiac device check - Surgery    Cardiac device check - Surgery      5. Malignant neoplasm of cheek mucosa (CMS/HCC)  Surgical Pathology Exam    Surgical Pathology Exam    PEG Tube Care    white petrolatum 41 % ointment ointment    sotalol (Betapace) 80 mg tablet    verapamil (Calan) 40 mg tablet    acetaminophen 500 mg/15 mL liquid    chlorhexidine (Peridex) 0.12 % solution    aspirin 81 mg chewable tablet    traMADol (Ultram) 50 mg tablet    melatonin 3 mg tablet    Enteral Nutrition      6. Acute respiratory failure with hypercapnia (CMS/HCC)  Arterial Puncture/Cannulation    Arterial Puncture/Cannulation    Intubation    Intubation        General:  General  Reason for Referral: Re-eval s/p ICU admission with decreased mental status and respiratory failure requiring intubation. Initially admitted s/p Direct laryngoscopy,  Wide local excision of skin cancer, Oral cavity resection malignant neoplasm, Right selective neck dissection levels 1A-3, Reconstruction with left anterolateral thigh free flap with Sheron Whitney and Rachele on 10/4/23. Flap check within normal limits.  Past Medical History Relevant to Rehab: Squamous cell carcinoma of face  Co-Treatment: PT  Co-Treatment Reason: AMPAC <10  Prior to Session Communication: Bedside nurse  Patient Position Received: Bed, 3 rail up, Alarm on  General Comment: Pt highly confused, minimally conversive. Increased tone in x4 extremities due to active contraction. Pt extubated this AM, currently on 4L NC. Pt with IV, evan, external catheter. Did not respond to questions regarding pain.  Precautions:  Medical Precautions: Fall precautions  Precautions Comment: SpO2 <88%  Vital Signs:  Heart Rate: 60 (Post: 62)  Resp: 22 (Post: 23)  SpO2: 95 % (Post: 94)  BP: (!) 135/41 (Post: 147/47)  Pain:       Objective   Cognition:  Overall Cognitive Status: Impaired  Orientation Level:  (Pt unable to  "state with max cues, reported \"[facility]\" as place and \"october 24\" as time)  Following Commands:  (Does not follow commands, very confused throughout.)  Insight: Severe     Confusion Assessment Method (CAM)  Acute Onset and Fluctuating Course (1A):  (Pt unable to engage in CAM 2/2 current mental status.)     Home Living:  Type of Home: House  Lives With: Adult children  Home Adaptive Equipment: Walker rolling or standard  Home Layout: One level, Full bath main level  Home Access: Stairs to enter without rails  Entrance Stairs-Number of Steps: 1  Bathroom Shower/Tub: Walk-in shower  Bathroom Toilet: Standard  Bathroom Equipment: Grab bars in shower, Shower chair with back  Prior Function:  Level of [place]: Independent with ADLs and functional transfers  Receives Help From:  (son)  ADL Assistance: Independent  Homemaking Assistance: Needs assistance  Ambulatory Assistance: Independent  Vocational: Retired  Prior Function Comments: hx of falls    ADL:  Eating Assistance: Total  Grooming Assistance: Total  Bathing Assistance: Total  UE Dressing Assistance: Total  LE Dressing Assistance: Total  Toileting Assistance with Device: Total  Activity Tolerance:  Early Mobility/Exercise Safety Screen: Proceed with mobilization - No exclusion criteria met  Activity Tolerance Comments: Pt sat EOB ~8 minutes with dependent assist. Knees fully extended throughout, pt refused to flex knees with max encouragement.  Bed Mobility/Transfers: Bed Mobility  Bed Mobility: Yes  Bed Mobility 1  Bed Mobility 1: Supine to sitting, Sitting to supine  Level of Assistance 1: Dependent (x2)  Bed Mobility Comments 1: draw sheet  Bed Mobility 2  Bed Mobility  2: Rolling right, Rolling left  Level of Assistance 2: Dependent  Bed Mobility Comments 2: draw sheet (shouting for help during task) :     Strength:  Strength Comments: Pt with significant rigidity in all extremities, impacting assessment.    Outcome Measures:[facility] Daily " Activity  Putting on and taking off regular lower body clothing: Total  Bathing (including washing, rinsing, drying): Total  Putting on and taking off regular upper body clothing: Total  Toileting, which includes using toilet, bedpan or urinal: Total  Taking care of personal grooming such as brushing teeth: Total  Eating Meals: Total  Daily Activity - Total Score: 6        Education Documentation  Precautions, taught by Jyoti Diaz OT at 10/17/2023  4:05 PM.  Learner: Patient  Readiness: Nonacceptance  Method: Explanation, Demonstration  Response: Needs Reinforcement, No Evidence of Learning, Refused Teaching    ADL Training, taught by Jyoti Diaz OT at 10/17/2023  4:05 PM.  Learner: Patient  Readiness: Nonacceptance  Method: Explanation, Demonstration  Response: Needs Reinforcement, No Evidence of Learning, Refused Teaching    Education Comments  No comments found.        OP EDUCATION:       Goals:  Encounter Problems       Encounter Problems (Active)       ADLs       Patient with complete upper body dressing with set-up level of assistance donning and doffing all UE clothes with no adaptive equipment while sitting (Progressing)       Start:  10/05/23    Expected End:  11/07/23            Patient with complete lower body dressing with set-up and stand by assist level of assistance donning and doffing all LE clothes  with no adaptive equipment while unsupported sitting (Not Progressing)       Start:  10/05/23    Expected End:  11/07/23            Patient will complete daily grooming tasks  with set-up and stand by assist level of assistance and PRN adaptive equipment while unsupported sitting. (Progressing)       Start:  10/05/23    Expected End:  11/07/23            Patient will complete toileting including hygiene clothing management/hygiene with set-up and contact guard assist level of assistance and DME prn. (Not Progressing)       Start:  10/05/23    Expected End:  11/07/23                MOBILITY       Patient will perform Functional mobility min Household distances/Community Distances with set-up and contact guard assist level of assistance and least restrictive device in order to improve safety and functional mobility. (Not Progressing)       Start:  10/05/23    Expected End:  11/07/23               TRANSFERS       Patient will perform bed mobility stand by assist level of assistance in order to improve safety and independence with mobility (Not Progressing)       Start:  10/05/23    Expected End:  11/07/23            Patient will complete all functional transfer with least restrictive device with set-up and stand by assist level of assistance. (Not Progressing)       Start:  10/05/23    Expected End:  11/07/23

## 2023-10-17 NOTE — PROGRESS NOTES
PLAN: Psych following, PT/OT, wean to extubate, following cultures, ABX, flap care per ENT, close obs, symptom management, ICU tlc. Will continue to follow and assist as appropriate.     PAYOR: Medicare    DISPO: pending remaining hospital course and final recs    SUPPORT/CONTACT: Epi NOK) [phone]

## 2023-10-17 NOTE — CARE PLAN
Problem: Skin  Goal: Prevent/minimize sheer/friction injuries  Outcome: Progressing  Goal: Promote skin healing  Outcome: Progressing     Problem: Safety - Medical Restraint  Goal: Free from restraint(s) (Restraint for Interference with Medical Device)  Outcome: Progressing   The patient's goals for the shift include      The clinical goals for the shift include increase urination    Over the shift, the patient did not make progress toward the following goals. Barriers to progression include patient remains intubated and cannot participate in own turns. Recommendations to address these barriers include potential extubation today.

## 2023-10-17 NOTE — PROGRESS NOTES
"Referring Provider: Danny Whitney MD    Eryn Willis is a 89 y.o. 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   Opens eyes to name, follows simple commands consistently.   Per RN, no agitation or acute events overnight.     Psychiatric ROS  Delirium: change in speech and disorientation    Objective   Last Recorded Vitals  BP (!) 93/46   Pulse 60   Temp 35.2 °C (95.4 °F) (Temporal)   Resp 21   Ht 1.651 m (5' 5\")   Wt 63.2 kg (139 lb 5.3 oz)   LMP  (LMP Unknown) Comment:   SpO2 99%   BMI 23.19 kg/m²       Mental Status Exam  Appearance: Elderly  woman, frail, laying in hospital bed; face notable for a surgical flap on R  Attitude: Calm, uncooperative due to mentation  Behavior: Poor eye contact  Motor Activity: No PMR/PMA; no EPS observed  Speech: Low volume, spontaneous, mumbling, incoherent  Mood: Unable to assess  Affect: Flat  Thought Process: Unable to assess, Intuabted  Thought Content: Unable to assess, Intuabted  Thought Perception: Unable to assess, Intuabted  Cognition:.  A/Ox0  Insight: Unable to assess, Intuabted  Judgement: Unable to assess, Intuabted    Medications  Scheduled medications  aspirin, 325 mg, g-tube, Daily  chlorhexidine, 15 mL, Mouth/Throat, TID  doxycycline, 100 mg, intravenous, q12h  enoxaparin, 40 mg, subcutaneous, q24h  esomeprazole, 40 mg, g-tube, Daily before breakfast  insulin lispro, 0-5 Units, subcutaneous, q4h  ipratropium-albuteroL, 3 mL, nebulization, q6h  melatonin, 5 mg, g-tube, Daily  piperacillin-tazobactam, 3.375 g, intravenous, q6h  [Held by provider] polyethylene glycol, 17 g, g-tube, Daily  predniSONE, 40 mg, g-tube, Daily  [Held by provider] QUEtiapine, 25 mg, g-tube, Nightly  sodium chloride, 3 mL, nebulization, q6h DERIK  [START ON 10/18/2023] sotalol, 80 mg, g-tube, Daily  terazosin, 1 mg, oral, " Nightly  thiamine, 100 mg, g-tube, Daily  verapamil, 40 mg, g-tube, q8h DERIK  white petrolatum, 1 Application, Topical, TID      Continuous medications  dexmedeTOMIDine, 0.1-1.5 mcg/kg/hr, Last Rate: Stopped (10/17/23 0652)      PRN medications  PRN medications: acetaminophen, albuterol, calcium gluconate, calcium gluconate, dextrose 10 % in water (D10W), dextrose, glucagon, magnesium sulfate, magnesium sulfate, oxygen, [DISCONTINUED] potassium chloride CR **OR** potassium chloride, [DISCONTINUED] potassium chloride CR **OR** potassium chloride      Relevant Results  Results for orders placed or performed during the hospital encounter of 10/04/23 (from the past 24 hour(s))   Blood Gas Arterial Full Panel   Result Value Ref Range    POCT pH, Arterial 7.43 (H) 7.38 - 7.42 pH    POCT pCO2, Arterial 45 (H) 38 - 42 mm Hg    POCT pO2, Arterial 121 (H) 85 - 95 mm Hg    POCT SO2, Arterial 99 94 - 100 %    POCT Oxy Hemoglobin, Arterial 97.1 94.0 - 98.0 %    POCT Hematocrit Calculated, Arterial 31.0 (L) 36.0 - 46.0 %    POCT Sodium, Arterial 134 (L) 136 - 145 mmol/L    POCT Potassium, Arterial 5.2 3.5 - 5.3 mmol/L    POCT Chloride, Arterial 104 98 - 107 mmol/L    POCT Ionized Calcium, Arterial 1.23 1.10 - 1.33 mmol/L    POCT Glucose, Arterial 116 (H) 74 - 99 mg/dL    POCT Lactate, Arterial 1.2 0.4 - 2.0 mmol/L    POCT Base Excess, Arterial 4.9 (H) -2.0 - 3.0 mmol/L    POCT HCO3 Calculated, Arterial 29.9 (H) 22.0 - 26.0 mmol/L    POCT Hemoglobin, Arterial 10.4 (L) 12.0 - 16.0 g/dL    POCT Anion Gap, Arterial 5 (L) 10 - 25 mmo/L    Patient Temperature 37.0 degrees Celsius    FiO2 30 %   POCT GLUCOSE   Result Value Ref Range    POCT Glucose 130 (H) 74 - 99 mg/dL   POCT GLUCOSE   Result Value Ref Range    POCT Glucose 116 (H) 74 - 99 mg/dL   POCT GLUCOSE   Result Value Ref Range    POCT Glucose 120 (H) 74 - 99 mg/dL   POCT GLUCOSE   Result Value Ref Range    POCT Glucose 114 (H) 74 - 99 mg/dL   Calcium, Ionized   Result Value Ref  Range    POCT Calcium, Ionized 1.15 1.1 - 1.33 mmol/L   CBC   Result Value Ref Range    WBC 10.6 4.4 - 11.3 x10*3/uL    nRBC 0.0 0.0 - 0.0 /100 WBCs    RBC 3.36 (L) 4.00 - 5.20 x10*6/uL    Hemoglobin 10.4 (L) 12.0 - 16.0 g/dL    Hematocrit 31.9 (L) 36.0 - 46.0 %    MCV 95 80 - 100 fL    MCH 31.0 26.0 - 34.0 pg    MCHC 32.6 32.0 - 36.0 g/dL    RDW 18.6 (H) 11.5 - 14.5 %    Platelets 260 150 - 450 x10*3/uL    MPV 10.0 7.5 - 11.5 fL   Coagulation Screen   Result Value Ref Range    Protime 12.0 9.8 - 12.8 seconds    INR 1.1 0.9 - 1.1    aPTT 30 27 - 38 seconds   Renal Function Panel   Result Value Ref Range    Glucose 140 (H) 74 - 99 mg/dL    Sodium 140 136 - 145 mmol/L    Potassium 4.4 3.5 - 5.3 mmol/L    Chloride 104 98 - 107 mmol/L    Bicarbonate 28 21 - 32 mmol/L    Anion Gap 12 10 - 20 mmol/L    Urea Nitrogen 34 (H) 6 - 23 mg/dL    Creatinine 0.76 0.50 - 1.05 mg/dL    eGFR 75 >60 mL/min/1.73m*2    Calcium 8.3 (L) 8.6 - 10.6 mg/dL    Phosphorus 4.4 2.5 - 4.9 mg/dL    Albumin 2.4 (L) 3.4 - 5.0 g/dL   Magnesium   Result Value Ref Range    Magnesium 2.22 1.60 - 2.40 mg/dL   POCT GLUCOSE   Result Value Ref Range    POCT Glucose 108 (H) 74 - 99 mg/dL   POCT GLUCOSE   Result Value Ref Range    POCT Glucose 101 (H) 74 - 99 mg/dL         Psychiatric Risk Assessment  Acute Risk of Harm to Others is Considered: low   Acute Risk of Harm to Self is Considered: low      Assessment  Eryn Willis is a 89 y.o. female with no past psychiatric history and a past medical history of hypertension, COPD, CAD, SSS s/p pacemaker, paroxysmal SVT, right buccal SCC, malnutrition who was admitted to [facility] on 10/4/2023 for elective oral cavity malignant neoplasm resection with reconstruction with left anterolateral thigh free flap and right selective neck dissection which was completed on 10/4/2023. Also s/p PEG placement this admission. Hospital course was complicated by gradual worsening of mentation and agitation starting from a 10/7.    "  Psychiatry was consulted on 10/9/2023 for delirium and concern for injury to self. Patient's Qtc has fluctuated between 440 ms and 515 ms.    10/14, patient is less coherent and more confused. She is not agitated but seems more delirious, possibly due to an underlying infection (elevated WBC, urinary retention could indicate UTI).     10/17, Intubated, off sedation. Opens eyes to name, follows simple commands consistently. Per RN, no agitation or acute events overnight. No PRN Seroquel or Zyprexa given (last Zyprexa given 10/9)     Diagnosis:  -Delirium, multifactorial (old age, baseline brain atrophy, prolonged hospital stay, recent large operation, poor sleep/disturbed circadian rhythm etc)     Recommendations:  - Patient does not currently meet criteria for inpatient psychiatric admission.   - To evaluate decision-making capacity, recommend use of the Capacity Evaluation Tool. Search “ IP Capacity Evaluation under SmartText\" unless the patient has a legal guardian, in which case all decisions per the legal guardian.  - Defer to primary team decision for 1:1 sitter for safety precautions.  - As with all hospitalized patients, would recommend delirium precautions, as below.     Work-up:  - None at the moment     Medications:  - Continue melatonin 5 mg via PEG tube at 1800 daily  - Discontinued Valproic acid on 10/12  - Held quetiapine 25mg PO at bedtime when intubated--no need to restart at this time  - Can give Quetiapine 12.5mg Q6h through PEG tube PRN, if needing antipsychotics, please obtain EKG daily if antipsychotic administered  - Continue thiamine 100mg daily    Follow-up:  -No need to follow-up with a psychiatrist at this time  - Patient's daughter Kandace is living with patient's son Epi for the past 10 months due to hip fracture, and Epi has also been the primary person taking care of patient and Kandace during this time. Kandace cannot reach a phone easily due to limited mobility from hip fracture. Please " reach out to Epi for updates and questions: Epi Willis ([phone]).        Thank you for allowing us to participate in the care of this patient. Will continue to follow peripherally. Please page [phone] with any questions or concerns.       Luz Norton MD

## 2023-10-17 NOTE — PROGRESS NOTES
"Eryn Willis is a 89 y.o. female on day 13 of admission presenting with Squamous cell carcinoma of face.    Subjective   Patient tolerating CPAP well this am. Intermittently following commands.       Objective     Physical Exam  Constitutional:       General: She is not in acute distress.     Comments: Elderly female, intubated   HENT:      Head: Normocephalic and atraumatic.      Comments: Flap to R cheek and buccal mucosa. Appears c/d/I with normal color     Mouth/Throat:      Comments: ETT in place  Eyes:      Pupils: Pupils are equal, round, and reactive to light.   Cardiovascular:      Rate and Rhythm: Normal rate.      Pulses: Normal pulses.      Heart sounds: Normal heart sounds.      Comments: Pacemaker AAI-DDD, paced @60pbm  Pulmonary:      Effort: Pulmonary effort is normal. No respiratory distress.      Breath sounds: Normal breath sounds.      Comments: Mechanically ventilated via ETT. Thorax symmetric.   Abdominal:      General: Abdomen is flat. Bowel sounds are normal. There is no distension.      Palpations: Abdomen is soft.      Comments: LUQ PEG tube in place   Genitourinary:     Comments: External catheter in place  Skin:     General: Skin is warm and dry.      Comments: Left ALT flap harvest, [place], sutures intact.   Neurological:      Comments: Intubated, intermittently following commands.   Psychiatric:      Comments: Calm and cooperative       Last Recorded Vitals  Blood pressure (!) 93/46, pulse 60, temperature 35.2 °C (95.4 °F), temperature source Temporal, resp. rate 21, height 1.651 m (5' 5\"), weight 63.2 kg (139 lb 5.3 oz), SpO2 99 %.  Intake/Output last 3 Shifts:  I/O last 3 completed shifts:  In: 2410.2 (38.1 mL/kg) [I.V.:1960.2 (31 mL/kg); NG/GT:100; IV Piggyback:350]  Out: 1215 (19.2 mL/kg) [Urine:1215 (0.5 mL/kg/hr)]  Weight: 63.2 kg     Relevant Results  Scheduled medications  aspirin, 325 mg, g-tube, Daily  chlorhexidine, 15 mL, Mouth/Throat, TID  doxycycline, 100 mg, intravenous, " q12h  enoxaparin, 40 mg, subcutaneous, q24h  esomeprazole, 40 mg, g-tube, Daily before breakfast  insulin lispro, 0-5 Units, subcutaneous, q4h  ipratropium-albuteroL, 3 mL, nebulization, q6h  melatonin, 5 mg, g-tube, Daily  piperacillin-tazobactam, 3.375 g, intravenous, q6h  [Held by provider] polyethylene glycol, 17 g, g-tube, Daily  predniSONE, 40 mg, g-tube, Daily  [Held by provider] QUEtiapine, 25 mg, g-tube, Nightly  sodium chloride, 3 mL, nebulization, q6h DERIK  [START ON 10/18/2023] sotalol, 80 mg, g-tube, Daily  terazosin, 1 mg, oral, Nightly  thiamine, 100 mg, g-tube, Daily  verapamil, 40 mg, g-tube, q8h DERIK  white petrolatum, 1 Application, Topical, TID      Continuous medications       PRN medications  PRN medications: acetaminophen, albuterol, calcium gluconate, calcium gluconate, dextrose 10 % in water (D10W), dextrose, glucagon, magnesium sulfate, magnesium sulfate, oxygen, [DISCONTINUED] potassium chloride CR **OR** potassium chloride, [DISCONTINUED] potassium chloride CR **OR** potassium chloride    Results for orders placed or performed during the hospital encounter of 10/04/23 (from the past 24 hour(s))   POCT GLUCOSE   Result Value Ref Range    POCT Glucose 130 (H) 74 - 99 mg/dL   POCT GLUCOSE   Result Value Ref Range    POCT Glucose 116 (H) 74 - 99 mg/dL   POCT GLUCOSE   Result Value Ref Range    POCT Glucose 120 (H) 74 - 99 mg/dL   POCT GLUCOSE   Result Value Ref Range    POCT Glucose 114 (H) 74 - 99 mg/dL   Calcium, Ionized   Result Value Ref Range    POCT Calcium, Ionized 1.15 1.1 - 1.33 mmol/L   CBC   Result Value Ref Range    WBC 10.6 4.4 - 11.3 x10*3/uL    nRBC 0.0 0.0 - 0.0 /100 WBCs    RBC 3.36 (L) 4.00 - 5.20 x10*6/uL    Hemoglobin 10.4 (L) 12.0 - 16.0 g/dL    Hematocrit 31.9 (L) 36.0 - 46.0 %    MCV 95 80 - 100 fL    MCH 31.0 26.0 - 34.0 pg    MCHC 32.6 32.0 - 36.0 g/dL    RDW 18.6 (H) 11.5 - 14.5 %    Platelets 260 150 - 450 x10*3/uL    MPV 10.0 7.5 - 11.5 fL   Coagulation Screen    Result Value Ref Range    Protime 12.0 9.8 - 12.8 seconds    INR 1.1 0.9 - 1.1    aPTT 30 27 - 38 seconds   Renal Function Panel   Result Value Ref Range    Glucose 140 (H) 74 - 99 mg/dL    Sodium 140 136 - 145 mmol/L    Potassium 4.4 3.5 - 5.3 mmol/L    Chloride 104 98 - 107 mmol/L    Bicarbonate 28 21 - 32 mmol/L    Anion Gap 12 10 - 20 mmol/L    Urea Nitrogen 34 (H) 6 - 23 mg/dL    Creatinine 0.76 0.50 - 1.05 mg/dL    eGFR 75 >60 mL/min/1.73m*2    Calcium 8.3 (L) 8.6 - 10.6 mg/dL    Phosphorus 4.4 2.5 - 4.9 mg/dL    Albumin 2.4 (L) 3.4 - 5.0 g/dL   Magnesium   Result Value Ref Range    Magnesium 2.22 1.60 - 2.40 mg/dL   POCT GLUCOSE   Result Value Ref Range    POCT Glucose 108 (H) 74 - 99 mg/dL   POCT GLUCOSE   Result Value Ref Range    POCT Glucose 101 (H) 74 - 99 mg/dL   POCT GLUCOSE   Result Value Ref Range    POCT Glucose 115 (H) 74 - 99 mg/dL        This patient currently has cardiac telemetry ordered; if you would like to modify or discontinue the telemetry order, click here to go to the orders activity to modify/discontinue the order.    Vent Mode: Pressure support  FiO2 (%):  [30 %] 30 %  S RR:  [12] 12  S VT:  [245 mL-350 mL] 245 mL  PEEP/CPAP (cm H2O):  [5 cm H20-8 cm H20] 5 cm H20  OK SUP:  [8 cm H20-12 cm H20] 8 cm H20  MAP (cm H2O):  [6-8.4] 8      Assessment/Plan   Principal Problem:    Squamous cell carcinoma of face  Active Problems:    Pacemaker    Gastroesophageal reflux disease without esophagitis    Arthritis    HTN (hypertension)    Dysrhythmias    Malignant neoplasm of cheek mucosa (CMS/HCC)    Eryn Willis is a 89 y.o. 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 Post-operative course complicated by delirium and urinary retention. 10/14 patient experienced worsening mental status changes, and  respiratory decline. ABG demonstrated profound acute hypercarbic respiratory failure 7.20/83/26. CXR with questionable RML infiltrate. Patient was transferred to SICU for further management. Given c/f flap injury with NIPPV mask, patient was intubated 10/14.     Plan:  Neuro: Hospital course complicated by delirium. A&Ox0 on presentation to SICU, likely multi-factorial in the setting of post-operative delirium and hypercarbia. Intermittently following commands while intubated.  - ongoing neuro and pain assessments  - PRN Tylenol for pain control  - Psych following, appreciate recs  - Continue Melatonin and Thiamine  - Sleep/wake cycle hygiene, CAM-ICU qShift, Avoid CNS depressants as able  - No indication for sitter at this time  - PT/OT as appropriate  - Discontinue restraints with extubation     Cardiovascular: Hx HTN, SSS s/p PPM. Pacer settings: AAI-DDD @60. Marginal hypotension during rounds.  - Continuous EKG/ABP monitoring   - MAP goal >65  - Decrease Sotalol to 80mg q24h  - Decrease Verapamil to 40mg q8h  - Continue ASA 325mg qd     Pulm: Hx of COPD of unclear etiology. Follows with Pulmonology outpatient. Acute on chronic hypercarbic respiratory failure. Arrived to SICU on BiPAP 10/5 50%, concern about increasing BiPAP settings with new buccal flap. Decision made to intubate due to continued respiratory acidosis. CXR with questionable RML infiltrate, treating for HCAP. Tolerating CPAP 5/5 this am.  - WTE -> extubated after rounds  - Maintain SpO2 >88%  - COPD exacerbation treatment: Prednisone 40mg and doxycyline for 5 days  - Discontinue scheduled 3% saline nebs and duonebs  - PRN albuterol  - encourage IS q1h  - CXR daily  - ABG prn -> obtain 1hr post-extubation ABG    ENT: Right buccal squamous cell carcinoma  s/p wide local excision and left anterolateral thigh free flap reconstruction on 10/4/23.  - Flap care per ENT  - continue peridex swabs 3x daily  - wound care to surgical sites     GI: Hx GERD,  s/p PEG.  - NPO  - Tube feeds on hold for extubation, okay for meds  - Resume tube feeds this afternoon  - Continue PPI per PEG  - holding bowel regimen given recent diarrhea     : Acute urinary retention during hospitalization. Howard placed 10/15, removed by patient same day, Net positive 1.1L for past 24hrs.  - RFP daily and PRN  - discontinue mIVFs  - Strict I/Os via external catheter  - Replete electrolytes to maintain K>4, Phos>2.5, iCal>1.1, Mag>2.  - Discontinue terazosin. Please do not resume as terazosin should not be opened and given via PEG. Monitor UOP. If there is ongoing retention, may start patient on Silodosin 4mg daily.      Heme: Acute blood loss anemia/anemia of chronic disease.  - CBC daily and PRN  - Coags PRN  - SCDs for DVT ppx and resume subcutaneous Lovenox  - ongoing monitoring for s/s bleeding  - Maintain active T&S -> update today     Endo: No hx of DM or thyroid disease. Adequate glycemic control.  - BG q4h and SSI per ICU protocol     ID: Afebrile. Leukocytosis resolved. Concern for HCAP. Completed savanah-operative Clinda. UA 10/9 with trace LE. MRSA negative, vancomycin discontinued. Sputum Cx 10/15 with salivary contamination.  - Trend temp q4h, WBC daily  - Zosyn (10/14-10/21)  - COPD exacerbation: Doxycycline (10/14-10/19)  - ongoing monitoring for s/s infection     Prophylaxis:  SCDs  Lovenox  Esomeprazole     Lines:  L radial arterial line (10/14-)  PIV x2    NOK: james Chowdary [phone]. Discussed transfer to ICU, intubation and ongoing care 10/14. Confirmation of code status at this time. Patient is FULL CODE.     Dispo: Continue ICU care. Consider transferring to floor this afternoon if patient remains stable. Patient seen and discussed with ICU attending Dr. Lofton.    I spent 50 minutes in the professional and overall care of this patient.       Ja Vincent, APRN-CNP

## 2023-10-17 NOTE — CARE PLAN
The patient's goals for the shift include  ROBERT, patient intubated.     The clinical goals for the shift include decreased agitation episodes.  Problem: Safety - Medical Restraint  Goal: Free from restraint(s) (Restraint for Interference with Medical Device)  Outcome: Progressing

## 2023-10-17 NOTE — PROGRESS NOTES
"Eryn Willis is a 89 y.o. female on day 13 of admission presenting with Squamous cell carcinoma of face. No acute events overnight. Flap checks stable.    Subjective   Remains intubated, failed weaning parameters yesterday back on SIMV overnight, now on CPAP.     Objective   Physical Exam  Vitals reviewed in EMR  Gen: Intubated, sedated  Eyes: Sclera clear  Ears: Normal external ears bilaterally  Nose: No rhinorrhea; anterior nares clear  Oral Cavity: dry, old crusted blood; ETT secured with tube krause superior to skin paddle  Head: normocephalic, atraumatic  Face/Neck: All incisions c/d/i, neck soft and flat without evidence of hematoma or fluid collection  -slightly increased area of fullness directly below skin paddle, stable  -no evidence of dehiscence   Resp: Symmetric chest rise b/l; intubated  Cards: No clubbing/cyanosis/edema of hands  Gastro: Soft, non-distended, 22fr PEG tube in place  : Voiding  MSK: Left thigh donor flap site well approximated, incision dry   Psych: Intubated, arouses to stimuli     Last Recorded Vitals  Blood pressure (!) 93/46, pulse 60, temperature 35.4 °C (95.7 °F), temperature source Temporal, resp. rate 20, height 1.651 m (5' 5\"), weight 63.2 kg (139 lb 5.3 oz), SpO2 100 %.  Intake/Output last 3 Shifts:  I/O last 3 completed shifts:  In: 2410.2 (38.1 mL/kg) [I.V.:1960.2 (31 mL/kg); NG/GT:100; IV Piggyback:350]  Out: 1215 (19.2 mL/kg) [Urine:1215 (0.5 mL/kg/hr)]  Weight: 63.2 kg     Relevant Results  Results for orders placed or performed during the hospital encounter of 10/04/23 (from the past 24 hour(s))   POCT GLUCOSE   Result Value Ref Range    POCT Glucose 104 (H) 74 - 99 mg/dL   Blood Gas Arterial Full Panel   Result Value Ref Range    POCT pH, Arterial 7.43 (H) 7.38 - 7.42 pH    POCT pCO2, Arterial 45 (H) 38 - 42 mm Hg    POCT pO2, Arterial 121 (H) 85 - 95 mm Hg    POCT SO2, Arterial 99 94 - 100 %    POCT Oxy Hemoglobin, Arterial 97.1 94.0 - 98.0 %    POCT Hematocrit " Calculated, Arterial 31.0 (L) 36.0 - 46.0 %    POCT Sodium, Arterial 134 (L) 136 - 145 mmol/L    POCT Potassium, Arterial 5.2 3.5 - 5.3 mmol/L    POCT Chloride, Arterial 104 98 - 107 mmol/L    POCT Ionized Calcium, Arterial 1.23 1.10 - 1.33 mmol/L    POCT Glucose, Arterial 116 (H) 74 - 99 mg/dL    POCT Lactate, Arterial 1.2 0.4 - 2.0 mmol/L    POCT Base Excess, Arterial 4.9 (H) -2.0 - 3.0 mmol/L    POCT HCO3 Calculated, Arterial 29.9 (H) 22.0 - 26.0 mmol/L    POCT Hemoglobin, Arterial 10.4 (L) 12.0 - 16.0 g/dL    POCT Anion Gap, Arterial 5 (L) 10 - 25 mmo/L    Patient Temperature 37.0 degrees Celsius    FiO2 30 %   POCT GLUCOSE   Result Value Ref Range    POCT Glucose 130 (H) 74 - 99 mg/dL   POCT GLUCOSE   Result Value Ref Range    POCT Glucose 116 (H) 74 - 99 mg/dL   POCT GLUCOSE   Result Value Ref Range    POCT Glucose 120 (H) 74 - 99 mg/dL   POCT GLUCOSE   Result Value Ref Range    POCT Glucose 114 (H) 74 - 99 mg/dL   Calcium, Ionized   Result Value Ref Range    POCT Calcium, Ionized 1.15 1.1 - 1.33 mmol/L   CBC   Result Value Ref Range    WBC 10.6 4.4 - 11.3 x10*3/uL    nRBC 0.0 0.0 - 0.0 /100 WBCs    RBC 3.36 (L) 4.00 - 5.20 x10*6/uL    Hemoglobin 10.4 (L) 12.0 - 16.0 g/dL    Hematocrit 31.9 (L) 36.0 - 46.0 %    MCV 95 80 - 100 fL    MCH 31.0 26.0 - 34.0 pg    MCHC 32.6 32.0 - 36.0 g/dL    RDW 18.6 (H) 11.5 - 14.5 %    Platelets 260 150 - 450 x10*3/uL    MPV 10.0 7.5 - 11.5 fL   Coagulation Screen   Result Value Ref Range    Protime 12.0 9.8 - 12.8 seconds    INR 1.1 0.9 - 1.1    aPTT 30 27 - 38 seconds   Renal Function Panel   Result Value Ref Range    Glucose 140 (H) 74 - 99 mg/dL    Sodium 140 136 - 145 mmol/L    Potassium 4.4 3.5 - 5.3 mmol/L    Chloride 104 98 - 107 mmol/L    Bicarbonate 28 21 - 32 mmol/L    Anion Gap 12 10 - 20 mmol/L    Urea Nitrogen 34 (H) 6 - 23 mg/dL    Creatinine 0.76 0.50 - 1.05 mg/dL    eGFR 75 >60 mL/min/1.73m*2    Calcium 8.3 (L) 8.6 - 10.6 mg/dL    Phosphorus 4.4 2.5 - 4.9 mg/dL     Albumin 2.4 (L) 3.4 - 5.0 g/dL   Magnesium   Result Value Ref Range    Magnesium 2.22 1.60 - 2.40 mg/dL   POCT GLUCOSE   Result Value Ref Range    POCT Glucose 108 (H) 74 - 99 mg/dL   POCT GLUCOSE   Result Value Ref Range    POCT Glucose 101 (H) 74 - 99 mg/dL     Scheduled medications  aspirin, 325 mg, g-tube, Daily  chlorhexidine, 15 mL, Mouth/Throat, TID  doxycycline, 100 mg, intravenous, q12h  esomeprazole, 40 mg, g-tube, Daily before breakfast  [MAR Hold] hydrogen peroxide, 1 Application, Topical, TID  insulin lispro, 0-5 Units, subcutaneous, q4h  ipratropium-albuteroL, 3 mL, nebulization, q6h  melatonin, 5 mg, g-tube, Daily  piperacillin-tazobactam, 3.375 g, intravenous, q6h  [Held by provider] polyethylene glycol, 17 g, g-tube, Daily  potassium phosphate, 15 mmol, intravenous, Once  predniSONE, 40 mg, g-tube, Daily  [Held by provider] QUEtiapine, 25 mg, g-tube, Nightly  sodium chloride, 3 mL, nebulization, q6h DERIK  sotalol, 80 mg, g-tube, BID  terazosin, 1 mg, oral, Nightly  thiamine, 100 mg, g-tube, Daily  verapamil, 60 mg, g-tube, q8h DERIK  white petrolatum, 1 Application, Topical, TID    Continuous medications  dexmedeTOMIDine, 0.1-1.5 mcg/kg/hr, Last Rate: Stopped (10/17/23 0652)  lactated Ringer's, 50 mL/hr, Last Rate: 50 mL/hr (10/17/23 0600)  propofol, 5-50 mcg/kg/min, Last Rate: Stopped (10/16/23 1230)    PRN medications  PRN medications: acetaminophen, albuterol, calcium gluconate, calcium gluconate, dextrose 10 % in water (D10W), dextrose, glucagon, magnesium sulfate, magnesium sulfate, oxygen, potassium chloride CR **OR** potassium chloride, potassium chloride CR **OR** potassium chloride     Assessment/Plan   Eryn Willis   is a 89 y.o. F who underwent Direct laryngoscopy,  Wide local excision of skin cancer, Oral cavity resection malignant neoplasm, Right selective neck dissection levels 1A-3, Reconstruction with left anterolateral thigh free flap with Sheron Whitney and Rachele on 10/4/23 on  10/4/2023.      Active Issues:  Right Buccal SCC  Possible Cervical Lymph Node Mets  HTN  COPD  Acute Blood Loss Anemia  Anemia of Chronic Disease  Hypokalemia  Hypomagnesia  Severe Protein Calorie Malnutrition  Mixed Delirium  Urinary retention  COPD exacerbation  Hypercarbia  Hospital Acquired Pneumonia    Plan:  -Flap Checks: q24hrs  -Drains:N/A  -Analgesia:  Intubated/sedated on propofol; Scheduled Acetaminophen  -FEN: mIVF restarted at 50 per SICU while holding TF; NPO; monitor and replete electrolytes as needed  -Pulm: SICU following for COPD exacerbation with HCAP; Doxycycline bid 10/14-19 for COPD exacerbation; plan to wean vent as tolerated; PRN COPD inhalers; IS q1h post-extubation  -ID: Day 1 vanc/zosyn for HCAP coverage per SICU  -Cardiac: Vitals per ENT free flap protocol then transition to Q4hr vitals; Aspirin 325mg daily, Sotalol 80mg BID, Verapamil 60mg Q8hrs; No current interventions per electrophysiology recs  -Endo: no current interventions  -GI: Bowel regimen held 2/2 diarrhea, PPI, and PRN anti-emetic  -: Howard removed by patient  -Steroids/Special: Steroid taper for COPD exacerbation, Incision and oral care per ENT free flap order set  -Embolic PPx: subcutaneous Lovenox, SCDs while in bed  -Dispo: [facility]. PT/OTordered; Patient has stated she would like to return home with home care at discharge. Discharge pending improvement in clinical condition.  Will discuss with family our recommendation for home PT given deconditioning after surgery.    All plans discussed with attending after morning rounds.     I spent 15 minutes in the professional and overall care of this patient.    Shirley Brody MD  Department of Otolaryngology: Head & Neck Surgery  ENT Team  Head and Neck Phone: [phone]

## 2023-10-18 NOTE — PROGRESS NOTES
"Subjective Data:  No events overnight, family at bedside says she is more coherent and clear today than yesterday       Objective Data:  Last Recorded Vitals:  Vitals:    10/18/23 0600 10/18/23 0700 10/18/23 0800 10/18/23 0900   BP: (!) 138/49 (!) 135/49 137/56 (!) 122/49   BP Location:   Left arm    Patient Position:   Lying    Pulse: 72 75 77 76   Resp: 22 21 23 23   Temp:  35.9 °C (96.6 °F)     TempSrc:       SpO2:   100%    Weight:       Height:           Last Labs:  CBC - 10/18/2023:  2:44 AM  14.2 12.4 349    37.9      CMP - 10/18/2023:  2:44 AM  8.4 3.9 9 --- 0.3   4.7 2.4 7 45      PTT - 10/17/2023:  2:20 AM  1.1   12.0 30     No results found for: \"TROPHS\", \"BNP\", \"HGBA1C\", \"LDLCALC\", \"VLDL\"   Last I/O:  I/O last 3 completed shifts:  In: 1644 (26 mL/kg) [I.V.:894 (14.1 mL/kg); NG/GT:100; IV Piggyback:650]  Out: 500 (7.9 mL/kg) [Urine:500 (0.2 mL/kg/hr)]  Weight: 63.2 kg     Past Cardiology Tests (Last 3 Years):  EKG:  No results found for this or any previous visit from the past 1095 days.    Echo:  No results found for this or any previous visit from the past 1095 days.    Ejection Fractions:  No results found for: \"EF\"  Cath:  No results found for this or any previous visit from the past 1095 days.    Stress Test:  No results found for this or any previous visit from the past 1095 days.    Cardiac Imaging:  No results found for this or any previous visit from the past 1095 days.      Inpatient Medications:  Scheduled medications   Medication Dose Route Frequency    aspirin  325 mg g-tube Daily    chlorhexidine  15 mL Mouth/Throat TID    doxycycline  100 mg intravenous q12h    enoxaparin  40 mg subcutaneous q24h    esomeprazole  40 mg g-tube Daily before breakfast    insulin lispro  0-5 Units subcutaneous q4h    melatonin  5 mg g-tube Daily    piperacillin-tazobactam  3.375 g intravenous q6h    [Held by provider] polyethylene glycol  17 g g-tube Daily    predniSONE  40 mg g-tube Daily    [Held by provider] " QUEtiapine  25 mg g-tube Nightly    sotalol  80 mg g-tube Daily    thiamine  100 mg g-tube Daily    verapamil  40 mg g-tube q8h DERIK    white petrolatum  1 Application Topical TID     PRN medications   Medication    acetaminophen    albuterol    calcium gluconate    calcium gluconate    dextrose 10 % in water (D10W)    dextrose    glucagon    magnesium sulfate    magnesium sulfate    oxygen    potassium chloride    potassium chloride     Continuous Medications   Medication Dose Last Rate       Physical Exam:  GEN: NAD  HEENT: ATNC,  anicteric, no JVD, right cheek with flap   CV: RRR, no r/g/m  Pulm: CTAB  Abdomen: NTND, PEG  Ext: warm, no LE edema noted  Neuro: A+Ox3  Psych: appropriate       Assessment/Plan   89-year-old female with history hypertension, COPD, sick sinus syndrome post DC PM, paroxysmal A-fib on sotalol.  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  Her course has been complicated by some delirium and agitation.  She has PEG tube  placed as well.     Pt has maintained sinus rhythm through her stay here and her prior device interrogations have little evidence of atrial arrhythmias.     #SSS s/p PM  #pAF on sotalol  #antipsychotic use     Recommendations:  -repeat ecg today with stable Qtc 466  -would continue sotalol 80 BID (this is a lower dose than general anyways and EGFR has been generally stable above 60)  -in general, sotalol can be stopped if there are significant electrolyte shifts or if renal function occurs, as it is not absolutely essential and she has had not had significant afib burden on review of her recent interrogations  -Please keep K>4, Mg>2.5  -please arrange follow up with her outpt cardiologist Dr. Garg at [facility] in 3-4 weeks  -as for her verapamil, I am not sure if it is for her HTN or afib, but given that she is in sinus rhythm it is okay to discontinue verpamil and start a different  antihypertensive to limit pill burden as she is getting IR verapamil through her PEG right now  -can start amlodipine 5 mg daily and follow BP on discharge     Pt discussed with Dr. Chino       EP Consult Pager: [phone] (weekday 7AM-6PM and weekend 7AM-2PM) and other: [phone]  EP Device Nurse Pager: [phone] (weekday 7AM-4PM)     We will sign off at this time but please do not hesitate to call back with questions.      Dc Draper MD

## 2023-10-18 NOTE — CARE PLAN
Pt will Keep current appt.    The patient's goals for the shift include      The clinical goals for the shift include dec agitation

## 2023-10-18 NOTE — PROGRESS NOTES
"Eryn Willis is a 89 y.o. female on day 14 of admission presenting with Squamous cell carcinoma of face.    Subjective   Patient extubated yesterday. Is aware of location. Breathing well on NC       Objective     Physical Exam  Constitutional:       General: She is not in acute distress.     Comments: Elderly female, intubated   HENT:      Head: Normocephalic and atraumatic.      Comments: Flap to R cheek and buccal mucosa. Appears c/d/I with normal color     Mouth/Throat:      Comments: ETT in place  Eyes:      Pupils: Pupils are equal, round, and reactive to light.   Cardiovascular:      Rate and Rhythm: Normal rate.      Pulses: Normal pulses.      Heart sounds: Normal heart sounds.      Comments: Pacemaker AAI-DDD, paced @60pbm  Pulmonary:      Effort: Pulmonary effort is normal. No respiratory distress.      Breath sounds: Normal breath sounds.      Comments: Mechanically ventilated via ETT. Thorax symmetric.   Abdominal:      General: Abdomen is flat. Bowel sounds are normal. There is no distension.      Palpations: Abdomen is soft.      Comments: LUQ PEG tube in place   Genitourinary:     Comments: External catheter in place  Skin:     General: Skin is warm and dry.      Comments: Left ALT flap harvest, [place], sutures intact.   Neurological:      Comments: Intubated, intermittently following commands.   Psychiatric:      Comments: Calm and cooperative       Last Recorded Vitals  Blood pressure (!) 122/48, pulse 76, temperature 35.9 °C (96.6 °F), resp. rate 20, height 1.651 m (5' 5\"), weight 63.2 kg (139 lb 5.3 oz), SpO2 98 %.  Intake/Output last 3 Shifts:  I/O last 3 completed shifts:  In: 1644 (26 mL/kg) [I.V.:894 (14.1 mL/kg); NG/GT:100; IV Piggyback:650]  Out: 500 (7.9 mL/kg) [Urine:500 (0.2 mL/kg/hr)]  Weight: 63.2 kg     Relevant Results  Scheduled medications  aspirin, 325 mg, g-tube, Daily  chlorhexidine, 15 mL, Mouth/Throat, TID  doxycycline, 100 mg, intravenous, q12h  enoxaparin, 40 mg, subcutaneous, " q24h  esomeprazole, 40 mg, g-tube, Daily before breakfast  insulin lispro, 0-5 Units, subcutaneous, q4h  melatonin, 5 mg, g-tube, Daily  piperacillin-tazobactam, 3.375 g, intravenous, q6h  [Held by provider] polyethylene glycol, 17 g, g-tube, Daily  predniSONE, 40 mg, g-tube, Daily  [Held by provider] QUEtiapine, 25 mg, g-tube, Nightly  sotalol, 80 mg, oral, BID  thiamine, 100 mg, g-tube, Daily  white petrolatum, 1 Application, Topical, TID      Continuous medications       PRN medications  PRN medications: acetaminophen, albuterol, calcium gluconate, calcium gluconate, dextrose 10 % in water (D10W), dextrose, glucagon, magnesium sulfate, magnesium sulfate, oxygen, [DISCONTINUED] potassium chloride CR **OR** potassium chloride, [DISCONTINUED] potassium chloride CR **OR** potassium chloride    Results for orders placed or performed during the hospital encounter of 10/04/23 (from the past 24 hour(s))   Type and Screen   Result Value Ref Range    ABO TYPE O     Rh TYPE POS     ANTIBODY SCREEN NEG    Blood Gas Arterial Full Panel Unsolicited   Result Value Ref Range    POCT pH, Arterial 7.45 (H) 7.38 - 7.42 pH    POCT pCO2, Arterial 44 (H) 38 - 42 mm Hg    POCT pO2, Arterial 110 (H) 85 - 95 mm Hg    POCT SO2, Arterial 99 94 - 100 %    POCT Oxy Hemoglobin, Arterial 97.1 94.0 - 98.0 %    POCT Hematocrit Calculated, Arterial 41.0 36.0 - 46.0 %    POCT Sodium, Arterial 135 (L) 136 - 145 mmol/L    POCT Potassium, Arterial 4.3 3.5 - 5.3 mmol/L    POCT Chloride, Arterial 104 98 - 107 mmol/L    POCT Ionized Calcium, Arterial 1.18 1.10 - 1.33 mmol/L    POCT Glucose, Arterial 117 (H) 74 - 99 mg/dL    POCT Lactate, Arterial 1.9 0.4 - 2.0 mmol/L    POCT Base Excess, Arterial 5.8 (H) -2.0 - 3.0 mmol/L    POCT HCO3 Calculated, Arterial 30.6 (H) 22.0 - 26.0 mmol/L    POCT Hemoglobin, Arterial 13.8 12.0 - 16.0 g/dL    POCT Anion Gap, Arterial 5 (L) 10 - 25 mmo/L    Patient Temperature 37.0 degrees Celsius   POCT GLUCOSE   Result Value  Ref Range    POCT Glucose 97 74 - 99 mg/dL   POCT GLUCOSE   Result Value Ref Range    POCT Glucose 84 74 - 99 mg/dL   POCT GLUCOSE   Result Value Ref Range    POCT Glucose 83 74 - 99 mg/dL   CBC   Result Value Ref Range    WBC 14.2 (H) 4.4 - 11.3 x10*3/uL    nRBC 0.0 0.0 - 0.0 /100 WBCs    RBC 3.99 (L) 4.00 - 5.20 x10*6/uL    Hemoglobin 12.4 12.0 - 16.0 g/dL    Hematocrit 37.9 36.0 - 46.0 %    MCV 95 80 - 100 fL    MCH 31.1 26.0 - 34.0 pg    MCHC 32.7 32.0 - 36.0 g/dL    RDW 18.7 (H) 11.5 - 14.5 %    Platelets 349 150 - 450 x10*3/uL    MPV 9.9 7.5 - 11.5 fL   Renal function panel   Result Value Ref Range    Glucose 87 74 - 99 mg/dL    Sodium 142 136 - 145 mmol/L    Potassium 4.9 3.5 - 5.3 mmol/L    Chloride 104 98 - 107 mmol/L    Bicarbonate 25 21 - 32 mmol/L    Anion Gap 18 10 - 20 mmol/L    Urea Nitrogen 37 (H) 6 - 23 mg/dL    Creatinine 0.90 0.50 - 1.05 mg/dL    eGFR 61 >60 mL/min/1.73m*2    Calcium 8.4 (L) 8.6 - 10.6 mg/dL    Phosphorus 4.7 2.5 - 4.9 mg/dL    Albumin 2.4 (L) 3.4 - 5.0 g/dL   Magnesium   Result Value Ref Range    Magnesium 2.31 1.60 - 2.40 mg/dL   CALCIUM, IONIZED   Result Value Ref Range    POCT Calcium, Ionized 1.04 (L) 1.1 - 1.33 mmol/L   POCT GLUCOSE   Result Value Ref Range    POCT Glucose 84 74 - 99 mg/dL   POCT GLUCOSE   Result Value Ref Range    POCT Glucose 74 74 - 99 mg/dL   POCT GLUCOSE   Result Value Ref Range    POCT Glucose 84 74 - 99 mg/dL        This patient currently has cardiac telemetry ordered; if you would like to modify or discontinue the telemetry order, click here to go to the orders activity to modify/discontinue the order.           Assessment/Plan   Principal Problem:    Squamous cell carcinoma of face  Active Problems:    Pacemaker    Gastroesophageal reflux disease without esophagitis    Arthritis    HTN (hypertension)    Dysrhythmias    Malignant neoplasm of cheek mucosa (CMS/HCC)    Eryn Willis is a 89 y.o. female with a past medical history significant for COPD,  SSS s/p PPM AAI-DDD @ 60, HTN, GERD, arthritis, anemia, and right buccal squamous cell carcinoma s/p wide local excision and left anterolateral thigh free flap reconstruction on 10/4/23. Post-operative course complicated by delirium and urinary retention. 10/14 patient experienced worsening mental status changes, and respiratory decline. ABG demonstrated profound acute hypercarbic respiratory failure 7.20/83/26. CXR with questionable RML infiltrate. Patient was transferred to SICU for further management. Given c/f flap injury with NIPPV mask, patient was intubated 10/14.     Plan:  Neuro: Hospital course complicated by delirium. A&Ox0 on presentation to SICU, likely multi-factorial in the setting of post-operative delirium and hypercarbia. Intermittently following commands while intubated.  - ongoing neuro and pain assessments  - PRN Tylenol for pain control  - Psych following, appreciate recs  - Continue Melatonin and Thiamine  - Sleep/wake cycle hygiene, CAM-ICU qShift, Avoid CNS depressants as able  - No indication for sitter at this time  - PT/OT as appropriate  - Discontinue restraints with extubation     Cardiovascular: Hx HTN, SSS s/p PPM. Pacer settings: AAI-DDD @60. Marginal hypotension during rounds.  - Continuous EKG/ABP monitoring   - MAP goal >65  - Returned Sotalol to 80mg BID  - Discontinued verapamil  - Continue ASA 325mg qd     Pulm: Hx of COPD of unclear etiology. Follows with Pulmonology outpatient. Acute on chronic hypercarbic respiratory failure. Arrived to SICU on BiPAP 10/5 50%, concern about increasing BiPAP settings with new buccal flap. Decision made to intubate due to continued respiratory acidosis. CXR with questionable RML infiltrate, treating for HCAP. Tolerating CPAP 5/5 this am.  - extubated 10/17  - Maintain SpO2 >88%  - COPD exacerbation treatment: Prednisone 40mg and doxycyline for 5 days  - PRN albuterol  - encourage IS q1h  - CXR daily  - ABG prn - post extubation ABG stable      ENT: Right buccal squamous cell carcinoma  s/p wide local excision and left anterolateral thigh free flap reconstruction on 10/4/23.  - Flap care per ENT  - continue peridex swabs 3x daily  - wound care to surgical sites     GI: Hx GERD, s/p PEG.  - continue tube feeds  - Continue PPI per PEG  - holding bowel regimen given recent diarrhea     : Acute urinary retention during hospitalization. Howard placed 10/15, removed by patient same day  - RFP daily and PRN  - Strict I/Os via external catheter  - Replete electrolytes to maintain K>4, Phos>2.5, iCal>1.1, Mag>2.  - Discontinue terazosin. Please do not resume as terazosin should not be opened and given via PEG. Monitor UOP. If there is ongoing retention, may start patient on Silodosin 4mg daily.   - given silodosin 4mg today given 900 mL of urine on bladder scan. Patient was straight cath'd and urine was relieved.   - obtain q4 bladder scans     Heme: Acute blood loss anemia/anemia of chronic disease.  - CBC daily and PRN  - Coags PRN  - SCDs for DVT ppx and resume subcutaneous Lovenox  - ongoing monitoring for s/s bleeding  - Maintain active T&S     Endo: No hx of DM or thyroid disease. Adequate glycemic control.  - BG q4h and SSI per ICU protocol     ID: Afebrile. Leukocytosis resolved. Concern for HCAP. Completed savanah-operative Clinda. UA 10/9 with trace LE. MRSA negative, vancomycin discontinued. Sputum Cx 10/15 with salivary contamination.  - Trend temp q4h, WBC daily  - Zosyn (10/14-10/21)  - COPD exacerbation: Doxycycline (10/14-10/19)  - ongoing monitoring for s/s infection     Prophylaxis:  SCDs  Lovenox  Esomeprazole     Lines:  PIV x2    NOK: son Epi [phone]. Discussed transfer to ICU, intubation and ongoing care 10/14. Confirmation of code status at this time. Patient is FULL CODE.     Dispo:  waiting on bed for transfer    I have discussed the case with the resident/advanced practice provider. I have personally performed a history, physical  exam, and my own medical decision making. I have reviewed the note and agree with the findings and plan with the following exceptions as identified below. I have personally provided 39 minutes of critical care time exclusive of time spent on separately billable procedures. Time includes review of laboratory data, radiology results, discussion with consultants, family and monitoring for potential decompensation. Interventions were performed as documented above.      Family/Surrogate updated with plan of care.  Code status addressed/up to date.       Darlin Cortez MD

## 2023-10-18 NOTE — PROGRESS NOTES
"Eryn Willis is a 89 y.o. female on day 14 of admission presenting with Squamous cell carcinoma of face. No acute events overnight. Flap checks stable.    Subjective   Extubated yesterday, now on NC. Awake and alert, A&Ox3. Some posturing of hands, not restrained.     Objective   Physical Exam  Vitals reviewed in EMR  Gen: Awake and alert  Eyes: Sclera clear  Ears: Normal external ears bilaterally  Nose: No rhinorrhea; anterior nares clear  Oral Cavity: dry, old crusted blood  Head: normocephalic, atraumatic  Face/Neck: All incisions c/d/i, neck soft and flat without evidence of hematoma or fluid collection  -slightly increased area of fullness directly below skin paddle, stable  -no evidence of dehiscence   Resp: Symmetric chest rise b/l; intubated  Cards: No clubbing/cyanosis/edema of hands  Gastro: Soft, non-distended, 22fr PEG tube in place  : Voiding  MSK: Left thigh donor flap site well approximated, incision dry   Psych: Intubated, arouses to stimuli     Last Recorded Vitals  Blood pressure (!) 140/48, pulse 74, temperature 36 °C (96.8 °F), temperature source Temporal, resp. rate 23, height 1.651 m (5' 5\"), weight 63.2 kg (139 lb 5.3 oz), SpO2 100 %.  Intake/Output last 3 Shifts:  I/O last 3 completed shifts:  In: 1947 (30.8 mL/kg) [I.V.:1497 (23.7 mL/kg); NG/GT:100; IV Piggyback:350]  Out: 700 (11.1 mL/kg) [Urine:700 (0.3 mL/kg/hr)]  Weight: 63.2 kg     Relevant Results  Results for orders placed or performed during the hospital encounter of 10/04/23 (from the past 24 hour(s))   POCT GLUCOSE   Result Value Ref Range    POCT Glucose 101 (H) 74 - 99 mg/dL   POCT GLUCOSE   Result Value Ref Range    POCT Glucose 115 (H) 74 - 99 mg/dL   Type and Screen   Result Value Ref Range    ABO TYPE O     Rh TYPE POS     ANTIBODY SCREEN NEG    Blood Gas Arterial Full Panel Unsolicited   Result Value Ref Range    POCT pH, Arterial 7.45 (H) 7.38 - 7.42 pH    POCT pCO2, Arterial 44 (H) 38 - 42 mm Hg    POCT pO2, Arterial " 110 (H) 85 - 95 mm Hg    POCT SO2, Arterial 99 94 - 100 %    POCT Oxy Hemoglobin, Arterial 97.1 94.0 - 98.0 %    POCT Hematocrit Calculated, Arterial 41.0 36.0 - 46.0 %    POCT Sodium, Arterial 135 (L) 136 - 145 mmol/L    POCT Potassium, Arterial 4.3 3.5 - 5.3 mmol/L    POCT Chloride, Arterial 104 98 - 107 mmol/L    POCT Ionized Calcium, Arterial 1.18 1.10 - 1.33 mmol/L    POCT Glucose, Arterial 117 (H) 74 - 99 mg/dL    POCT Lactate, Arterial 1.9 0.4 - 2.0 mmol/L    POCT Base Excess, Arterial 5.8 (H) -2.0 - 3.0 mmol/L    POCT HCO3 Calculated, Arterial 30.6 (H) 22.0 - 26.0 mmol/L    POCT Hemoglobin, Arterial 13.8 12.0 - 16.0 g/dL    POCT Anion Gap, Arterial 5 (L) 10 - 25 mmo/L    Patient Temperature 37.0 degrees Celsius   POCT GLUCOSE   Result Value Ref Range    POCT Glucose 97 74 - 99 mg/dL   POCT GLUCOSE   Result Value Ref Range    POCT Glucose 84 74 - 99 mg/dL   POCT GLUCOSE   Result Value Ref Range    POCT Glucose 83 74 - 99 mg/dL   CBC   Result Value Ref Range    WBC 14.2 (H) 4.4 - 11.3 x10*3/uL    nRBC 0.0 0.0 - 0.0 /100 WBCs    RBC 3.99 (L) 4.00 - 5.20 x10*6/uL    Hemoglobin 12.4 12.0 - 16.0 g/dL    Hematocrit 37.9 36.0 - 46.0 %    MCV 95 80 - 100 fL    MCH 31.1 26.0 - 34.0 pg    MCHC 32.7 32.0 - 36.0 g/dL    RDW 18.7 (H) 11.5 - 14.5 %    Platelets 349 150 - 450 x10*3/uL    MPV 9.9 7.5 - 11.5 fL   Renal function panel   Result Value Ref Range    Glucose 87 74 - 99 mg/dL    Sodium 142 136 - 145 mmol/L    Potassium 4.9 3.5 - 5.3 mmol/L    Chloride 104 98 - 107 mmol/L    Bicarbonate 25 21 - 32 mmol/L    Anion Gap 18 10 - 20 mmol/L    Urea Nitrogen 37 (H) 6 - 23 mg/dL    Creatinine 0.90 0.50 - 1.05 mg/dL    eGFR 61 >60 mL/min/1.73m*2    Calcium 8.4 (L) 8.6 - 10.6 mg/dL    Phosphorus 4.7 2.5 - 4.9 mg/dL    Albumin 2.4 (L) 3.4 - 5.0 g/dL   Magnesium   Result Value Ref Range    Magnesium 2.31 1.60 - 2.40 mg/dL   CALCIUM, IONIZED   Result Value Ref Range    POCT Calcium, Ionized 1.04 (L) 1.1 - 1.33 mmol/L   POCT  GLUCOSE   Result Value Ref Range    POCT Glucose 84 74 - 99 mg/dL     Scheduled medications  aspirin, 325 mg, g-tube, Daily  chlorhexidine, 15 mL, Mouth/Throat, TID  doxycycline, 100 mg, intravenous, q12h  enoxaparin, 40 mg, subcutaneous, q24h  esomeprazole, 40 mg, g-tube, Daily before breakfast  insulin lispro, 0-5 Units, subcutaneous, q4h  melatonin, 5 mg, g-tube, Daily  piperacillin-tazobactam, 3.375 g, intravenous, q6h  [Held by provider] polyethylene glycol, 17 g, g-tube, Daily  predniSONE, 40 mg, g-tube, Daily  [Held by provider] QUEtiapine, 25 mg, g-tube, Nightly  sotalol, 80 mg, g-tube, Daily  thiamine, 100 mg, g-tube, Daily  verapamil, 40 mg, g-tube, q8h DERIK  white petrolatum, 1 Application, Topical, TID    Continuous medications     PRN medications  PRN medications: acetaminophen, albuterol, calcium gluconate, calcium gluconate, dextrose 10 % in water (D10W), dextrose, glucagon, magnesium sulfate, magnesium sulfate, oxygen, [DISCONTINUED] potassium chloride CR **OR** potassium chloride, [DISCONTINUED] potassium chloride CR **OR** potassium chloride     Assessment/Plan   Eryn Willis   is a 89 y.o. F who underwent Direct laryngoscopy,  Wide local excision of skin cancer, Oral cavity resection malignant neoplasm, Right selective neck dissection levels 1A-3, Reconstruction with left anterolateral thigh free flap with Sheron Whitney and Rachele on 10/4/23 on 10/4/2023.      Active Issues:  Right Buccal SCC  Possible Cervical Lymph Node Mets  HTN  COPD  Acute Blood Loss Anemia  Anemia of Chronic Disease  Hypokalemia  Hypomagnesia  Severe Protein Calorie Malnutrition  Mixed Delirium  Urinary retention  COPD exacerbation  Hypercarbia  Hospital Acquired Pneumonia    Plan:  -Flap Checks: q24hrs  -Drains:N/A  -Analgesia:  Intubated/sedated on propofol; Scheduled Acetaminophen  -FEN: mIVF restarted at 50 per SICU while holding TF; NPO; monitor and replete electrolytes as needed  -Pulm: SICU following for COPD exacerbation  with HCAP; Doxycycline bid 10/14-19 for COPD exacerbation; PRN COPD inhalers; IS q1h post-extubation  -ID: Day 1 vanc/zosyn for HCAP coverage per SICU  -Cardiac: Vitals per ENT free flap protocol then transition to Q4hr vitals; Aspirin 325mg daily, Sotalol 80mg BID, Verapamil 60mg Q8hrs; No current interventions per electrophysiology recs, re-engage cardiology regarding home med regimen  -Endo: no current interventions  -GI: Bowel regimen held 2/2 diarrhea, PPI, and PRN anti-emetic  -: Howard removed by patient  -Steroids/Special: Steroid taper for COPD exacerbation, Incision and oral care per ENT free flap order set  -Embolic PPx: subcutaneous Lovenox, SCDs while in bed  -Dispo: [facility]. PT/OTordered; Patient has stated she would like to return home with home care at discharge. Discharge pending improvement in clinical condition.  Will discuss with family our recommendation for home PT given deconditioning after surgery.    All plans discussed with attending after morning rounds.     I spent 15 minutes in the professional and overall care of this patient.    Shirley Brody MD  Department of Otolaryngology: Head & Neck Surgery  ENT Team  Head and Neck Phone: [phone]

## 2023-10-19 NOTE — PROGRESS NOTES
"Eryn Willis is a 89 y.o. female on day 15 of admission presenting with Squamous cell carcinoma of face.     Subjective   Patient extubated and resting in bed. She is awaiting transfer to [place] [id number] once a bed becomes available.    Objective   Physical Exam  Vitals reviewed in EMR  Gen: NAD, resting in bed A&O x1  Eyes: Sclera clear  Ears: Normal external ears bilaterally  Nose: No rhinorrhea; anterior nares clear  Oral Cavity: dry, old crusted blood; area of dehiscence at the posterior aspect of the flap  Head: normocephalic, atraumatic  Face/Neck: All incisions c/d/i, neck soft and flat without evidence of hematoma or fluid collection  -slightly increased area of fullness directly below skin paddle, stable  -no evidence of dehiscence   Resp: Symmetric chest rise b/l;   Cards: No clubbing/cyanosis/edema of hands  Gastro: Soft, non-distended, 22fr PEG tube in place  : Howard catheter in place alma delia colored urine  MSK: Left thigh donor flap site well approximated, incision dry   Psych: Appropriate mood and flat affect    Last Recorded Vitals  Blood pressure (!) 132/49, pulse 75, temperature 35.9 °C (96.6 °F), temperature source Temporal, resp. rate 19, height 1.651 m (5' 5\"), weight 66 kg (145 lb 8.1 oz), SpO2 97 %.  Intake/Output last 3 Shifts:  I/O last 3 completed shifts:  In: 1215 (18.4 mL/kg) [I.V.:60 (0.9 mL/kg); NG/GT:305; IV Piggyback:850]  Out: 2600 (39.4 mL/kg) [Urine:2600 (1.1 mL/kg/hr)]  Weight: 66 kg     Relevant Results  Results for orders placed or performed during the hospital encounter of 10/04/23 (from the past 24 hour(s))   POCT GLUCOSE   Result Value Ref Range    POCT Glucose 84 74 - 99 mg/dL   ECG 12 Lead   Result Value Ref Range    Ventricular Rate 77 BPM    Atrial Rate 77 BPM    NC Interval 192 ms    QRS Duration 80 ms    QT Interval 412 ms    QTC Calculation(Bazett) 466 ms    P Axis 40 degrees    R Axis 60 degrees    T Axis 40 degrees    QRS Count 12 beats    Q Onset 219 ms    P Onset 123 ms "    P Offset 181 ms    T Offset 425 ms    QTC Fredericia 447 ms   POCT GLUCOSE   Result Value Ref Range    POCT Glucose 80 74 - 99 mg/dL   POCT GLUCOSE   Result Value Ref Range    POCT Glucose 78 74 - 99 mg/dL   POCT GLUCOSE   Result Value Ref Range    POCT Glucose 89 74 - 99 mg/dL   CBC   Result Value Ref Range    WBC 15.2 (H) 4.4 - 11.3 x10*3/uL    nRBC 0.0 0.0 - 0.0 /100 WBCs    RBC 3.74 (L) 4.00 - 5.20 x10*6/uL    Hemoglobin 11.4 (L) 12.0 - 16.0 g/dL    Hematocrit 35.3 (L) 36.0 - 46.0 %    MCV 94 80 - 100 fL    MCH 30.5 26.0 - 34.0 pg    MCHC 32.3 32.0 - 36.0 g/dL    RDW 18.6 (H) 11.5 - 14.5 %    Platelets 321 150 - 450 x10*3/uL    MPV 10.1 7.5 - 11.5 fL   Coagulation Screen   Result Value Ref Range    Protime 12.9 (H) 9.8 - 12.8 seconds    INR 1.1 0.9 - 1.1    aPTT 29 27 - 38 seconds   Renal Function Panel   Result Value Ref Range    Glucose 95 74 - 99 mg/dL    Sodium 141 136 - 145 mmol/L    Potassium 4.5 3.5 - 5.3 mmol/L    Chloride 102 98 - 107 mmol/L    Bicarbonate 30 21 - 32 mmol/L    Anion Gap 14 10 - 20 mmol/L    Urea Nitrogen 40 (H) 6 - 23 mg/dL    Creatinine 1.05 0.50 - 1.05 mg/dL    eGFR 51 (L) >60 mL/min/1.73m*2    Calcium 8.2 (L) 8.6 - 10.6 mg/dL    Phosphorus 4.1 2.5 - 4.9 mg/dL    Albumin 2.6 (L) 3.4 - 5.0 g/dL   Magnesium   Result Value Ref Range    Magnesium 2.39 1.60 - 2.40 mg/dL   CALCIUM, IONIZED   Result Value Ref Range    POCT Calcium, Ionized 1.10 1.1 - 1.33 mmol/L   POCT GLUCOSE   Result Value Ref Range    POCT Glucose 94 74 - 99 mg/dL      Scheduled medications  amLODIPine, 5 mg, oral, Daily  aspirin, 325 mg, g-tube, Daily  chlorhexidine, 15 mL, Mouth/Throat, TID  doxycycline, 100 mg, intravenous, q12h  enoxaparin, 40 mg, subcutaneous, q24h  esomeprazole, 40 mg, g-tube, Daily before breakfast  gelatin sponge,absorb-porcine, 1 each, topical (top), Once  insulin lispro, 0-5 Units, subcutaneous, q4h  melatonin, 5 mg, g-tube, Daily  piperacillin-tazobactam, 3.375 g, intravenous, q6h  [Held by  provider] polyethylene glycol, 17 g, g-tube, Daily  predniSONE, 40 mg, g-tube, Daily  [Held by provider] QUEtiapine, 25 mg, g-tube, Nightly  sotalol, 80 mg, oral, BID  tamsulosin, 0.4 mg, oral, Daily before breakfast  thiamine, 100 mg, g-tube, Daily  white petrolatum, 1 Application, Topical, TID    Continuous medications   None    PRN medications  PRN medications: acetaminophen, albuterol, calcium gluconate, calcium gluconate, dextrose 10 % in water (D10W), dextrose, glucagon, magnesium sulfate, magnesium sulfate, oxygen, [DISCONTINUED] potassium chloride CR **OR** potassium chloride, [DISCONTINUED] potassium chloride CR **OR** potassium chloride       Assessment/Plan   Eryn Willis   is a 89 y.o. F who underwent Direct laryngoscopy,  Wide local excision of skin cancer, Oral cavity resection malignant neoplasm, Right selective neck dissection levels 1A-3, Reconstruction with left anterolateral thigh free flap with Sheron Whitney and Rachele on 10/4/23 on 10/4/2023.       Active Issues:  Right Buccal SCC  Possible Cervical Lymph Node Mets  HTN  COPD  Acute Blood Loss Anemia  Anemia of Chronic Disease  Hypokalemia  Hypomagnesia  Severe Protein Calorie Malnutrition  Mixed Delirium  Urinary retention  COPD exacerbation  Hypercarbia  Hospital Acquired Pneumonia     Plan:  -Flap Checks: q24hrs  -Drains:N/A  -Analgesia: Scheduled Acetaminophen  -FEN: mIVF restarted at 50 per SICU; advancing TF; NPO; monitor and replete electrolytes as needed  -Pulm: SICU following for COPD exacerbation with HCAP; Doxycycline bid 10/14-19 for COPD exacerbation; plan to wean vent as tolerated; PRN COPD inhalers; IS q1h post-extubation  -ID: Day 1 vanc/zosyn for HCAP coverage per SICU  -Cardiac: Vitals per ENT free flap protocol then transition to Q4hr vitals; Aspirin 325mg daily, Sotalol 80mg BID, Verapamil 60mg Q8hrs; No current interventions per electrophysiology recs  -Endo: no current interventions  -GI: Bowel regimen held 2/2 diarrhea, PPI,  and PRN anti-emetic  -: Howard removed by patient  -Steroids/Special: Steroid taper for COPD exacerbation, Incision and oral care per ENT free flap order set  -Embolic PPx: subcutaneous Lovenox, SCDs while in bed  -Dispo: [facility]. PT/OTordered; Patient has stated she would like to return home with home care at discharge. Discharge pending improvement in clinical condition.  Will discuss with family our recommendation for home PT given deconditioning after surgery.     All plans discussed with attending after morning rounds.       I spent 15 minutes in the professional and overall care of this patient.    Vesna Louie APRN, CNP  Department of Otolaryngology: Head & Neck Surgery  Personal Pager [phone]  ENT Team  Head and Neck Phone: [phone]

## 2023-10-19 NOTE — PROGRESS NOTES
"Eryn Willis is a 89 y.o. female, with PMH COPD, dysrrthmia (s/p PPM AAI-DDD @60bpm), HTN, GERD, Arthritis, Anemia on day 15 of admission, transferred to the SICU on 10/14/2023 for altered mental status and respiratory decline s/p wide local excission and flap reconstruction on 10/4/23 for Squamous cell carcinoma of face.    Subjective   Pt was lying in the bed. No acute distress. Does not complain of pain. Denies any fever, chills, headache, abdominal pain, chest pain or pain at the surgical site.        Objective     Physical Exam  Constitutional:       Comments: cachectic   HENT:      Head: Normocephalic.      Mouth/Throat:      Mouth: Mucous membranes are dry.   Eyes:      Extraocular Movements: Extraocular movements intact.   Cardiovascular:      Rate and Rhythm: Normal rate.      Heart sounds: Normal heart sounds.      Comments: Dual Chamber PM  Pulmonary:      Breath sounds: Normal breath sounds. No stridor. No wheezing, rhonchi or rales.   Abdominal:      General: There is no distension.      Comments: PEG in situ, on tube feeds   Skin:     Comments: Rt hand wound, covered with dressing,    Neurological:      Mental Status: She is alert and oriented to person, place, and time.   Psychiatric:         Mood and Affect: Mood normal.         Behavior: Behavior normal.         Thought Content: Thought content normal.         Last Recorded Vitals  Blood pressure (!) 117/46, pulse 75, temperature 35.9 °C (96.6 °F), temperature source Temporal, resp. rate 21, height 1.651 m (5' 5\"), weight 66 kg (145 lb 8.1 oz), SpO2 92 %.  Intake/Output last 3 Shifts:  I/O last 3 completed shifts:  In: 1215 (18.4 mL/kg) [I.V.:60 (0.9 mL/kg); NG/GT:305; IV Piggyback:850]  Out: 2600 (39.4 mL/kg) [Urine:2600 (1.1 mL/kg/hr)]  Weight: 66 kg     Relevant Results  Scheduled medications  amLODIPine, 5 mg, oral, Daily  aspirin, 325 mg, g-tube, Daily  chlorhexidine, 15 mL, Mouth/Throat, 5x daily  doxycycline, 100 mg, intravenous, " q12h  esomeprazole, 40 mg, g-tube, Daily before breakfast  gelatin sponge,absorb-porcine, 1 each, topical (top), Once  heparin, 5,000 Units, subcutaneous, q12h  insulin lispro, 0-5 Units, subcutaneous, q4h  melatonin, 5 mg, g-tube, Daily  piperacillin-tazobactam, 3.375 g, intravenous, q6h  [Held by provider] polyethylene glycol, 17 g, g-tube, Daily  [Held by provider] QUEtiapine, 25 mg, g-tube, Nightly  sotalol, 80 mg, oral, BID  tamsulosin, 0.4 mg, oral, Daily before breakfast  thiamine, 100 mg, g-tube, Daily  white petrolatum, 1 Application, Topical, TID      Continuous medications     PRN medications  PRN medications: acetaminophen, albuterol, calcium gluconate, calcium gluconate, dextrose 10 % in water (D10W), dextrose, glucagon, magnesium sulfate, magnesium sulfate, oxygen, [DISCONTINUED] potassium chloride CR **OR** potassium chloride, [DISCONTINUED] potassium chloride CR **OR** potassium chloride    Results for orders placed or performed during the hospital encounter of 10/04/23 (from the past 24 hour(s))   ECG 12 Lead   Result Value Ref Range    Ventricular Rate 77 BPM    Atrial Rate 77 BPM    CO Interval 192 ms    QRS Duration 80 ms    QT Interval 412 ms    QTC Calculation(Bazett) 466 ms    P Axis 40 degrees    R Axis 60 degrees    T Axis 40 degrees    QRS Count 12 beats    Q Onset 219 ms    P Onset 123 ms    P Offset 181 ms    T Offset 425 ms    QTC Fredericia 447 ms   POCT GLUCOSE   Result Value Ref Range    POCT Glucose 80 74 - 99 mg/dL   POCT GLUCOSE   Result Value Ref Range    POCT Glucose 78 74 - 99 mg/dL   POCT GLUCOSE   Result Value Ref Range    POCT Glucose 89 74 - 99 mg/dL   CBC   Result Value Ref Range    WBC 15.2 (H) 4.4 - 11.3 x10*3/uL    nRBC 0.0 0.0 - 0.0 /100 WBCs    RBC 3.74 (L) 4.00 - 5.20 x10*6/uL    Hemoglobin 11.4 (L) 12.0 - 16.0 g/dL    Hematocrit 35.3 (L) 36.0 - 46.0 %    MCV 94 80 - 100 fL    MCH 30.5 26.0 - 34.0 pg    MCHC 32.3 32.0 - 36.0 g/dL    RDW 18.6 (H) 11.5 - 14.5 %     Platelets 321 150 - 450 x10*3/uL    MPV 10.1 7.5 - 11.5 fL   Coagulation Screen   Result Value Ref Range    Protime 12.9 (H) 9.8 - 12.8 seconds    INR 1.1 0.9 - 1.1    aPTT 29 27 - 38 seconds   Renal Function Panel   Result Value Ref Range    Glucose 95 74 - 99 mg/dL    Sodium 141 136 - 145 mmol/L    Potassium 4.5 3.5 - 5.3 mmol/L    Chloride 102 98 - 107 mmol/L    Bicarbonate 30 21 - 32 mmol/L    Anion Gap 14 10 - 20 mmol/L    Urea Nitrogen 40 (H) 6 - 23 mg/dL    Creatinine 1.05 0.50 - 1.05 mg/dL    eGFR 51 (L) >60 mL/min/1.73m*2    Calcium 8.2 (L) 8.6 - 10.6 mg/dL    Phosphorus 4.1 2.5 - 4.9 mg/dL    Albumin 2.6 (L) 3.4 - 5.0 g/dL   Magnesium   Result Value Ref Range    Magnesium 2.39 1.60 - 2.40 mg/dL   CALCIUM, IONIZED   Result Value Ref Range    POCT Calcium, Ionized 1.10 1.1 - 1.33 mmol/L   POCT GLUCOSE   Result Value Ref Range    POCT Glucose 94 74 - 99 mg/dL   POCT GLUCOSE   Result Value Ref Range    POCT Glucose 101 (H) 74 - 99 mg/dL       ECG 12 Lead    Result Date: 10/18/2023  Normal sinus rhythm Nonspecific ST and T wave abnormality Borderline ECG     XR chest 1 view    Result Date: 10/18/2023    1. Persistent mild bibasilar opacities, correlate with atelectasis versus infiltrate. 2. Suspect trace bilateral pleural effusions. 3. Medical devices as described above.            Assessment/Plan   Principal Problem:    Squamous cell carcinoma of face  Active Problems:    Pacemaker    Gastroesophageal reflux disease without esophagitis    Arthritis    HTN (hypertension)    Dysrhythmias    Malignant neoplasm of cheek mucosa (CMS/HCC)  Eryn Willis is a 89 y.o. female, with PMH COPD, dysrrthmia (s/p PPM AAI-DDD @60bpm), HTN, GERD, Arthritis, Anemia on day 15 of admission, transferred to the SICU on 10/14/2023 for altered mental status and respiratory decline s/p wide local excission and flap reconstruction on 10/4/23 for Squamous cell carcinoma of face. Post-operative course complicated by delirium and urinary  retention. 10/14 patient experienced worsening mental status changes, and respiratory decline. ABG demonstrated profound acute hypercarbic respiratory failure. CXR with questionable RML infiltrate. Patient was transferred to SICU for further management. Now stable, waiting for bed in [place] [id number]     Plan:  Neuro: Hospital course complicated by delirium. Currently, AAO x 3  - ongoing neuro and pain assessments  - PRN Tylenol for pain control  - Psych signed off  - Melatonin 5mg via PEG and Thiamine 100mg  - Sleep/wake cycle hygiene, CAM-ICU qShift, Avoid CNS depressants as able  - PT/OT as appropriate     Cardiovascular: HTN, SSS s/p PPM. Pacer settings: AAI-DDD @60. Follows with cardiology outpt  - Continuous EKG/ABP monitoring   - MAP goal >65  - Cardiology following  - Stable QTc   - Sotalol to 80mg BID, to be stopped if significant electrolyte or renal imbalance  - Discontinued verapamil  - ASA 325mg Q24hrs     Pulm: Hx of COPD of unclear etiology. Follows with Pulmonology outpatient. Acute on chronic hypercarbic respiratory failure. Arrived to SICU on BiPAP 10/5 50%, concern about increasing BiPAP settings with new buccal flap. Decision made to intubate due to continued respiratory acidosis. CXR with questionable RML infiltrate, treating for HCAP.   - extubated 10/17  - Maintain SpO2 >88%  - Received Prednisone 40mg and doxycyline for 5 days for suspected COPD exacerbation. Stopped today  - PRN albuterol  - encourage IS q1h  - CXR daily     ENT: Right buccal squamous cell carcinoma  s/p wide local excision and left anterolateral thigh free flap reconstruction on 10/4/23.  - Flap care per ENT  - continue peridex swabs 3x daily  - wound care to surgical sites     GI: Hx GERD, s/p PEG.  - Tube feeds changed from 30ml/hr to 40ml/hr. Goal reached  - Continue PPI via PEG  - Holding bowel regimen given recent diarrhea     : Acute urinary retention during hospitalization. Howard placed 10/15, removed by patient same  day  - RFP daily and PRN  - Strict I/Os via external catheter  - Replete electrolytes to maintain K>4, Phos>2.5, iCal>1.1, Mag>2.  - Discontinue terazosin. Please do not resume as terazosin should not be opened and given via PEG. Monitor UOP. If there is ongoing retention, may start patient on Silodosin 4mg daily.    - obtain q4 bladder scans  - Monitor creat (Baseline 0.69)     Heme: Acute blood loss anemia/anemia of chronic disease.  - CBC daily and PRN  - Coags PRN  - SCDs and subcut Lovenox for DVT ppx   - ongoing monitoring for s/s bleeding  - Maintain active T&S     Endo: No hx of DM or thyroid disease. Adequate glycemic control.  - BG q4h and SSI per ICU protocol     ID: Afebrile. Leukocytosis resolved. Concern for HCAP. Completed savanah-operative Clinda. UA 10/9 with trace LE. MRSA negative, vancomycin discontinued. Sputum Cx 10/15 with salivary contamination.  - Trend temp q4h, WBC daily  - Zosyn (10/14-10/21)  - COPD exacerbation: Doxycycline (10/14-10/19)  - ongoing monitoring for s/s infection     Muskuloskeletal: Wound on rt forearm at the site of access line.   - Consulted wound care for dressing  - Ordered Gelfoam dressing    Lines:  PIV 22G lt forearm    NOK: james Chowdary [phone]. Discussed transfer to ICU, intubation and ongoing care 10/14. Confirmation of code status at this time. Patient is FULL CODE.     Dispo:  waiting on bed for transfer to [facility]           Skylar Mcmahon MD  PGY-1  Anesthesiology

## 2023-10-19 NOTE — PROGRESS NOTES
PLAN: ICU for ongoing assessments, MAP goals, flap care per ENT, PT/OT, cards following, TF @goal, abx, wound care, tlc    PAYOR: Medicare     DISPO: pending- family refused to discuss placement needs, will continue to follow and assist.

## 2023-10-19 NOTE — CARE PLAN
Problem: Skin  Goal: Prevent/minimize sheer/friction injuries  10/19/2023 0748 by Sydney Florentino RN  Outcome: Not Progressing  10/19/2023 0306 by Sydney Florentino RN  Flowsheets (Taken 10/19/2023 0224)  Prevent/minimize sheer/friction injuries:   Complete micro-shifts as needed if patient unable. Adjust patient position to relieve pressure points, not a full turn   Use pull sheet   HOB 30 degrees or less   Turn/reposition every 2 hours/use positioning/transfer devices     Problem: Skin  Goal: Participates in plan/prevention/treatment measures  10/19/2023 0748 by Sydney Florentino RN  Outcome: Progressing  10/19/2023 0306 by Sydney Florentino RN  Flowsheets (Taken 10/19/2023 0224)  Participates in plan/prevention/treatment measures: Elevate heels     Problem: Skin  Goal: Participates in plan/prevention/treatment measures  Recent Flowsheet Documentation  Taken 10/19/2023 0224 by Sydney Florentino RN  Participates in plan/prevention/treatment measures: Elevate heels   The patient's goals for the shift include      The clinical goals for the shift include decrease agitation    Over the shift, the patient did make progress toward the following goals.       Problem: Skin  Goal: Prevent/minimize sheer/friction injuries  10/19/2023 0748 by Sydney Florentino RN  Outcome: Not Progressing  10/19/2023 0306 by Sydney Florentino RN  Flowsheets (Taken 10/19/2023 0224)  Prevent/minimize sheer/friction injuries:   Complete micro-shifts as needed if patient unable. Adjust patient position to relieve pressure points, not a full turn   Use pull sheet   HOB 30 degrees or less   Turn/reposition every 2 hours/use positioning/transfer devices

## 2023-10-19 NOTE — PROGRESS NOTES
Wound Care Progress Note     Visit Date: 10/19/2023      Patient Name: Eryn Willis         MRN: [id number]                Reason for Visit: right hand  Wound Assessment:  Wound 10/16/23 Skin Tear Hand Dorsal;Right (Active)   Date First Assessed/Time First Assessed: 10/16/23 1700   Present on Original Admission: No  Hand Hygiene Completed: Yes  Primary Wound Type: Skin Tear  Location: Hand  Wound Location Orientation: Dorsal;Right      Assessments 10/19/2023  4:24 PM   Site Assessment Bleeding;Dark edges;Fragile;Red   Heidi-Wound Assessment Ecchymotic   Non-staged Wound Description Partial thickness   Wound Length (cm) 2 cm   Wound Width (cm) 2 cm   Wound Surface Area (cm^2) 4 cm^2   Drainage Description Sanguineous   Drainage Amount Small   Dressing Hydrofiber;Abdominal dressing;Kerlix/rolled gauze   Dressing Status Removed       Active Orders   Date Order Priority Status Authorizing Provider   10/19/23 1015 Inpatient Consult to Wound and Ostomy Nurse Routine Active CARLOS Ayala-CNP         Wound Team Plan: Right hand with skin tear; stable at time of assessment with no active oozing (clot noted at opening). Area gently cleansed with NS, allowed to dry. Promogran Fabiana (collagen) then applied to base, followed by Aquacel and ABD pad. Hand/lower arm wrapped with kerlix to keep dressings in place. This can stay in place x2 days unless dressing becomes saturated.     Addie Hernandez RN  10/19/2023  4:27 PM

## 2023-10-19 NOTE — PROGRESS NOTES
"Referring Provider: Danny Whitney MD    Eryn Willis is a 89 y.o. 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 Alert and awake.  Per RN, overnight report stated some mild agitation described as refusal to cooperate with nursing care, but redirectable and responded well after redirection to nursing instruction.     Objective   Last Recorded Vitals  BP (!) 132/49   Pulse 75   Temp 35.9 °C (96.6 °F) (Temporal)   Resp 19   Ht 1.651 m (5' 5\")   Wt 66 kg (145 lb 8.1 oz)   LMP  (LMP Unknown) Comment:   SpO2 97%   BMI 24.21 kg/m²       Mental Status Exam  Appearance: Elderly  woman, frail, laying in hospital bed; face notable for a surgical flap on R  Attitude: Calm, uncooperative due to mentation  Behavior: Poor eye contact  Motor Activity: No PMR/PMA; no EPS observed  Speech: Low volume, spontaneous, mumbling, incoherent  Mood: Unable to assess  Affect: Flat  Thought Process: Unable to assess, Intuabted  Thought Content: Unable to assess, Intuabted  Thought Perception: Unable to assess, Intuabted  Cognition:.  A/Ox4   Insight: Unable to assess, Intuabted  Judgement: Unable to assess, Intuabted    Medications  Scheduled medications  amLODIPine, 5 mg, oral, Daily  aspirin, 325 mg, g-tube, Daily  chlorhexidine, 15 mL, Mouth/Throat, 5x daily  doxycycline, 100 mg, intravenous, q12h  enoxaparin, 40 mg, subcutaneous, q24h  esomeprazole, 40 mg, g-tube, Daily before breakfast  gelatin sponge,absorb-porcine, 1 each, topical (top), Once  insulin lispro, 0-5 Units, subcutaneous, q4h  melatonin, 5 mg, g-tube, Daily  piperacillin-tazobactam, 3.375 g, intravenous, q6h  [Held by provider] polyethylene glycol, 17 g, g-tube, Daily  [Held by provider] QUEtiapine, 25 mg, g-tube, Nightly  sotalol, 80 mg, oral, BID  tamsulosin, 0.4 mg, oral, Daily before breakfast  thiamine, 100 mg, " g-tube, Daily  white petrolatum, 1 Application, Topical, TID      Continuous medications       PRN medications  PRN medications: acetaminophen, albuterol, calcium gluconate, calcium gluconate, dextrose 10 % in water (D10W), dextrose, glucagon, magnesium sulfate, magnesium sulfate, oxygen, [DISCONTINUED] potassium chloride CR **OR** potassium chloride, [DISCONTINUED] potassium chloride CR **OR** potassium chloride      Relevant Results  Results for orders placed or performed during the hospital encounter of 10/04/23 (from the past 24 hour(s))   POCT GLUCOSE   Result Value Ref Range    POCT Glucose 84 74 - 99 mg/dL   ECG 12 Lead   Result Value Ref Range    Ventricular Rate 77 BPM    Atrial Rate 77 BPM    KS Interval 192 ms    QRS Duration 80 ms    QT Interval 412 ms    QTC Calculation(Bazett) 466 ms    P Axis 40 degrees    R Axis 60 degrees    T Axis 40 degrees    QRS Count 12 beats    Q Onset 219 ms    P Onset 123 ms    P Offset 181 ms    T Offset 425 ms    QTC Fredericia 447 ms   POCT GLUCOSE   Result Value Ref Range    POCT Glucose 80 74 - 99 mg/dL   POCT GLUCOSE   Result Value Ref Range    POCT Glucose 78 74 - 99 mg/dL   POCT GLUCOSE   Result Value Ref Range    POCT Glucose 89 74 - 99 mg/dL   CBC   Result Value Ref Range    WBC 15.2 (H) 4.4 - 11.3 x10*3/uL    nRBC 0.0 0.0 - 0.0 /100 WBCs    RBC 3.74 (L) 4.00 - 5.20 x10*6/uL    Hemoglobin 11.4 (L) 12.0 - 16.0 g/dL    Hematocrit 35.3 (L) 36.0 - 46.0 %    MCV 94 80 - 100 fL    MCH 30.5 26.0 - 34.0 pg    MCHC 32.3 32.0 - 36.0 g/dL    RDW 18.6 (H) 11.5 - 14.5 %    Platelets 321 150 - 450 x10*3/uL    MPV 10.1 7.5 - 11.5 fL   Coagulation Screen   Result Value Ref Range    Protime 12.9 (H) 9.8 - 12.8 seconds    INR 1.1 0.9 - 1.1    aPTT 29 27 - 38 seconds   Renal Function Panel   Result Value Ref Range    Glucose 95 74 - 99 mg/dL    Sodium 141 136 - 145 mmol/L    Potassium 4.5 3.5 - 5.3 mmol/L    Chloride 102 98 - 107 mmol/L    Bicarbonate 30 21 - 32 mmol/L    Anion Gap  14 10 - 20 mmol/L    Urea Nitrogen 40 (H) 6 - 23 mg/dL    Creatinine 1.05 0.50 - 1.05 mg/dL    eGFR 51 (L) >60 mL/min/1.73m*2    Calcium 8.2 (L) 8.6 - 10.6 mg/dL    Phosphorus 4.1 2.5 - 4.9 mg/dL    Albumin 2.6 (L) 3.4 - 5.0 g/dL   Magnesium   Result Value Ref Range    Magnesium 2.39 1.60 - 2.40 mg/dL   CALCIUM, IONIZED   Result Value Ref Range    POCT Calcium, Ionized 1.10 1.1 - 1.33 mmol/L   POCT GLUCOSE   Result Value Ref Range    POCT Glucose 94 74 - 99 mg/dL   POCT GLUCOSE   Result Value Ref Range    POCT Glucose 101 (H) 74 - 99 mg/dL         Psychiatric Risk Assessment  Acute Risk of Harm to Others is Considered: low   Acute Risk of Harm to Self is Considered: low      Assessment  Eryn Willis is a 89 y.o. female with no past psychiatric history and a past medical history of hypertension, COPD, CAD, SSS s/p pacemaker, paroxysmal SVT, right buccal SCC, malnutrition who was admitted to [facility] on 10/4/2023 for elective oral cavity malignant neoplasm resection with reconstruction with left anterolateral thigh free flap and right selective neck dissection which was completed on 10/4/2023. Also s/p PEG placement this admission. Hospital course was complicated by gradual worsening of mentation and agitation starting from a 10/7.     Psychiatry was consulted on 10/9/2023 for delirium and concern for injury to self. Patient's Qtc has fluctuated between 440 ms and 515 ms.    10/14, patient is less coherent and more confused. She is not agitated but seems more delirious, possibly due to an underlying infection (elevated WBC, urinary retention could indicate UTI).     10/17, Intubated, off sedation. Opens eyes to name, follows simple commands consistently. Per RN, no agitation or acute events overnight. No PRN Seroquel or Zyprexa given (last Zyprexa given 10/9)    10/19: Extubated. Alert and awake. Per RN, overnight report stated some mild agitation described as refusal to cooperate with nursing care, but redirectable  "and responded well after redirection to nursing instruction. This AM, very pleasant, AOx4 (answers hospital for place, answers \"for a surgery on my face and mouth\" for reason for admission). Stated that she felt well.      Diagnosis:  -Delirium, multifactorial (old age, baseline brain atrophy, prolonged hospital stay, recent large operation, poor sleep/disturbed circadian rhythm etc)     Recommendations:  - Patient does not currently meet criteria for inpatient psychiatric admission.   - To evaluate decision-making capacity, recommend use of the Capacity Evaluation Tool. Search “ IP Capacity Evaluation under SmartText\" unless the patient has a legal guardian, in which case all decisions per the legal guardian.  - Defer to primary team decision for 1:1 sitter for safety precautions.  - As with all hospitalized patients, would recommend delirium precautions, as below.  - Delirium precautions as below     Work-up:  - None at the moment     Medications:  - Continue melatonin 5 mg via PEG tube at 1800 daily  - Held quetiapine 25mg PO at bedtime when intubated--no need to restart at this time  - Can give Quetiapine 12.5mg Q6h  PRN, if needing antipsychotics, please obtain EKG daily if antipsychotic administered  - Continue thiamine 100mg daily    Follow-up:  -No need to follow-up with a psychiatrist at this time  - Patient's daughter Kandace is living with patient's son Epi for the past 10 months due to hip fracture, and Epi has also been the primary person taking care of patient and Kandace during this time. Kandace cannot reach a phone easily due to limited mobility from hip fracture. Please reach out to Epi for updates and questions: Epi Willis ([phone]).      Psychiatry will sign off. Please feel flee to page [phone] with any questions or concerns.    DELIRIUM GUIDELINES:  1) Non-Pharmacological Measures: Please ensure blinds are open during day to allow sufficient daylight to enter room; maintain dark/quiet room at " night with minimal interruptions; minimize daytime naps and maintain sleep/wake cycle as able; minimize excessive stimulation; if patient wears glasses or hearing aids, patient should have access to them as sensory deprivation can cause/worsen delirium; minimize room/staff changes; encourage interaction with familiar objects and frequent orientation.    2) Pharmacological Measures: Minimize use of benzodiazepines, anticholinergic medications, and opiates, as these can all exacerbate delirium. Ensure adequate treatment of pain while minimizing use of opioids to the extent possible.    3) Minimize use of restraints to situations where necessary to keep patient and staff safe and to prevent patient from removing lines, tubes, medical devices, dressings, etc.  Continue to regularly re-evaluate continued need for any ordered restraints.     Pending final attending signature.     Luz Norton MD

## 2023-10-19 NOTE — PROGRESS NOTES
Physical Therapy    Physical Therapy Treatment    Patient Name: Eryn Willis  MRN: [id number]  Today's Date: 10/19/2023  Time Calculation  Start Time: 0926  Stop Time: 0959  Time Calculation (min): 33 min       Assessment/Plan   PT Assessment  PT Assessment Results: Decreased range of motion, Decreased endurance, Impaired balance, Decreased mobility, Decreased coordination, Decreased cognition, Impaired tone  Rehab Prognosis: Good  Evaluation/Treatment Tolerance:  (Limited by cognition)  Medical Staff Made Aware: Yes  End of Session Communication: Bedside nurse  End of Session Patient Position: Bed, 3 rail up  PT Plan  Inpatient/Swing Bed or Outpatient: Inpatient  PT Plan  Treatment/Interventions: Bed mobility, Transfer training, Gait training, Stair training, Balance training, Neuromuscular re-education, Strengthening, Endurance training, Range of motion, Therapeutic exercise, Therapeutic activity, Positioning, Postural re-education, Home exercise program  PT Plan: Skilled PT  PT Frequency: 3 times per week  PT Discharge Recommendations: Moderate intensity level of continued care  PT Recommended Transfer Status: Assist x2  PT - OK to Discharge: Yes      General Visit Information:   PT  Visit  PT Received On: 10/19/23  General  Reason for Referral: Re-eval s/p ICU admission with decreased mental status and respiratory failure requiring intubation. Initially admitted s/p Direct laryngoscopy,  Wide local excision of skin cancer, Oral cavity resection malignant neoplasm, Right selective neck dissection levels 1A-3, Reconstruction with left anterolateral thigh free flap with Sheron Whitney and Rachele on 10/4/23. Flap check within normal limits.  Past Medical History Relevant to Rehab: Squamous cell carcinoma of face  Prior to Session Communication: Bedside nurse  Patient Position Received: Bed, 3 rail up  General Comment: Pt much more mentally clear this date. Highly pleasant and agreeable. On 4L O2 via NC, brooke, IV,  PEG.    Subjective   Precautions:  Precautions  Medical Precautions: Fall precautions  Precautions Comment: SPO2 >88%  Vital Signs:  Vital Signs  Heart Rate: 78  Resp: 24  SpO2: 100 %  BP: (!) 113/43    Objective   Pain:  Pain Assessment  Pain Assessment: 0-10  Pain Score: 0 - No pain  Cognition:  Cognition  Overall Cognitive Status: Impaired  Orientation Level: Disoriented to situation  Following Commands:  (Followed all 1 step motor commands)  Cognition Comments: Pleasant and agreeable this date       Activity Tolerance:  Activity Tolerance  Endurance: Tolerates 10 - 20 min exercise with multiple rests  Early Mobility/Exercise Safety Screen: Proceed with mobilization - No exclusion criteria met  Treatments:  Therapeutic Exercise  Therapeutic Exercise Performed: Yes  Therapeutic Exercise Activity 1: Seated LAQs and ankle pumps x10 reps R/L    Therapeutic Activity  Therapeutic Activity Performed: Yes  Therapeutic Activity 1: Pt sat up at EOB x10 mins with varied balance from close SBA for static sitting to Min A needed for sitting during dynamic LE movements. Cues provided for balance/at wt shift    Bed Mobility  Bed Mobility: Yes  Bed Mobility 1  Bed Mobility 1: Supine to sitting  Level of Assistance 1: Moderate assistance  Bed Mobility Comments 1: x1 assist, HOB elevated  Bed Mobility 2  Bed Mobility  2: Sitting to supine  Level of Assistance 2: Maximum assistance  Bed Mobility Comments 2: x1 assist  Bed Mobility 3  Bed Mobility 3: Rolling right, Rolling left  Level of Assistance 3: Moderate assistance  Bed Mobility Comments 3: x1 assist    Transfers  Transfer: No    Outcome Measures:  [facility] Basic Mobility  Turning from your back to your side while in a flat bed without using bedrails: A lot  Moving from lying on your back to sitting on the side of a flat bed without using bedrails: A lot  Moving to and from bed to chair (including a wheelchair): A lot  Standing up from a chair using your arms (e.g. wheelchair or  bedside chair): A lot  To walk in hospital room: Total  Climbing 3-5 steps with railing: Total  Basic Mobility - Total Score: 10    FSS-ICU  Ambulation: Unable to attempt due to weakness  Rolling: Maximal assistance (performs 25% - 49% of task)  Sitting: Minimal assistance (performs 75% or more of task)  Transfer Sit-to-Stand: Maximal assistance (performs 25% - 49% of task)  Transfer Supine-to-Sit: Moderate assistance (performs 50 - 74% of task)  Total Score: 11    ICU Mobility Screen  Early Mobility/Exercise Safety Screen: Proceed with mobilization - No exclusion criteria met    Education Documentation  Precautions, taught by Dilma Vallejo PT at 10/19/2023  2:04 PM.  Learner: Patient  Readiness: Acceptance  Method: Explanation  Response: Verbalizes Understanding, Needs Reinforcement    Mobility Training, taught by Dilma Vallejo PT at 10/19/2023  2:04 PM.  Learner: Patient  Readiness: Acceptance  Method: Explanation  Response: Verbalizes Understanding, Needs Reinforcement    Education Comments  No comments found.        OP EDUCATION:  Education  Individual(s) Educated: Patient  Education Provided: Body Mechanics    Encounter Problems       Encounter Problems (Active)       Mobility       STG - Patient will ambulate 25 ft with min A and FWW with no LOB, progress as able and appropriate  (Not met)       Start:  10/05/23    Expected End:  10/19/23    Resolved:  10/17/23    Updated to: STG - Patient will ambulate 10 ft with Mod A and FWW with no LOB, progress as able and appropriate    Update reason: re-eval         STG - Patient will ascend and descend 1 stair with mod A and HR  (Not met)       Start:  10/05/23    Expected End:  10/19/23    Resolved:  10/17/23    Updated to: Pt will ascend/descend 1 step with Mod A x1 and LRAD for home re-entry    Update reason: re-eval         STG - Patient will ambulate 10 ft with Mod A and FWW with no LOB, progress as able and appropriate (Progressing)       Start:  10/17/23     Expected End:  11/07/23                Pt will ascend/descend 1 step with Mod A x1 and LRAD for home re-entry (Progressing)       Start:  10/17/23    Expected End:  11/07/23                   Pain - Adult          Transfers       STG - Transfer from bed to chair with min A and FWW (Not met)       Start:  10/05/23    Expected End:  10/19/23    Resolved:  10/17/23    Updated to: STG - Transfer from bed to chair with mod A and FWW    Update reason: re-eval         STG - Patient will perform bed mobility with CGA  (Not met)       Start:  10/05/23    Expected End:  10/19/23    Resolved:  10/17/23    Updated to: STG - Patient will perform bed mobility with Mod A x1    Update reason: re-eval         STG - Transfer from bed to chair with mod A and FWW (Progressing)       Start:  10/17/23    Expected End:  11/07/23                STG - Patient will perform bed mobility with Mod A x1 (Progressing)       Start:  10/17/23    Expected End:  11/07/23                   Transfers       STG - Patient will transfer sit to and from stand with min A and FWW  (Not met)       Start:  10/05/23    Expected End:  10/19/23    Resolved:  10/17/23    Updated to: STG - Patient will transfer sit to and from stand with Mod A and FWW    Update reason: re-eval         STG - Patient will transfer sit to and from stand with Mod A and FWW (Progressing)       Start:  10/17/23    Expected End:  11/07/23

## 2023-10-19 NOTE — PROGRESS NOTES
Eryn Willis is a 89 y.o. female on day 15 of admission presenting with Squamous cell carcinoma of face.    SW met with patient briefly and was speaking for family who was present with her.  SW introduced themselves and their role within  and discharge planning.  At the time of encounter, patient did not have any questions nor did family.  SW will be available for support and assistance as needed.  Appropriate referrals will be generated as needed.    ALFREDO HASTINGS LCSW

## 2023-10-20 NOTE — CARE PLAN
Problem: Skin  Goal: Participates in plan/prevention/treatment measures  Outcome: Progressing  Goal: Prevent/manage excess moisture  Outcome: Progressing  Goal: Prevent/minimize sheer/friction injuries  Outcome: Progressing  Goal: Promote/optimize nutrition  Outcome: Progressing  Goal: Promote skin healing  Outcome: Progressing     Problem: Safety - Medical Restraint  Goal: Free from restraint(s) (Restraint for Interference with Medical Device)  Outcome: Met   The patient's goals for the shift include      The clinical goals for the shift include Patient will tolerate increased mobility throughout shift    Over the shift, the patient did not make progress toward the following goals. Barriers to progression include decompensated state. Recommendations to address these barriers include promoting independence and mobility.

## 2023-10-20 NOTE — SIGNIFICANT EVENT
Patient arrived to [facility] 5 with ICU nurse transport at 1435.  On coming nurse is at the bedside to receive the patient.

## 2023-10-20 NOTE — PROGRESS NOTES
Physical Therapy                 Therapy Communication Note    Patient Name: Eryn Willis  MRN: [id number]  Today's Date: 10/20/2023     Discipline: Physical Therapy    Missed Visit Reason: Missed Visit Reason:  (Attempt at 14:23. Pt. off division, will reattempt as schedule permits.)    Missed Time: Attempt    Comment:

## 2023-10-20 NOTE — PROGRESS NOTES
"Eryn Willis is a 89 y.o. female on day 16 of admission presenting with Squamous cell carcinoma of face.    Subjective   No acute issues overnight       Objective     Physical Exam  Neurological:      Mental Status: She is alert.     Constitutional:       Comments: cachectic , alert and cooperative  HENT:      Head: facial flap/incision [place], edema present     Mouth: Mucous membranes are dry.   Eyes:      Extraocular Movements: Extraocular movements intact.   Cardiovascular:      Rate and Rhythm: Normal rate.      Heart sounds: Normal heart sounds.      Comments: Dual Chamber PM  Pulmonary:      Breath sounds: Normal breath sounds. No stridor. No wheezing, rhonchi or rales. On 3L NC  Abdominal:      General: There is no distension.      Comments: PEG in situ, on tube feeds   Skin:     Comments: Rt hand wound, covered with dressing,    Neurological:      Mental Status: She is alert and oriented to person, place, and time.   Psychiatric:         Mood and Affect: Mood normal.         Behavior: Behavior normal.         Thought Content: Thought content normal.   :      Howard catheter in place alma delia colored urine  MSK:      Left thigh donor flap site well approximated, incision dry     Last Recorded Vitals  Blood pressure 116/50, pulse 60, temperature 36.6 °C (97.9 °F), temperature source Temporal, resp. rate 16, height 1.651 m (5' 5\"), weight 66.4 kg (146 lb 6.2 oz), SpO2 96 %.  Intake/Output last 3 Shifts:  I/O last 3 completed shifts:  In: 1735.4 (26.1 mL/kg) [I.V.:50.4 (0.8 mL/kg); NG/GT:1235; IV Piggyback:450]  Out: 2010 (30.3 mL/kg) [Urine:2010 (0.8 mL/kg/hr)]  Weight: 66.4 kg     Relevant Results  Scheduled medications  amLODIPine, 5 mg, oral, Daily  aspirin, 325 mg, g-tube, Daily  chlorhexidine, 15 mL, Mouth/Throat, 5x daily  esomeprazole, 40 mg, g-tube, Daily before breakfast  gelatin sponge,absorb-porcine, 1 each, topical (top), Once  heparin, 5,000 Units, subcutaneous, q12h  insulin lispro, 0-5 Units, " subcutaneous, q4h  melatonin, 5 mg, g-tube, Daily  piperacillin-tazobactam, 3.375 g, intravenous, q6h  [Held by provider] polyethylene glycol, 17 g, g-tube, Daily  [Held by provider] QUEtiapine, 25 mg, g-tube, Nightly  sotalol, 80 mg, oral, BID  tamsulosin, 0.4 mg, oral, Daily before breakfast  thiamine, 100 mg, g-tube, Daily  white petrolatum, 1 Application, Topical, TID      Continuous medications     PRN medications  PRN medications: acetaminophen, albuterol, calcium gluconate, calcium gluconate, dextrose 10 % in water (D10W), dextrose, glucagon, magnesium sulfate, magnesium sulfate, oxygen, [DISCONTINUED] potassium chloride CR **OR** potassium chloride, [DISCONTINUED] potassium chloride CR **OR** potassium chloride     Results for orders placed or performed during the hospital encounter of 10/04/23 (from the past 24 hour(s))   POCT GLUCOSE   Result Value Ref Range    POCT Glucose 94 74 - 99 mg/dL   POCT GLUCOSE   Result Value Ref Range    POCT Glucose 105 (H) 74 - 99 mg/dL   POCT GLUCOSE   Result Value Ref Range    POCT Glucose 100 (H) 74 - 99 mg/dL   CBC   Result Value Ref Range    WBC 12.1 (H) 4.4 - 11.3 x10*3/uL    nRBC 0.0 0.0 - 0.0 /100 WBCs    RBC 3.10 (L) 4.00 - 5.20 x10*6/uL    Hemoglobin 9.7 (L) 12.0 - 16.0 g/dL    Hematocrit 30.1 (L) 36.0 - 46.0 %    MCV 97 80 - 100 fL    MCH 31.3 26.0 - 34.0 pg    MCHC 32.2 32.0 - 36.0 g/dL    RDW 18.6 (H) 11.5 - 14.5 %    Platelets 265 150 - 450 x10*3/uL    MPV 10.1 7.5 - 11.5 fL   Coagulation Screen   Result Value Ref Range    Protime 12.2 9.8 - 12.8 seconds    INR 1.1 0.9 - 1.1    aPTT 32 27 - 38 seconds   Renal Function Panel   Result Value Ref Range    Glucose 107 (H) 74 - 99 mg/dL    Sodium 143 136 - 145 mmol/L    Potassium 4.5 3.5 - 5.3 mmol/L    Chloride 103 98 - 107 mmol/L    Bicarbonate 32 21 - 32 mmol/L    Anion Gap 13 10 - 20 mmol/L    Urea Nitrogen 44 (H) 6 - 23 mg/dL    Creatinine 0.82 0.50 - 1.05 mg/dL    eGFR 68 >60 mL/min/1.73m*2    Calcium 8.0 (L)  8.6 - 10.6 mg/dL    Phosphorus 2.9 2.5 - 4.9 mg/dL    Albumin 2.3 (L) 3.4 - 5.0 g/dL   Magnesium   Result Value Ref Range    Magnesium 2.13 1.60 - 2.40 mg/dL   POCT GLUCOSE   Result Value Ref Range    POCT Glucose 91 74 - 99 mg/dL   POCT GLUCOSE   Result Value Ref Range    POCT Glucose 105 (H) 74 - 99 mg/dL          Assessment/Plan   89 y.o. female, with PMH COPD, dysrrthmia (s/p PPM AAI-DDD @60bpm), HTN, GERD, Arthritis, Anemia on day 15 of admission, transferred to the SICU on 10/14/2023 for altered mental status and respiratory decline s/p wide local excission and flap reconstruction on 10/4/23 for Squamous cell carcinoma of face. Post-operative course complicated by delirium and urinary retention. 10/14 patient experienced worsening mental status changes, and respiratory decline. ABG demonstrated profound acute hypercarbic respiratory failure. CXR with questionable RML infiltrate. Patient was transferred to SICU for further management. Now stable, waiting for bed on [facility]     Plan:  Neuro: Hospital course complicated by acute delirium. Psychiatry was consulted, have since signed off after resolution. Currently, AAO x 3  - ongoing neuro and pain assessments  - PRN Tylenol for pain control  - Melatonin 5mg via PEG and Thiamine 100mg  - Sleep/wake cycle hygiene, CAM-ICU qShift, Avoid CNS depressants as able  - PT/OT daily     Cardiovascular: pAfib on sotalol. HTN, SSS s/p PPM. Pacer settings: AAI-DDD @60. Follows with cardiology as outpatient  - Continuous EKG/hourly NIBP monitoring   - MAP goal >65  - Cardiology following  - Stable QTc   - Sotalol to 80mg BID, to be stopped if significant electrolyte or renal imbalance  - ASA 325mg Q24hrs     Pulm: Hx of COPD of unclear etiology. Follows with Pulmonology outpatient. Acute on chronic hypercarbic respiratory failure. Arrived to SICU on BiPAP 10/5 50%, concern about increasing BiPAP settings with new buccal flap. Decision made to intubate due to continued  respiratory acidosis. CXR with questionable RML infiltrate, treating for HCAP.   - extubated 10/17  - Maintain SpO2 >88%  - Received Prednisone 40mg and doxycyline for 5 days for suspected COPD exacerbation. Completed 10/19.   - PRN albuterol  - encourage IS q1h  - CXR daily     ENT: Right buccal squamous cell carcinoma  s/p wide local excision and left anterolateral thigh free flap reconstruction on 10/4/23.  - Flap care per ENT  - continue peridex swabs 3x daily  - wound care to surgical sites     GI: Hx GERD, s/p PEG. Continuous tube feeds at goal rate  - convert TF to bolus feeds QID  - Continue PPI via PEG  - continue holding bowel regimen given recent diarrhea     : Acute urinary retention during hospitalization. Brooke placed 10/15, removed by patient same day. Replaced again on 10/19 for urine retention  - RFP daily and PRN  - continue brooke  - Replete electrolytes judiciously  - switch tamsulosin to silodosin  - Monitor creat (Baseline 0.69)     Heme: Acute blood loss anemia/anemia of chronic disease.  - CBC daily and PRN  - Coags PRN  - SCDs and subcutaneous heparin for dvt ppx  - ongoing monitoring for s/s bleeding  - Maintain active T&S     Endo: No hx of DM or thyroid disease. Adequate glycemic control.  - BG q4h and SSI per ICU protocol     ID: Afebrile. Leukocytosis resolved. Concern for HCAP. Completed savanah-operative Clinda. UA 10/9 with trace LE. MRSA negative, vancomycin discontinued. Sputum Cx 10/15 with salivary contamination.  - Trend temp q4h, WBC daily  - Zosyn (10/14-10/21)  - COPD exacerbation: Doxycycline (10/14-10/19)  - ongoing monitoring for s/s infection     Muskuloskeletal: Wound on rt forearm at the site of access line.   - Consulted wound care for dressing  - Ordered Gelfoam dressing     Lines:  PIV 22G lt forearm     NOK: son Epi [phone]. Discussed transfer to ICU, intubation and ongoing care 10/14. Confirmation of code status at this time. Patient is FULL CODE.     Dispo:   waiting on bed for transfer to [place] [id number]       I spent 35 minutes in the professional and overall care of this patient.      Michelle Jose, APRN-CNP

## 2023-10-20 NOTE — NURSING NOTE
Pt transferred from ICU to [facility]. ICU RN at bedside. VS recorded and WNL. Continuous o2 monitoring, 2L via NC. IV ABT infusing. Howard and PEG in place. Family at bed side. Alert, oriented and able to make needs known. No s/s of distress, no complaints voiced. Will continue to monitor.

## 2023-10-20 NOTE — PROGRESS NOTES
"Eryn Willis is a 89 y.o. female on day 16 of admission presenting with Squamous cell carcinoma of face. No acute events overnight. Flap checks stable.    Subjective   Patient this morning on rounds very upbeat, and wanting to go home. Plan to have patient transferred to the regular nursing floor.     Objective   Physical Exam  Vitals reviewed in EMR  Gen: NAD, resting in bed A&O x 2-3  Eyes: Sclera clear  Ears: Normal external ears bilaterally  Nose: No rhinorrhea; anterior nares clear  Oral Cavity: dry, old crusted blood; area of dehiscence at the posterior aspect of the flap  Head: normocephalic, atraumatic  Face/Neck: All incisions c/d/i, neck soft and flat without evidence of hematoma or fluid collection  -slightly increased area of fullness directly below skin paddle, stable  -no evidence of dehiscence   Resp: Symmetric chest rise b/l;   Cards: No clubbing/cyanosis/edema of hands  Gastro: Soft, non-distended, 22fr PEG tube in place  : Howard catheter in place alma delia colored urine  MSK: Left thigh donor flap site well approximated, incision dry   -right hand with skin tears/partial thickness wound dressing I place  Psych: Appropriate mood and flat affect    Last Recorded Vitals  Blood pressure (!) 155/46, pulse 74, temperature 36.5 °C (97.7 °F), temperature source Temporal, resp. rate 20, height 1.651 m (5' 5\"), weight 66.4 kg (146 lb 6.2 oz), SpO2 100 %.  Intake/Output last 3 Shifts:  I/O last 3 completed shifts:  In: 1735.4 (26.1 mL/kg) [I.V.:50.4 (0.8 mL/kg); NG/GT:1235; IV Piggyback:450]  Out: 2010 (30.3 mL/kg) [Urine:2010 (0.8 mL/kg/hr)]  Weight: 66.4 kg     Results for orders placed or performed during the hospital encounter of 10/04/23 (from the past 24 hour(s))   POCT GLUCOSE   Result Value Ref Range    POCT Glucose 101 (H) 74 - 99 mg/dL   POCT GLUCOSE   Result Value Ref Range    POCT Glucose 94 74 - 99 mg/dL   POCT GLUCOSE   Result Value Ref Range    POCT Glucose 105 (H) 74 - 99 mg/dL   POCT GLUCOSE "   Result Value Ref Range    POCT Glucose 100 (H) 74 - 99 mg/dL   CBC   Result Value Ref Range    WBC 12.1 (H) 4.4 - 11.3 x10*3/uL    nRBC 0.0 0.0 - 0.0 /100 WBCs    RBC 3.10 (L) 4.00 - 5.20 x10*6/uL    Hemoglobin 9.7 (L) 12.0 - 16.0 g/dL    Hematocrit 30.1 (L) 36.0 - 46.0 %    MCV 97 80 - 100 fL    MCH 31.3 26.0 - 34.0 pg    MCHC 32.2 32.0 - 36.0 g/dL    RDW 18.6 (H) 11.5 - 14.5 %    Platelets 265 150 - 450 x10*3/uL    MPV 10.1 7.5 - 11.5 fL   Coagulation Screen   Result Value Ref Range    Protime 12.2 9.8 - 12.8 seconds    INR 1.1 0.9 - 1.1    aPTT 32 27 - 38 seconds   Renal Function Panel   Result Value Ref Range    Glucose 107 (H) 74 - 99 mg/dL    Sodium 143 136 - 145 mmol/L    Potassium 4.5 3.5 - 5.3 mmol/L    Chloride 103 98 - 107 mmol/L    Bicarbonate 32 21 - 32 mmol/L    Anion Gap 13 10 - 20 mmol/L    Urea Nitrogen 44 (H) 6 - 23 mg/dL    Creatinine 0.82 0.50 - 1.05 mg/dL    eGFR 68 >60 mL/min/1.73m*2    Calcium 8.0 (L) 8.6 - 10.6 mg/dL    Phosphorus 2.9 2.5 - 4.9 mg/dL    Albumin 2.3 (L) 3.4 - 5.0 g/dL   Magnesium   Result Value Ref Range    Magnesium 2.13 1.60 - 2.40 mg/dL   POCT GLUCOSE   Result Value Ref Range    POCT Glucose 91 74 - 99 mg/dL   POCT GLUCOSE   Result Value Ref Range    POCT Glucose 105 (H) 74 - 99 mg/dL     Scheduled medications  amLODIPine, 5 mg, oral, Daily  aspirin, 325 mg, g-tube, Daily  chlorhexidine, 15 mL, Mouth/Throat, 5x daily  esomeprazole, 40 mg, g-tube, Daily before breakfast  gelatin sponge,absorb-porcine, 1 each, topical (top), Once  heparin, 5,000 Units, subcutaneous, q12h  insulin lispro, 0-5 Units, subcutaneous, q4h  melatonin, 5 mg, g-tube, Daily  piperacillin-tazobactam, 3.375 g, intravenous, q6h  [Held by provider] polyethylene glycol, 17 g, g-tube, Daily  [Held by provider] QUEtiapine, 25 mg, g-tube, Nightly  sotalol, 80 mg, oral, BID  tamsulosin, 0.4 mg, oral, Daily before breakfast  thiamine, 100 mg, g-tube, Daily  white petrolatum, 1 Application, Topical,  TID      Continuous medications   None  PRN medications  PRN medications: acetaminophen, albuterol, calcium gluconate, calcium gluconate, dextrose 10 % in water (D10W), dextrose, glucagon, magnesium sulfate, magnesium sulfate, oxygen, [DISCONTINUED] potassium chloride CR **OR** potassium chloride, [DISCONTINUED] potassium chloride CR **OR** potassium chloride       Assessment/Plan   Eryn Willis   is a 89 y.o. F who underwent Direct laryngoscopy,  Wide local excision of skin cancer, Oral cavity resection malignant neoplasm, Right selective neck dissection levels 1A-3, Reconstruction with left anterolateral thigh free flap with Sheron Whitney and Rachele on 10/4/23 on 10/4/2023.       Active Issues:  Right Buccal SCC  Possible Cervical Lymph Node Mets  HTN  COPD  Acute Blood Loss Anemia  Anemia of Chronic Disease  Hypokalemia  Hypomagnesia  Severe Protein Calorie Malnutrition  Mixed Delirium  Urinary retention  COPD exacerbation  Hypercarbia  Hospital Acquired Pneumonia     Plan:  -Flap Checks: q24hrs  -Drains:N/A  -Analgesia: Scheduled Acetaminophen  -FEN: mIVF restarted at 50 per SICU; transition to bolus Tube feeds; NPO; monitor and replete electrolytes as needed  -Pulm: SICU following for COPD exacerbation with HCAP; Doxycycline bid 10/14-19 for COPD exacerbation; plan to wean vent as tolerated; PRN COPD inhalers; IS q1h post-extubation  -ID: Day 1 vanc/zosyn for HCAP coverage per SICU  -Cardiac: Vitals per ENT free flap protocol then transition to Q4hr vitals; Aspirin 325mg daily, Sotalol 80mg BID, Verapamil 60mg Q8hrs; No current interventions per electrophysiology recs  -Endo: no current interventions  -GI: Bowel regimen held 2/2 diarrhea, PPI, and PRN anti-emetic  -: Howard removed by patient  -Steroids/Special: Steroid taper for COPD exacerbation, Incision and oral care per ENT free flap order set  -Embolic PPx: subcutaneous Lovenox, SCDs while in bed  -Dispo: [facility]. PT/OTordered; Patient has stated she would  like to return home with home care at discharge. Discharge pending improvement in clinical condition.  Will discuss with family our recommendation for home PT given deconditioning after surgery.     All plans discussed with attending after morning rounds.             I spent 15 minutes in the professional and overall care of this patient.    Vesna GONZALEZ, CNP  Department of Otolaryngology: Head & Neck Surgery  Personal Pager [phone]  ENT Team  Head and Neck Phone: [phone]

## 2023-10-20 NOTE — PROGRESS NOTES
Pt transferred back form the ICU to the floor. ENT asked info for ordering home oxygen. I gave them an ordering form from [employer]. Referral started in [facility]. Pt may be discharged tomorrow. I will give updates in weekend sign out to transitions team. Shital Schafer RN TCC

## 2023-10-20 NOTE — CARE PLAN
The patient's goals for the shift include      The clinical goals for the shift include Patient will tolerate increased mobility throughout shift    Over the shift, the patient did not make progress toward the following goals. Barriers to progression include . Recommendations to address these barriers include .    Problem: Skin  Goal: Participates in plan/prevention/treatment measures  10/20/2023 1548 by Shital Perez RN  Outcome: Progressing  10/20/2023 1548 by Shital Perez RN  Outcome: Progressing  Goal: Prevent/manage excess moisture  10/20/2023 1548 by Shital Perez RN  Outcome: Progressing  10/20/2023 1548 by Shital Perez RN  Outcome: Progressing  Goal: Prevent/minimize sheer/friction injuries  10/20/2023 1548 by Shital Perez RN  Outcome: Progressing  10/20/2023 1548 by Shital Perez RN  Outcome: Progressing  Goal: Promote/optimize nutrition  10/20/2023 1548 by Shital Perez RN  Outcome: Progressing  10/20/2023 1548 by Shital Perez RN  Outcome: Progressing  Goal: Promote skin healing  10/20/2023 1548 by Shital Perez RN  Outcome: Progressing  10/20/2023 1548 by Shital Perez RN  Outcome: Progressing

## 2023-10-21 NOTE — CARE PLAN
Problem: Skin  Goal: Participates in plan/prevention/treatment measures  Outcome: Progressing  Flowsheets (Taken 10/19/2023 0224 by Sydney Florentino, RN)  Participates in plan/prevention/treatment measures: Elevate heels  Goal: Prevent/manage excess moisture  Outcome: Progressing  Flowsheets (Taken 10/19/2023 0224 by Sydney Florentino, RN)  Prevent/manage excess moisture:   Cleanse incontinence/protect with barrier cream   Moisturize dry skin   Follow provider orders for dressing changes  Goal: Prevent/minimize sheer/friction injuries  Outcome: Progressing  Flowsheets (Taken 10/19/2023 0224 by Sydney Florentino, RN)  Prevent/minimize sheer/friction injuries:   Complete micro-shifts as needed if patient unable. Adjust patient position to relieve pressure points, not a full turn   Use pull sheet   HOB 30 degrees or less   Turn/reposition every 2 hours/use positioning/transfer devices  Goal: Promote/optimize nutrition  Outcome: Progressing  Flowsheets (Taken 10/19/2023 0224 by Sydney Florentino, RN)  Promote/optimize nutrition: Monitor/record intake including meals  Goal: Promote skin healing  Outcome: Progressing  Flowsheets (Taken 10/19/2023 0224 by Sydney Florentino, RN)  Promote skin healing:   Assess skin/pad under line(s)/device(s)   Protective dressings over bony prominences   Turn/reposition every 2 hours/use positioning/transfer devices   Ensure correct size (line/device) and apply per  instructions   The patient's goals for the shift include      The clinical goals for the shift include To go home    A/O x3, waxes and wanes. Patient is to be discharged home today with son, going home with brooke and with HHC and PT. Isosource 1.5 bolus feeds tolerated well. All needs met, WCTM.

## 2023-10-21 NOTE — DISCHARGE SUMMARY
Discharge Diagnosis  Squamous cell carcinoma of face    Issues Requiring Follow-Up  Cancer Resection and Free flap reconstruction follow-up and monitoring    Test Results Pending At Discharge  Pending Labs       Order Current Status    CBC Collected (10/18/23 0235)    CBC Collected (10/21/23 0634)    Magnesium Collected (10/18/23 0235)    Magnesium Collected (10/21/23 0634)    Renal Function Panel Collected (10/18/23 0235)    Renal Function Panel Collected (10/21/23 0634)            Hospital Course  89 yr old female with SCC of the Face s/p triple endoscopy, PEG placement, excision of right buccal lesion, right neck dissection levels I-III, and reconstruction with a Left ALT free flap on 10/04/2023 with Dr. Whitney and Dr. Jeffrey. Please see operative report for full details. Patient tolerated the procedure well and recovered briefly in PACU before being transitioned to regular nursing floor. Post-op course was complicated by post op delirium, and patient pulled out leg drain and PEG, PEG replaced by IR 10/11. On 10/14, she developed hypercarbic respiratory failure thought secondary to COPD exacerbation requiring upgrade to SICU and intubation. Patient ultimately extubated 10/17 and was ready for transfer to floor, however delay in bed availability. Patient initially reached bolus 10/8, however, due to episode requiring ICU admission, feeds were interrupted, reached TF goal 10/19, bolus 10/20.  Patient failed void trial 10/18 so brooke was replaced, which was in place upon discharge with outpatient follow up arranged. IV medication transitioned to PEG as diet advanced. The drains were monitored and once the output was less than 30ml in a 24hr time frame they were removed accordingly. On the day of discharge, the pt was tolerating a diet, pain was controlled on PO pain medication, and they were ambulating and voiding spontaneously. They were discharged home with home PT in stable condition with instructions to follow up  as outpatient.       Home Medications     Medication List      START taking these medications     acetaminophen 500 mg/15 mL liquid; 30 mL (1,000 mg) by g-tube route   every 8 hours.   amLODIPine 5 mg tablet; Commonly known as: Norvasc; Take 1 tablet (5 mg)   by g-tube route once daily.   aspirin 81 mg chewable tablet; 4 tablets (325 mg) by g-tube route once   daily for 26 doses.   chlorhexidine 0.12 % solution; Commonly known as: Peridex; Swish and   spit 15 mL in the mouth or throat 3 times a day after meals.   oxygen gas therapy; Commonly known as: O2; Inhale 2 L/min continuously.   white petrolatum 41 % ointment ointment; Commonly known as: Aquaphor;   Apply 1 Application topically 2 times a day.     CHANGE how you take these medications     sotalol 80 mg tablet; Commonly known as: Betapace; 1 tablet (80 mg) by   g-tube route 2 times a day.; What changed: how to take this     CONTINUE taking these medications     albuterol 2.5 mg /3 mL (0.083 %) nebulizer solution     STOP taking these medications     verapamil  mg ER tablet; Commonly known as: Calan-SR     ASK your doctor about these medications     melatonin 3 mg tablet; 1 tablet (3 mg) by g-tube route as needed at   bedtime for sleep for up to 10 days.; Ask about: Should I take this   medication?   traMADol 50 mg tablet; Commonly known as: Ultram; Take 0.5 tablets (25   mg) by mouth every 8 hours if needed for moderate pain (4 - 6) for up to 7   days.; Ask about: Should I take this medication?       Outpatient Follow-Up  Future Appointments   Date Time Provider Department Center   10/27/2023 11:30 AM Dany Jeffrey MD [id number] [place]       Omaira Keys MD

## 2023-10-21 NOTE — PROGRESS NOTES
Eryn Willis is a 89 y.o. female on day 17 of admission presenting with Squamous cell carcinoma of face.    Transitional Care Coordinator Note: Met with patient and family. Per sign out patient would require home oxygen upon enetering patient's room TCC noted patient to be without o2 and on room air. TCC discussed with patient's bedside RN who confirmed patient has been off oxygen since last night. RN checked O2 sats at time of visit and patient was 95% on room air. Family present at time of discussion and vitals check. All parties agreeable that patient is in no distress and does not require O2 at this time. TCC updated team who also agreed patient is okay to discharge without oxygen. TCC provided [employer] contact in the event the patient drops to 88% at home and is in no acute distress to help assist with obtaining home o2 as TCC discussed with [employer] rep. Sons verbalized understanding if patient is in any distress or pulse ox continues to drop below 88% to present to the nearest ED.  Family initially declined HC services again and stated they would assess when at home, TCC provided education on process and family verbalized understanding, Family decided to go with [facility] for PT services. TCC sent referral. Family okay to use  if [place] is not able to provide services. Per son they are comfortable with TF administration and have an abundance of supplies. Family unfamiliar with brooke care( bedside RN to provide education and demonstration. Family aware referral has been placed for urology to follow up with removal of catheter and will contact hospital scheduling if no call received to schedule appointment. All parties verbalized understanding regarding discharge plan. No further need at this time.

## 2023-10-21 NOTE — CARE PLAN
Problem: Skin  Goal: Participates in plan/prevention/treatment measures  Outcome: Progressing  Goal: Prevent/manage excess moisture  Outcome: Progressing  Goal: Prevent/minimize sheer/friction injuries  Outcome: Progressing  Goal: Promote/optimize nutrition  Outcome: Progressing  Goal: Promote skin healing  Outcome: Progressing

## 2023-10-21 NOTE — PROGRESS NOTES
"Eryn Willis is a 89 y.o. female on day 17 of admission presenting with Squamous cell carcinoma of face. No acute events overnight. Flap checks stable.    Subjective   Transferred to [facility] nursing floor. O2 discontinued yesterday evening and has not required supplemental oxygen since. Pt eager to go home without complaints.     Objective   Physical Exam  Vitals reviewed in EMR  Gen: NAD, resting in bed A&O x 2-3  Eyes: Sclera clear  Ears: Normal external ears bilaterally  Nose: No rhinorrhea; anterior nares clear  Oral Cavity: dry, old crusted blood; area of dehiscence at the posterior aspect of the flap  Head: normocephalic, atraumatic  Face/Neck: All incisions c/d/i, neck soft and flat without evidence of hematoma or fluid collection  -slightly increased area of fullness directly below skin paddle, stable  -no evidence of dehiscence   Resp: Symmetric chest rise b/l;   Cards: No clubbing/cyanosis/edema of hands  Gastro: Soft, non-distended, 22fr PEG tube in place  : Howard catheter in place alma delia colored urine  MSK: Left thigh donor flap site well approximated, incision dry   -right hand with skin tears/partial thickness wound dressing I place  Psych: Appropriate mood and flat affect    Last Recorded Vitals  Blood pressure (!) 128/48, pulse 61, temperature 36.3 °C (97.3 °F), temperature source Temporal, resp. rate 14, height 1.651 m (5' 5\"), weight 66.4 kg (146 lb 6.2 oz), SpO2 96 %.  Intake/Output last 3 Shifts:  I/O last 3 completed shifts:  In: 1840 (27.7 mL/kg) [NG/GT:1740; IV Piggyback:100]  Out: 2030 (30.6 mL/kg) [Urine:2030 (0.8 mL/kg/hr)]  Weight: 66.4 kg     Results for orders placed or performed during the hospital encounter of 10/04/23 (from the past 24 hour(s))   POCT GLUCOSE   Result Value Ref Range    POCT Glucose 96 74 - 99 mg/dL   POCT GLUCOSE   Result Value Ref Range    POCT Glucose 79 74 - 99 mg/dL   POCT GLUCOSE   Result Value Ref Range    POCT Glucose 91 74 - 99 mg/dL   POCT GLUCOSE   Result " Value Ref Range    POCT Glucose 87 74 - 99 mg/dL   POCT GLUCOSE   Result Value Ref Range    POCT Glucose 84 74 - 99 mg/dL   POCT GLUCOSE   Result Value Ref Range    POCT Glucose 92 74 - 99 mg/dL     Scheduled medications  amLODIPine, 5 mg, g-tube, Daily  aspirin, 325 mg, g-tube, Daily  chlorhexidine, 15 mL, Mouth/Throat, TID  esomeprazole, 40 mg, g-tube, Daily before breakfast  heparin, 5,000 Units, subcutaneous, q12h  insulin lispro, 0-5 Units, subcutaneous, q4h  melatonin, 5 mg, g-tube, Daily  piperacillin-tazobactam, 3.375 g, intravenous, q6h  silodosin, 4 mg, g-tube, Daily with breakfast  sotalol, 80 mg, g-tube, BID  thiamine, 100 mg, g-tube, Daily  white petrolatum, 1 Application, Topical, TID      Continuous medications   None  PRN medications  PRN medications: acetaminophen, albuterol, calcium gluconate, calcium gluconate, dextrose 10 % in water (D10W), dextrose, glucagon, magnesium sulfate, magnesium sulfate, oxygen, [DISCONTINUED] potassium chloride CR **OR** potassium chloride, [DISCONTINUED] potassium chloride CR **OR** potassium chloride       Assessment/Plan   Eryn Willis   is a 89 y.o. F who underwent Direct laryngoscopy,  Wide local excision of skin cancer, Oral cavity resection malignant neoplasm, Right selective neck dissection levels 1A-3, Reconstruction with left anterolateral thigh free flap with Sheron Whitney and Rachele on 10/4/23 on 10/4/2023.       Active Issues:  Right Buccal SCC  Possible Cervical Lymph Node Mets  HTN  COPD  Acute Blood Loss Anemia  Anemia of Chronic Disease  Hypokalemia  Hypomagnesia  Severe Protein Calorie Malnutrition  Mixed Delirium  Urinary retention  COPD exacerbation  Hypercarbia  Hospital Acquired Pneumonia     Plan:  -Flap Checks: q24hrs  -Drains:N/A  -Analgesia: Scheduled Acetaminophen  -FEN: mIVF restarted at 50 per SICU; tolerating bolus TF; NPO; monitor and replete electrolytes as needed  -Pulm: SICU following for COPD exacerbation with HCAP; Doxycycline bid  10/14-19 for COPD exacerbation; satting well on RA; PRN COPD inhalers; IS q1h post-extubation  -ID: Day 1 vanc/zosyn for HCAP coverage per SICU  -Cardiac: Vitals per ENT free flap protocol then transition to Q4hr vitals; Aspirin 325mg daily, Sotalol 80mg BID, Verapamil 60mg Q8hrs; No current interventions per electrophysiology recs  -Endo: no current interventions  -GI: Bowel regimen held 2/2 diarrhea, PPI, and PRN anti-emetic  -: Howard removed by patient  -Steroids/Special: Steroid taper for COPD exacerbation, Incision and oral care per ENT free flap order set  -Embolic PPx: subcutaneous Lovenox, SCDs while in bed  -Dispo: [facility]. PT/OTordered; Patient has stated she would like to return home with home care at discharge. Discharge HHC with PT today.  Will discuss with family our recommendation for home PT given deconditioning after surgery.     All plans discussed with attending after morning rounds.             I spent 15 minutes in the professional and overall care of this patient.    Ryan Guerrero DO, PGY3  Department of Otolaryngology: Head & Neck Surgery  Personal Pager [phone]  ENT Team  Head and Neck Phone: [phone]

## 2023-10-21 NOTE — NURSING NOTE
Discharge paperwork discussed with patient and son. Sent home with brooke, brooke care and supplies discussed. Patient and sons have no questions. Discussed TF and stated she got her last bolus feed at 0900 today and will be due for one at 1300. Patient VSS, 95% on RA. All needs met, left in POV with sons.

## 2023-10-25 NOTE — PROGRESS NOTES
Eryn presents for as a new patient for an evaluation.  The patient’s EMR has been reviewed.  Lives in [place], [place].    H/o SCC of the face s/p triple endoscopy, PEG placement, excision of R buccal lesion, R neck dissection and reconstruction with left ALT free flap with Dr. Whitney and Dr. Jeffrey (10/04/23).     Post-op c/b acute delirium.  Pulled out leg drain and PEG.  PEG replaced by IR (10/11/23).  Developed hypercarbic respiratory failure (10/14/23)  Suspected 2/2 COPD exacerbation requiring upgrade to SICU for intubation.  Extubated 10/17/23 and transferred to medical floor.  Failed trial of void (10/18/23), Howard was replaced.  Howard remained in place upon discharge. (10/21/23)    SUBJECTIVE: HPI   TODAY (10/25/23)  Presents today for TOV.  Denies any recent UTIs, gross hematuria, fevers or chills.    Past medical, surgical, family and social history in the chart was reviewed and is accurate including any additions to what is in this HPI.    Review of Systems   Constitutional: denies any unintentional weight loss or change in strength.  Integumentary: denies any rashes or pruritus.  Eyes: denies any double vision or eye pain.  Ear/Nose/Mouth/Throat: denies any nosebleeds or gum bleeds.  Cardiovascular: denies any chest pain or syncope.  Respiratory: denies hemoptysis.  Gastrointestinal: denies nausea or vomiting.  Musculoskeletal: denies muscle cramping or weakness.  Neurologic: denies convulsions or seizures.  Hematologic/Lymphatic: denies bleeding tendencies.  Endocrine: denies heat/cold intolerance.  All other systems have been reviewed and are negative unless otherwise noted in the HPI.    OBJECTIVE:  There were no vitals taken for this visit.  Physical Exam   Constitutional: No obvious distress.  Eyes: Non-injected conjunctiva, sclera clear, EOMI.  Ears/Nose/Mouth/Throat: No obvious drainage per ears or nose.  Cardiovascular: Extremities are warm and well perfused. No edema, cyanosis or  pallor.  Respiratory: No audible wheezing/stridor; respirations do not appear labored.  Gastrointestinal: Abdomen soft, not distended.  Musculoskeletal: Normal ROM of extremities.  Skin: No obvious rashes or open sores.  Neurologic: Alert and oriented, CN 2-12 grossly intact.  Psychiatric: Answers questions appropriately with normal affect.  Hematologic/Lymphatic/Immunologic: No obvious bruises or sites of spontaneous bleeding.  Genitourinary: No CVA tenderness, bladder not palpable.     Labs and imaging:  Lab Results   Component Value Date    WBC 14.9 (H) 10/21/2023    HGB 10.0 (L) 10/21/2023    HCT 31.8 (L) 10/21/2023     10/21/2023    ALT 7 10/15/2023    AST 9 10/15/2023     10/21/2023    K 4.7 10/21/2023     10/21/2023    CREATININE 0.75 10/21/2023    BUN 38 (H) 10/21/2023    CO2 31 10/21/2023    INR 1.1 10/20/2023     ASSESSMENT:  Problem List Items Addressed This Visit       Squamous cell carcinoma of face      PLAN:  Howard catheter irrigated with NS and removed.   100ml was infused and she voided 150ml    All questions were answered to the patient’s satisfaction.  Patient agrees with the plan and wishes to proceed.  Follow-up will be scheduled appropriately.     Scribed for Dr. Yo Angel by Earl Cintron.  I, Dr. Yo Angel have personally reviewed and agreed with the information entered by the Virtual Scribe. 10/25/23.

## 2023-10-26 PROBLEM — R41.82 AMS (ALTERED MENTAL STATUS): Status: ACTIVE | Noted: 2023-01-01

## 2023-10-26 PROBLEM — R60.0 EDEMA OF BOTH LEGS: Chronic | Status: ACTIVE | Noted: 2020-01-02

## 2023-10-26 PROBLEM — S72.052A: Status: ACTIVE | Noted: 2019-04-27

## 2023-10-26 PROBLEM — I27.20 PULMONARY HYPERTENSION (MULTI): Chronic | Status: ACTIVE | Noted: 2020-01-02

## 2023-10-26 PROBLEM — I48.91 ATRIAL FIBRILLATION (MULTI): Status: ACTIVE | Noted: 2023-01-01

## 2023-10-26 PROBLEM — I50.31 ACUTE DIASTOLIC CHF (CONGESTIVE HEART FAILURE) (MULTI): Status: ACTIVE | Noted: 2023-01-01

## 2023-10-26 PROBLEM — J01.90 ACUTE BACTERIAL SINUSITIS: Chronic | Status: RESOLVED | Noted: 2020-09-10 | Resolved: 2023-01-01

## 2023-10-26 PROBLEM — I49.5 SICK SINUS SYNDROME (MULTI): Status: ACTIVE | Noted: 2017-11-30

## 2023-10-26 PROBLEM — I25.10 CORONARY ARTERY DISEASE INVOLVING NATIVE CORONARY ARTERY OF NATIVE HEART WITHOUT ANGINA PECTORIS: Status: ACTIVE | Noted: 2019-05-29

## 2023-10-26 PROBLEM — K40.90 UNILATERAL INGUINAL HERNIA WITHOUT OBSTRUCTION OR GANGRENE: Chronic | Status: ACTIVE | Noted: 2020-01-02

## 2023-10-26 PROBLEM — R06.02 SHORTNESS OF BREATH: Status: ACTIVE | Noted: 2020-07-24

## 2023-10-26 PROBLEM — J20.9 ACUTE BRONCHITIS: Status: RESOLVED | Noted: 2019-03-06 | Resolved: 2023-01-01

## 2023-10-26 PROBLEM — J96.01 ACUTE RESPIRATORY FAILURE WITH HYPOXIA (MULTI): Status: RESOLVED | Noted: 2019-04-27 | Resolved: 2023-01-01

## 2023-10-26 PROBLEM — S72.009A FEMORAL NECK FRACTURE (MULTI): Status: ACTIVE | Noted: 2019-04-27

## 2023-10-26 PROBLEM — M81.0 AGE-RELATED OSTEOPOROSIS WITHOUT CURRENT PATHOLOGICAL FRACTURE: Chronic | Status: ACTIVE | Noted: 2020-04-02

## 2023-10-26 PROBLEM — I10 ESSENTIAL HYPERTENSION: Status: ACTIVE | Noted: 2017-11-30

## 2023-10-26 PROBLEM — J44.9 CHRONIC OBSTRUCTIVE PULMONARY DISEASE (MULTI): Status: ACTIVE | Noted: 2018-12-03

## 2023-10-26 PROBLEM — M25.562 LEFT KNEE PAIN: Status: ACTIVE | Noted: 2020-06-10

## 2023-10-26 PROBLEM — J42 CHRONIC BRONCHITIS (MULTI): Status: ACTIVE | Noted: 2023-01-01

## 2023-10-26 PROBLEM — J31.0 CHRONIC RHINITIS: Status: ACTIVE | Noted: 2023-01-01

## 2023-10-26 PROBLEM — B96.89 ACUTE BACTERIAL SINUSITIS: Chronic | Status: RESOLVED | Noted: 2020-09-10 | Resolved: 2023-01-01

## 2023-10-26 PROBLEM — H53.9 VISUAL DISTURBANCE: Status: ACTIVE | Noted: 2019-05-29

## 2023-10-26 PROBLEM — E46 PROTEIN CALORIE MALNUTRITION (MULTI): Status: ACTIVE | Noted: 2023-01-01

## 2023-11-01 NOTE — PROGRESS NOTES
ENT Follow up Visit    History Of Present Illness  Eryn Willis is a 89 y.o. female presents for follow up    Underwent resection of right buccal scca, neck dissection and ALT flap reconstruction 10/4/23.     Hospitalization prolonged due to delirium. Now at home and doing well    Final pathology showed negative margins and 1 lymph node without ecs    No major concerns since returning home       Past Medical History  She has a past medical history of Acute bronchitis (03/06/2019), Cancer (CMS/HCC), Hypertension, Irregular heart beat, and Pacemaker.    Surgical History  She has a past surgical history that includes Tonsillectomy and IR GI G tube placement (10/11/2023).     Social History  She reports that she has never smoked. She has never used smokeless tobacco. She reports that she does not drink alcohol and does not use drugs.    Family History  No family history on file.     Allergies  Codeine and Penicillins     Physical Exam:    Leg incision healing well, no sign of infection  Flap is well vascularized. Two areas with scant drainage. Appears to be suture abscess and not a large cavity  Intraoral dehiscence healing well  No sign of infection      Assessment and Plan  89 y.o. female with right buccal scca s/p surgical treatment    -will refer for postoperative radiation  -will likely need lip revision to restore oral competence. Will wait until after treatment is complete. Can introduce soft solids as tolerated  -follow up in  6-8 weeks    Dany Jeffrey MD

## 2023-11-06 ENCOUNTER — HOSPITAL ENCOUNTER (OUTPATIENT)
Dept: CARDIOLOGY | Age: 88
Discharge: HOME | End: 2023-11-06
Payer: MEDICARE

## 2023-11-06 PROCEDURE — 93296 REM INTERROG EVL PM/IDS: CPT

## 2023-11-08 ENCOUNTER — OFFICE VISIT (OUTPATIENT)
Dept: PULMONOLOGY | Age: 88
End: 2023-11-08
Payer: MEDICARE

## 2023-11-08 VITALS
SYSTOLIC BLOOD PRESSURE: 128 MMHG | HEART RATE: 60 BPM | OXYGEN SATURATION: 100 % | TEMPERATURE: 96.8 F | DIASTOLIC BLOOD PRESSURE: 70 MMHG

## 2023-11-08 DIAGNOSIS — R06.02 SHORTNESS OF BREATH: ICD-10-CM

## 2023-11-08 DIAGNOSIS — J44.9 CHRONIC OBSTRUCTIVE PULMONARY DISEASE, UNSPECIFIED COPD TYPE (HCC): Primary | ICD-10-CM

## 2023-11-08 DIAGNOSIS — R60.0 BILATERAL LEG EDEMA: ICD-10-CM

## 2023-11-08 PROCEDURE — G8484 FLU IMMUNIZE NO ADMIN: HCPCS | Performed by: INTERNAL MEDICINE

## 2023-11-08 PROCEDURE — 1090F PRES/ABSN URINE INCON ASSESS: CPT | Performed by: INTERNAL MEDICINE

## 2023-11-08 PROCEDURE — G8427 DOCREV CUR MEDS BY ELIG CLIN: HCPCS | Performed by: INTERNAL MEDICINE

## 2023-11-08 PROCEDURE — 3023F SPIROM DOC REV: CPT | Performed by: INTERNAL MEDICINE

## 2023-11-08 PROCEDURE — 1123F ACP DISCUSS/DSCN MKR DOCD: CPT | Performed by: INTERNAL MEDICINE

## 2023-11-08 PROCEDURE — 99213 OFFICE O/P EST LOW 20 MIN: CPT | Performed by: INTERNAL MEDICINE

## 2023-11-08 PROCEDURE — 1036F TOBACCO NON-USER: CPT | Performed by: INTERNAL MEDICINE

## 2023-11-08 PROCEDURE — G8420 CALC BMI NORM PARAMETERS: HCPCS | Performed by: INTERNAL MEDICINE

## 2023-11-08 RX ORDER — AMLODIPINE BESYLATE 5 MG/1
5 TABLET ORAL DAILY
COMMUNITY
Start: 2023-10-18

## 2023-11-08 ASSESSMENT — ENCOUNTER SYMPTOMS
VOMITING: 0
TROUBLE SWALLOWING: 0
VOICE CHANGE: 0
DIARRHEA: 0
ABDOMINAL PAIN: 0
CHEST TIGHTNESS: 0
WHEEZING: 1
SINUS PRESSURE: 0
SHORTNESS OF BREATH: 1
FACIAL SWELLING: 1
EYE ITCHING: 0
COUGH: 1
EYE DISCHARGE: 0
SORE THROAT: 0
RHINORRHEA: 0
NAUSEA: 0

## 2023-11-08 NOTE — PROGRESS NOTES
Subjective:                  Salvador Scott is a 80 y.o. female who complains today of:     Chief Complaint   Patient presents with    Follow-up     4m f/u on COPD    Congestion     C/o thick mucus, difficult to bring up. C/o difficulty breathing        HPI  She has swelling rt. Side chick area it was malignant and had surgery  and graft on rt. Chick   She had PEG tube for feeding   She is using  nebulizer with albuterol TID. C/o shortness of breath  with exertion. C/o  Wheezing off and on . C/o Cough with   thick Sputum. No Chest tightness. No Chest pain with radiation  or pleuritic pain. C/o  both leg edema. No orthopnea. No Fever or chills. No Rhinorrhea and postnasal drip    Allergies:  Codeine and Penicillins  Past Medical History:   Diagnosis Date    Abnormality of gait and mobility due to Left femoral neck fracture S/P Left Hip Hemiarthroplasty. [facility] admit 05/01/19. 5/6/2019    This is an 80year old female with a medical history significant for COPD, SSS S/P PPM, paroxysmal SVT, HTN who was sent to [facility] from [address] [place] [address] for further evaluation and management of left femoral neck fracture S/P fall. She states she was walking out of her bathroom when she tripped and fell. She denies any loss of consciousness and she was unable to get up. EMS transferred her to [address] [place] [address] ER.       Arthritis     BILAT    CAD (coronary artery disease)     Closed fracture of head of left femur ([address]) 4/27/2019    Last Assessment & Plan:  S/p left hip arthroplasty Assessment:  Pain well  controlled  PLAN:  Pain control with bowel regimen Ortho  following, going to SNF    COPD (chronic obstructive pulmonary disease) ([address])     Hip joint replacement by other means 5/6/2011    Hypertension     Left displaced femoral neck fracture ([address]) 5/1/2019    Lower leg edema     BILAT     Lung disease     Osteoarthritis     Pacemaker 2009    FOR SYNCOPE    Paroxysmal SVT (supraventricular tachycardia) (HCC)     Paroxysmal SVT

## 2023-11-13 ENCOUNTER — APPOINTMENT (OUTPATIENT)
Dept: RADIATION ONCOLOGY | Facility: CLINIC | Age: 88
End: 2023-11-13

## 2023-12-11 ENCOUNTER — APPOINTMENT (OUTPATIENT)
Dept: OTOLARYNGOLOGY | Facility: CLINIC | Age: 88
End: 2023-12-11

## 2024-02-15 ENCOUNTER — TELEPHONE (OUTPATIENT)
Dept: FAMILY MEDICINE CLINIC | Age: 89
End: 2024-02-15

## 2024-03-13 ENCOUNTER — TELEPHONE (OUTPATIENT)
Dept: FAMILY MEDICINE CLINIC | Age: 89
End: 2024-03-13

## 2024-03-28 ENCOUNTER — TELEPHONE (OUTPATIENT)
Dept: FAMILY MEDICINE CLINIC | Age: 89
End: 2024-03-28

## 2024-05-16 ENCOUNTER — TELEPHONE (OUTPATIENT)
Dept: FAMILY MEDICINE CLINIC | Age: 89
End: 2024-05-16

## (undated) DEVICE — STAY SET, SURGICAL, 12MM BLUNT HOOK, 8/PK

## (undated) DEVICE — MOUTH PIECE, UNIVERSAL, DISPOSABLE

## (undated) DEVICE — MANIFOLD, 4 PORT NEPTUNE STANDARD

## (undated) DEVICE — SUTURE, SILK, 2-0, 30 IN, SH, BLACK

## (undated) DEVICE — SUTURE, MONOCRYL, 4-0, 18 IN, PS2, UNDYED

## (undated) DEVICE — DRAPE, C-ARM IMAGE

## (undated) DEVICE — CANNULA, ANTERIOR CHAMBER

## (undated) DEVICE — SUTURE, NYLON, 9-0, 10C33

## (undated) DEVICE — SPEAR, EYE, SURGICAL, WECK-CEL, CELLULOSE

## (undated) DEVICE — SUTURE, PROLENE, 5-0, 18 IN, P3, BLUE

## (undated) DEVICE — SUTURE, PROLENE, 2-0, 18 IN, FS, BLUE

## (undated) DEVICE — DRESSING, GAUZE, 16 PLY, 4 X 4 IN, STERILE

## (undated) DEVICE — CATHETER TRAY, SURESTEP, 16FR, URINE METER W/STATLOCK

## (undated) DEVICE — COUNTER, NEEDLE, FOAM BLOCK, W/MAGNET, W/BLADE GUARD, 10 COUNT, RED, LF

## (undated) DEVICE — STIMULATOR, NERVE LOCATOR, HEAD&NECK

## (undated) DEVICE — SYRINGE, 6 CC, REG LUER TIP

## (undated) DEVICE — SYRINGE, COLLECTION, ABG, 1CC, LUER LOCK

## (undated) DEVICE — Device

## (undated) DEVICE — SPONGE, DISSECTOR, PEANUT, 3/8, STERILE 5 FOAM HOLDER"

## (undated) DEVICE — DRESSING, ABDOMINAL, TENDERSORB, 8 X 10 IN, STERILE

## (undated) DEVICE — REST, HEAD, BAGEL, 9 IN

## (undated) DEVICE — HOLSTER, JET SAFETY

## (undated) DEVICE — DRESSING, ADHESIVE, ISLAND, TELFA, 4 X 14 IN

## (undated) DEVICE — DRESSING, NON-ADHERENT, TELFA, OUCHLESS, 3 X 8 IN, STERILE

## (undated) DEVICE — DRESSING, TRANSPARENT, TEGADERM, 8 X 12 IN

## (undated) DEVICE — SYRINGE, HYPODERMIC, LUER LOCK, 6 CC

## (undated) DEVICE — CLEANER, WIPE, INSTRUMENT, 3.25 X 3.25 IN

## (undated) DEVICE — CATHETER, IV, ANGIOCATH, 24 G X 0.75 IN, FEP POLYMER

## (undated) DEVICE — SUTURE, PROLENE, 2-0, 30 IN, SH, BLUE

## (undated) DEVICE — CLIP, LIGATING, W/ADHESIVE PAD, MEDIUM, TITANIUM

## (undated) DEVICE — SPONGE, LAP, XRAY DECT, 12IN X 12IN, W/MASTER DMT, STERILE

## (undated) DEVICE — TRAY, MINOR, SINGLE BASIN, STERILE

## (undated) DEVICE — SPONGE, GAUZE, XRAY DECT, 16 PLY, 4 X 4, W/MASTER DMT,STERILE

## (undated) DEVICE — BACKGROUND MATERIAL, MERCIAN, YELLOW, 1MM GRID, LF

## (undated) DEVICE — DRAIN, WOUND, FLAT, HUBLESS, FULL LENGTH PERFORATION, 10 MM X 20 CM, SILICONE

## (undated) DEVICE — SUTURE, VICRYL, 3-0,18 IN, SH, UNDYED

## (undated) DEVICE — MICROCLIPS, GEM HEMOSTATIC TITANIUM

## (undated) DEVICE — SUTURE, PERMA HAND 2-0, TAPER POINT, SH BLACK 8-18 INCH

## (undated) DEVICE — ADAPTER, WATER BOTTLE, SMART CAP, 140/160/180 SERIES

## (undated) DEVICE — COVER, BACK TABLE, 65 X 90, HVY REINFORCED

## (undated) DEVICE — EVACUATOR, WOUND, SUCTION, CLOSED, JACKSON-PRATT, 100 CC, SILICONE

## (undated) DEVICE — CLIP, LIGATING, W/ADHESIVE, WIDE SLOT, SMALL, TITANIUM

## (undated) DEVICE — PEG KIT, GASTRO, STANDARD, PULL, 20 FR, W/XYLOCAINE AMPULE

## (undated) DEVICE — CATHETER, IV, INSYTE, AUTOGUARD, SHIELDED, 16 G X 1.75 IN, VIALON

## (undated) DEVICE — COVER, CART, 45 X 27 X 48 IN, CLEAR

## (undated) DEVICE — SUTURE, PLAIN, 5-0, 18 IN, PC1, YELLOW

## (undated) DEVICE — STAY SET, SURGICAL, 5MM SHARP HOOK, 8PK

## (undated) DEVICE — RETRACTOR, SPANDEX CHEEK & LIP HAGER-WERKEN P605-454

## (undated) DEVICE — TOWEL, SURGICAL, NEURO, O/R, 16 X 26, BLUE, STERILE